# Patient Record
Sex: FEMALE | Race: BLACK OR AFRICAN AMERICAN | Employment: OTHER | URBAN - METROPOLITAN AREA
[De-identification: names, ages, dates, MRNs, and addresses within clinical notes are randomized per-mention and may not be internally consistent; named-entity substitution may affect disease eponyms.]

---

## 2017-04-05 ENCOUNTER — HOSPITAL ENCOUNTER (OUTPATIENT)
Dept: OTHER | Age: 52
Discharge: HOME | End: 2017-04-05
Attending: CLINIC/CENTER | Admitting: CLINIC/CENTER

## 2017-04-05 LAB
BACTERIA: NEGATIVE /HPF
BILIRUBIN URINE: NEGATIVE MG/DL
BLOOD, URINE: ABNORMAL
CLARITY: ABNORMAL
COLOR: YELLOW
COMMENT UA: ABNORMAL
GLUCOSE, URINE: >500 MG/DL
KETONES, URINE: NEGATIVE MG/DL
LEUKOCYTE ESTERASE, URINE: ABNORMAL
MUCUS: ABNORMAL HPF
NITRITE URINE, QUANTITATIVE: NEGATIVE
PH, URINE: 7 (ref 5–8)
PROTEIN UA: NEGATIVE MG/DL
RBC URINE: 105 /HPF (ref 0–6)
SPECIFIC GRAVITY UA: 1 (ref 1–1.03)
SQUAMOUS EPITHELIAL: 7 /HPF
UROBILINOGEN, URINE: NORMAL EU/DL (ref 0.2–1)
WBC UA: 211 /HPF (ref 0–5)

## 2017-04-07 LAB
CHLAMYDIA TRACHOMATIS AMPLIFIED DET: NEGATIVE
CULTURE: NORMAL
N GONORRHOEAE AMPLIFIED DET: NEGATIVE
REPORT STATUS: NORMAL
REQUEST PROBLEM: NORMAL
SPECIMEN: NORMAL
TOTAL COLONY COUNT: NORMAL

## 2019-08-06 ENCOUNTER — HOSPITAL ENCOUNTER (OUTPATIENT)
Age: 54
Discharge: HOME | End: 2019-08-06
Payer: MEDICAID

## 2019-08-06 LAB
ALBUMIN SERPL-MCNC: 3.9 GM/DL (ref 3.4–5)
ALP BLD-CCNC: 143 IU/L (ref 40–128)
ALT SERPL-CCNC: 51 U/L (ref 10–40)
AMPHETAMINES: NEGATIVE
ANION GAP SERPL CALCULATED.3IONS-SCNC: 6 MMOL/L (ref 4–16)
AST SERPL-CCNC: 51 IU/L (ref 15–37)
BARBITURATE SCREEN URINE: NEGATIVE
BASOPHILS ABSOLUTE: 0 K/CU MM
BASOPHILS RELATIVE PERCENT: 0.4 % (ref 0–1)
BENZODIAZEPINE SCREEN, URINE: NEGATIVE
BILIRUB SERPL-MCNC: 0.7 MG/DL (ref 0–1)
BUN BLDV-MCNC: 7 MG/DL (ref 6–23)
CALCIUM SERPL-MCNC: 9.8 MG/DL (ref 8.3–10.6)
CANNABINOID SCREEN URINE: ABNORMAL
CHLORIDE BLD-SCNC: 108 MMOL/L (ref 99–110)
CHOLESTEROL: 141 MG/DL
CO2: 20 MMOL/L (ref 21–32)
COCAINE METABOLITE: NEGATIVE
CREAT SERPL-MCNC: 1 MG/DL (ref 0.6–1.1)
DIFFERENTIAL TYPE: ABNORMAL
EOSINOPHILS ABSOLUTE: 0.1 K/CU MM
EOSINOPHILS RELATIVE PERCENT: 1.5 % (ref 0–3)
ESTIMATED AVERAGE GLUCOSE: 126 MG/DL
GFR AFRICAN AMERICAN: >60 ML/MIN/1.73M2
GFR NON-AFRICAN AMERICAN: 58 ML/MIN/1.73M2
GLUCOSE BLD-MCNC: 153 MG/DL (ref 70–99)
HBA1C MFR BLD: 6 % (ref 4.2–6.3)
HCT VFR BLD CALC: 40.4 % (ref 37–47)
HDLC SERPL-MCNC: 77 MG/DL
HEMOGLOBIN: 13.2 GM/DL (ref 12.5–16)
IMMATURE NEUTROPHIL %: 1.2 % (ref 0–0.43)
INTERPRETATION: NORMAL
LDL CHOLESTEROL DIRECT: 54 MG/DL
LITHIUM LEVEL: 1.3 MMOL/L (ref 0.6–1.2)
LYMPHOCYTES ABSOLUTE: 2.3 K/CU MM
LYMPHOCYTES RELATIVE PERCENT: 24.4 % (ref 24–44)
MCH RBC QN AUTO: 32.5 PG (ref 27–31)
MCHC RBC AUTO-ENTMCNC: 32.7 % (ref 32–36)
MCV RBC AUTO: 99.5 FL (ref 78–100)
MONOCYTES ABSOLUTE: 0.8 K/CU MM
MONOCYTES RELATIVE PERCENT: 8.3 % (ref 0–4)
NUCLEATED RBC %: 0 %
OPIATES, URINE: NEGATIVE
OXYCODONE: ABNORMAL
PDW BLD-RTO: 14.8 % (ref 11.7–14.9)
PHENCYCLIDINE, URINE: NEGATIVE
PLATELET # BLD: 171 K/CU MM (ref 140–440)
PMV BLD AUTO: 11.3 FL (ref 7.5–11.1)
POTASSIUM SERPL-SCNC: 4.2 MMOL/L (ref 3.5–5.1)
PREGNANCY, URINE: NEGATIVE
RBC # BLD: 4.06 M/CU MM (ref 4.2–5.4)
SEGMENTED NEUTROPHILS ABSOLUTE COUNT: 6 K/CU MM
SEGMENTED NEUTROPHILS RELATIVE PERCENT: 64.2 % (ref 36–66)
SODIUM BLD-SCNC: 134 MMOL/L (ref 135–145)
SPECIFIC GRAVITY, URINE: 1.01 (ref 1–1.03)
T4 FREE: 1.19 NG/DL (ref 0.9–1.8)
TOTAL IMMATURE NEUTOROPHIL: 0.11 K/CU MM
TOTAL NUCLEATED RBC: 0 K/CU MM
TOTAL PROTEIN: 8.1 GM/DL (ref 6.4–8.2)
TRIGL SERPL-MCNC: 92 MG/DL
TSH HIGH SENSITIVITY: 2.89 UIU/ML (ref 0.27–4.2)
WBC # BLD: 9.3 K/CU MM (ref 4–10.5)

## 2019-08-06 PROCEDURE — 36415 COLL VENOUS BLD VENIPUNCTURE: CPT

## 2019-08-06 PROCEDURE — 81025 URINE PREGNANCY TEST: CPT

## 2019-08-06 PROCEDURE — 84443 ASSAY THYROID STIM HORMONE: CPT

## 2019-08-06 PROCEDURE — 80178 ASSAY OF LITHIUM: CPT

## 2019-08-06 PROCEDURE — 80307 DRUG TEST PRSMV CHEM ANLYZR: CPT

## 2019-08-06 PROCEDURE — 80053 COMPREHEN METABOLIC PANEL: CPT

## 2019-08-06 PROCEDURE — 85025 COMPLETE CBC W/AUTO DIFF WBC: CPT

## 2019-08-06 PROCEDURE — 84439 ASSAY OF FREE THYROXINE: CPT

## 2019-08-06 PROCEDURE — 83036 HEMOGLOBIN GLYCOSYLATED A1C: CPT

## 2019-08-06 PROCEDURE — 83721 ASSAY OF BLOOD LIPOPROTEIN: CPT

## 2019-08-06 PROCEDURE — 80061 LIPID PANEL: CPT

## 2020-05-26 ENCOUNTER — HOSPITAL ENCOUNTER (INPATIENT)
Age: 55
LOS: 2 days | Discharge: HOME | DRG: 312 | End: 2020-05-28
Attending: EMERGENCY MEDICINE | Admitting: INTERNAL MEDICINE
Payer: COMMERCIAL

## 2020-05-26 ENCOUNTER — APPOINTMENT (OUTPATIENT)
Dept: CT IMAGING | Age: 55
DRG: 312 | End: 2020-05-26
Payer: COMMERCIAL

## 2020-05-26 ENCOUNTER — APPOINTMENT (OUTPATIENT)
Dept: MRI IMAGING | Age: 55
DRG: 312 | End: 2020-05-26
Payer: COMMERCIAL

## 2020-05-26 ENCOUNTER — APPOINTMENT (OUTPATIENT)
Dept: GENERAL RADIOLOGY | Age: 55
DRG: 312 | End: 2020-05-26
Payer: COMMERCIAL

## 2020-05-26 PROBLEM — R73.9 HYPERGLYCEMIA: Status: ACTIVE | Noted: 2020-05-26

## 2020-05-26 LAB
ALBUMIN SERPL-MCNC: 4.1 GM/DL (ref 3.4–5)
ALP BLD-CCNC: 182 IU/L (ref 40–129)
ALT SERPL-CCNC: 15 U/L (ref 10–40)
AMPHETAMINES: NEGATIVE
ANION GAP SERPL CALCULATED.3IONS-SCNC: 16 MMOL/L (ref 4–16)
AST SERPL-CCNC: 18 IU/L (ref 15–37)
BACTERIA: NEGATIVE /HPF
BARBITURATE SCREEN URINE: NEGATIVE
BASE EXCESS: 1 (ref 0–2.4)
BASE EXCESS: 3 (ref 0–2.4)
BASOPHILS ABSOLUTE: 0 K/CU MM
BASOPHILS RELATIVE PERCENT: 0.6 % (ref 0–1)
BENZODIAZEPINE SCREEN, URINE: NEGATIVE
BETA-HYDROXYBUTYRATE: 5.4 MG/DL (ref 0–3)
BILIRUB SERPL-MCNC: 0.6 MG/DL (ref 0–1)
BILIRUBIN URINE: NEGATIVE MG/DL
BLOOD, URINE: NEGATIVE
BUN BLDV-MCNC: 3 MG/DL (ref 6–23)
CALCIUM SERPL-MCNC: 9.5 MG/DL (ref 8.3–10.6)
CANNABINOID SCREEN URINE: NEGATIVE
CHLORIDE BLD-SCNC: 86 MMOL/L (ref 99–110)
CHP ED QC CHECK: NORMAL
CLARITY: CLEAR
CO2: 24 MMOL/L (ref 21–32)
COCAINE METABOLITE: ABNORMAL
COLOR: ABNORMAL
COMMENT: ABNORMAL
COMMENT: ABNORMAL
CREAT SERPL-MCNC: 0.6 MG/DL (ref 0.6–1.1)
DIFFERENTIAL TYPE: ABNORMAL
EOSINOPHILS ABSOLUTE: 0.1 K/CU MM
EOSINOPHILS RELATIVE PERCENT: 1.2 % (ref 0–3)
ESTIMATED AVERAGE GLUCOSE: 387 MG/DL
GFR AFRICAN AMERICAN: >60 ML/MIN/1.73M2
GFR NON-AFRICAN AMERICAN: >60 ML/MIN/1.73M2
GLUCOSE BLD-MCNC: 300 MG/DL (ref 70–99)
GLUCOSE BLD-MCNC: 335 MG/DL (ref 70–99)
GLUCOSE BLD-MCNC: 345 MG/DL (ref 70–99)
GLUCOSE BLD-MCNC: 388 MG/DL (ref 70–99)
GLUCOSE BLD-MCNC: 450 MG/DL (ref 70–99)
GLUCOSE BLD-MCNC: 833 MG/DL (ref 70–99)
GLUCOSE BLD-MCNC: >550 MG/DL (ref 70–99)
GLUCOSE BLD-MCNC: >550 MG/DL (ref 70–99)
GLUCOSE BLD-MCNC: NORMAL MG/DL
GLUCOSE, URINE: >500 MG/DL
HBA1C MFR BLD: 15.1 % (ref 4.2–6.3)
HCO3 VENOUS: 23.2 MMOL/L (ref 19–25)
HCO3 VENOUS: 26.3 MMOL/L (ref 19–25)
HCT VFR BLD CALC: 45.4 % (ref 37–47)
HEMOGLOBIN: 15.5 GM/DL (ref 12.5–16)
IMMATURE NEUTROPHIL %: 0.4 % (ref 0–0.43)
KETONES, URINE: NEGATIVE MG/DL
LEUKOCYTE ESTERASE, URINE: NEGATIVE
LYMPHOCYTES ABSOLUTE: 1.5 K/CU MM
LYMPHOCYTES RELATIVE PERCENT: 29.4 % (ref 24–44)
MCH RBC QN AUTO: 32.6 PG (ref 27–31)
MCHC RBC AUTO-ENTMCNC: 34.1 % (ref 32–36)
MCV RBC AUTO: 95.6 FL (ref 78–100)
MONOCYTES ABSOLUTE: 0.6 K/CU MM
MONOCYTES RELATIVE PERCENT: 10.8 % (ref 0–4)
MUCUS: ABNORMAL HPF
NITRITE URINE, QUANTITATIVE: NEGATIVE
NUCLEATED RBC %: 0 %
O2 SAT, VEN: 82.3 % (ref 50–70)
O2 SAT, VEN: 86.5 % (ref 50–70)
OPIATES, URINE: NEGATIVE
OXYCODONE: NEGATIVE
PCO2, VEN: 43 MMHG (ref 38–52)
PCO2, VEN: 51 MMHG (ref 38–52)
PDW BLD-RTO: 12.8 % (ref 11.7–14.9)
PH VENOUS: 7.32 (ref 7.32–7.42)
PH VENOUS: 7.34 (ref 7.32–7.42)
PH, URINE: 6 (ref 5–8)
PHENCYCLIDINE, URINE: NEGATIVE
PLATELET # BLD: 179 K/CU MM (ref 140–440)
PMV BLD AUTO: 11.8 FL (ref 7.5–11.1)
PO2, VEN: 250 MMHG (ref 28–48)
PO2, VEN: 58 MMHG (ref 28–48)
POTASSIUM SERPL-SCNC: 4.5 MMOL/L (ref 3.5–5.1)
PROTEIN UA: NEGATIVE MG/DL
RBC # BLD: 4.75 M/CU MM (ref 4.2–5.4)
RBC URINE: ABNORMAL /HPF (ref 0–6)
SEGMENTED NEUTROPHILS ABSOLUTE COUNT: 3 K/CU MM
SEGMENTED NEUTROPHILS RELATIVE PERCENT: 57.6 % (ref 36–66)
SODIUM BLD-SCNC: 126 MMOL/L (ref 135–145)
SPECIFIC GRAVITY UA: 1.02 (ref 1–1.03)
TOTAL IMMATURE NEUTOROPHIL: 0.02 K/CU MM
TOTAL NUCLEATED RBC: 0 K/CU MM
TOTAL PROTEIN: 8.5 GM/DL (ref 6.4–8.2)
TRICHOMONAS: ABNORMAL /HPF
TROPONIN T: <0.01 NG/ML
TROPONIN T: <0.01 NG/ML
UROBILINOGEN, URINE: NORMAL MG/DL (ref 0.2–1)
WBC # BLD: 5.1 K/CU MM (ref 4–10.5)
WBC UA: ABNORMAL /HPF (ref 0–5)

## 2020-05-26 PROCEDURE — 2700000000 HC OXYGEN THERAPY PER DAY

## 2020-05-26 PROCEDURE — 81001 URINALYSIS AUTO W/SCOPE: CPT

## 2020-05-26 PROCEDURE — 93005 ELECTROCARDIOGRAM TRACING: CPT | Performed by: EMERGENCY MEDICINE

## 2020-05-26 PROCEDURE — 6370000000 HC RX 637 (ALT 250 FOR IP): Performed by: PHYSICIAN ASSISTANT

## 2020-05-26 PROCEDURE — 2580000003 HC RX 258: Performed by: EMERGENCY MEDICINE

## 2020-05-26 PROCEDURE — 82805 BLOOD GASES W/O2 SATURATION: CPT

## 2020-05-26 PROCEDURE — 96374 THER/PROPH/DIAG INJ IV PUSH: CPT

## 2020-05-26 PROCEDURE — 94761 N-INVAS EAR/PLS OXIMETRY MLT: CPT

## 2020-05-26 PROCEDURE — 6370000000 HC RX 637 (ALT 250 FOR IP): Performed by: EMERGENCY MEDICINE

## 2020-05-26 PROCEDURE — 6360000002 HC RX W HCPCS: Performed by: EMERGENCY MEDICINE

## 2020-05-26 PROCEDURE — G0378 HOSPITAL OBSERVATION PER HR: HCPCS

## 2020-05-26 PROCEDURE — 71045 X-RAY EXAM CHEST 1 VIEW: CPT

## 2020-05-26 PROCEDURE — 96366 THER/PROPH/DIAG IV INF ADDON: CPT

## 2020-05-26 PROCEDURE — 83036 HEMOGLOBIN GLYCOSYLATED A1C: CPT

## 2020-05-26 PROCEDURE — 2580000003 HC RX 258: Performed by: INTERNAL MEDICINE

## 2020-05-26 PROCEDURE — 2000000000 HC ICU R&B

## 2020-05-26 PROCEDURE — 80053 COMPREHEN METABOLIC PANEL: CPT

## 2020-05-26 PROCEDURE — 70450 CT HEAD/BRAIN W/O DYE: CPT

## 2020-05-26 PROCEDURE — 96365 THER/PROPH/DIAG IV INF INIT: CPT

## 2020-05-26 PROCEDURE — 99291 CRITICAL CARE FIRST HOUR: CPT

## 2020-05-26 PROCEDURE — 84484 ASSAY OF TROPONIN QUANT: CPT

## 2020-05-26 PROCEDURE — 70551 MRI BRAIN STEM W/O DYE: CPT

## 2020-05-26 PROCEDURE — 80307 DRUG TEST PRSMV CHEM ANLYZR: CPT

## 2020-05-26 PROCEDURE — 85025 COMPLETE CBC W/AUTO DIFF WBC: CPT

## 2020-05-26 PROCEDURE — 96375 TX/PRO/DX INJ NEW DRUG ADDON: CPT

## 2020-05-26 PROCEDURE — 82962 GLUCOSE BLOOD TEST: CPT

## 2020-05-26 PROCEDURE — 6370000000 HC RX 637 (ALT 250 FOR IP): Performed by: INTERNAL MEDICINE

## 2020-05-26 PROCEDURE — 82010 KETONE BODYS QUAN: CPT

## 2020-05-26 RX ORDER — ASPIRIN 81 MG/1
81 TABLET, CHEWABLE ORAL DAILY
Status: DISCONTINUED | OUTPATIENT
Start: 2020-05-26 | End: 2020-05-28 | Stop reason: HOSPADM

## 2020-05-26 RX ORDER — POLYETHYLENE GLYCOL 3350 17 G/17G
17 POWDER, FOR SOLUTION ORAL DAILY PRN
Status: DISCONTINUED | OUTPATIENT
Start: 2020-05-26 | End: 2020-05-28 | Stop reason: HOSPADM

## 2020-05-26 RX ORDER — QUETIAPINE FUMARATE 200 MG/1
400 TABLET, FILM COATED ORAL NIGHTLY
Status: DISCONTINUED | OUTPATIENT
Start: 2020-05-26 | End: 2020-05-28 | Stop reason: HOSPADM

## 2020-05-26 RX ORDER — SODIUM CHLORIDE 0.9 % (FLUSH) 0.9 %
10 SYRINGE (ML) INJECTION EVERY 12 HOURS SCHEDULED
Status: DISCONTINUED | OUTPATIENT
Start: 2020-05-26 | End: 2020-05-28 | Stop reason: HOSPADM

## 2020-05-26 RX ORDER — DEXTROSE MONOHYDRATE 50 MG/ML
100 INJECTION, SOLUTION INTRAVENOUS PRN
Status: DISCONTINUED | OUTPATIENT
Start: 2020-05-26 | End: 2020-05-28 | Stop reason: HOSPADM

## 2020-05-26 RX ORDER — ACETAMINOPHEN 325 MG/1
650 TABLET ORAL EVERY 6 HOURS PRN
Status: DISCONTINUED | OUTPATIENT
Start: 2020-05-26 | End: 2020-05-28 | Stop reason: HOSPADM

## 2020-05-26 RX ORDER — 0.9 % SODIUM CHLORIDE 0.9 %
1000 INTRAVENOUS SOLUTION INTRAVENOUS ONCE
Status: COMPLETED | OUTPATIENT
Start: 2020-05-26 | End: 2020-05-26

## 2020-05-26 RX ORDER — ONDANSETRON 2 MG/ML
4 INJECTION INTRAMUSCULAR; INTRAVENOUS EVERY 30 MIN PRN
Status: DISCONTINUED | OUTPATIENT
Start: 2020-05-26 | End: 2020-05-26

## 2020-05-26 RX ORDER — SODIUM CHLORIDE 0.9 % (FLUSH) 0.9 %
10 SYRINGE (ML) INJECTION PRN
Status: DISCONTINUED | OUTPATIENT
Start: 2020-05-26 | End: 2020-05-28 | Stop reason: HOSPADM

## 2020-05-26 RX ORDER — ONDANSETRON 2 MG/ML
4 INJECTION INTRAMUSCULAR; INTRAVENOUS EVERY 6 HOURS PRN
Status: DISCONTINUED | OUTPATIENT
Start: 2020-05-26 | End: 2020-05-28 | Stop reason: HOSPADM

## 2020-05-26 RX ORDER — PROMETHAZINE HYDROCHLORIDE 25 MG/1
12.5 TABLET ORAL EVERY 6 HOURS PRN
Status: DISCONTINUED | OUTPATIENT
Start: 2020-05-26 | End: 2020-05-28 | Stop reason: HOSPADM

## 2020-05-26 RX ORDER — NICOTINE POLACRILEX 4 MG
15 LOZENGE BUCCAL PRN
Status: DISCONTINUED | OUTPATIENT
Start: 2020-05-26 | End: 2020-05-28 | Stop reason: HOSPADM

## 2020-05-26 RX ORDER — SODIUM CHLORIDE 9 MG/ML
INJECTION, SOLUTION INTRAVENOUS CONTINUOUS
Status: DISCONTINUED | OUTPATIENT
Start: 2020-05-26 | End: 2020-05-27

## 2020-05-26 RX ORDER — ACETAMINOPHEN 650 MG/1
650 SUPPOSITORY RECTAL EVERY 6 HOURS PRN
Status: DISCONTINUED | OUTPATIENT
Start: 2020-05-26 | End: 2020-05-28 | Stop reason: HOSPADM

## 2020-05-26 RX ORDER — DEXTROSE MONOHYDRATE 25 G/50ML
12.5 INJECTION, SOLUTION INTRAVENOUS PRN
Status: DISCONTINUED | OUTPATIENT
Start: 2020-05-26 | End: 2020-05-28 | Stop reason: HOSPADM

## 2020-05-26 RX ADMIN — ASPIRIN 81 MG 81 MG: 81 TABLET ORAL at 19:59

## 2020-05-26 RX ADMIN — SODIUM CHLORIDE, PRESERVATIVE FREE 10 ML: 5 INJECTION INTRAVENOUS at 23:02

## 2020-05-26 RX ADMIN — SODIUM CHLORIDE 1000 ML: 9 INJECTION, SOLUTION INTRAVENOUS at 19:40

## 2020-05-26 RX ADMIN — ONDANSETRON 4 MG: 2 INJECTION INTRAMUSCULAR; INTRAVENOUS at 15:25

## 2020-05-26 RX ADMIN — SODIUM CHLORIDE 1000 ML: 9 INJECTION, SOLUTION INTRAVENOUS at 15:24

## 2020-05-26 RX ADMIN — SODIUM CHLORIDE: 9 INJECTION, SOLUTION INTRAVENOUS at 23:01

## 2020-05-26 RX ADMIN — QUETIAPINE FUMARATE 400 MG: 200 TABLET ORAL at 23:01

## 2020-05-26 RX ADMIN — SODIUM CHLORIDE 1 UNITS/HR: 9 INJECTION, SOLUTION INTRAVENOUS at 18:14

## 2020-05-26 ASSESSMENT — PAIN DESCRIPTION - PAIN TYPE: TYPE: ACUTE PAIN

## 2020-05-26 ASSESSMENT — PAIN SCALES - GENERAL: PAINLEVEL_OUTOF10: 8

## 2020-05-26 NOTE — ED TRIAGE NOTES
Pt mother Sergio Hilliard [phone]  Pt sister Komal Mayfield [phone]  Pt ok for pt mother and sister to have information about her.

## 2020-05-26 NOTE — ED PROVIDER NOTES
History    Marital status: Legally      Spouse name: Not on file    Number of children: Not on file    Years of education: Not on file    Highest education level: Not on file   Occupational History    Not on file   Social Needs    Financial resource strain: Not on file    Food insecurity     Worry: Not on file     Inability: Not on file    Transportation needs     Medical: Not on file     Non-medical: Not on file   Tobacco Use    Smoking status: Current Every Day Smoker     Packs/day: 1.00     Types: Cigarettes    Smokeless tobacco: Never Used   Substance and Sexual Activity    Alcohol use: Yes     Comment: occassionally    Drug use: Yes     Types: Marijuana    Sexual activity: Not on file   Lifestyle    Physical activity     Days per week: Not on file     Minutes per session: Not on file    Stress: Not on file   Relationships    Social connections     Talks on phone: Not on file     Gets together: Not on file     Attends Gnosticist service: Not on file     Active member of club or organization: Not on file     Attends meetings of clubs or organizations: Not on file     Relationship status: Not on file    Intimate partner violence     Fear of current or ex partner: Not on file     Emotionally abused: Not on file     Physically abused: Not on file     Forced sexual activity: Not on file   Other Topics Concern    Not on file   Social History Narrative    Not on file     Current Facility-Administered Medications   Medication Dose Route Frequency Provider Last Rate Last Dose    glucose (GLUTOSE) 40 % oral gel 15 g  15 g Oral PRN Marcos Morales MD        dextrose 50 % IV solution  12.5 g Intravenous PRN Marcos Morales MD        glucagon (rDNA) injection 1 mg  1 mg Intramuscular PRN Marcos Morales MD        dextrose 5 % solution  100 mL/hr Intravenous PRN Marcos Morales MD        insulin regular (HUMULIN R;NOVOLIN R) 100 Units in sodium chloride 0.9 % 100 mL infusion  1 Units/hr Intravenous Continuous Alessio Noonan MD 1 mL/hr at 05/26/20 2110 1 Units/hr at 05/26/20 2110    0.9 % sodium chloride infusion   Intravenous Continuous Yung Dillard MD        acetaminophen (TYLENOL) tablet 650 mg  650 mg Oral Q6H PRN Yung Dillard MD        Or   Anna Haro acetaminophen (TYLENOL) suppository 650 mg  650 mg Rectal Q6H PRN Yung Dillard MD        promethazine (PHENERGAN) tablet 12.5 mg  12.5 mg Oral Q6H PRN Yung Dillard MD        Or    ondansetron (ZOFRAN) injection 4 mg  4 mg Intravenous Q6H PRN Yung Dillard MD        aspirin chewable tablet 81 mg  81 mg Oral Daily Yung Dillard MD   81 mg at 05/26/20 1959     No current outpatient medications on file. Allergies   Allergen Reactions    Haldol [Haloperidol Lactate] Other (See Comments)     Unknown, severe reaction per mother    Penicillins        Nursing Notes Reviewed    Physical Exam:  Triage VS:    ED Triage Vitals [05/26/20 1345]   Enc Vitals Group      /87      Pulse 111      Resp 18      Temp 98.3 °F (36.8 °C)      Temp Source Oral      SpO2 95 %      Weight 162 lb (73.5 kg)      Height 5' 2\" (1.575 m)      Head Circumference       Peak Flow       Pain Score       Pain Loc       Pain Edu? Excl. in [address]? My pulse ox interpretation is - normal    General appearance:  No acute distress. Skin:  Warm. Dry. No vaginal/labial rash, no other rashes noted. Eye:  Extraocular movements intact. Ears, nose, mouth and throat:  Oral mucosa moist   Neck:  Trachea midline. Extremity:  No swelling. Normal ROM    Heart:  Tachycardic with regular rhythm, normal S1 & S2, no extra heart sounds. Perfusion:  intact  Respiratory:  Lungs clear to auscultation bilaterally. Respirations nonlabored. Abdominal:  Normal bowel sounds. Soft. Nontender. Non distended.   Neurological:  Alert and oriented, follows commands with all limbs, strength 5 out of 5 bilateral upper and lower extremities, sensation intact to light touch throughout bilateral upper and lower extremities. Finger-nose testing with no ataxia bilaterally.   Gait deferred          Psychiatric:  Appropriate    I have reviewed and interpreted all of the currently available lab results from this visit (if applicable):  Results for orders placed or performed during the hospital encounter of 05/26/20   Blood Gas, Venous   Result Value Ref Range    pH, Balbir 7.32 7.32 - 7.42    pCO2, Balbir 51 38 - 52 mmHG    pO2, Balbir 58 (H) 28 - 48 mmHG    Base Excess 1 0 - 2.4    HCO3, Venous 26.3 (H) 19 - 25 MMOL/L    O2 Sat, Balbir 82.3 (H) 50 - 70 %    Comment VBG    CBC Auto Differential   Result Value Ref Range    WBC 5.1 4.0 - 10.5 K/CU MM    RBC 4.75 4.2 - 5.4 M/CU MM    Hemoglobin 15.5 12.5 - 16.0 GM/DL    Hematocrit 45.4 37 - 47 %    MCV 95.6 78 - 100 FL    MCH 32.6 (H) 27 - 31 PG    MCHC 34.1 32.0 - 36.0 %    RDW 12.8 11.7 - 14.9 %    Platelets 028 530 - 156 K/CU MM    MPV 11.8 (H) 7.5 - 11.1 FL    Differential Type AUTOMATED DIFFERENTIAL     Segs Relative 57.6 36 - 66 %    Lymphocytes % 29.4 24 - 44 %    Monocytes % 10.8 (H) 0 - 4 %    Eosinophils % 1.2 0 - 3 %    Basophils % 0.6 0 - 1 %    Segs Absolute 3.0 K/CU MM    Lymphocytes Absolute 1.5 K/CU MM    Monocytes Absolute 0.6 K/CU MM    Eosinophils Absolute 0.1 K/CU MM    Basophils Absolute 0.0 K/CU MM    Nucleated RBC % 0.0 %    Total Nucleated RBC 0.0 K/CU MM    Total Immature Neutrophil 0.02 K/CU MM    Immature Neutrophil % 0.4 0 - 0.43 %   CMP   Result Value Ref Range    Sodium 126 (L) 135 - 145 MMOL/L    Potassium 4.5 3.5 - 5.1 MMOL/L    Chloride 86 (L) 99 - 110 mMol/L    CO2 24 21 - 32 MMOL/L    BUN 3 (L) 6 - 23 MG/DL    CREATININE 0.6 0.6 - 1.1 MG/DL    Glucose 833 (HH) 70 - 99 MG/DL    Calcium 9.5 8.3 - 10.6 MG/DL    Alb 4.1 3.4 - 5.0 GM/DL    Total Protein 8.5 (H) 6.4 - 8.2 GM/DL    Total Bilirubin 0.6 0.0 - 1.0 MG/DL    ALT 15 10 - 40 U/L    AST 18 15 - 37 IU/L    Alkaline Phosphatase 182 (H) 40 - 129 IU/L    GFR

## 2020-05-26 NOTE — ED NOTES
The pt's glucose is \"Out of Reportable Range\" and was taken at 1719 by this tech.      Ray Virk  05/26/20 1725

## 2020-05-26 NOTE — H&P
History and Physical  Internal Medicine Hospitalist      Name:  Jorge L Parrish /Age/Sex: 1965  (53 y.o. female)   MRN & CSN:  [id number] & [id number] Admission Date/Time: 2020  2:39 PM   Location:  [facility] PCP: Aroldo Singleton MD       Hospital Day: 1          Chief Complaint: Chest Pain (squeezing a month, general unwell); Other (groin has sores like when pt had acidosis); and Loss of Consciousness (x 3 per mom)     Assessment and Plan:     --- Recurrent syncope/pre-syncope  - unclear etiology. CT head unremarkable. --- Atypical chest pain -initial troponin normal.  --- Hyperglycemia (). Not in DKA. --- Pseudohyponatremia due to hyperglycemia. --- Poorly controlled T2DM. --- Paranoid schizophrenia - no longer on treatment. --- Elevated BP. No hx of HTN. --- Cigarette smoker - 1 PPD    Plan:  Continue insulin drip. Will switch to SQ insulin when BG < 250 mg/dL. Maintain n.p.o. until ready to switch to SQ insulin. Check hemoglobin A1c  Endocrinology consultation  Continue normal saline  Check orthostatic vital signs  MRI brain without contrast and carotid ultrasound  ECHO  ECG  Cardiology consult  Smoking cessation counseling offered. She declined nicotine patch. Current diagnosis and plan of management discussed with the patient  at the time of admission in lay language who agree to the above plan and disposition of admission for further care. All concerns and questions addressed. Diet NPO   DVT Prophylaxis [x] Lovenox, []  Heparin, [] SCDs, [] Ambulation  [] Long term AC   GI Prophylaxis [] PPI,  [] H2 Blocker,  [] Carafate,  [] Diet,  [x] No GI prophylaxis, N/A: patient is not under significant medical stress, non-ICU or is receiving a diet/tube feeds   Code Status FULL   Disposition  home when ready for discharge.    MDM [] Low, [x] Moderate,[]  High     History of Present Illness:     Principal Problem: Recurrent syncope and intermittent chest pain for 3 quan Balderrama is a 53 y.o. female who presents to the ED with above complaints. Patient has PMH of diabetes mellitus, schizophrenia. She reports history of 3 \" heart attacks\" when she was living at [place] but denies any stent placement or open heart surgeries. She presented to the ED today because of recurrent syncopal episodes. She reports 3 episodes. She says episodes started as dizziness and then she sees gray and then passes out. She reports usually passing out about 5 to 10 minutes and when she comes around she is back to her normal self. She denies any history of seizures. No one has ever witnessed this episode. She reports issues with hypo-and hyperglycemia in the past but she has not checked her blood sugar after these episodes so she cannot if she was hypoglycemic.  she denies extremity weakness. No headache or blurry vision. She also endorses intermittent chest pain. Describes episodes as starting out as a burning sensation then progresses to a tightness. Episodes would last about 10 seconds. Last episode was few hours prior to today's presentation to the ED. She denies exertional chest pain. She denies any relationship with meals. She cannot identify any trigger. In the ED she was found to have blood glucose of 833 and sodium of 126. She was not in DKA. She was started on insulin drip in the ED and admitted for further evaluation. She endorses polyuria and polydipsia. ED Course: Discussed case with ED physician prior to admission. ROS: As per HPI, otherwise 10-systems reviewed negative, unless as noted above.     Objective:   No intake or output data in the 24 hours ending 05/26/20 1831     Vitals:   Vitals:    05/26/20 1600 05/26/20 1651 05/26/20 1751 05/26/20 1805   BP: (!) 143/90 (!) 128/91 (!) 136/93 (!) 164/103   Pulse:  81 90 97   Resp: 18 20     Temp:       TempSrc:       SpO2: 98% 100% 100% 100%   Weight:       Height:         Physical Exam: 05/26/20    GEN  --American woman, sitting upright in bed. EYES -Pupils are equally round. No vision changes. No scleral erythema, discharge, or conjunctivitis. HENT -MM are moist. Oral pharynx without exudates, no evidence of thrush. NECK -Supple, no apparent thyromegaly or masses. RESP -LS CTA equal bilat, no wheezes, rales or rhonchi. Symmetric chest movement. No respiratory distress noted. C/V  -S1/S2 auscultated. RRR without appreciable M/R/G. No peripheral edema. No reproducible chest wall tenderness. GI  -Abdomen is soft non-distended, no significant tenderness. No masses or guarding.   -Laurent catheter is not present. MS  -B/L extremities strong muscles strength. Full movements. No gross joint deformities. No swelling, intact sensation symmetrical.   SKIN  -Normal coloration, warm, dry. No open wounds or ulcers. NEURO  - normal speech, no lateralizing weakness. PSYC  -Awake, alert, oriented x 3. Appropriate affect. Past Medical History:      Past Medical History:   Diagnosis Date    CAD (coronary artery disease)     Diabetes mellitus ([facility] [address]     Hepatitis C      Past Surgery History:  Patient  has a past surgical history that includes Hysterectomy and Cholecystectomy. Social History:    FAM HX: Assessed: Noncontributory.   Soc HX:   Social History     Socioeconomic History    Marital status: Legally      Spouse name: None    Number of children: None    Years of education: None    Highest education level: None   Occupational History    None   Social Needs    Financial resource strain: None    Food insecurity     Worry: None     Inability: None    Transportation needs     Medical: None     Non-medical: None   Tobacco Use    Smoking status: Current Every Day Smoker     Packs/day: 1.00     Types: Cigarettes    Smokeless tobacco: Never Used   Substance and Sexual Activity    Alcohol use: Yes     Comment: occassionally    Drug use: Yes     Types: Marijuana    Sexual MD on 5/26/2020 at 6:31 PM

## 2020-05-27 ENCOUNTER — APPOINTMENT (OUTPATIENT)
Dept: ULTRASOUND IMAGING | Age: 55
DRG: 312 | End: 2020-05-27
Payer: COMMERCIAL

## 2020-05-27 ENCOUNTER — APPOINTMENT (OUTPATIENT)
Dept: GENERAL RADIOLOGY | Age: 55
DRG: 312 | End: 2020-05-27
Payer: COMMERCIAL

## 2020-05-27 PROBLEM — E11.9 TYPE 2 DIABETES MELLITUS WITHOUT COMPLICATION, WITH LONG-TERM CURRENT USE OF INSULIN (HCC): Status: ACTIVE | Noted: 2020-05-27

## 2020-05-27 PROBLEM — R07.2 PRECORDIAL PAIN: Status: ACTIVE | Noted: 2020-05-27

## 2020-05-27 PROBLEM — Z79.4 TYPE 2 DIABETES MELLITUS WITHOUT COMPLICATION, WITH LONG-TERM CURRENT USE OF INSULIN (HCC): Status: ACTIVE | Noted: 2020-05-27

## 2020-05-27 LAB
ANION GAP SERPL CALCULATED.3IONS-SCNC: 10 MMOL/L (ref 4–16)
BACTERIA: NEGATIVE /HPF
BILIRUBIN URINE: NEGATIVE MG/DL
BLOOD, URINE: NEGATIVE
BUN BLDV-MCNC: 2 MG/DL (ref 6–23)
CALCIUM SERPL-MCNC: 9 MG/DL (ref 8.3–10.6)
CHLORIDE BLD-SCNC: 107 MMOL/L (ref 99–110)
CLARITY: ABNORMAL
CO2: 23 MMOL/L (ref 21–32)
COLOR: ABNORMAL
CREAT SERPL-MCNC: 0.5 MG/DL (ref 0.6–1.1)
EKG ATRIAL RATE: 109 BPM
EKG DIAGNOSIS: NORMAL
EKG P-R INTERVAL: 96 MS
EKG Q-T INTERVAL: 492 MS
EKG QRS DURATION: 78 MS
EKG QTC CALCULATION (BAZETT): 662 MS
EKG R AXIS: -32 DEGREES
EKG T AXIS: 53 DEGREES
EKG VENTRICULAR RATE: 109 BPM
GFR AFRICAN AMERICAN: >60 ML/MIN/1.73M2
GFR NON-AFRICAN AMERICAN: >60 ML/MIN/1.73M2
GLUCOSE BLD-MCNC: 166 MG/DL (ref 70–99)
GLUCOSE BLD-MCNC: 190 MG/DL (ref 70–99)
GLUCOSE BLD-MCNC: 243 MG/DL (ref 70–99)
GLUCOSE BLD-MCNC: 252 MG/DL (ref 70–99)
GLUCOSE BLD-MCNC: 264 MG/DL (ref 70–99)
GLUCOSE BLD-MCNC: 272 MG/DL (ref 70–99)
GLUCOSE BLD-MCNC: 274 MG/DL (ref 70–99)
GLUCOSE BLD-MCNC: 279 MG/DL (ref 70–99)
GLUCOSE BLD-MCNC: 279 MG/DL (ref 70–99)
GLUCOSE BLD-MCNC: 290 MG/DL (ref 70–99)
GLUCOSE BLD-MCNC: 347 MG/DL (ref 70–99)
GLUCOSE BLD-MCNC: 412 MG/DL (ref 70–99)
GLUCOSE, URINE: >500 MG/DL
HCT VFR BLD CALC: 38.7 % (ref 37–47)
HEMOGLOBIN: 13.1 GM/DL (ref 12.5–16)
KETONES, URINE: NEGATIVE MG/DL
LEUKOCYTE ESTERASE, URINE: ABNORMAL
LV EF: 53 %
LVEF MODALITY: NORMAL
MCH RBC QN AUTO: 32.2 PG (ref 27–31)
MCHC RBC AUTO-ENTMCNC: 33.9 % (ref 32–36)
MCV RBC AUTO: 95.1 FL (ref 78–100)
NITRITE URINE, QUANTITATIVE: NEGATIVE
PDW BLD-RTO: 12.5 % (ref 11.7–14.9)
PH, URINE: 8 (ref 5–8)
PLATELET # BLD: 145 K/CU MM (ref 140–440)
PMV BLD AUTO: 11.3 FL (ref 7.5–11.1)
POTASSIUM SERPL-SCNC: 3.7 MMOL/L (ref 3.5–5.1)
PROTEIN UA: NEGATIVE MG/DL
RBC # BLD: 4.07 M/CU MM (ref 4.2–5.4)
RBC URINE: 3 /HPF (ref 0–6)
SODIUM BLD-SCNC: 140 MMOL/L (ref 135–145)
SPECIFIC GRAVITY UA: 1.01 (ref 1–1.03)
SQUAMOUS EPITHELIAL: 4 /HPF
TRICHOMONAS: ABNORMAL /HPF
TROPONIN T: <0.01 NG/ML
TSH HIGH SENSITIVITY: 0.94 UIU/ML (ref 0.27–4.2)
UROBILINOGEN, URINE: NORMAL MG/DL (ref 0.2–1)
WBC # BLD: 8.4 K/CU MM (ref 4–10.5)
WBC UA: 4 /HPF (ref 0–5)

## 2020-05-27 PROCEDURE — 93306 TTE W/DOPPLER COMPLETE: CPT

## 2020-05-27 PROCEDURE — 80053 COMPREHEN METABOLIC PANEL: CPT

## 2020-05-27 PROCEDURE — 84484 ASSAY OF TROPONIN QUANT: CPT

## 2020-05-27 PROCEDURE — 2060000000 HC ICU INTERMEDIATE R&B

## 2020-05-27 PROCEDURE — 6360000002 HC RX W HCPCS: Performed by: INTERNAL MEDICINE

## 2020-05-27 PROCEDURE — 84443 ASSAY THYROID STIM HORMONE: CPT

## 2020-05-27 PROCEDURE — G0378 HOSPITAL OBSERVATION PER HR: HCPCS

## 2020-05-27 PROCEDURE — 85027 COMPLETE CBC AUTOMATED: CPT

## 2020-05-27 PROCEDURE — 96366 THER/PROPH/DIAG IV INF ADDON: CPT

## 2020-05-27 PROCEDURE — 81001 URINALYSIS AUTO W/SCOPE: CPT

## 2020-05-27 PROCEDURE — 80048 BASIC METABOLIC PNL TOTAL CA: CPT

## 2020-05-27 PROCEDURE — 93010 ELECTROCARDIOGRAM REPORT: CPT | Performed by: INTERNAL MEDICINE

## 2020-05-27 PROCEDURE — 6370000000 HC RX 637 (ALT 250 FOR IP): Performed by: INTERNAL MEDICINE

## 2020-05-27 PROCEDURE — 99223 1ST HOSP IP/OBS HIGH 75: CPT | Performed by: INTERNAL MEDICINE

## 2020-05-27 PROCEDURE — 94761 N-INVAS EAR/PLS OXIMETRY MLT: CPT

## 2020-05-27 PROCEDURE — 96375 TX/PRO/DX INJ NEW DRUG ADDON: CPT

## 2020-05-27 PROCEDURE — 2580000003 HC RX 258: Performed by: INTERNAL MEDICINE

## 2020-05-27 PROCEDURE — 96372 THER/PROPH/DIAG INJ SC/IM: CPT

## 2020-05-27 PROCEDURE — 71045 X-RAY EXAM CHEST 1 VIEW: CPT

## 2020-05-27 PROCEDURE — 96368 THER/DIAG CONCURRENT INF: CPT

## 2020-05-27 PROCEDURE — 93880 EXTRACRANIAL BILAT STUDY: CPT

## 2020-05-27 PROCEDURE — 6370000000 HC RX 637 (ALT 250 FOR IP): Performed by: PHYSICIAN ASSISTANT

## 2020-05-27 RX ORDER — HYDRALAZINE HYDROCHLORIDE 20 MG/ML
10 INJECTION INTRAMUSCULAR; INTRAVENOUS EVERY 6 HOURS PRN
Status: DISCONTINUED | OUTPATIENT
Start: 2020-05-27 | End: 2020-05-28 | Stop reason: HOSPADM

## 2020-05-27 RX ORDER — INSULIN GLARGINE 100 [IU]/ML
40 INJECTION, SOLUTION SUBCUTANEOUS NIGHTLY
Status: DISCONTINUED | OUTPATIENT
Start: 2020-05-27 | End: 2020-05-28

## 2020-05-27 RX ORDER — ASPIRIN 81 MG/1
81 TABLET, CHEWABLE ORAL DAILY
COMMUNITY

## 2020-05-27 RX ORDER — SODIUM CHLORIDE 9 MG/ML
INJECTION, SOLUTION INTRAVENOUS CONTINUOUS
Status: DISCONTINUED | OUTPATIENT
Start: 2020-05-27 | End: 2020-05-28 | Stop reason: HOSPADM

## 2020-05-27 RX ORDER — QUETIAPINE FUMARATE 300 MG/1
400 TABLET, FILM COATED ORAL NIGHTLY
Status: ON HOLD | COMMUNITY
End: 2022-03-22 | Stop reason: SDUPTHER

## 2020-05-27 RX ORDER — DEXTROSE AND SODIUM CHLORIDE 5; .45 G/100ML; G/100ML
INJECTION, SOLUTION INTRAVENOUS CONTINUOUS
Status: DISCONTINUED | OUTPATIENT
Start: 2020-05-27 | End: 2020-05-28

## 2020-05-27 RX ORDER — LISINOPRIL 10 MG/1
10 TABLET ORAL DAILY
Status: DISCONTINUED | OUTPATIENT
Start: 2020-05-27 | End: 2020-05-28 | Stop reason: HOSPADM

## 2020-05-27 RX ORDER — LISINOPRIL 10 MG/1
10 TABLET ORAL DAILY
Status: ON HOLD | COMMUNITY
End: 2020-05-28 | Stop reason: SDUPTHER

## 2020-05-27 RX ADMIN — DEXTROSE AND SODIUM CHLORIDE: 5; 450 INJECTION, SOLUTION INTRAVENOUS at 09:54

## 2020-05-27 RX ADMIN — ASPIRIN 81 MG 81 MG: 81 TABLET ORAL at 09:54

## 2020-05-27 RX ADMIN — SODIUM CHLORIDE: 9 INJECTION, SOLUTION INTRAVENOUS at 14:41

## 2020-05-27 RX ADMIN — ENOXAPARIN SODIUM 40 MG: 40 INJECTION SUBCUTANEOUS at 09:54

## 2020-05-27 RX ADMIN — SODIUM CHLORIDE, PRESERVATIVE FREE 10 ML: 5 INJECTION INTRAVENOUS at 09:54

## 2020-05-27 RX ADMIN — HYDRALAZINE HYDROCHLORIDE 10 MG: 20 INJECTION INTRAMUSCULAR; INTRAVENOUS at 13:02

## 2020-05-27 RX ADMIN — LISINOPRIL 10 MG: 10 TABLET ORAL at 14:41

## 2020-05-27 RX ADMIN — INSULIN GLARGINE 40 UNITS: 100 INJECTION, SOLUTION SUBCUTANEOUS at 20:56

## 2020-05-27 RX ADMIN — DEXTROSE AND SODIUM CHLORIDE: 5; 450 INJECTION, SOLUTION INTRAVENOUS at 02:24

## 2020-05-27 RX ADMIN — QUETIAPINE FUMARATE 400 MG: 200 TABLET ORAL at 20:56

## 2020-05-27 RX ADMIN — ACETAMINOPHEN 650 MG: 325 TABLET ORAL at 12:36

## 2020-05-27 ASSESSMENT — PAIN DESCRIPTION - DESCRIPTORS
DESCRIPTORS: HEADACHE
DESCRIPTORS: HEADACHE

## 2020-05-27 ASSESSMENT — PAIN DESCRIPTION - LOCATION: LOCATION: HEAD

## 2020-05-27 ASSESSMENT — PAIN SCALES - GENERAL
PAINLEVEL_OUTOF10: 10
PAINLEVEL_OUTOF10: 0
PAINLEVEL_OUTOF10: 6

## 2020-05-27 ASSESSMENT — PAIN DESCRIPTION - PAIN TYPE: TYPE: ACUTE PAIN

## 2020-05-27 NOTE — PROGRESS NOTES
05/27/20 1002   BP: (!) 158/106   Pulse: 89   Resp: 25   Temp:    SpO2:      Physical Exam:   GEN Awake.  Alert , not in respiratory distress, not in pain  HEENT: PEERLA, , supple neck,   Chest: air entry equal bilaterally, no wheezing or crepitation  Heart: S1 and S2 heard, no murmur, no gallop or rub, regular rate  Abdomen: soft, ND , Nt, +BS  Extremities: no cyanosis, tenderness or erythema, peripheral pulses audible  Neurology: alert, oriented x3, able to move 4 limbs    Medications:   Medications:    insulin lispro  15 Units Subcutaneous TID WC    insulin glargine  40 Units Subcutaneous Nightly    insulin lispro  0-12 Units Subcutaneous TID WC    insulin lispro  0-6 Units Subcutaneous 2 times per day    sodium chloride flush  10 mL Intravenous 2 times per day    enoxaparin  40 mg Subcutaneous Daily    aspirin  81 mg Oral Daily    QUEtiapine  400 mg Oral Nightly      Infusions:    dextrose 5 % and 0.45 % NaCl 150 mL/hr at 05/27/20 0954    dextrose      insulin 1 Units/hr (05/26/20 2110)     PRN Meds: glucose, 15 g, PRN  dextrose, 12.5 g, PRN  glucagon (rDNA), 1 mg, PRN  dextrose, 100 mL/hr, PRN  sodium chloride flush, 10 mL, PRN  acetaminophen, 650 mg, Q6H PRN    Or  acetaminophen, 650 mg, Q6H PRN  polyethylene glycol, 17 g, Daily PRN  promethazine, 12.5 mg, Q6H PRN    Or  ondansetron, 4 mg, Q6H PRN          Electronically signed by Jill Amin MD on 5/27/2020 at 10:55 AM

## 2020-05-27 NOTE — CONSULTS
CARDIOLOGY CONSULT NOTE   Reason for consultation:  Chest pain/ syncope    Referring physician:  Yung Dillard MD     Primary care physician: Brie Lynn MD      Dear  Dr. Yung Dillard MD   Thanks for the consult. Chief Complaints :  Chief Complaint   Patient presents with    Chest Pain     squeezing a month, general unwell    Other     groin has sores like when pt had acidosis    Loss of Consciousness     x 3 per mom        History of present illness:Deb is a 53 y. o.year old who has history of uncontrolled diabetes presents with complaint of intermittent dizziness lightheadedness reports episodes of passing out. He says he gets dizzy when she stands up she also reports chest tightness in the middle of the chest without any radiation. She has history of schizophrenia reports that she has had heart attacks before when she was out of the [place] although cannot confirm where. She has uncontrolled diabetes not taking any medication supposed to be on insulin. In the emergency department she was noted to have possible ketoacidosis that admitted in the ICU for IV fluids and insulin. Cardiology was consulted due to intermittent complaints of chest tightness. EKG shows sinus tachycardia    Past medical history:    has a past medical history of CAD (coronary artery disease), Diabetes mellitus ([address] and Hepatitis C. Past surgical history:   has a past surgical history that includes Hysterectomy and Cholecystectomy. Social History:   reports that she has been smoking cigarettes. She has been smoking about 1.00 pack per day. She has never used smokeless tobacco. She reports current alcohol use. She reports current drug use. Drug: Marijuana.   Family history:   no family history of CAD, STROKE of DM at early age    Allergies   Allergen Reactions    Haldol [Haloperidol Lactate] Other (See Comments)     Unknown, severe reaction per mother    Penicillins        dextrose 5 % and 0.45 % sodium MD Maxx        dextrose 50 % IV solution  12.5 g Intravenous PRN Patricia Kitchen MD        glucagon (rDNA) injection 1 mg  1 mg Intramuscular PRN Patricia Kitchen MD        dextrose 5 % solution  100 mL/hr Intravenous PRN Patricia Kitchen MD        insulin regular (HUMULIN R;NOVOLIN R) 100 Units in sodium chloride 0.9 % 100 mL infusion  1 Units/hr Intravenous Continuous Patricia Kitchen MD 1 mL/hr at 05/26/20 2110 1 Units/hr at 05/26/20 2110    sodium chloride flush 0.9 % injection 10 mL  10 mL Intravenous 2 times per day Patricia Kitchen MD   10 mL at 05/27/20 0954    sodium chloride flush 0.9 % injection 10 mL  10 mL Intravenous PRN Patricia Kitchen MD        acetaminophen (TYLENOL) tablet 650 mg  650 mg Oral Q6H PRN Patricia Kitchen MD   650 mg at 05/27/20 1236    Or    acetaminophen (TYLENOL) suppository 650 mg  650 mg Rectal Q6H PRN Patricia Kitchen MD        polyethylene glycol (GLYCOLAX) packet 17 g  17 g Oral Daily PRN Patricia Kitchen MD        promethazine (PHENERGAN) tablet 12.5 mg  12.5 mg Oral Q6H PRN Patricia Kitchen MD        Or    ondansetron (ZOFRAN) injection 4 mg  4 mg Intravenous Q6H PRN Patricia Kitchen MD        enoxaparin (LOVENOX) injection 40 mg  40 mg Subcutaneous Daily Patricia Kitchen MD   40 mg at 05/27/20 9828    aspirin chewable tablet 81 mg  81 mg Oral Daily Patricia Kitchen MD   81 mg at 05/27/20 3078    QUEtiapine (SEROQUEL) tablet 400 mg  400 mg Oral Nightly Moe Montero PA-C   400 mg at 05/26/20 2301     Review of Systems:   · Constitutional: No Fever or Weight Loss   · Eyes: No Decreased Vision  · ENT: No Headaches, Hearing Loss or Vertigo  · Cardiovascular: As per HPI  · Respiratory: As per HPI  · Gastrointestinal: No abdominal pain, appetite loss, blood in stools, constipation, diarrhea or heartburn  · Genitourinary: No dysuria, trouble voiding, or hematuria  · Musculoskeletal:  No gait disturbance, weakness or joint complaints  · Integumentary: No rash or pruritis  · Neurological: No TIA or stroke symptoms  · Psychiatric: History of schizophrenia currently not getting treated  · Endocrine: No malaise, fatigue or temperature intolerance  · Hematologic/Lymphatic: No bleeding problems, blood clots or swollen lymph nodes  · Allergic/Immunologic: No nasal congestion or hives  All systems negative except as marked. Physical Examination:    Vitals:    05/27/20 0808 05/27/20 0900 05/27/20 1002 05/27/20 1100   BP:  (!) 157/104 (!) 158/106 (!) 152/109   Pulse: 90 91 89 94   Resp:  21 25 23   Temp:       TempSrc:       SpO2:       Weight:       Height:           General Appearance:  No distress, conversant    Constitutional:  Well developed, Well nourished, No acute distress, Non-toxic appearance. HENT:  Normocephalic, Atraumatic, Bilateral external ears normal, Oropharynx moist, No oral exudates, Nose normal. Neck- Normal range of motion, No tenderness, Supple, No stridor,no apical-carotid delay  Lymphatics : no palpable lymph nodes  Eyes:  PERRL, EOMI, Conjunctiva normal, No discharge. Respiratory:  Normal breath sounds, No respiratory distress, No wheezing, No chest tenderness. ,no use of accessory muscles, crackles Absent   Cardiovascular: (PMI) apex non displaced,no lifts no thrills, ankle swelling Absent  , 1+, s1 and s2 audible,Murmur. Absent , JVD not noted    Abdomen /GI:  Bowel sounds normal, Soft, No tenderness, No masses, No gross visceromegaly   :  No costovertebral angle tenderness   Musculoskeletal:  No edema, no tenderness, no deformities.  Back- no tenderness  Integument:  Well hydrated, no rash   Lymphatic:  No lymphadenopathy noted   Neurologic:  Alert & oriented x 3, CN 2-12 normal, normal motor function, normal sensory function, no focal deficits noted           Medical decision making and Data review:    Lab Review   Recent Labs     05/27/20  0330   WBC 8.4   HGB 13.1   HCT 38.7         Recent Labs     05/27/20  0330    exam:->chest pain Reason for Exam: chest pain FINDINGS: The lungs are without acute focal process. There is no effusion or pneumothorax. The cardiomediastinal silhouette is without acute process. The osseous structures are without acute process. No acute process. Vl Dup Carotid Bilateral    Result Date: 5/27/2020  EXAMINATION: ULTRASOUND EVALUATION OF THE CAROTID ARTERIES 5/27/2020 COMPARISON: None. HISTORY: ORDERING SYSTEM PROVIDED HISTORY: Recurrent dizziness TECHNOLOGIST PROVIDED HISTORY: Reason for exam:->Recurrent dizziness Reason for Exam: dizziness FINDINGS: RIGHT: The right common carotid artery demonstrates peak systolic velocities of 61 and 52 cm/sec in the proximal and distal segments respectively. The right internal carotid artery demonstrates the systolic velocities of 38, 37, and 44 cm/sec in the proximal, mid and distal segments respectively. The external carotid artery is patent. The vertebral artery demonstrates normal antegrade flow. Mild atherosclerotic plaque at the carotid bulb. Significant vessel movement with respiration. ICA/CCA ratio of 0.7. LEFT: The left common carotid artery demonstrates peak systolic velocities of 67 and 44 cm/sec in the proximal and distal segments respectively. The left internal carotid artery demonstrates the systolic velocities of 36, 33, and 38 cm/sec in the proximal, mid and distal segments respectively. The external carotid artery is patent. The vertebral artery demonstrates normal antegrade flow. Mild atherosclerotic plaque at the carotid bulb. Significant vessel movement with respiration. ICA/CCA ratio of 0.6. 1. The right internal carotid artery demonstrates 0-50% stenosis . 2. The left internal carotid artery demonstrates 0-50% stenosis . 3. Bilateral vertebral arteries are patent with flow in the normal direction.      Mri Brain Wo Contrast    Result Date: 5/26/2020  EXAMINATION: MRI OF THE BRAIN WITHOUT CONTRAST  5/26/2020 8:27 pm TECHNIQUE: diabetes dehydration electrolyte abnormality as per primary team  DVT prophylaxis if no contraindication  History of schizophrenia as per primary team  6. Dyslipidemia: Start statins           Thank you  much for consult and giving us the opportunity in contributing in the care of this patient. Please feel free to call me for any questions.        Roxanne Baird MD, 5/27/2020 12:54 PM

## 2020-05-28 ENCOUNTER — APPOINTMENT (OUTPATIENT)
Dept: NUCLEAR MEDICINE | Age: 55
DRG: 312 | End: 2020-05-28
Payer: COMMERCIAL

## 2020-05-28 VITALS
DIASTOLIC BLOOD PRESSURE: 98 MMHG | SYSTOLIC BLOOD PRESSURE: 151 MMHG | RESPIRATION RATE: 29 BRPM | TEMPERATURE: 98.3 F | OXYGEN SATURATION: 98 % | HEIGHT: 62 IN | WEIGHT: 142.2 LBS | HEART RATE: 91 BPM | BODY MASS INDEX: 26.17 KG/M2

## 2020-05-28 LAB
ALBUMIN SERPL-MCNC: 2.9 GM/DL (ref 3.4–5)
ALP BLD-CCNC: 114 IU/L (ref 40–128)
ALT SERPL-CCNC: 11 U/L (ref 10–40)
ANION GAP SERPL CALCULATED.3IONS-SCNC: 8 MMOL/L (ref 4–16)
AST SERPL-CCNC: 17 IU/L (ref 15–37)
BILIRUB SERPL-MCNC: 0.4 MG/DL (ref 0–1)
BUN BLDV-MCNC: 3 MG/DL (ref 6–23)
CALCIUM SERPL-MCNC: 9 MG/DL (ref 8.3–10.6)
CHLORIDE BLD-SCNC: 107 MMOL/L (ref 99–110)
CO2: 25 MMOL/L (ref 21–32)
CREAT SERPL-MCNC: 0.6 MG/DL (ref 0.6–1.1)
GFR AFRICAN AMERICAN: >60 ML/MIN/1.73M2
GFR NON-AFRICAN AMERICAN: >60 ML/MIN/1.73M2
GLUCOSE BLD-MCNC: 108 MG/DL (ref 70–99)
GLUCOSE BLD-MCNC: 148 MG/DL (ref 70–99)
GLUCOSE BLD-MCNC: 184 MG/DL (ref 70–99)
GLUCOSE BLD-MCNC: 206 MG/DL (ref 70–99)
GLUCOSE BLD-MCNC: 98 MG/DL (ref 70–99)
HCT VFR BLD CALC: 39.4 % (ref 37–47)
HEMOGLOBIN: 13.3 GM/DL (ref 12.5–16)
LV EF: 73 %
LVEF MODALITY: NORMAL
MAGNESIUM: 1.6 MG/DL (ref 1.8–2.4)
MCH RBC QN AUTO: 32.3 PG (ref 27–31)
MCHC RBC AUTO-ENTMCNC: 33.8 % (ref 32–36)
MCV RBC AUTO: 95.6 FL (ref 78–100)
PDW BLD-RTO: 12.8 % (ref 11.7–14.9)
PHOSPHORUS: 2.4 MG/DL (ref 2.5–4.9)
PLATELET # BLD: 143 K/CU MM (ref 140–440)
PMV BLD AUTO: 11.9 FL (ref 7.5–11.1)
POTASSIUM SERPL-SCNC: 3.9 MMOL/L (ref 3.5–5.1)
RBC # BLD: 4.12 M/CU MM (ref 4.2–5.4)
SODIUM BLD-SCNC: 140 MMOL/L (ref 135–145)
TOTAL PROTEIN: 6.3 GM/DL (ref 6.4–8.2)
WBC # BLD: 5.3 K/CU MM (ref 4–10.5)

## 2020-05-28 PROCEDURE — 2580000003 HC RX 258: Performed by: INTERNAL MEDICINE

## 2020-05-28 PROCEDURE — 6360000002 HC RX W HCPCS: Performed by: HOSPITALIST

## 2020-05-28 PROCEDURE — 96372 THER/PROPH/DIAG INJ SC/IM: CPT

## 2020-05-28 PROCEDURE — 6360000002 HC RX W HCPCS: Performed by: INTERNAL MEDICINE

## 2020-05-28 PROCEDURE — 2580000003 HC RX 258: Performed by: HOSPITALIST

## 2020-05-28 PROCEDURE — 99233 SBSQ HOSP IP/OBS HIGH 50: CPT | Performed by: INTERNAL MEDICINE

## 2020-05-28 PROCEDURE — 6370000000 HC RX 637 (ALT 250 FOR IP): Performed by: INTERNAL MEDICINE

## 2020-05-28 PROCEDURE — 80053 COMPREHEN METABOLIC PANEL: CPT

## 2020-05-28 PROCEDURE — 36415 COLL VENOUS BLD VENIPUNCTURE: CPT

## 2020-05-28 PROCEDURE — 84100 ASSAY OF PHOSPHORUS: CPT

## 2020-05-28 PROCEDURE — 96367 TX/PROPH/DG ADDL SEQ IV INF: CPT

## 2020-05-28 PROCEDURE — 85027 COMPLETE CBC AUTOMATED: CPT

## 2020-05-28 PROCEDURE — 82962 GLUCOSE BLOOD TEST: CPT

## 2020-05-28 PROCEDURE — 96368 THER/DIAG CONCURRENT INF: CPT

## 2020-05-28 PROCEDURE — 94761 N-INVAS EAR/PLS OXIMETRY MLT: CPT

## 2020-05-28 PROCEDURE — 96366 THER/PROPH/DIAG IV INF ADDON: CPT

## 2020-05-28 PROCEDURE — 83735 ASSAY OF MAGNESIUM: CPT

## 2020-05-28 PROCEDURE — G0378 HOSPITAL OBSERVATION PER HR: HCPCS

## 2020-05-28 PROCEDURE — 78452 HT MUSCLE IMAGE SPECT MULT: CPT

## 2020-05-28 PROCEDURE — 3430000000 HC RX DIAGNOSTIC RADIOPHARMACEUTICAL: Performed by: INTERNAL MEDICINE

## 2020-05-28 PROCEDURE — 93017 CV STRESS TEST TRACING ONLY: CPT

## 2020-05-28 PROCEDURE — A9500 TC99M SESTAMIBI: HCPCS | Performed by: INTERNAL MEDICINE

## 2020-05-28 PROCEDURE — 2500000003 HC RX 250 WO HCPCS: Performed by: HOSPITALIST

## 2020-05-28 RX ORDER — INSULIN GLARGINE 100 [IU]/ML
30 INJECTION, SOLUTION SUBCUTANEOUS NIGHTLY
Status: DISCONTINUED | OUTPATIENT
Start: 2020-05-28 | End: 2020-05-28 | Stop reason: HOSPADM

## 2020-05-28 RX ORDER — INSULIN GLARGINE 100 [IU]/ML
30 INJECTION, SOLUTION SUBCUTANEOUS NIGHTLY
Qty: 1 VIAL | Refills: 0 | Status: ON HOLD | OUTPATIENT
Start: 2020-05-28 | End: 2020-08-05 | Stop reason: SDUPTHER

## 2020-05-28 RX ORDER — LISINOPRIL 10 MG/1
20 TABLET ORAL DAILY
Qty: 30 TABLET | Refills: 0 | Status: ON HOLD
Start: 2020-05-28 | End: 2020-08-05 | Stop reason: HOSPADM

## 2020-05-28 RX ORDER — MAGNESIUM SULFATE IN WATER 40 MG/ML
2 INJECTION, SOLUTION INTRAVENOUS ONCE
Status: COMPLETED | OUTPATIENT
Start: 2020-05-28 | End: 2020-05-28

## 2020-05-28 RX ORDER — PEN NEEDLE, DIABETIC 31 GX5/16"
NEEDLE, DISPOSABLE MISCELLANEOUS
Qty: 60 EACH | Refills: 0 | Status: SHIPPED | OUTPATIENT
Start: 2020-05-28 | End: 2021-04-12

## 2020-05-28 RX ORDER — ATORVASTATIN CALCIUM 40 MG/1
40 TABLET, FILM COATED ORAL DAILY
Qty: 15 TABLET | Refills: 0 | Status: SHIPPED | OUTPATIENT
Start: 2020-05-28 | End: 2021-04-12

## 2020-05-28 RX ADMIN — Medication 30 MILLICURIE: at 09:30

## 2020-05-28 RX ADMIN — SODIUM CHLORIDE: 9 INJECTION, SOLUTION INTRAVENOUS at 11:17

## 2020-05-28 RX ADMIN — POTASSIUM PHOSPHATE, MONOBASIC AND POTASSIUM PHOSPHATE, DIBASIC 10 MMOL: 224; 236 INJECTION, SOLUTION INTRAVENOUS at 11:17

## 2020-05-28 RX ADMIN — SODIUM CHLORIDE: 9 INJECTION, SOLUTION INTRAVENOUS at 00:19

## 2020-05-28 RX ADMIN — MAGNESIUM SULFATE 2 G: 2 INJECTION INTRAVENOUS at 11:17

## 2020-05-28 RX ADMIN — ASPIRIN 81 MG 81 MG: 81 TABLET ORAL at 08:11

## 2020-05-28 RX ADMIN — ACETAMINOPHEN 650 MG: 325 TABLET ORAL at 03:13

## 2020-05-28 RX ADMIN — REGADENOSON 0.4 MG: 0.08 INJECTION, SOLUTION INTRAVENOUS at 09:29

## 2020-05-28 RX ADMIN — Medication 10 MILLICURIE: at 08:25

## 2020-05-28 RX ADMIN — LISINOPRIL 10 MG: 10 TABLET ORAL at 08:11

## 2020-05-28 RX ADMIN — INSULIN LISPRO 15 UNITS: 100 INJECTION, SOLUTION INTRAVENOUS; SUBCUTANEOUS at 10:51

## 2020-05-28 RX ADMIN — SODIUM CHLORIDE, PRESERVATIVE FREE 10 ML: 5 INJECTION INTRAVENOUS at 10:57

## 2020-05-28 RX ADMIN — INSULIN LISPRO 15 UNITS: 100 INJECTION, SOLUTION INTRAVENOUS; SUBCUTANEOUS at 13:22

## 2020-05-28 RX ADMIN — ENOXAPARIN SODIUM 40 MG: 40 INJECTION SUBCUTANEOUS at 08:11

## 2020-05-28 ASSESSMENT — PAIN DESCRIPTION - PAIN TYPE: TYPE: ACUTE PAIN

## 2020-05-28 ASSESSMENT — PAIN SCALES - GENERAL
PAINLEVEL_OUTOF10: 1
PAINLEVEL_OUTOF10: 3

## 2020-05-28 ASSESSMENT — PAIN DESCRIPTION - LOCATION: LOCATION: HEAD

## 2020-05-28 NOTE — PROGRESS NOTES
Cardiology Progress Note     Admit Date:  5/26/2020    Consult reason/ Seen today for :   Chest pain   Uncontrolled HTN     Subjective and  Overnight Events :  No chest pain ,       Chief complain on admission : 53 y. o.year old who is admitted for  Chief Complaint   Patient presents with    Chest Pain     squeezing a month, general unwell    Other     groin has sores like when pt had acidosis    Loss of Consciousness     x 3 per mom      Assessment / Plan:  Stress test is normal , follow up in office , EF is karlos on echo , no valve disease  Uncontrolled HTN : increase lisinopril to 20   Needs to be on statin   Dizziness in setting fo dehydration , ketoacidosis  DVT Prophylaxis if no contraindication  jannet sign off call with questions, follow up in office    Past medical history:    has a past medical history of CAD (coronary artery disease), Diabetes mellitus ([address] and Hepatitis C. Past surgical history:   has a past surgical history that includes Hysterectomy and Cholecystectomy. Social History:   reports that she has been smoking cigarettes. She has been smoking about 1.00 pack per day. She has never used smokeless tobacco. She reports current alcohol use. She reports current drug use. Drug: Marijuana. Family history:  family history is not on file.     Allergies   Allergen Reactions    Haldol [Haloperidol Lactate] Other (See Comments)     Unknown, severe reaction per mother    Penicillins        Review of Systems:    All 14 systems were reviewed and are negative  Except for the positive findings  which as documented     BP (!) 151/98   Pulse 91   Temp 98.3 °F (36.8 °C) (Oral)   Resp 29   Ht 5' 2\" (1.575 m)   Wt 142 lb 3.2 oz (64.5 kg)   SpO2 98%   BMI 26.01 kg/m²       Intake/Output Summary (Last 24 hours) at 5/28/2020 1322  Last data filed at 5/28/2020 0732  Gross per 24 hour   Intake 3628 ml   Output --   Net 3628 ml

## 2020-05-28 NOTE — PROGRESS NOTES
called? ->No Is the patient pregnant?->No Reason for Exam: falls, syncope Acuity: Acute Type of Exam: Initial Mechanism of Injury: fall Relevant Medical/Surgical History: poor historian FINDINGS: BRAIN/VENTRICLES: There is no acute intracranial hemorrhage, mass effect or midline shift. No abnormal extra-axial fluid collection. The gray-white differentiation is maintained without evidence of an acute infarct. There is no evidence of hydrocephalus. ORBITS: The visualized portion of the orbits demonstrate no acute abnormality. SINUSES: The visualized paranasal sinuses and mastoid air cells demonstrate no acute abnormality. SOFT TISSUES/SKULL:  No acute abnormality of the visualized skull or soft tissues. No acute intracranial abnormality. Xr Chest Portable    Result Date: 5/26/2020  EXAMINATION: ONE XRAY VIEW OF THE CHEST 5/26/2020 2:16 pm COMPARISON: None. HISTORY: ORDERING SYSTEM PROVIDED HISTORY: chest pain TECHNOLOGIST PROVIDED HISTORY: Reason for exam:->chest pain Reason for Exam: chest pain FINDINGS: The lungs are without acute focal process. There is no effusion or pneumothorax. The cardiomediastinal silhouette is without acute process. The osseous structures are without acute process. No acute process. Xr Chest 1 Vw    Result Date: 5/27/2020  EXAMINATION: ONE XRAY VIEW OF THE CHEST 5/27/2020 11:44 am COMPARISON: 05/26/2020 HISTORY: ORDERING SYSTEM PROVIDED HISTORY: cough TECHNOLOGIST PROVIDED HISTORY: Reason for exam:->cough Reason for Exam: cough FINDINGS: The heart size is normal.  There is no pneumothorax, edema or focal consolidation. The bony thorax is grossly intact. No evidence of acute cardiopulmonary disease. Vl Dup Carotid Bilateral    Result Date: 5/27/2020  EXAMINATION: ULTRASOUND EVALUATION OF THE CAROTID ARTERIES 5/27/2020 COMPARISON: None.  HISTORY: ORDERING SYSTEM PROVIDED HISTORY: Recurrent dizziness TECHNOLOGIST PROVIDED HISTORY: Reason for exam:->Recurrent dizziness configuration. The sellar/suprasellar regions appear unremarkable. The normal signal voids within the major intracranial vessels appear maintained. There is a chronic lacunar infarct within the left cerebellar hemisphere, inferiorly. ORBITS: The visualized portion of the orbits demonstrate no acute abnormality. SINUSES: The visualized paranasal sinuses and mastoid air cells are well aerated. BONES/SOFT TISSUES: The bone marrow signal intensity appears normal. The soft tissues demonstrate no acute abnormality. No acute intracranial abnormality visualized. Chronic lacunar infarct within the cerebellum. Scheduled Medicines   Medications:    insulin glargine  30 Units Subcutaneous Nightly    insulin lispro  15 Units Subcutaneous TID WC    insulin lispro  0-12 Units Subcutaneous TID WC    insulin lispro  0-6 Units Subcutaneous 2 times per day    lisinopril  10 mg Oral Daily    sodium chloride flush  10 mL Intravenous 2 times per day    enoxaparin  40 mg Subcutaneous Daily    aspirin  81 mg Oral Daily    QUEtiapine  400 mg Oral Nightly      Infusions:    sodium chloride 100 mL/hr at 05/28/20 0019    dextrose           Objective:   Vitals: /86   Pulse 85   Temp 98.3 °F (36.8 °C) (Oral)   Resp 21   Ht 5' 2\" (1.575 m)   Wt 142 lb 3.2 oz (64.5 kg)   SpO2 98%   BMI 26.01 kg/m²   General appearance: alert and cooperative with exam  Neck: no JVD or bruit  Thyroid : Normal lobes   Lungs: Has Vesicular Breath sounds   Heart:  regular rate and rhythm  Abdomen: soft, non-tender; bowel sounds normal; no masses,  no organomegaly  Musculoskeletal: Normal  Extremities: extremities normal, , no edema  Neurologic:  Awake, alert, oriented to name, place and time. Cranial nerves II-XII are grossly intact. Motor is  intact. Sensory is intact. ,  and gait is normal.    Assessment:     Patient Active Problem List:     Hyperglycemia     Precordial pain     Type 2 diabetes mellitus without complication, with long-term current use of insulin ([facility] [address]      Plan:     1. Reviewed POC blood glucose . Labs and X ray results   2. Reviewed Current Medicines   3. On meal/ Correction bolus Humalog/ Basal Lantus Insulin regime / and Oral Hypoglycemic drugs   4. Monitor Blood glucose frequently   5. Modified  the dose of Insulin/ other medicines as needed   6. Will follow     .      Demi Woodruff MD

## 2020-05-28 NOTE — CONSULTS
140   K 4.5 3.7   CL 86* 107   CO2 24 23   BUN 3* 2*   CREATININE 0.6 0.5*   CALCIUM 9.5 9.0   PROT 8.5*  --    LABALBU 4.1  --    BILITOT 0.6  --    ALKPHOS 182*  --    AST 18  --    ALT 15  --      Lipids:   Lab Results   Component Value Date    CHOL 141 08/06/2019    HDL 77 08/06/2019    TRIG 92 08/06/2019     Glucose:   Recent Labs     05/27/20  1448 05/27/20  1717 05/27/20  2055   POCGLU 347* 166* 190*     Hemoglobin A1C:   Lab Results   Component Value Date    LABA1C 15.1 05/26/2020     Free T4:   Lab Results   Component Value Date    T4FREE 1.19 08/06/2019     Free T3: No results found for: FT3  TSH High Sensitivity:   Lab Results   Component Value Date    TSHHS 0.936 05/27/2020       Ct Head Wo Contrast    Result Date: 5/26/2020  EXAMINATION: CT OF THE HEAD WITHOUT CONTRAST  5/26/2020 5:24 pm TECHNIQUE: CT of the head was performed without the administration of intravenous contrast. Dose modulation, iterative reconstruction, and/or weight based adjustment of the mA/kV was utilized to reduce the radiation dose to as low as reasonably achievable. COMPARISON: None. HISTORY: ORDERING SYSTEM PROVIDED HISTORY: falls, syncope TECHNOLOGIST PROVIDED HISTORY: Reason for exam:->falls, syncope Has a \"code stroke\" or \"stroke alert\" been called? ->No Is the patient pregnant?->No Reason for Exam: falls, syncope Acuity: Acute Type of Exam: Initial Mechanism of Injury: fall Relevant Medical/Surgical History: poor historian FINDINGS: BRAIN/VENTRICLES: There is no acute intracranial hemorrhage, mass effect or midline shift. No abnormal extra-axial fluid collection. The gray-white differentiation is maintained without evidence of an acute infarct. There is no evidence of hydrocephalus. ORBITS: The visualized portion of the orbits demonstrate no acute abnormality. SINUSES: The visualized paranasal sinuses and mastoid air cells demonstrate no acute abnormality.  SOFT TISSUES/SKULL:  No acute abnormality of the visualized skull or soft tissues. No acute intracranial abnormality. Xr Chest Portable    Result Date: 5/26/2020  EXAMINATION: ONE XRAY VIEW OF THE CHEST 5/26/2020 2:16 pm COMPARISON: None. HISTORY: ORDERING SYSTEM PROVIDED HISTORY: chest pain TECHNOLOGIST PROVIDED HISTORY: Reason for exam:->chest pain Reason for Exam: chest pain FINDINGS: The lungs are without acute focal process. There is no effusion or pneumothorax. The cardiomediastinal silhouette is without acute process. The osseous structures are without acute process. No acute process. Xr Chest 1 Vw    Result Date: 5/27/2020  EXAMINATION: ONE XRAY VIEW OF THE CHEST 5/27/2020 11:44 am COMPARISON: 05/26/2020 HISTORY: ORDERING SYSTEM PROVIDED HISTORY: cough TECHNOLOGIST PROVIDED HISTORY: Reason for exam:->cough Reason for Exam: cough FINDINGS: The heart size is normal.  There is no pneumothorax, edema or focal consolidation. The bony thorax is grossly intact. No evidence of acute cardiopulmonary disease. Vl Dup Carotid Bilateral    Result Date: 5/27/2020  EXAMINATION: ULTRASOUND EVALUATION OF THE CAROTID ARTERIES 5/27/2020 COMPARISON: None. HISTORY: ORDERING SYSTEM PROVIDED HISTORY: Recurrent dizziness TECHNOLOGIST PROVIDED HISTORY: Reason for exam:->Recurrent dizziness Reason for Exam: dizziness FINDINGS: RIGHT: The right common carotid artery demonstrates peak systolic velocities of 61 and 52 cm/sec in the proximal and distal segments respectively. The right internal carotid artery demonstrates the systolic velocities of 38, 37, and 44 cm/sec in the proximal, mid and distal segments respectively. The external carotid artery is patent. The vertebral artery demonstrates normal antegrade flow. Mild atherosclerotic plaque at the carotid bulb. Significant vessel movement with respiration. ICA/CCA ratio of 0.7. LEFT: The left common carotid artery demonstrates peak systolic velocities of 67 and 44 cm/sec in the proximal and distal segments respectively. The left internal carotid artery demonstrates the systolic velocities of 36, 33, and 38 cm/sec in the proximal, mid and distal segments respectively. The external carotid artery is patent. The vertebral artery demonstrates normal antegrade flow. Mild atherosclerotic plaque at the carotid bulb. Significant vessel movement with respiration. ICA/CCA ratio of 0.6. 1. The right internal carotid artery demonstrates 0-50% stenosis . 2. The left internal carotid artery demonstrates 0-50% stenosis . 3. Bilateral vertebral arteries are patent with flow in the normal direction. Mri Brain Wo Contrast    Result Date: 5/26/2020  EXAMINATION: MRI OF THE BRAIN WITHOUT CONTRAST  5/26/2020 8:27 pm TECHNIQUE: Multiplanar multisequence MRI of the brain was performed without the administration of intravenous contrast. COMPARISON: None. HISTORY: ORDERING SYSTEM PROVIDED HISTORY: Recurrent dizziness TECHNOLOGIST PROVIDED HISTORY: Reason for exam:->Recurrent dizziness Is the patient pregnant?->No Reason for Exam: NKI, dizziness, No Surg FINDINGS: INTRACRANIAL STRUCTURES/VENTRICLES: There is no acute infarct. No mass effect or midline shift. No evidence of an acute intracranial hemorrhage. The ventricles and sulci are normal in size and configuration. The sellar/suprasellar regions appear unremarkable. The normal signal voids within the major intracranial vessels appear maintained. There is a chronic lacunar infarct within the left cerebellar hemisphere, inferiorly. ORBITS: The visualized portion of the orbits demonstrate no acute abnormality. SINUSES: The visualized paranasal sinuses and mastoid air cells are well aerated. BONES/SOFT TISSUES: The bone marrow signal intensity appears normal. The soft tissues demonstrate no acute abnormality. No acute intracranial abnormality visualized. Chronic lacunar infarct within the cerebellum.        Scheduled Medicines   Medications:    insulin lispro  15 Units Subcutaneous TID WC

## 2020-05-28 NOTE — PROGRESS NOTES
[facility] NOTE: MEDS TO [address] Select Patient?: No  Total # of Prescriptions Filled: 3   The following medications were delivered to the patient:  Atorvastatin 40mg  Basaglar (substituted for lantus due to INS)  Novolog (substituted for humalog due to INS  Total # of Interventions Completed: 1  Time Spent (min): 15    Additional Documentation:    Also added pen needles

## 2020-05-28 NOTE — DISCHARGE SUMMARY
agreement with the discharge recommendations, medications, and plan. Consults this admission:  IP CONSULT TO HOSPITALIST  IP CONSULT TO ENDOCRINOLOGY  IP CONSULT TO CARDIOLOGY    Discharge Instruction:   Follow up appointments: follow up with endocrinologist 2 weeks  Primary care physician:  within 1 weeks    Diet:  diabetic diet   Activity: activity as tolerated  Disposition: Discharged to:   [x]Home, [][facility], []SNF, []Acute Rehab, []Hospice   Condition on discharge: Stable    Discharge Medications:      Nasir Self   Home Medication Instructions [id number]    Printed on:05/28/20 1111   Medication Information                      aspirin 81 MG chewable tablet  Take 81 mg by mouth daily             insulin lispro (HUMALOG) 100 UNIT/ML injection vial  Inject into the skin 3 times daily (before meals)             lisinopril (PRINIVIL;ZESTRIL) 10 MG tablet  Take 10 mg by mouth daily             QUEtiapine (SEROQUEL) 300 MG tablet  Take 400 mg by mouth nightly                 Objective Findings at Discharge:   BP (!) 139/94   Pulse 90   Temp 98.3 °F (36.8 °C) (Oral)   Resp 24   Ht 5' 2\" (1.575 m)   Wt 142 lb 3.2 oz (64.5 kg)   SpO2 98%   BMI 26.01 kg/m²            PHYSICAL EXAM     GEN    Awake.  Alert , not in respiratory distress, not in pain  HEENT: PEERLA, , supple neck,   Chest: air entry equal bilaterally, no wheezing or crepitation  Heart: S1 and S2 heard, no murmur, no gallop or rub, regular rate  Abdomen: soft, ND , Nt, +BS  Extremities: no cyanosis, tenderness or erythema, peripheral pulses audible  Neurology: alert, oriented x3, able to move 4 limbs  BMP/CBC  Recent Labs     05/26/20  1439 05/27/20  0330 05/28/20  0316   * 140 140   K 4.5 3.7 3.9   CL 86* 107 107   CO2 24 23 25   BUN 3* 2* 3*   CREATININE 0.6 0.5* 0.6   WBC 5.1 8.4 5.3   HCT 45.4 38.7 39.4    145 143       IMAGING:      Discharge Time of 35  minutes    Electronically signed by Vidal Victoria MD on

## 2020-08-01 ENCOUNTER — APPOINTMENT (OUTPATIENT)
Dept: GENERAL RADIOLOGY | Age: 55
DRG: 871 | End: 2020-08-01
Payer: COMMERCIAL

## 2020-08-01 ENCOUNTER — HOSPITAL ENCOUNTER (INPATIENT)
Age: 55
LOS: 4 days | Discharge: HOME HEALTH | DRG: 871 | End: 2020-08-05
Attending: EMERGENCY MEDICINE | Admitting: INTERNAL MEDICINE
Payer: COMMERCIAL

## 2020-08-01 PROBLEM — E11.65 HYPERGLYCEMIC CRISIS IN DIABETES MELLITUS (HCC): Status: ACTIVE | Noted: 2020-08-01

## 2020-08-01 PROBLEM — I95.9 HYPOTENSIVE EPISODE: Status: ACTIVE | Noted: 2020-08-01

## 2020-08-01 PROBLEM — E87.1 HYPEROSMOLAR HYPONATREMIA: Status: ACTIVE | Noted: 2020-08-01

## 2020-08-01 PROBLEM — E87.6 HYPOKALEMIA: Status: ACTIVE | Noted: 2020-08-01

## 2020-08-01 PROBLEM — E87.0 HYPEROSMOLAR HYPONATREMIA: Status: ACTIVE | Noted: 2020-08-01

## 2020-08-01 LAB
ALBUMIN SERPL-MCNC: 2.8 GM/DL (ref 3.4–5)
ALP BLD-CCNC: 121 IU/L (ref 40–128)
ALT SERPL-CCNC: 6 U/L (ref 10–40)
AMPHETAMINES: NEGATIVE
ANION GAP SERPL CALCULATED.3IONS-SCNC: 11 MMOL/L (ref 4–16)
ANION GAP SERPL CALCULATED.3IONS-SCNC: 18 MMOL/L (ref 4–16)
ANION GAP SERPL CALCULATED.3IONS-SCNC: 20 MMOL/L (ref 4–16)
AST SERPL-CCNC: 7 IU/L (ref 15–37)
BACTERIA: ABNORMAL /HPF
BARBITURATE SCREEN URINE: NEGATIVE
BASE EXCESS: 4 (ref 0–2.4)
BASOPHILS ABSOLUTE: 0 K/CU MM
BASOPHILS RELATIVE PERCENT: 0.1 % (ref 0–1)
BENZODIAZEPINE SCREEN, URINE: NEGATIVE
BETA-HYDROXYBUTYRATE: 10.1 MG/DL (ref 0–3)
BILIRUB SERPL-MCNC: 1 MG/DL (ref 0–1)
BILIRUBIN URINE: NEGATIVE MG/DL
BLOOD, URINE: ABNORMAL
BUN BLDV-MCNC: 12 MG/DL (ref 6–23)
BUN BLDV-MCNC: 14 MG/DL (ref 6–23)
BUN BLDV-MCNC: 15 MG/DL (ref 6–23)
CALCIUM IONIZED: 4.36 MG/DL (ref 4.48–5.28)
CALCIUM IONIZED: 4.72 MG/DL (ref 4.48–5.28)
CALCIUM SERPL-MCNC: 6.4 MG/DL (ref 8.3–10.6)
CALCIUM SERPL-MCNC: 8.9 MG/DL (ref 8.3–10.6)
CALCIUM SERPL-MCNC: 9.3 MG/DL (ref 8.3–10.6)
CANNABINOID SCREEN URINE: NEGATIVE
CHLORIDE BLD-SCNC: 72 MMOL/L (ref 99–110)
CHLORIDE BLD-SCNC: 91 MMOL/L (ref 99–110)
CHLORIDE BLD-SCNC: 97 MMOL/L (ref 99–110)
CHP ED QC CHECK: NORMAL
CLARITY: ABNORMAL
CO2: 17 MMOL/L (ref 21–32)
CO2: 20 MMOL/L (ref 21–32)
CO2: 26 MMOL/L (ref 21–32)
COCAINE METABOLITE: NEGATIVE
COLOR: YELLOW
COMMENT: ABNORMAL
CREAT SERPL-MCNC: 1 MG/DL (ref 0.6–1.1)
DIFFERENTIAL TYPE: ABNORMAL
EOSINOPHILS ABSOLUTE: 0 K/CU MM
EOSINOPHILS RELATIVE PERCENT: 0.1 % (ref 0–3)
GFR AFRICAN AMERICAN: >60 ML/MIN/1.73M2
GFR NON-AFRICAN AMERICAN: 58 ML/MIN/1.73M2
GLUCOSE BLD-MCNC: 1336 MG/DL (ref 70–99)
GLUCOSE BLD-MCNC: 1336 MG/DL (ref 70–99)
GLUCOSE BLD-MCNC: 912 MG/DL (ref 70–99)
GLUCOSE BLD-MCNC: >1500 MG/DL (ref 70–99)
GLUCOSE BLD-MCNC: >550 MG/DL (ref 70–99)
GLUCOSE, URINE: >500 MG/DL
HCO3 VENOUS: 19.2 MMOL/L (ref 19–25)
HCT VFR BLD CALC: 32.2 % (ref 37–47)
HEMOGLOBIN: 8.3 GM/DL (ref 12.5–16)
IMMATURE NEUTROPHIL %: 0.4 % (ref 0–0.43)
IONIZED CA: 1.09 MMOL/L (ref 1.12–1.32)
IONIZED CA: 1.18 MMOL/L (ref 1.12–1.32)
IRON: 89 UG/DL (ref 37–145)
KETONES, URINE: NEGATIVE MG/DL
LACTATE: 2.7 MMOL/L (ref 0.4–2)
LACTATE: 5.3 MMOL/L (ref 0.4–2)
LEUKOCYTE ESTERASE, URINE: ABNORMAL
LIPASE: 32 IU/L (ref 13–60)
LYMPHOCYTES ABSOLUTE: 0.6 K/CU MM
LYMPHOCYTES RELATIVE PERCENT: 8.8 % (ref 24–44)
MAGNESIUM: 1.9 MG/DL (ref 1.8–2.4)
MAGNESIUM: 2.1 MG/DL (ref 1.8–2.4)
MAGNESIUM: 2.1 MG/DL (ref 1.8–2.4)
MCH RBC QN AUTO: 30.9 PG (ref 27–31)
MCHC RBC AUTO-ENTMCNC: 25.8 % (ref 32–36)
MCV RBC AUTO: 119.7 FL (ref 78–100)
MONOCYTES ABSOLUTE: 0.7 K/CU MM
MONOCYTES RELATIVE PERCENT: 10 % (ref 0–4)
MUCUS: ABNORMAL HPF
NITRITE URINE, QUANTITATIVE: NEGATIVE
NUCLEATED RBC %: 0 %
O2 SAT, VEN: 86.4 % (ref 50–70)
OPIATES, URINE: NEGATIVE
OXYCODONE: NEGATIVE
PCO2, VEN: 29 MMHG (ref 38–52)
PCT TRANSFERRIN: 39 % (ref 10–44)
PDW BLD-RTO: 17 % (ref 11.7–14.9)
PH VENOUS: 7.43 (ref 7.32–7.42)
PH, URINE: 5 (ref 5–8)
PHENCYCLIDINE, URINE: NEGATIVE
PHOSPHORUS: 0.7 MG/DL (ref 2.5–4.9)
PHOSPHORUS: 1.2 MG/DL (ref 2.5–4.9)
PLATELET # BLD: 114 K/CU MM (ref 140–440)
PMV BLD AUTO: 12.2 FL (ref 7.5–11.1)
PO2, VEN: 159 MMHG (ref 28–48)
POTASSIUM SERPL-SCNC: 2.7 MMOL/L (ref 3.5–5.1)
POTASSIUM SERPL-SCNC: 2.7 MMOL/L (ref 3.5–5.1)
POTASSIUM SERPL-SCNC: 3 MMOL/L (ref 3.5–5.1)
PROCALCITONIN: 2.39
PROTEIN UA: NEGATIVE MG/DL
RBC # BLD: 2.69 M/CU MM (ref 4.2–5.4)
RBC URINE: 1 /HPF (ref 0–6)
REASON FOR REJECTION: NORMAL
REJECTED TEST: NORMAL
SEGMENTED NEUTROPHILS ABSOLUTE COUNT: 5.4 K/CU MM
SEGMENTED NEUTROPHILS RELATIVE PERCENT: 80.6 % (ref 36–66)
SODIUM BLD-SCNC: 109 MMOL/L (ref 135–145)
SODIUM BLD-SCNC: 129 MMOL/L (ref 135–145)
SODIUM BLD-SCNC: 134 MMOL/L (ref 135–145)
SPECIFIC GRAVITY UA: 1.02 (ref 1–1.03)
SQUAMOUS EPITHELIAL: <1 /HPF
TOTAL IMMATURE NEUTOROPHIL: 0.03 K/CU MM
TOTAL IRON BINDING CAPACITY: 230 UG/DL (ref 250–450)
TOTAL NUCLEATED RBC: 0 K/CU MM
TOTAL PROTEIN: 8.5 GM/DL (ref 6.4–8.2)
TRICHOMONAS: ABNORMAL /HPF
UNSATURATED IRON BINDING CAPACITY: 141 UG/DL (ref 110–370)
UROBILINOGEN, URINE: NORMAL MG/DL (ref 0.2–1)
WBC # BLD: 6.7 K/CU MM (ref 4–10.5)
WBC UA: 34 /HPF (ref 0–5)

## 2020-08-01 PROCEDURE — 87086 URINE CULTURE/COLONY COUNT: CPT

## 2020-08-01 PROCEDURE — 96372 THER/PROPH/DIAG INJ SC/IM: CPT

## 2020-08-01 PROCEDURE — 83550 IRON BINDING TEST: CPT

## 2020-08-01 PROCEDURE — 83605 ASSAY OF LACTIC ACID: CPT

## 2020-08-01 PROCEDURE — 87077 CULTURE AEROBIC IDENTIFY: CPT

## 2020-08-01 PROCEDURE — 36415 COLL VENOUS BLD VENIPUNCTURE: CPT

## 2020-08-01 PROCEDURE — 82805 BLOOD GASES W/O2 SATURATION: CPT

## 2020-08-01 PROCEDURE — 94761 N-INVAS EAR/PLS OXIMETRY MLT: CPT

## 2020-08-01 PROCEDURE — 2000000000 HC ICU R&B

## 2020-08-01 PROCEDURE — 96375 TX/PRO/DX INJ NEW DRUG ADDON: CPT

## 2020-08-01 PROCEDURE — 83540 ASSAY OF IRON: CPT

## 2020-08-01 PROCEDURE — 99285 EMERGENCY DEPT VISIT HI MDM: CPT

## 2020-08-01 PROCEDURE — 96365 THER/PROPH/DIAG IV INF INIT: CPT

## 2020-08-01 PROCEDURE — 83735 ASSAY OF MAGNESIUM: CPT

## 2020-08-01 PROCEDURE — 6360000002 HC RX W HCPCS: Performed by: NURSE PRACTITIONER

## 2020-08-01 PROCEDURE — 87186 SC STD MICRODIL/AGAR DIL: CPT

## 2020-08-01 PROCEDURE — 80307 DRUG TEST PRSMV CHEM ANLYZR: CPT

## 2020-08-01 PROCEDURE — 6360000002 HC RX W HCPCS: Performed by: EMERGENCY MEDICINE

## 2020-08-01 PROCEDURE — 93005 ELECTROCARDIOGRAM TRACING: CPT | Performed by: EMERGENCY MEDICINE

## 2020-08-01 PROCEDURE — 80053 COMPREHEN METABOLIC PANEL: CPT

## 2020-08-01 PROCEDURE — 83690 ASSAY OF LIPASE: CPT

## 2020-08-01 PROCEDURE — 84145 PROCALCITONIN (PCT): CPT

## 2020-08-01 PROCEDURE — 2580000003 HC RX 258: Performed by: EMERGENCY MEDICINE

## 2020-08-01 PROCEDURE — 81001 URINALYSIS AUTO W/SCOPE: CPT

## 2020-08-01 PROCEDURE — 87150 DNA/RNA AMPLIFIED PROBE: CPT

## 2020-08-01 PROCEDURE — 85025 COMPLETE CBC W/AUTO DIFF WBC: CPT

## 2020-08-01 PROCEDURE — C9113 INJ PANTOPRAZOLE SODIUM, VIA: HCPCS | Performed by: EMERGENCY MEDICINE

## 2020-08-01 PROCEDURE — 6360000002 HC RX W HCPCS: Performed by: INTERNAL MEDICINE

## 2020-08-01 PROCEDURE — 82010 KETONE BODYS QUAN: CPT

## 2020-08-01 PROCEDURE — 84100 ASSAY OF PHOSPHORUS: CPT

## 2020-08-01 PROCEDURE — 6370000000 HC RX 637 (ALT 250 FOR IP): Performed by: EMERGENCY MEDICINE

## 2020-08-01 PROCEDURE — 87040 BLOOD CULTURE FOR BACTERIA: CPT

## 2020-08-01 PROCEDURE — 71045 X-RAY EXAM CHEST 1 VIEW: CPT

## 2020-08-01 PROCEDURE — 82330 ASSAY OF CALCIUM: CPT

## 2020-08-01 PROCEDURE — 83036 HEMOGLOBIN GLYCOSYLATED A1C: CPT

## 2020-08-01 PROCEDURE — 80048 BASIC METABOLIC PNL TOTAL CA: CPT

## 2020-08-01 RX ORDER — ONDANSETRON 2 MG/ML
4 INJECTION INTRAMUSCULAR; INTRAVENOUS ONCE
Status: COMPLETED | OUTPATIENT
Start: 2020-08-01 | End: 2020-08-02

## 2020-08-01 RX ORDER — NICOTINE POLACRILEX 4 MG
15 LOZENGE BUCCAL PRN
Status: DISCONTINUED | OUTPATIENT
Start: 2020-08-01 | End: 2020-08-02 | Stop reason: SDUPTHER

## 2020-08-01 RX ORDER — POTASSIUM CHLORIDE 7.45 MG/ML
10 INJECTION INTRAVENOUS
Status: COMPLETED | OUTPATIENT
Start: 2020-08-01 | End: 2020-08-01

## 2020-08-01 RX ORDER — SODIUM CHLORIDE 9 MG/ML
INJECTION, SOLUTION INTRAVENOUS CONTINUOUS
Status: CANCELLED | OUTPATIENT
Start: 2020-08-01

## 2020-08-01 RX ORDER — DEXTROSE MONOHYDRATE 25 G/50ML
12.5 INJECTION, SOLUTION INTRAVENOUS PRN
Status: DISCONTINUED | OUTPATIENT
Start: 2020-08-01 | End: 2020-08-02 | Stop reason: SDUPTHER

## 2020-08-01 RX ORDER — PANTOPRAZOLE SODIUM 40 MG/10ML
40 INJECTION, POWDER, LYOPHILIZED, FOR SOLUTION INTRAVENOUS ONCE
Status: COMPLETED | OUTPATIENT
Start: 2020-08-01 | End: 2020-08-01

## 2020-08-01 RX ORDER — SODIUM CHLORIDE AND POTASSIUM CHLORIDE .9; .15 G/100ML; G/100ML
SOLUTION INTRAVENOUS CONTINUOUS
Status: DISCONTINUED | OUTPATIENT
Start: 2020-08-02 | End: 2020-08-02

## 2020-08-01 RX ORDER — POTASSIUM CHLORIDE 7.45 MG/ML
10 INJECTION INTRAVENOUS PRN
Status: DISCONTINUED | OUTPATIENT
Start: 2020-08-01 | End: 2020-08-02

## 2020-08-01 RX ORDER — ATORVASTATIN CALCIUM 40 MG/1
40 TABLET, FILM COATED ORAL DAILY
Status: DISCONTINUED | OUTPATIENT
Start: 2020-08-02 | End: 2020-08-05 | Stop reason: HOSPADM

## 2020-08-01 RX ORDER — DEXTROSE, SODIUM CHLORIDE, AND POTASSIUM CHLORIDE 5; .45; .15 G/100ML; G/100ML; G/100ML
INJECTION INTRAVENOUS CONTINUOUS PRN
Status: DISCONTINUED | OUTPATIENT
Start: 2020-08-01 | End: 2020-08-03

## 2020-08-01 RX ORDER — MAGNESIUM SULFATE 1 G/100ML
1 INJECTION INTRAVENOUS PRN
Status: DISCONTINUED | OUTPATIENT
Start: 2020-08-01 | End: 2020-08-05 | Stop reason: HOSPADM

## 2020-08-01 RX ORDER — DEXTROSE MONOHYDRATE 50 MG/ML
100 INJECTION, SOLUTION INTRAVENOUS PRN
Status: DISCONTINUED | OUTPATIENT
Start: 2020-08-01 | End: 2020-08-05 | Stop reason: HOSPADM

## 2020-08-01 RX ORDER — QUETIAPINE FUMARATE 200 MG/1
400 TABLET, FILM COATED ORAL NIGHTLY
Status: DISCONTINUED | OUTPATIENT
Start: 2020-08-01 | End: 2020-08-05 | Stop reason: HOSPADM

## 2020-08-01 RX ORDER — SODIUM CHLORIDE 0.9 % (FLUSH) 0.9 %
10 SYRINGE (ML) INJECTION PRN
Status: DISCONTINUED | OUTPATIENT
Start: 2020-08-01 | End: 2020-08-05 | Stop reason: HOSPADM

## 2020-08-01 RX ORDER — LISINOPRIL 10 MG/1
20 TABLET ORAL DAILY
Status: CANCELLED | OUTPATIENT
Start: 2020-08-01

## 2020-08-01 RX ORDER — CALCIUM GLUCONATE 94 MG/ML
1 INJECTION, SOLUTION INTRAVENOUS ONCE
Status: COMPLETED | OUTPATIENT
Start: 2020-08-01 | End: 2020-08-01

## 2020-08-01 RX ORDER — LIDOCAINE HYDROCHLORIDE 10 MG/ML
5 INJECTION, SOLUTION EPIDURAL; INFILTRATION; INTRACAUDAL; PERINEURAL ONCE
Status: DISCONTINUED | OUTPATIENT
Start: 2020-08-01 | End: 2020-08-05 | Stop reason: HOSPADM

## 2020-08-01 RX ORDER — SODIUM CHLORIDE 0.9 % (FLUSH) 0.9 %
10 SYRINGE (ML) INJECTION EVERY 12 HOURS SCHEDULED
Status: DISCONTINUED | OUTPATIENT
Start: 2020-08-01 | End: 2020-08-02 | Stop reason: SDUPTHER

## 2020-08-01 RX ORDER — PROMETHAZINE HYDROCHLORIDE 25 MG/ML
12.5 INJECTION, SOLUTION INTRAMUSCULAR; INTRAVENOUS EVERY 6 HOURS PRN
Status: DISCONTINUED | OUTPATIENT
Start: 2020-08-01 | End: 2020-08-05 | Stop reason: HOSPADM

## 2020-08-01 RX ORDER — ASPIRIN 81 MG/1
81 TABLET, CHEWABLE ORAL DAILY
Status: DISCONTINUED | OUTPATIENT
Start: 2020-08-02 | End: 2020-08-05 | Stop reason: HOSPADM

## 2020-08-01 RX ORDER — ONDANSETRON 2 MG/ML
4 INJECTION INTRAMUSCULAR; INTRAVENOUS EVERY 30 MIN PRN
Status: DISCONTINUED | OUTPATIENT
Start: 2020-08-01 | End: 2020-08-01

## 2020-08-01 RX ORDER — NICOTINE 21 MG/24HR
1 PATCH, TRANSDERMAL 24 HOURS TRANSDERMAL DAILY
Status: DISCONTINUED | OUTPATIENT
Start: 2020-08-02 | End: 2020-08-05 | Stop reason: HOSPADM

## 2020-08-01 RX ORDER — POTASSIUM CHLORIDE 7.45 MG/ML
20 INJECTION INTRAVENOUS ONCE
Status: DISCONTINUED | OUTPATIENT
Start: 2020-08-01 | End: 2020-08-01 | Stop reason: ALTCHOICE

## 2020-08-01 RX ORDER — SODIUM CHLORIDE AND POTASSIUM CHLORIDE .9; .15 G/100ML; G/100ML
SOLUTION INTRAVENOUS CONTINUOUS
Status: DISCONTINUED | OUTPATIENT
Start: 2020-08-01 | End: 2020-08-01

## 2020-08-01 RX ORDER — NICOTINE 21 MG/24HR
1 PATCH, TRANSDERMAL 24 HOURS TRANSDERMAL ONCE
Status: DISCONTINUED | OUTPATIENT
Start: 2020-08-01 | End: 2020-08-05 | Stop reason: HOSPADM

## 2020-08-01 RX ORDER — DEXTROSE MONOHYDRATE 25 G/50ML
12.5 INJECTION, SOLUTION INTRAVENOUS PRN
Status: DISCONTINUED | OUTPATIENT
Start: 2020-08-01 | End: 2020-08-01

## 2020-08-01 RX ORDER — PROMETHAZINE HYDROCHLORIDE 25 MG/ML
12.5 INJECTION, SOLUTION INTRAMUSCULAR; INTRAVENOUS EVERY 6 HOURS PRN
Status: DISCONTINUED | OUTPATIENT
Start: 2020-08-01 | End: 2020-08-01

## 2020-08-01 RX ORDER — PANTOPRAZOLE SODIUM 40 MG/10ML
40 INJECTION, POWDER, LYOPHILIZED, FOR SOLUTION INTRAVENOUS DAILY
Status: DISCONTINUED | OUTPATIENT
Start: 2020-08-01 | End: 2020-08-05 | Stop reason: HOSPADM

## 2020-08-01 RX ORDER — PROMETHAZINE HYDROCHLORIDE 25 MG/ML
25 INJECTION, SOLUTION INTRAMUSCULAR; INTRAVENOUS ONCE
Status: COMPLETED | OUTPATIENT
Start: 2020-08-01 | End: 2020-08-01

## 2020-08-01 RX ORDER — 0.9 % SODIUM CHLORIDE 0.9 %
15 INTRAVENOUS SOLUTION INTRAVENOUS ONCE
Status: CANCELLED | OUTPATIENT
Start: 2020-08-01 | End: 2020-08-01

## 2020-08-01 RX ORDER — 0.9 % SODIUM CHLORIDE 0.9 %
1000 INTRAVENOUS SOLUTION INTRAVENOUS ONCE
Status: COMPLETED | OUTPATIENT
Start: 2020-08-01 | End: 2020-08-01

## 2020-08-01 RX ADMIN — SODIUM CHLORIDE 0.1 UNITS/KG/HR: 9 INJECTION, SOLUTION INTRAVENOUS at 17:39

## 2020-08-01 RX ADMIN — POTASSIUM CHLORIDE 10 MEQ: 7.46 INJECTION, SOLUTION INTRAVENOUS at 19:09

## 2020-08-01 RX ADMIN — CALCIUM GLUCONATE 1 G: 98 INJECTION, SOLUTION INTRAVENOUS at 17:40

## 2020-08-01 RX ADMIN — POTASSIUM CHLORIDE 10 MEQ: 7.46 INJECTION, SOLUTION INTRAVENOUS at 17:38

## 2020-08-01 RX ADMIN — SODIUM CHLORIDE 1000 ML: 9 INJECTION, SOLUTION INTRAVENOUS at 15:44

## 2020-08-01 RX ADMIN — ONDANSETRON 4 MG: 2 INJECTION INTRAMUSCULAR; INTRAVENOUS at 15:44

## 2020-08-01 RX ADMIN — POTASSIUM CHLORIDE AND SODIUM CHLORIDE: 900; 150 INJECTION, SOLUTION INTRAVENOUS at 22:57

## 2020-08-01 RX ADMIN — CEFEPIME HYDROCHLORIDE 2 G: 2 INJECTION, POWDER, FOR SOLUTION INTRAVENOUS at 17:37

## 2020-08-01 RX ADMIN — PANTOPRAZOLE SODIUM 40 MG: 40 INJECTION, POWDER, FOR SOLUTION INTRAVENOUS at 15:48

## 2020-08-01 RX ADMIN — PROMETHAZINE HYDROCHLORIDE 25 MG: 25 INJECTION INTRAMUSCULAR; INTRAVENOUS at 17:38

## 2020-08-01 RX ADMIN — PROMETHAZINE HYDROCHLORIDE 12.5 MG: 25 INJECTION INTRAMUSCULAR; INTRAVENOUS at 21:10

## 2020-08-01 ASSESSMENT — PAIN DESCRIPTION - PAIN TYPE
TYPE: ACUTE PAIN

## 2020-08-01 ASSESSMENT — PAIN SCALES - GENERAL
PAINLEVEL_OUTOF10: 10

## 2020-08-01 ASSESSMENT — PAIN DESCRIPTION - LOCATION
LOCATION: MOUTH
LOCATION: GENERALIZED
LOCATION: MOUTH

## 2020-08-01 NOTE — ED PROVIDER NOTES
Triage Chief Complaint:   Hyperglycemia (BG HIGH); Fatigue; and Emesis      [place]:  Nir Monae is a 53 y.o. female that presents to the emergency department with nausea, vomiting, hyperglycemia. Patient does have a history of hep C, diabetes and CAD. She was admitted at the end of May with syncope, hyperglycemia. Patient did have an elevated glucose in the 800s at that time. She did have an MRI of the brain that was nonacute. She did have a negative cardiac stress test.  She was started on Lantus 30 units at night and a sliding scale of Humalog 12 units before meals which patient states she has been taking. States her last dose of 12 units was at 2 PM.  States vomiting has started today. No diarrhea. No chest pain or pressure. No abdominal pain. No fevers or chills. No sick contacts. Denies urinary symptoms. Past Medical History:   Diagnosis Date    CAD (coronary artery disease)     Diabetes mellitus ([facility] [address]     Hepatitis C      Past Surgical History:   Procedure Laterality Date    CHOLECYSTECTOMY      HYSTERECTOMY       History reviewed. No pertinent family history.   Social History     Socioeconomic History    Marital status: Legally      Spouse name: Not on file    Number of children: Not on file    Years of education: Not on file    Highest education level: Not on file   Occupational History    Not on file   Social Needs    Financial resource strain: Not on file    Food insecurity     Worry: Not on file     Inability: Not on file    Transportation needs     Medical: Not on file     Non-medical: Not on file   Tobacco Use    Smoking status: Current Every Day Smoker     Packs/day: 0.50     Types: Cigarettes    Smokeless tobacco: Never Used   Substance and Sexual Activity    Alcohol use: Yes     Comment: occassionally    Drug use: Yes     Types: Marijuana    Sexual activity: Not on file   Lifestyle    Physical activity     Days per week: Not on file     Minutes per session: Not on file    Stress: Not on file   Relationships    Social connections     Talks on phone: Not on file     Gets together: Not on file     Attends [employer] service: Not on file     Active member of club or organization: Not on file     Attends meetings of clubs or organizations: Not on file     Relationship status: Not on file    Intimate partner violence     Fear of current or ex partner: Not on file     Emotionally abused: Not on file     Physically abused: Not on file     Forced sexual activity: Not on file   Other Topics Concern    Not on file   Social History Narrative    Not on file     Current Facility-Administered Medications   Medication Dose Route Frequency Provider Last Rate Last Dose    ondansetron (ZOFRAN) injection 4 mg  4 mg Intravenous Q30 Min PRN Ginger Thacker MD   4 mg at 08/01/20 1544    cefepime (MAXIPIME) 2 g IVPB minibag  2 g Intravenous Once Ginger hTacker MD        glucose (GLUTOSE) 40 % oral gel 15 g  15 g Oral PRN Ginger Thacker MD        dextrose 50 % IV solution  12.5 g Intravenous PRN Ginger Thacker MD        glucagon (rDNA) injection 1 mg  1 mg Intramuscular PRN Ginger Thacker MD        dextrose 5 % solution  100 mL/hr Intravenous PRN Ginger Thacker MD        insulin regular (HUMULIN R;NOVOLIN R) 100 Units in sodium chloride 0.9 % 100 mL infusion  0.1 Units/kg/hr Intravenous Continuous Ginger Thacker MD        potassium chloride 10 mEq/100 mL IVPB (Peripheral Line)  10 mEq Intravenous Q1H Ginger Thacker MD        calcium gluconate 10 % injection 1 g  1 g Intravenous Once Ginger Thacker MD         Current Outpatient Medications   Medication Sig Dispense Refill    insulin glargine (LANTUS) 100 UNIT/ML injection vial Inject 30 Units into the skin nightly 1 vial 0    insulin lispro (HUMALOG) 100 UNIT/ML injection vial Inject 12 Units into the skin 3 times daily (with meals) 1 vial 0    lisinopril (PRINIVIL;ZESTRIL) 10 MG tablet Take 2 tablets by mouth daily 30 tablet 0    atorvastatin (LIPITOR) 40 MG tablet Take 1 tablet by mouth daily for 15 days 15 tablet 0    Insulin Pen Needle (MEIJER PEN NEEDLES) 31G X 8 MM MISC 1 each by Does not apply route 2 times daily AND 1 each 2 times daily. 60 each 0    aspirin 81 MG chewable tablet Take 81 mg by mouth daily      QUEtiapine (SEROQUEL) 300 MG tablet Take 400 mg by mouth nightly       Allergies   Allergen Reactions    Haldol [Haloperidol Lactate] Other (See Comments)     Unknown, severe reaction per mother    Penicillins      Nursing Notes Reviewed    ROS:  At least 10 systems reviewed and otherwise negative except as in the [address] Physical Exam:  ED Triage Vitals   Enc Vitals Group      BP 08/01/20 1536 89/68      Pulse 08/01/20 1536 88      Resp 08/01/20 1536 18      Temp 08/01/20 1536 97.8 °F (36.6 °C)      Temp Source 08/01/20 1536 Oral      SpO2 08/01/20 1536 100 %      Weight 08/01/20 1533 142 lb (64.4 kg)      Height 08/01/20 1533 5' 2\" (1.575 m)      Head Circumference --       Peak Flow --       Pain Score --       Pain Loc --       Pain Edu? --       Excl. in [address]? --      My pulse oximetry interpretation is which is within the normal range    GENERAL APPEARANCE: Awake and alert. Cooperative. Ill-appearing. Dry heaving. HEAD: Normocephalic. Atraumatic. EYES: EOM's grossly intact. Sclera anicteric. ENT: Mucous membranes are tacky. Tolerates saliva. No trismus. NECK: Supple. No meningismus. Trachea midline. HEART: RRR. Radial pulses 2+. LUNGS: Respirations unlabored. CTAB  ABDOMEN: Soft. Non-tender. No guarding or rebound. Normal bowel sounds. EXTREMITIES: No acute deformities. No edema  SKIN: Warm and dry. NEUROLOGICAL: No gross facial drooping. Moves all 4 extremities spontaneously. PSYCHIATRIC: Normal mood.     I have reviewed and interpreted all of the currently available lab results from this visit (if applicable):  Results for orders placed or performed during the hospital exam:->fatigue   Reason for Exam: fatigue   Acuity: Unknown   Type of Exam: Initial   Additional signs and symptoms: none   Relevant Medical/Surgical History: CAD     FINDINGS:   Monitor wires overlie the chest.     The trachea is midline.  The cardiac silhouette is unremarkable. The lungs   are clear without evidence of infiltrate, mass, or pneumothorax. Loyd Levans is no   pleural fluid.                        [] Discussed with Radiologist:     [] The following radiograph was interpreted by myself in the absence of a radiologist:     EKG: (All EKG's are interpreted by myself in the absence of a cardiologist)  The Ekg interpreted by me shows  normal sinus rhythm with a rate of 93  Axis is   Normal  QTc is  normal  Intervals and Durations are unremarkable. ST Segments: T wave inversion in V3  No significant change from prior EKG dated 5-      MDM:  Patient is afebrile. Hypotensive at 89/68. Heart rate is in the 80s. Sats are 100%. Patient has a liter of fluid going via EMS. Did add a second liter of fluid as well as Zofran and Protonix IV. EKG does not show acute findings. . Accuchek \"hi\" . CBC shows a normal white count of 6.7. Hemoglobin of 8.3. Does have a left shift. CMP shows a glucose of greater than 1500. She does have a pseudohyponatremia of 109. She does have a low calcium of 6.4 that was replaced IV with calcium gluconate. Patient is not acidotic with a normal pH and a normal CO2. Anion gap is mildly elevated at 20 and she is dehydrated with a beta hydroxybutyrate of 10. 1. Bula Dayhoff Venous pH is 7.43. magnesium normal. LIpase normal.  Hydroxybutyrate is 7. 1. Urinalysis shows negative nitrites, trace leuks, 34 WBCs with rare bacteria. Less than 1 squames. Urine culture pending. Started on cefepime IV as her lactic acid came back elevated at 5.3 patient started on a insulin drip secondary to very high glucose. CXR shows no acute findings.   Patient will need to be admitted to the ICU for further evaluation and treatment. CRITICAL CARE NOTE:  There was a high probability of clinically significant life-threatening deterioration of the patient's condition requiring my urgent intervention due to electrolyte abnormalities including low potassium, low calcium, very high glucose. IV insulin, IV potassium, IV calcium, IV fluids chart review, re-eval, admit was performed to address this. Total critical care time is at least  45 minutes. This includes vital sign monitoring, pulse oximetry monitoring, telemetry monitoring, clinical response to the IV medications, reviewing the nursing notes, consultation time, dictation/documentation time, and interpretation of the lab work. This time excludes time spent performing procedures and separately billable procedures and family discussion time. Clinical Impression:  1. Nausea and vomiting, intractability of vomiting not specified, unspecified vomiting type    2. Hyperglycemia    3. Acute cystitis without hematuria    4. Elevated lactic acid level    5. Hypocalcemia    6. Hypokalemia    7. Anemia, unspecified type        Disposition Vitals:  [unfilled], [unfilled], [unfilled], [unfilled]    Disposition referral (if applicable):  No follow-up provider specified.     Disposition medications (if applicable):  New Prescriptions    No medications on file         (Please note that portions of this note may have been completed with a voice recognition program. Efforts were made to edit the dictations but occasionally words are mis-transcribed.)    MD Bertha Emanuel MD  08/01/20 2901 Delmi Arguelles MD  08/01/20 2901 Delmi Arguelles MD  08/01/20 2801

## 2020-08-01 NOTE — ED NOTES
Kiran Sextonbrandon (sister) [phone]  Please keep updated on patient, patient verbalized understanding.        Juliocesar Munoz RN  08/01/20 6092

## 2020-08-01 NOTE — ED NOTES
Bed: ED-25  Expected date:   Expected time:   Means of arrival:   Comments:  ems     Bhanu Joyce RN  08/01/20 2826

## 2020-08-01 NOTE — H&P
History and Physical  YNES Awad-BC   Internal Medicine Hospitalist      Name:  Nemo Infante /Age/Sex: 1965  (53 y.o. female)   MRN & CSN:  [id number] & [id number] Admission Date/Time: 2020  3:32 PM   Location:  ED25/ED-25 PCP: Aileen Dunbar MD       Hospital Day: 1      Supervising Physician: Dr. Oliva Faith     Chief Complaint: Hyperglycemia (BG HIGH); Fatigue; and Emesis     Assessment and Plan:   Nemo Infante is a 53 y.o. female who presents with Hyperglycemic crisis in diabetes mellitus ([facility] [address]     Hyperglycemic, hyperosmolar crisis  in DM type 2 -- likely noncompliance to treatment, serum glucose >1500, A/G 20.   Hyperosmolar hyponatremia (109), 2/2 hyperglycemia - corrected with fluids and Insulin for hyperglycemia.  Hypokalemia (3.0) , hypocalcemia (corrected 7.4)   Elevated lactate (2.7), likely d/t dehydration.       - was admitted last 2020 for similar problem       - admit inpatient to ICU, telemetry monitoring         - insulin bolus then insulin gtt - started in ED       - aggressive IVF NS with KCl        - series Lactate       - serial BMP            - NPO until gap closed       - hold Lantus for now       - calcium gluconate 1 g given in ED       - Endo consulted for med mx       - pending iron panel, A1C, UDS, ionized Ca     Hypotensive episode - asymptomatic, initial BP in ED 89/68 with MAP 75.       - hold Lisinopril for now       - cont IVF       - closely monitor BP trends and MAP     Suspected, UTI - asymptomatic, UA with trace esterase, WBC normal, no fever, but procalcitonin 2.39       - cont Cefepime - started in ED       - cont monitor signs and symptoms of sepsis       - pending cultures           Tobacco abuse - on Nicotine patch, tobacco cessation education.  Chronic Illnesses: will continue current home medications unless contraindicated by above plan and assessment.         - CAD - on ASA, Lipitor       - Hep C       - thyroid disease       - paranoid schizophrenia/bipolar affective       - enlarged liver     Current diagnosis and plan of management discussed with the patient and sister at the time of admission in lay language who agree to the above plan and disposition of admission for further care. All concerns and questions addressed. Patient assessment and plan in conjunction with supervising physician - Dr. Ryanne Knowles NPO effective now    DVT Prophylaxis [x] Lovenox, []  Heparin, [] SCDs, [] Ambulation  [] Long term AC   GI Prophylaxis [x] PPI,  [] H2 Blocker,  [] Carafate,  [] Diet,  [] No GI prophylaxis, N/A: patient is not under significant medical stress, non-ICU or is receiving a diet/tube feeds   Code Status  Full CODE   Disposition Patient requires continued admission due to Hyperglycemic crisis in diabetes mellitus ([facility] [address] Discharge Plan: Patient plans to return home upon discharge. MDM [] Low, [] Moderate,[x]  High  Patient's risk as above due to:      [x] One or more chronic illnesses with mild exacerbation progression      [x] Two or more stable chronic illnesses      [] Undiagnosed new problem with uncertain prognosis      [] Elective major surgery      []Prescription drug management     History of Present Illness:     Principal Problem: Hyperglycemic crisis in diabetes mellitus ([facility] [address]  Kacey Arenas is a 53 y.o. female with past medical history of uncontrolled DM type 2, CAD, Hep C, thyroid disease, paranoid schizophrenia, bipolar affective, enlarged liver, and tobacco abuse presented to the ED with sister for complaints of extremely high blood sugar, nausea, vomiting, and fatigue. Per chart review, patient was admitted here last 5/2020 for similar problem with blood glucose of 833 and syncope. Patient reports that she was doing fine for 2 weeks after discharged last 5/28/2020, then she stopped eating, lost her appetite but admits drinking pop every day.  Patient stated that she was taking her insulin every day, and last dose of 12 units of insulin was taking at 2 PM day. Patient had a MRI test last admission which was non-acute. Patient denies chest pain, palpitation, fever, chills or diaphoresis, cough, and SOB or difficulty breathing. Denies any other symptoms including headache, paresthesias, abdominal pain, changes in bowels, dysuria, hematuria, frequency or urgency, and B/L weakness. Upon interview, the patient provided the history as above. ED Course: Discussed case with ED physician prior to admission. ROS: Ten point ROS reviewed and negative, unless as noted above per HPI. Objective:   No intake or output data in the 24 hours ending 08/01/20 1812     Vitals:   Vitals:    08/01/20 1533 08/01/20 1536   BP:  89/68   Pulse:  88   Resp:  18   Temp:  97.8 °F (36.6 °C)   TempSrc:  Oral   SpO2:  100%   Weight: 142 lb (64.4 kg)    Height: 5' 2\" (1.575 m)      Physical Exam: 08/01/20    GEN  -Awake, alert, appearing female, sitting upright in bed, cooperative, able to give adequate history. Appears given age. EYES -Pupils are equally round. No vision changes. No scleral erythema, discharge, or conjunctivitis. HENT -MM are moist. Oral pharynx without exudates, no evidence of thrush. NECK -Supple, no apparent thyromegaly or masses. RESP -LS CTA equal bilat, no wheezes, rales or rhonchi. Symmetric chest movement. No respiratory distress noted. C/V  -S1/S2 auscultated. RRR without appreciable M/R/G. No JVD or carotid bruits. Peripheral pulses equal bilaterally and palpable. Peripheral pulses equal bilaterally and palpable. No peripheral edema. GI  -Abdomen is soft, round, non-distended, no significant tenderness. No masses or guarding. + BS in all quadrants. Rectal exam deferred.   -Laurent catheter is not present. LYMPH  -No palpable cervical lymphadenopathy and no hepatosplenomegaly. No petechiae or ecchymoses. MS  -B/L extremities strong muscles strength. Full movements.  No gross joint deformities. No swelling, intact sensation symmetrical.   SKIN  -Normal coloration, warm, dry. No open wounds or ulcers. NEURO  -CN 2-12 appear grossly intact, normal speech, no lateralizing weakness. PSYC  -Awake, alert, oriented x 4. Appropriate affect. Past Medical History:      Past Medical History:   Diagnosis Date    CAD (coronary artery disease)     Diabetes mellitus ([facility] [address]     Hepatitis C      Past Surgery History:  Patient  has a past surgical history that includes Hysterectomy and Cholecystectomy. Social History:    FAM HX: Assessed: family history includes Cancer in her father; No Known Problems in her mother.   Soc HX:   Social History     Socioeconomic History    Marital status: Legally      Spouse name: None    Number of children: None    Years of education: None    Highest education level: None   Occupational History    None   Social Needs    Financial resource strain: None    Food insecurity     Worry: None     Inability: None    Transportation needs     Medical: None     Non-medical: None   Tobacco Use    Smoking status: Current Every Day Smoker     Packs/day: 0.50     Types: Cigarettes    Smokeless tobacco: Never Used   Substance and Sexual Activity    Alcohol use: Yes     Comment: occassionally    Drug use: Yes     Types: Marijuana    Sexual activity: None   Lifestyle    Physical activity     Days per week: None     Minutes per session: None    Stress: None   Relationships    Social connections     Talks on phone: None     Gets together: None     Attends Latter-day service: None     Active member of club or organization: None     Attends meetings of clubs or organizations: None     Relationship status: None    Intimate partner violence     Fear of current or ex partner: None     Emotionally abused: None     Physically abused: None     Forced sexual activity: None   Other Topics Concern    None   Social History Narrative    None     TOBACCO:   reports that for input(s): INR in the last 72 hours. No results for input(s): CKTOTAL, CKMB, CKMBINDEX in the last 72 hours. Invalid input(s): Elmyra Kid input(s): PRO-BNP    Radiology this visit:  Reviewed. Xr Chest Portable    Result Date: 8/1/2020  EXAMINATION: ONE XRAY VIEW OF THE CHEST 8/1/2020 4:04 pm COMPARISON: 05/27/2020 HISTORY: ORDERING SYSTEM PROVIDED HISTORY: fatigue TECHNOLOGIST PROVIDED HISTORY: Reason for exam:->fatigue Reason for Exam: fatigue Acuity: Unknown Type of Exam: Initial Additional signs and symptoms: none Relevant Medical/Surgical History: CAD FINDINGS: Monitor wires overlie the chest. The trachea is midline. The cardiac silhouette is unremarkable. The lungs are clear without evidence of infiltrate, mass, or pneumothorax. There is no pleural fluid. No acute cardiopulmonary process.      EKG this visit:   EKG: Normal sinus rhythm, rate 93   Nonspecific T wave abnormality   Abnormal ECG   When compared with ECG of 26-MAY-2020 13:48,   NH interval has increased   Questionable change in QRS duration   Minimal criteria for Anterior infarct are no longer present     Current Treatment Team:  Treatment Team: Attending Provider: Bertha Griffin MD; Registered Nurse: Artur Shipley RN; Consulting Physician: MD Paulette Gayle APRN-BC [facility]  8/1/2020 6:12 PM      Electronically signed by YNES Roy CNP on 8/1/2020 at 6:12 PM

## 2020-08-01 NOTE — ED TRIAGE NOTES
Pt brought to the ED by squad from home for Hyperglycemia, Generalized Fatigue, and N/V. Pt reports symptoms for a week. Pt was found to hypertensive en route by EMS and a 1Liter bolus NS initiated.

## 2020-08-02 ENCOUNTER — APPOINTMENT (OUTPATIENT)
Dept: CT IMAGING | Age: 55
DRG: 871 | End: 2020-08-02
Payer: COMMERCIAL

## 2020-08-02 LAB
ANION GAP SERPL CALCULATED.3IONS-SCNC: 10 MMOL/L (ref 4–16)
ANION GAP SERPL CALCULATED.3IONS-SCNC: 12 MMOL/L (ref 4–16)
ANION GAP SERPL CALCULATED.3IONS-SCNC: 9 MMOL/L (ref 4–16)
BASE EXCESS MIXED: 5.7 (ref 0–2.3)
BASE EXCESS: ABNORMAL (ref 0–2.4)
BASOPHILS ABSOLUTE: 0 K/CU MM
BASOPHILS RELATIVE PERCENT: 0.2 % (ref 0–1)
BUN BLDV-MCNC: 11 MG/DL (ref 6–23)
BUN BLDV-MCNC: 6 MG/DL (ref 6–23)
BUN BLDV-MCNC: 9 MG/DL (ref 6–23)
CALCIUM SERPL-MCNC: 8.2 MG/DL (ref 8.3–10.6)
CALCIUM SERPL-MCNC: 9.3 MG/DL (ref 8.3–10.6)
CALCIUM SERPL-MCNC: 9.5 MG/DL (ref 8.3–10.6)
CHLORIDE BLD-SCNC: 101 MMOL/L (ref 99–110)
CHLORIDE BLD-SCNC: 106 MMOL/L (ref 99–110)
CHLORIDE BLD-SCNC: 111 MMOL/L (ref 99–110)
CO2: 22 MMOL/L (ref 21–32)
CO2: 28 MMOL/L (ref 21–32)
CO2: 28 MMOL/L (ref 21–32)
CO2: 33 MMOL/L (ref 21–32)
CREAT SERPL-MCNC: 0.9 MG/DL (ref 0.6–1.1)
CREAT SERPL-MCNC: 1 MG/DL (ref 0.6–1.1)
CREAT SERPL-MCNC: 1 MG/DL (ref 0.6–1.1)
DIFFERENTIAL TYPE: ABNORMAL
EOSINOPHILS ABSOLUTE: 0.1 K/CU MM
EOSINOPHILS RELATIVE PERCENT: 0.5 % (ref 0–3)
ESTIMATED AVERAGE GLUCOSE: 344 MG/DL
GFR AFRICAN AMERICAN: >60 ML/MIN/1.73M2
GFR NON-AFRICAN AMERICAN: 58 ML/MIN/1.73M2
GFR NON-AFRICAN AMERICAN: 58 ML/MIN/1.73M2
GFR NON-AFRICAN AMERICAN: >60 ML/MIN/1.73M2
GLUCOSE BLD-MCNC: 156 MG/DL (ref 70–99)
GLUCOSE BLD-MCNC: 162 MG/DL (ref 70–99)
GLUCOSE BLD-MCNC: 178 MG/DL (ref 70–99)
GLUCOSE BLD-MCNC: 180 MG/DL (ref 70–99)
GLUCOSE BLD-MCNC: 214 MG/DL (ref 70–99)
GLUCOSE BLD-MCNC: 242 MG/DL (ref 70–99)
GLUCOSE BLD-MCNC: 248 MG/DL (ref 70–99)
GLUCOSE BLD-MCNC: 259 MG/DL (ref 70–99)
GLUCOSE BLD-MCNC: 265 MG/DL (ref 70–99)
GLUCOSE BLD-MCNC: 287 MG/DL (ref 70–99)
GLUCOSE BLD-MCNC: 317 MG/DL (ref 70–99)
GLUCOSE BLD-MCNC: 381 MG/DL (ref 70–99)
GLUCOSE BLD-MCNC: 403 MG/DL (ref 70–99)
GLUCOSE BLD-MCNC: 447 MG/DL (ref 70–99)
GLUCOSE BLD-MCNC: 563 MG/DL (ref 70–99)
HBA1C MFR BLD: 13.6 % (ref 4.2–6.3)
HCO3 ARTERIAL: 31 MMOL/L (ref 18–23)
HCT VFR BLD CALC: 21.2 % (ref 37–47)
HCT VFR BLD CALC: 24 % (ref 37–47)
HEMOGLOBIN: 7.6 GM/DL (ref 12.5–16)
HEMOGLOBIN: 8.3 GM/DL (ref 12.5–16)
HEPATITIS C ANTIBODY: ABNORMAL
IMMATURE NEUTROPHIL %: 1 % (ref 0–0.43)
LACTIC ACID, SEPSIS: 2 MMOL/L (ref 0.5–1.9)
LYMPHOCYTES ABSOLUTE: 1.7 K/CU MM
LYMPHOCYTES RELATIVE PERCENT: 9.6 % (ref 24–44)
MAGNESIUM: 1.5 MG/DL (ref 1.8–2.4)
MAGNESIUM: 1.7 MG/DL (ref 1.8–2.4)
MAGNESIUM: 2.1 MG/DL (ref 1.8–2.4)
MAGNESIUM: 2.1 MG/DL (ref 1.8–2.4)
MCH RBC QN AUTO: 30.6 PG (ref 27–31)
MCHC RBC AUTO-ENTMCNC: 35.8 % (ref 32–36)
MCV RBC AUTO: 85.5 FL (ref 78–100)
MONOCYTES ABSOLUTE: 1 K/CU MM
MONOCYTES RELATIVE PERCENT: 6 % (ref 0–4)
NUCLEATED RBC %: 0 %
O2 SATURATION: 54.1 % (ref 96–97)
PCO2 ARTERIAL: 48.5 MMHG (ref 32–45)
PDW BLD-RTO: 14.7 % (ref 11.7–14.9)
PH BLOOD: 7.41 (ref 7.34–7.45)
PHOSPHORUS: 0.7 MG/DL (ref 2.5–4.9)
PHOSPHORUS: 1.7 MG/DL (ref 2.5–4.9)
PHOSPHORUS: 1.8 MG/DL (ref 2.5–4.9)
PHOSPHORUS: 2.1 MG/DL (ref 2.5–4.9)
PLATELET # BLD: 348 K/CU MM (ref 140–440)
PMV BLD AUTO: 10.1 FL (ref 7.5–11.1)
PO2 ARTERIAL: 28.7 MMHG (ref 75–100)
POC CALCIUM: 1.38 MMOL/L (ref 1.12–1.32)
POC CHLORIDE: 113 MMOL/L (ref 98–109)
POC CREATININE: 0.9 MG/DL (ref 0.6–1.1)
POTASSIUM SERPL-SCNC: 2.7 MMOL/L (ref 3.5–5.1)
POTASSIUM SERPL-SCNC: 2.9 MMOL/L (ref 3.5–4.5)
POTASSIUM SERPL-SCNC: 3.1 MMOL/L (ref 3.5–5.1)
POTASSIUM SERPL-SCNC: 3.4 MMOL/L (ref 3.5–5.1)
RBC # BLD: 2.48 M/CU MM (ref 4.2–5.4)
REASON FOR REJECTION: NORMAL
REJECTED TEST: NORMAL
SEGMENTED NEUTROPHILS ABSOLUTE COUNT: 14.3 K/CU MM
SEGMENTED NEUTROPHILS RELATIVE PERCENT: 82.7 % (ref 36–66)
SODIUM BLD-SCNC: 135 MMOL/L (ref 135–145)
SODIUM BLD-SCNC: 144 MMOL/L (ref 135–145)
SODIUM BLD-SCNC: 148 MMOL/L (ref 135–145)
SODIUM BLD-SCNC: 153 MMOL/L (ref 138–146)
SOURCE, BLOOD GAS: ABNORMAL
TOTAL IMMATURE NEUTOROPHIL: 0.17 K/CU MM
TOTAL NUCLEATED RBC: 0 K/CU MM
WBC # BLD: 17.2 K/CU MM (ref 4–10.5)

## 2020-08-02 PROCEDURE — 87040 BLOOD CULTURE FOR BACTERIA: CPT

## 2020-08-02 PROCEDURE — 82962 GLUCOSE BLOOD TEST: CPT

## 2020-08-02 PROCEDURE — 83605 ASSAY OF LACTIC ACID: CPT

## 2020-08-02 PROCEDURE — 84100 ASSAY OF PHOSPHORUS: CPT

## 2020-08-02 PROCEDURE — 02HV33Z INSERTION OF INFUSION DEVICE INTO SUPERIOR VENA CAVA, PERCUTANEOUS APPROACH: ICD-10-PCS | Performed by: FAMILY MEDICINE

## 2020-08-02 PROCEDURE — 36556 INSERT NON-TUNNEL CV CATH: CPT

## 2020-08-02 PROCEDURE — 94761 N-INVAS EAR/PLS OXIMETRY MLT: CPT

## 2020-08-02 PROCEDURE — 74177 CT ABD & PELVIS W/CONTRAST: CPT

## 2020-08-02 PROCEDURE — C9113 INJ PANTOPRAZOLE SODIUM, VIA: HCPCS | Performed by: NURSE PRACTITIONER

## 2020-08-02 PROCEDURE — 2500000003 HC RX 250 WO HCPCS: Performed by: INTERNAL MEDICINE

## 2020-08-02 PROCEDURE — 2500000003 HC RX 250 WO HCPCS: Performed by: NURSE PRACTITIONER

## 2020-08-02 PROCEDURE — 2000000000 HC ICU R&B

## 2020-08-02 PROCEDURE — 86803 HEPATITIS C AB TEST: CPT

## 2020-08-02 PROCEDURE — 84132 ASSAY OF SERUM POTASSIUM: CPT

## 2020-08-02 PROCEDURE — 83735 ASSAY OF MAGNESIUM: CPT

## 2020-08-02 PROCEDURE — 6360000002 HC RX W HCPCS: Performed by: NURSE PRACTITIONER

## 2020-08-02 PROCEDURE — 6360000002 HC RX W HCPCS: Performed by: INTERNAL MEDICINE

## 2020-08-02 PROCEDURE — 6360000004 HC RX CONTRAST MEDICATION: Performed by: INTERNAL MEDICINE

## 2020-08-02 PROCEDURE — 6370000000 HC RX 637 (ALT 250 FOR IP): Performed by: NURSE PRACTITIONER

## 2020-08-02 PROCEDURE — 6370000000 HC RX 637 (ALT 250 FOR IP): Performed by: INTERNAL MEDICINE

## 2020-08-02 PROCEDURE — 2580000003 HC RX 258: Performed by: NURSE PRACTITIONER

## 2020-08-02 PROCEDURE — 83036 HEMOGLOBIN GLYCOSYLATED A1C: CPT

## 2020-08-02 PROCEDURE — 80048 BASIC METABOLIC PNL TOTAL CA: CPT

## 2020-08-02 PROCEDURE — 93010 ELECTROCARDIOGRAM REPORT: CPT | Performed by: INTERNAL MEDICINE

## 2020-08-02 PROCEDURE — 85025 COMPLETE CBC W/AUTO DIFF WBC: CPT

## 2020-08-02 PROCEDURE — 2580000003 HC RX 258: Performed by: HOSPITALIST

## 2020-08-02 PROCEDURE — 2580000003 HC RX 258: Performed by: INTERNAL MEDICINE

## 2020-08-02 PROCEDURE — 36592 COLLECT BLOOD FROM PICC: CPT

## 2020-08-02 RX ORDER — INSULIN GLARGINE 100 [IU]/ML
0.25 INJECTION, SOLUTION SUBCUTANEOUS NIGHTLY
Status: DISCONTINUED | OUTPATIENT
Start: 2020-08-02 | End: 2020-08-02

## 2020-08-02 RX ORDER — POTASSIUM CHLORIDE 29.8 MG/ML
20 INJECTION INTRAVENOUS PRN
Status: DISCONTINUED | OUTPATIENT
Start: 2020-08-02 | End: 2020-08-05 | Stop reason: HOSPADM

## 2020-08-02 RX ORDER — SODIUM CHLORIDE 450 MG/100ML
INJECTION, SOLUTION INTRAVENOUS CONTINUOUS
Status: DISCONTINUED | OUTPATIENT
Start: 2020-08-02 | End: 2020-08-03

## 2020-08-02 RX ORDER — INSULIN GLARGINE 100 [IU]/ML
40 INJECTION, SOLUTION SUBCUTANEOUS NIGHTLY
Status: DISCONTINUED | OUTPATIENT
Start: 2020-08-02 | End: 2020-08-05 | Stop reason: HOSPADM

## 2020-08-02 RX ORDER — DEXTROSE MONOHYDRATE 25 G/50ML
12.5 INJECTION, SOLUTION INTRAVENOUS PRN
Status: DISCONTINUED | OUTPATIENT
Start: 2020-08-02 | End: 2020-08-05 | Stop reason: HOSPADM

## 2020-08-02 RX ORDER — NICOTINE POLACRILEX 4 MG
15 LOZENGE BUCCAL PRN
Status: DISCONTINUED | OUTPATIENT
Start: 2020-08-02 | End: 2020-08-05 | Stop reason: HOSPADM

## 2020-08-02 RX ORDER — DEXTROSE MONOHYDRATE 50 MG/ML
100 INJECTION, SOLUTION INTRAVENOUS PRN
Status: DISCONTINUED | OUTPATIENT
Start: 2020-08-02 | End: 2020-08-02

## 2020-08-02 RX ORDER — LIDOCAINE HYDROCHLORIDE 20 MG/ML
5 SOLUTION OROPHARYNGEAL
Status: DISCONTINUED | OUTPATIENT
Start: 2020-08-02 | End: 2020-08-05 | Stop reason: HOSPADM

## 2020-08-02 RX ADMIN — POTASSIUM CHLORIDE 20 MEQ: 400 INJECTION, SOLUTION INTRAVENOUS at 03:21

## 2020-08-02 RX ADMIN — CEFEPIME 2 G: 2 INJECTION, POWDER, FOR SOLUTION INTRAVENOUS at 18:46

## 2020-08-02 RX ADMIN — SODIUM PHOSPHATE, MONOBASIC, MONOHYDRATE 20 MMOL: 276; 142 INJECTION, SOLUTION INTRAVENOUS at 02:33

## 2020-08-02 RX ADMIN — ASPIRIN 81 MG: 81 TABLET, CHEWABLE ORAL at 08:24

## 2020-08-02 RX ADMIN — POTASSIUM CHLORIDE 20 MEQ: 400 INJECTION, SOLUTION INTRAVENOUS at 18:08

## 2020-08-02 RX ADMIN — PANTOPRAZOLE SODIUM 40 MG: 40 INJECTION, POWDER, FOR SOLUTION INTRAVENOUS at 00:46

## 2020-08-02 RX ADMIN — SODIUM CHLORIDE: 4.5 INJECTION, SOLUTION INTRAVENOUS at 13:22

## 2020-08-02 RX ADMIN — POTASSIUM CHLORIDE 20 MEQ: 400 INJECTION, SOLUTION INTRAVENOUS at 02:00

## 2020-08-02 RX ADMIN — ONDANSETRON 4 MG: 2 INJECTION INTRAMUSCULAR; INTRAVENOUS at 00:46

## 2020-08-02 RX ADMIN — MAGNESIUM SULFATE HEPTAHYDRATE 1 G: 1 INJECTION, SOLUTION INTRAVENOUS at 19:02

## 2020-08-02 RX ADMIN — SODIUM CHLORIDE: 4.5 INJECTION, SOLUTION INTRAVENOUS at 23:51

## 2020-08-02 RX ADMIN — CEFEPIME 2 G: 2 INJECTION, POWDER, FOR SOLUTION INTRAVENOUS at 06:33

## 2020-08-02 RX ADMIN — ENOXAPARIN SODIUM 40 MG: 40 INJECTION SUBCUTANEOUS at 21:54

## 2020-08-02 RX ADMIN — INSULIN GLARGINE 40 UNITS: 100 INJECTION, SOLUTION SUBCUTANEOUS at 21:57

## 2020-08-02 RX ADMIN — POTASSIUM CHLORIDE, DEXTROSE MONOHYDRATE AND SODIUM CHLORIDE: 150; 5; 450 INJECTION, SOLUTION INTRAVENOUS at 07:08

## 2020-08-02 RX ADMIN — POTASSIUM CHLORIDE 20 MEQ: 400 INJECTION, SOLUTION INTRAVENOUS at 06:52

## 2020-08-02 RX ADMIN — POTASSIUM CHLORIDE: 149 INJECTION, SOLUTION, CONCENTRATE INTRAVENOUS at 00:19

## 2020-08-02 RX ADMIN — PANTOPRAZOLE SODIUM 40 MG: 40 INJECTION, POWDER, FOR SOLUTION INTRAVENOUS at 08:25

## 2020-08-02 RX ADMIN — POTASSIUM CHLORIDE 20 MEQ: 400 INJECTION, SOLUTION INTRAVENOUS at 00:46

## 2020-08-02 RX ADMIN — POTASSIUM CHLORIDE 20 MEQ: 400 INJECTION, SOLUTION INTRAVENOUS at 18:57

## 2020-08-02 RX ADMIN — POTASSIUM CHLORIDE 20 MEQ: 400 INJECTION, SOLUTION INTRAVENOUS at 08:24

## 2020-08-02 RX ADMIN — QUETIAPINE FUMARATE 400 MG: 200 TABLET ORAL at 21:54

## 2020-08-02 RX ADMIN — IOPAMIDOL 75 ML: 755 INJECTION, SOLUTION INTRAVENOUS at 15:33

## 2020-08-02 RX ADMIN — SODIUM PHOSPHATE, MONOBASIC, MONOHYDRATE 15 MMOL: 276; 142 INJECTION, SOLUTION INTRAVENOUS at 18:45

## 2020-08-02 RX ADMIN — SODIUM CHLORIDE: 234 INJECTION INTRAMUSCULAR; INTRAVENOUS; SUBCUTANEOUS at 00:46

## 2020-08-02 RX ADMIN — ATORVASTATIN CALCIUM 40 MG: 40 TABLET, FILM COATED ORAL at 08:24

## 2020-08-02 RX ADMIN — MAGNESIUM SULFATE HEPTAHYDRATE 1 G: 1 INJECTION, SOLUTION INTRAVENOUS at 17:57

## 2020-08-02 ASSESSMENT — PAIN SCALES - GENERAL
PAINLEVEL_OUTOF10: 0
PAINLEVEL_OUTOF10: 2
PAINLEVEL_OUTOF10: 2
PAINLEVEL_OUTOF10: 0
PAINLEVEL_OUTOF10: 5
PAINLEVEL_OUTOF10: 0

## 2020-08-02 ASSESSMENT — PAIN DESCRIPTION - LOCATION
LOCATION: MOUTH

## 2020-08-02 ASSESSMENT — PAIN DESCRIPTION - PAIN TYPE
TYPE: ACUTE PAIN

## 2020-08-02 NOTE — PROGRESS NOTES
Davina Case is a 53 y.o. female patient stating has abd pain.  No chest pain or shortness of breath    Current Facility-Administered Medications   Medication Dose Route Frequency Provider Last Rate Last Dose    potassium chloride 20 mEq/50 mL IVPB (Central Line)  20 mEq Intravenous PRN Kyra Uriostegui MD 50 mL/hr at 08/02/20 0652 20 mEq at 08/02/20 0017    sodium chloride 0.225 % 1,000 mL infusion   Intravenous Continuous Mono Anders Olivas  mL/hr at 08/02/20 0046      glucose (GLUTOSE) 40 % oral gel 15 g  15 g Oral PRN Bob Truong MD        glucagon (rDNA) injection 1 mg  1 mg Intramuscular PRN Bob Truong MD        dextrose 5 % solution  100 mL/hr Intravenous PRN Bob Truong MD        insulin regular (HUMULIN R;NOVOLIN R) 100 Units in sodium chloride 0.9 % 100 mL infusion  0.1 Units/kg/hr Intravenous Continuous Bob Truong MD 3.6 mL/hr at 08/02/20 0725 3.6 Units/hr at 08/02/20 0725    enoxaparin (LOVENOX) injection 40 mg  40 mg Subcutaneous Daily Olive Aranda, APRN - CNP   Stopped at 08/02/20 0030    dextrose 50 % IV solution  12.5 g Intravenous PRN Olive Aranda, APRN - CNP        magnesium sulfate 1 g in dextrose 5% 100 mL IVPB  1 g Intravenous PRN Olive Aranda, APRN - CNP        sodium phosphate 10 mmol in dextrose 5 % 250 mL IVPB  10 mmol Intravenous PRN Olive Aranda, APRN - CNP        Or    sodium phosphate 15 mmol in dextrose 5 % 250 mL IVPB  15 mmol Intravenous PRN Olive Aranda, APRN - CNP        Or    sodium phosphate 20 mmol in dextrose 5 % 500 mL IVPB  20 mmol Intravenous PRN Olive Aranda, APRN - CNP 83.3 mL/hr at 08/02/20 0233 20 mmol at 08/02/20 0233    dextrose 5 % and 0.45 % NaCl with KCl 20 mEq infusion   Intravenous Continuous PRN Olive Aranda, APRN -  mL/hr at 08/02/20 0708      aspirin chewable tablet 81 mg  81 mg Oral Daily Yadira Cee APRN - CNP        atorvastatin (LIPITOR) tablet 40 mg  40 mg Oral Daily [place] Aranda, APRN - CNP        QUEtiapine (SEROQUEL) tablet 400 mg  400 mg Oral Nightly Yadira Cee, APRN - CNP        nicotine (NICODERM CQ) 21 MG/24HR 1 patch  1 patch Transdermal Daily Yadira Cee, APRN - CNP        pantoprazole (PROTONIX) injection 40 mg  40 mg Intravenous Daily Yadira Cee, APRN - CNP   40 mg at 08/02/20 0046    cefepime (MAXIPIME) 2 g IVPB minibag  2 g Intravenous Q12H YNES Gutiérrez - CNP   Stopped at 08/02/20 0703    lidocaine PF 1 % injection 5 mL  5 mL Intradermal Once Orquidea Schreiber APRN - CNP        sodium chloride flush 0.9 % injection 10 mL  10 mL Intravenous PRN Orquidea Schreiber, APRN - CNP        nicotine (NICODERM CQ) 21 MG/24HR 1 patch  1 patch Transdermal Once Kennedy Suryar, APRN - ALLYSSA        promethazine (PHENERGAN) injection 12.5 mg  12.5 mg Intravenous Q6H PRN Orquidea Schreiber, APRN - CNP   12.5 mg at 08/01/20 2110     Allergies   Allergen Reactions    Haldol [Haloperidol Lactate] Other (See Comments)     Unknown, severe reaction per mother    Penicillins      Principal Problem:    Hyperglycemic crisis in diabetes mellitus ([facility] [address]  Active Problems:    Hyperosmolar hyponatremia    Hypokalemia    Hypotensive episode  Resolved Problems:    * No resolved hospital problems. *    Blood pressure 119/83, pulse 106, temperature 98.5 °F (36.9 °C), temperature source Oral, resp. rate (!) 47, height 5' 2\" (1.575 m), weight 124 lb 3.2 oz (56.3 kg), SpO2 100 %. Subjective:  Diet:  Poor intake. Pain:  She complains of pain that is mild. Objective:  General Appearance:  Uncomfortable. Vital signs: (most recent): Blood pressure 125/84, pulse 107, temperature 98.5 °F (36.9 °C), temperature source Oral, resp. rate 28, height 5' 2\" (1.575 m), weight 124 lb 3.2 oz (56.3 kg), SpO2 97 %. Vital signs are normal.    HEENT: Normal HEENT exam.    Lungs:  Normal effort. There are decreased breath sounds. Heart: Normal rate. Abdomen: Abdomen is soft.   There is no abdominal tenderness. Extremities: Decreased range of motion. Neurological: Patient is alert.       Assessment & Plan   Sepsis with klebseilla bacteremia and suspected from UTI  -repeat bld cx  -urine cx pending  -Cefepime and check CT abd  Hyperosmolar hyperglycemic crisis  -IVF and IV insulin  hyperosmoloar hyponatremia and now hypernatremia  -IVF  Hypophos,Hypokalemia  -replacement protocol  Hypotension  -hold ACE, IVF   Bipolar  Thormbocytopenia  -mild due to sepsis  DVT prophylaxis  -lovenox  Rachel Andino MD  8/2/2020

## 2020-08-02 NOTE — CONSULTS
150.  In any event,  she has various treatment initially isotonic followed by hypotonic as I  was called by Dr. Monico Nageotte and without knowing much data we decided to put  some hypotonic solution and she is currently on 0.5 normal saline too. She is subsequently admitted to ICU. In addition of that of course, she has had acute kidney injury with  creatinine about 1.1 to 1.0 which does not surprise me at all. She is a  tiny lady and does not have much muscle mass. Her baseline creatinine  about 0.5. Sugars getting under control. She was on insulin drip of  course. In addition to that, she was also found to have Klebsiella  pneumoniae sepsis by PCR and some pyuria in the urine. Taken together,  she probably had some urinary tract infection and sepsis. It also looks like that she was here in 05/2020 with apparently syncopal  episode. She has several diagnostic tests, which include cardiac and  neurological.  None of them revealed any clear-cut etiology. Her sugar  obviously was 802. At that time, she also had hyperosmolar nonketotic  state like this time and was sent home with insulin therapy. The patient is not 100% oriented, but she told me that she does not have  any primary care that might be the biggest problem why she keep coming  back, but we probably need more collateral history to see how we are  going to manage her as an outpatient. PAST MEDICAL HISTORY:  1. Of course diabetes mellitus. She is insulin dependent. She could  not tell me how long she has it obviously out of control, not controlled  at all one can see it. 2.  It is listed that she had hepatitis C, but I could not see any  serology or probably going to worth checking next time. 3.  Hypertension. 4.  She is also listed as coronary artery disease, but I do not see any  documentation. The noninvasive workup was okay, so those all need  verification. SOCIAL HISTORY:  The patient is not . Apparently has one son. atraumatic. EYES:  At least 1+ conjunctival pallor. EARS, MOUTH, AND THROAT:  Poor oral hygiene. CARDIOVASCULAR:  Tachycardia. RESPIRATORY:  Clear to auscultation. ABDOMEN:  Soft. There are some well-healed scars from previous surgery  perhaps her hysterectomy and cholecystectomy as mentioned in her  history. EXTREMITIES:  No edema. :  She does have a Laurent catheter with some concentrated urine. LABORATORY VALUES AND ANCILLARY SERVICES:  Her most recent creatinine is  0.9. Potassium is 3.4, sodium is 148, and phosphorous is 1.8. We need  to replete all these things. IMPRESSION:  A 48-year-old female with dysnatremia and acute kidney  injury, and sepsis. 1.  Dysnatremia. I suspect the first ER lab was an error. As her  calcium, sodium, albumin, bicarb, all was really low and it  significantly improved within four hours. Regardless what kind of  intervention was done it will be extremely unlikely that can explain  physiologically. So if I disregard the first lab, the second lab sodium  was 129, but adjusted for a blood sugar will be almost 150s, so I think  she was hypernatremic to begin with and it makes more sense to me given  her very high sugar perhaps polyuria and more water loss and the salt. Her sodium still about 150'ara. She is on hypotonic solution. Blood  sugar getting under control. So I think it will be all acceptable to  me. 2.  Acute kidney injury likely from sepsis and high hyperglycemia  induced more water loss than the salt. 3.  Sepsis with Klebsiella. She does have some pyuria, so urinary tract  infection would make some sense that might have precipitated the  hyperosmolar nonketotic state too. I do not see overtly any other  source at this point, but we will keep looking. 4.  Other electrolyte imbalance again all from hyperglycemia. 5.  Hyperosmolar nonketotic state, recurrent event, might have been this  time precipitated by infection. Her pH is more than 7. Initial  bicarbonate could have been lab error too. PLAN:  So I would go ahead and treat the gram-negative ravi and look for  any other potential source, slow blood sugar control. I think it is  quite reasonable to keep her on hypotonic solution. She has high risk  for all electrolyte imbalance including potassium, magnesium,  phosphorus, etc.  So keep an eye and monitor and replete accordingly. Above all we probably need a good plan for her outpatient care and to  make sure we can treat her as an outpatient and not getting  decompensating and coming back to the hospital.  I will probably add an  hepatitis C antibody _____ test just to see whether she does have  hepatitis C or not, if so she might be able to get some treatment. Further plan will be dictated by her clinical course.         Darling Presley MD    D: 08/02/2020 8:15:22       T: 08/02/2020 10:02:58     [email or web address]  Job#: [id number]     Doc#: [id number]    CC:

## 2020-08-02 NOTE — CONSULTS
Pt seen ,examined,interviewed and chart reviewed. Please see the dictated consult for details     Imp :   1. Dysnatremia- I suspect the first ER lab was an error- as  her ca, Na. Alb  was very low and reppeat lab within 4 h - Na 129, ca 8.9 - no reapet alb yet - I suspect with  Her  very high BS ( lab cpuld not event detec it - as it was > 1500 mg/dl- so if we disregard the initial na - rest of the na adjuted for BS was > 140- her second Na likely close to 150 - which makes sense  with severe polyuria and diuresis likely  from very high bs - na still little up clsoe to 150 but she is on hypotionic sol - so acceptable  2. MELANY- likely froms sepsis and high bs induced  water/ and some na loss   3. Sepsis with kelb - likely UT origin but will look for any other potential source - her CXR seems to be ok and no pulm sx   4. Hyperosmolar non ketotic state- recurrent event  And  may have been precipiatated by sepsis - her PH was > 7.4     Plan:  1. So mainly tx for GNR  2. Look for any other source  3. Slow BS control  4. Also ok with hypotopnic sol  5. She has risk ro other electrolytes imblalanc  So  keep monitoring  6. Above all - we need a good plan for her out pt care - so she does not  keep getting sick and  needing her to come to the [facility]   7.  Add hep c ab       Thanks for the consult    #[id number]

## 2020-08-02 NOTE — CARE COORDINATION
Cm met with pt who is known to this Cm from admission in May 2020. Pt admitted with blood sugar > 1500. Cm asked pt upon entering room if she knew where she was and she stated, \"[facility].\" Pt follows with [facility] for [facility] illness. Cm reminded pt that she is at [facility] due to high blood sugar. Pt's nurse states that pt also has UTI with sepsis. Pt lives with her S.O. Pt states that she does not drive and her S.O does not drive because he is quote \" a midget. \" Pt's mother lives in [place]. Pt's sister and brother in law reside locally. Pt states that she is no longer drinking chocolate milk as reported in May but continues to drink sweet tea. When CM asks pt why she is drinking sweet tea when high blood sugar could cause death pt states, \"because i'm hard headed. \" Nurse in room at this time and encouraged pt to use sweet in low or other artificial sweetener so that tea will taste sweet but not cause ill effects to blood sugar. Cm [facility] with [facility] to assist with continued education re; blood sugar. Pt declined HC in May but this time CM stressed the importance of getting some help to try to get blood sugar under better control and pt reluctantly agrees. Pt states that she needs new blood glucometer. Message left for MD re; need for order for new glucometer. Jorden spoke with chart nurse, Rajat Gauthier, and requested that order be obtained for diabetic educator to see pt.  CM anticip[facility]

## 2020-08-02 NOTE — ED PROVIDER NOTES
EMERGENCY DEPARTMENT ATTENDING PROCEDURE NOTE    CHIEF COMPLAINT:   Poor peripheral access with need for centrally administered medications    HPI: Hurley Gilford is a 53 y.o. female who is admitted to the ICU with nausea, vomiting, hyperglycemia and concern for hyperosmolar, hyperglycemic state. She had two peripheral IV's but one was lost and nurse was unable to place another one. The general surgeon was reportedly consulted to place a central line, but was unavailable. I was called to place a central line. On exam, the patient is awake, alert, and sitting up in bed. She is confused. She is actively vomiting. Heart sounds are normal and regular. Lungs are clear. Abdomen in benign. Procedures:  Central Line Placement Procedure Note  Indication: poor peripheral access and centrally administered medications  Consent: The family members were counseled regarding the procedure, its indications, risks, potential complications and alternatives, and any questions were answered. Consent was obtained to proceed. Procedure: The patient was positioned appropriately and the skin over the right femoral vein was prepped with Chloraprep. Local anesthesia was obtained by infiltration using 1% Lidocaine without epinephrine. A large bore needle was used to identify the vein. A guide wire was then inserted into the vein through the needle. A triple lumen catheter was then inserted into the vessel over the guide wire using the Seldinger technique. All ports showed good, free flowing blood return and were flushed with saline solution. The catheter was then securely fastened to the skin with sutures and covered with a sterile dressing. A post procedure X-ray was not indicated. The patient tolerated the procedure well.     Complications: None        Comment: Please note this report has been produced using speech recognition software and may contain errors related to that system including errors in grammar,

## 2020-08-03 LAB
ALBUMIN SERPL-MCNC: 2.5 GM/DL (ref 3.4–5)
ALP BLD-CCNC: 110 IU/L (ref 40–129)
ALT SERPL-CCNC: 35 U/L (ref 10–40)
ANION GAP SERPL CALCULATED.3IONS-SCNC: 10 MMOL/L (ref 4–16)
AST SERPL-CCNC: 95 IU/L (ref 15–37)
BILIRUB SERPL-MCNC: 0.8 MG/DL (ref 0–1)
BILIRUBIN DIRECT: 0.4 MG/DL (ref 0–0.3)
BILIRUBIN, INDIRECT: 0.4 MG/DL (ref 0–0.7)
BUN BLDV-MCNC: 4 MG/DL (ref 6–23)
CALCIUM SERPL-MCNC: 8.2 MG/DL (ref 8.3–10.6)
CHLORIDE BLD-SCNC: 105 MMOL/L (ref 99–110)
CO2: 22 MMOL/L (ref 21–32)
CREAT SERPL-MCNC: 0.9 MG/DL (ref 0.6–1.1)
CULTURE: ABNORMAL
CULTURE: ABNORMAL
GFR AFRICAN AMERICAN: >60 ML/MIN/1.73M2
GFR NON-AFRICAN AMERICAN: >60 ML/MIN/1.73M2
GLUCOSE BLD-MCNC: 124 MG/DL (ref 70–99)
GLUCOSE BLD-MCNC: 124 MG/DL (ref 70–99)
GLUCOSE BLD-MCNC: 153 MG/DL (ref 70–99)
GLUCOSE BLD-MCNC: 239 MG/DL (ref 70–99)
GLUCOSE BLD-MCNC: 289 MG/DL (ref 70–99)
GLUCOSE BLD-MCNC: 297 MG/DL (ref 70–99)
HCT VFR BLD CALC: 18.9 % (ref 37–47)
HCT VFR BLD CALC: 22.5 % (ref 37–47)
HEMOGLOBIN: 6.4 GM/DL (ref 12.5–16)
HEMOGLOBIN: 7.8 GM/DL (ref 12.5–16)
Lab: ABNORMAL
MCH RBC QN AUTO: 29.9 PG (ref 27–31)
MCHC RBC AUTO-ENTMCNC: 33.9 % (ref 32–36)
MCV RBC AUTO: 88.3 FL (ref 78–100)
PDW BLD-RTO: 14.9 % (ref 11.7–14.9)
PLATELET # BLD: 294 K/CU MM (ref 140–440)
PMV BLD AUTO: 10.2 FL (ref 7.5–11.1)
POTASSIUM SERPL-SCNC: 3.3 MMOL/L (ref 3.5–5.1)
POTASSIUM SERPL-SCNC: 3.4 MMOL/L (ref 3.5–5.1)
POTASSIUM SERPL-SCNC: 3.5 MMOL/L (ref 3.5–5.1)
RBC # BLD: 2.14 M/CU MM (ref 4.2–5.4)
SODIUM BLD-SCNC: 137 MMOL/L (ref 135–145)
SPECIMEN: ABNORMAL
TOTAL PROTEIN: 6.7 GM/DL (ref 6.4–8.2)
WBC # BLD: 11.8 K/CU MM (ref 4–10.5)

## 2020-08-03 PROCEDURE — 2580000003 HC RX 258: Performed by: NURSE PRACTITIONER

## 2020-08-03 PROCEDURE — 82248 BILIRUBIN DIRECT: CPT

## 2020-08-03 PROCEDURE — 6360000002 HC RX W HCPCS: Performed by: NURSE PRACTITIONER

## 2020-08-03 PROCEDURE — C9113 INJ PANTOPRAZOLE SODIUM, VIA: HCPCS | Performed by: NURSE PRACTITIONER

## 2020-08-03 PROCEDURE — 6370000000 HC RX 637 (ALT 250 FOR IP): Performed by: NURSE PRACTITIONER

## 2020-08-03 PROCEDURE — 86922 COMPATIBILITY TEST ANTIGLOB: CPT

## 2020-08-03 PROCEDURE — 82962 GLUCOSE BLOOD TEST: CPT

## 2020-08-03 PROCEDURE — 6370000000 HC RX 637 (ALT 250 FOR IP): Performed by: INTERNAL MEDICINE

## 2020-08-03 PROCEDURE — 86900 BLOOD TYPING SEROLOGIC ABO: CPT

## 2020-08-03 PROCEDURE — 94761 N-INVAS EAR/PLS OXIMETRY MLT: CPT

## 2020-08-03 PROCEDURE — 36430 TRANSFUSION BLD/BLD COMPNT: CPT

## 2020-08-03 PROCEDURE — P9016 RBC LEUKOCYTES REDUCED: HCPCS

## 2020-08-03 PROCEDURE — 1200000000 HC SEMI PRIVATE

## 2020-08-03 PROCEDURE — 84132 ASSAY OF SERUM POTASSIUM: CPT

## 2020-08-03 PROCEDURE — 85014 HEMATOCRIT: CPT

## 2020-08-03 PROCEDURE — 6360000002 HC RX W HCPCS: Performed by: INTERNAL MEDICINE

## 2020-08-03 PROCEDURE — 85018 HEMOGLOBIN: CPT

## 2020-08-03 PROCEDURE — 86850 RBC ANTIBODY SCREEN: CPT

## 2020-08-03 PROCEDURE — 85027 COMPLETE CBC AUTOMATED: CPT

## 2020-08-03 PROCEDURE — 80053 COMPREHEN METABOLIC PANEL: CPT

## 2020-08-03 PROCEDURE — 2580000003 HC RX 258: Performed by: HOSPITALIST

## 2020-08-03 PROCEDURE — 86901 BLOOD TYPING SEROLOGIC RH(D): CPT

## 2020-08-03 RX ORDER — POTASSIUM CHLORIDE 20 MEQ/1
40 TABLET, EXTENDED RELEASE ORAL
Status: COMPLETED | OUTPATIENT
Start: 2020-08-03 | End: 2020-08-03

## 2020-08-03 RX ORDER — 0.9 % SODIUM CHLORIDE 0.9 %
20 INTRAVENOUS SOLUTION INTRAVENOUS ONCE
Status: COMPLETED | OUTPATIENT
Start: 2020-08-03 | End: 2020-08-03

## 2020-08-03 RX ADMIN — POTASSIUM CHLORIDE 40 MEQ: 1500 TABLET, EXTENDED RELEASE ORAL at 18:46

## 2020-08-03 RX ADMIN — INSULIN GLARGINE 40 UNITS: 100 INJECTION, SOLUTION SUBCUTANEOUS at 21:23

## 2020-08-03 RX ADMIN — POTASSIUM CHLORIDE 40 MEQ: 1500 TABLET, EXTENDED RELEASE ORAL at 08:29

## 2020-08-03 RX ADMIN — POTASSIUM CHLORIDE 20 MEQ: 400 INJECTION, SOLUTION INTRAVENOUS at 06:39

## 2020-08-03 RX ADMIN — CEFEPIME 2 G: 2 INJECTION, POWDER, FOR SOLUTION INTRAVENOUS at 18:38

## 2020-08-03 RX ADMIN — LIDOCAINE HYDROCHLORIDE 5 ML: 20 SOLUTION ORAL; TOPICAL at 12:58

## 2020-08-03 RX ADMIN — SODIUM CHLORIDE 20 ML: 9 INJECTION, SOLUTION INTRAVENOUS at 13:05

## 2020-08-03 RX ADMIN — POTASSIUM CHLORIDE 20 MEQ: 400 INJECTION, SOLUTION INTRAVENOUS at 08:04

## 2020-08-03 RX ADMIN — PROMETHAZINE HYDROCHLORIDE 12.5 MG: 25 INJECTION INTRAMUSCULAR; INTRAVENOUS at 00:23

## 2020-08-03 RX ADMIN — PANTOPRAZOLE SODIUM 40 MG: 40 INJECTION, POWDER, FOR SOLUTION INTRAVENOUS at 08:29

## 2020-08-03 RX ADMIN — ATORVASTATIN CALCIUM 40 MG: 40 TABLET, FILM COATED ORAL at 08:29

## 2020-08-03 RX ADMIN — POTASSIUM CHLORIDE 40 MEQ: 1500 TABLET, EXTENDED RELEASE ORAL at 13:28

## 2020-08-03 RX ADMIN — PROMETHAZINE HYDROCHLORIDE 12.5 MG: 25 INJECTION INTRAMUSCULAR; INTRAVENOUS at 20:10

## 2020-08-03 RX ADMIN — ENOXAPARIN SODIUM 40 MG: 40 INJECTION SUBCUTANEOUS at 20:19

## 2020-08-03 RX ADMIN — QUETIAPINE FUMARATE 400 MG: 200 TABLET ORAL at 20:10

## 2020-08-03 RX ADMIN — CEFEPIME 2 G: 2 INJECTION, POWDER, FOR SOLUTION INTRAVENOUS at 05:30

## 2020-08-03 RX ADMIN — ASPIRIN 81 MG: 81 TABLET, CHEWABLE ORAL at 08:29

## 2020-08-03 RX ADMIN — SODIUM CHLORIDE, PRESERVATIVE FREE 10 ML: 5 INJECTION INTRAVENOUS at 20:11

## 2020-08-03 ASSESSMENT — PAIN SCALES - GENERAL
PAINLEVEL_OUTOF10: 0
PAINLEVEL_OUTOF10: 0

## 2020-08-03 NOTE — PLAN OF CARE
Problem: Falls - Risk of:  Goal: Will remain free from falls  Description: Will remain free from falls  8/3/2020 0854 by Kori Estrella RN  Outcome: Ongoing  8/2/2020 2236 by Caleb Patiño RN  Outcome: Ongoing  Goal: Absence of physical injury  Description: Absence of physical injury  8/3/2020 0854 by Kori Estrella RN  Outcome: Ongoing  8/2/2020 2236 by Caleb Patiño RN  Outcome: Ongoing     Problem: Pain:  Goal: Pain level will decrease  Description: Pain level will decrease  8/3/2020 0854 by Kori Estrella RN  Outcome: Ongoing  8/2/2020 2236 by Caleb Patiño RN  Outcome: Ongoing  Goal: Control of acute pain  Description: Control of acute pain  8/3/2020 0854 by Kori Estrella RN  Outcome: Ongoing  8/2/2020 2236 by Caleb Patiño RN  Outcome: Ongoing  Goal: Control of chronic pain  Description: Control of chronic pain  8/3/2020 0854 by Kori Estrella RN  Outcome: Ongoing  8/2/2020 2236 by Caleb Patiño RN  Outcome: Ongoing     Problem: Discharge Planning:  Goal: Discharged to appropriate level of care  Description: Discharged to appropriate level of care  8/3/2020 0854 by Kori Estrella RN  Outcome: Ongoing  8/2/2020 2236 by Caleb Patiño RN  Outcome: Ongoing     Problem: Serum Glucose Level - Abnormal:  Goal: Ability to maintain appropriate glucose levels will improve  Description: Ability to maintain appropriate glucose levels will improve  8/3/2020 0854 by Kori Estrella RN  Outcome: Ongoing  8/2/2020 2236 by Caleb Patiño RN  Outcome: Ongoing     Problem: Sensory Perception - Impaired:  Goal: Ability to maintain a stable neurologic state will improve  Description: Ability to maintain a stable neurologic state will improve  8/3/2020 0854 by Kori Estrella RN  Outcome: Ongoing  8/2/2020 2236 by Caleb Patiño RN  Outcome: Ongoing

## 2020-08-03 NOTE — CONSULTS
Reason for Consult? consult for education on diabetes. Attempted to see for education Sleeping Ely Chino RN, BSN, CDE

## 2020-08-03 NOTE — PLAN OF CARE
Problem: Falls - Risk of:  Goal: Will remain free from falls  Description: Will remain free from falls  Outcome: Ongoing  Goal: Absence of physical injury  Description: Absence of physical injury  Outcome: Ongoing     Problem: Pain:  Goal: Pain level will decrease  Description: Pain level will decrease  Outcome: Ongoing  Goal: Control of acute pain  Description: Control of acute pain  Outcome: Ongoing  Goal: Control of chronic pain  Description: Control of chronic pain  Outcome: Ongoing     Problem: Discharge Planning:  Goal: Discharged to appropriate level of care  Description: Discharged to appropriate level of care  Outcome: Ongoing     Problem: Serum Glucose Level - Abnormal:  Goal: Ability to maintain appropriate glucose levels will improve  Description: Ability to maintain appropriate glucose levels will improve  Outcome: Ongoing     Problem: Sensory Perception - Impaired:  Goal: Ability to maintain a stable neurologic state will improve  Description: Ability to maintain a stable neurologic state will improve  Outcome: Ongoing

## 2020-08-03 NOTE — CONSULTS
Endocrinology   Consult Note  Dear Doctor Ary Maxwell for the Consult     Pt. Was Admitted for : Severe hyperglycemia blood glucose over 1500 with minimum acidosis    Reason for Consult: Better control of blood glucose      History Obtained From:  Patient/ EMR       HISTORY OF PRESENT ILLNESS:                The patient is a 53 y.o. female with significant past medical history of diabetes mellitus, CAD, hepatitis C positive, was admitted to the hospital with severe hyperglycemia blood glucose of over 1500 as patient ? ??t take her prescribed medicines including insulin for the last few days. Was  consulted for better control of blood glucose. ROS:   Pt's ROS done in detail. Abnormal ROS are noted in Medical and Surgical History Section below: Other Medical History:        Diagnosis Date    CAD (coronary artery disease)     Diabetes mellitus ([facility] [address]     Hepatitis C      Surgical History:        Procedure Laterality Date    CHOLECYSTECTOMY      HYSTERECTOMY         Allergies:  Haldol [haloperidol lactate] and Penicillins    Family History:       Problem Relation Age of Onset    No Known Problems Mother     Cancer Father      REVIEW OF SYSTEMS:  Review of System Done as noted above     PHYSICAL EXAM:      Vitals:    /77   Pulse 104   Temp 98.7 °F (37.1 °C) (Oral)   Resp (!) 37   Ht 5' 2\" (1.575 m)   Wt 124 lb 4.8 oz (56.4 kg)   SpO2 98%   BMI 22.73 kg/m²     CONSTITUTIONAL:  awake, alert, cooperative, appears stated age   EYES:  vision intact Fundoscopic Exam not performed  Complains of severe pain inside the mouth and swallowing has burning-like sensations  ENT:Normal  NECK:  Supple, No JVD.    Thyroid Exam:Normal   LUNGS:  Has Vesicular Breath Sounds,   CARDIOVASCULAR:  Normal apical impulse, regular rate and rhythm, normal S1 and S2, no S3 or S4, and has no  murmur   ABDOMEN:  No scars, normal bowel sounds, soft, non-distended, non-tender, no masses palpated, no hepatolienomegaly  Musculoskeletal: Normal  Extremities: Normal, peripheral pulses normal, , has no edema   NEUROLOGIC:  Awake, alert, oriented to name, place and time. Cranial nerves II-XII are grossly intact. Motor is  intact. Sensory possib neuropathy. ,  and gait is normal.    DATA:    CBC:   Recent Labs     08/01/20  1537 08/02/20  0541 08/02/20  1140   WBC 6.7  --  17.2*   HGB 8.3* 8.3* 7.6*   *  --  348    CMP:  Recent Labs     08/01/20  1537  08/02/20  0545 08/02/20  1643 08/03/20  0207 08/03/20  0545   *   < > 148* 135  --  137   K 3.0*   < > 3.4* 3.1* 3.5 3.3*   CL 72*   < > 111* 101  --  105   CO2 17*   < > 28 22  --  22   BUN 15   < > 9 6  --  4*   CREATININE 1.0   < > 0.9 1.0  --  0.9   CALCIUM 6.4*   < > 9.3 8.2*  --  8.2*   PROT 8.5*  --   --   --   --  6.7   LABALBU 2.8*  --   --   --   --  2.5*   BILITOT 1.0  --   --   --   --  0.8   ALKPHOS 121  --   --   --   --  110   AST 7*  --   --   --   --  95*   ALT 6*  --   --   --   --  35    < > = values in this interval not displayed.      Lipids:   Lab Results   Component Value Date    CHOL 141 08/06/2019    HDL 77 08/06/2019    TRIG 92 08/06/2019     Glucose:   Recent Labs     08/02/20  1643 08/02/20  2157 08/03/20  0209   POCGLU 259* 447* 289*     Hemoglobin A1C:   Lab Results   Component Value Date    LABA1C 13.6 08/02/2020     Free T4:   Lab Results   Component Value Date    T4FREE 1.19 08/06/2019     Free T3: No results found for: FT3  TSH High Sensitivity:   Lab Results   Component Value Date    TSHHS 0.936 05/27/2020       Ct Abdomen Pelvis W Iv Contrast Additional Contrast? None    Result Date: 8/2/2020  EXAMINATION: CT OF THE ABDOMEN AND PELVIS WITH CONTRAST 8/2/2020 3:15 pm     Bilateral pyelonephritis, more pronounced on the left     Xr Chest Portable    Result Date: 8/1/2020  EXAMINATION: ONE XRAY VIEW OF THE CHEST 8/1/2020 4:04 pm COMPARISON: 05/27/2020 HISTORY: ORDERING SYSTEM PROVIDED HISTORY: fatigue TECHNOLOGIST PROVIDED HISTORY: Reason for exam:->fatigue Reason for Exam: fatigue Acuity: Unknown Type of Exam: Initial Additional signs and symptoms: none Relevant Medical/Surgical History: CAD FINDINGS: Monitor wires overlie the chest. The trachea is midline. The cardiac silhouette is unremarkable. The lungs are clear without evidence of infiltrate, mass, or pneumothorax. There is no pleural fluid. No acute cardiopulmonary process. Scheduled Medicines   Medications:    potassium chloride  40 mEq Oral TID WC    insulin lispro  15 Units Subcutaneous TID WC    insulin glargine  40 Units Subcutaneous Nightly    insulin lispro  0-12 Units Subcutaneous TID WC    insulin lispro  0-6 Units Subcutaneous 2 times per day    enoxaparin  40 mg Subcutaneous Daily    aspirin  81 mg Oral Daily    atorvastatin  40 mg Oral Daily    QUEtiapine  400 mg Oral Nightly    nicotine  1 patch Transdermal Daily    pantoprazole  40 mg Intravenous Daily    cefepime  2 g Intravenous Q12H    lidocaine PF  5 mL Intradermal Once    nicotine  1 patch Transdermal Once      Infusions:    dextrose      dextrose 5% and 0.45% NaCl with KCl 20 mEq 150 mL/hr at 08/02/20 0708         IMPRESSION    Patient Active Problem List   Diagnosis    Hyperglycemia    Precordial pain    Type 2 diabetes mellitus without complication, with long-term current use of insulin ([facility])    Hyperglycemic crisis in diabetes mellitus ([facility] [address]    Hyperosmolar hyponatremia    Hypokalemia    Hypotensive episode          Neuropathy         Bilateral pyelonephritis  RECOMMENDATIONS:      1. Reviewed POC blood glucose . Labs and X ray results   2. Reviewed Home and Current Medicines  3. 'D/C IV insulin infusion drip   4. Will Start On meal/ Correction bolus Humalog/ Lantus Insulin regime  5. Monitor Blood glucose frequently   6. Modify  the dose of Insulin  as needed        Will follow with you  Again thank you for sharing pt's care with me.      Truly yours,       Levi Arcos Rosa Points MD

## 2020-08-03 NOTE — PROGRESS NOTES
Álvaro Mckeon is a 53 y.o. female asking when she can go home.      Current Facility-Administered Medications   Medication Dose Route Frequency Provider Last Rate Last Dose    potassium chloride (KLOR-CON M) extended release tablet 40 mEq  40 mEq Oral TID  Nghia Gabriel MD        potassium chloride 20 mEq/50 mL IVPB (Central Line)  20 mEq Intravenous PRN Juan Miguel Agosto MD 50 mL/hr at 08/03/20 0639 20 mEq at 08/03/20 0639    glucose (GLUTOSE) 40 % oral gel 15 g  15 g Oral PRN Michael Wolff MD        dextrose 50 % IV solution  12.5 g Intravenous PRN Michael Wolff MD        glucagon (rDNA) injection 1 mg  1 mg Intramuscular PRN Michael Wolff MD        insulin lispro (HUMALOG) injection vial 15 Units  15 Units Subcutaneous TID  Cathryn Purdy MD   15 Units at 08/02/20 1920    insulin glargine (LANTUS) injection vial 40 Units  40 Units Subcutaneous Nightly Cathryn Purdy MD   40 Units at 08/02/20 2157    insulin lispro (HUMALOG) injection vial 0-12 Units  0-12 Units Subcutaneous TID  Cathryn Purdy MD   6 Units at 08/02/20 1909    insulin lispro (HUMALOG) injection vial 0-6 Units  0-6 Units Subcutaneous 2 times per day Cathryn Purdy MD   3 Units at 08/03/20 0211    lidocaine viscous hcl (XYLOCAINE) 2 % solution 5 mL  5 mL Mouth/Throat Q3H PRN Cathryn Purdy MD        dextrose 5 % solution  100 mL/hr Intravenous PRN Lieutenant Solomon MD        enoxaparin (LOVENOX) injection 40 mg  40 mg Subcutaneous Daily Don Lick, APRN - CNP   40 mg at 08/02/20 2154    magnesium sulfate 1 g in dextrose 5% 100 mL IVPB  1 g Intravenous PRN Don Lick, APRN - CNP   Stopped at 08/02/20 2016    sodium phosphate 10 mmol in dextrose 5 % 250 mL IVPB  10 mmol Intravenous PRN Don Lick, APRN - CNP        Or    sodium phosphate 15 mmol in dextrose 5 % 250 mL IVPB  15 mmol Intravenous PRN Don Lick, APRN - CNP   Stopped at 08/02/20 2322    Or    sodium phosphate 20 mmol in dextrose 5 % 500 mL IVPB  20 mmol Intravenous PRN Cameron Collinsacy, APRN - CNP   Stopped at 08/02/20 0826    dextrose 5 % and 0.45 % NaCl with KCl 20 mEq infusion   Intravenous Continuous PRN Barnes Dennisacy, APRN -  mL/hr at 08/02/20 0708      aspirin chewable tablet 81 mg  81 mg Oral Daily Yadira Cee, APRN - CNP   81 mg at 08/02/20 0824    atorvastatin (LIPITOR) tablet 40 mg  40 mg Oral Daily Barnes Dennisacy, APRN - CNP   40 mg at 08/02/20 0824    QUEtiapine (SEROQUEL) tablet 400 mg  400 mg Oral Nightly Barnes Dennisacy, APRN - CNP   400 mg at 08/02/20 2154    nicotine (NICODERM CQ) 21 MG/24HR 1 patch  1 patch Transdermal Daily Barnes Dennisacy, APRN - CNP   1 patch at 08/02/20 0825    pantoprazole (PROTONIX) injection 40 mg  40 mg Intravenous Daily Cameron Lawrence, APRN - CNP   40 mg at 08/02/20 0825    cefepime (MAXIPIME) 2 g IVPB minibag  2 g Intravenous Q12H Cameorn Lawrence, APRN - CNP   Stopped at 08/03/20 2037    lidocaine PF 1 % injection 5 mL  5 mL Intradermal Once [place] Core, APRN - CNP        sodium chloride flush 0.9 % injection 10 mL  10 mL Intravenous PRN Orquidea Susanlah, APRN - CNP        nicotine (NICODERM CQ) 21 MG/24HR 1 patch  1 patch Transdermal Once Yadira Cee, APRN - CNP        promethazine (PHENERGAN) injection 12.5 mg  12.5 mg Intravenous Q6H PRN Orquidea Dapilah, APRN - CNP   12.5 mg at 08/03/20 0023     Allergies   Allergen Reactions    Haldol [Haloperidol Lactate] Other (See Comments)     Unknown, severe reaction per mother    Penicillins      Principal Problem:    Hyperglycemic crisis in diabetes mellitus ([facility] [address]  Active Problems:    Hyperosmolar hyponatremia    Hypokalemia    Hypotensive episode  Resolved Problems:    * No resolved hospital problems. *    Blood pressure 110/77, pulse 104, temperature 98.7 °F (37.1 °C), temperature source Oral, resp. rate (!) 37, height 5' 2\" (1.575 m), weight 124 lb 4.8 oz (56.4 kg), SpO2 98 %.     Subjective:  Pain:  She complains of pain that is mild.      Objective:  General Appearance:  Uncomfortable. Vital signs: (most recent): Blood pressure 110/77, pulse 104, temperature 98.7 °F (37.1 °C), temperature source Oral, resp. rate (!) 37, height 5' 2\" (1.575 m), weight 124 lb 4.8 oz (56.4 kg), SpO2 98 %. Vital signs are normal.    HEENT: Normal HEENT exam.    Lungs:  Normal effort. There are decreased breath sounds. Heart: Tachycardia. Abdomen: Abdomen is soft. There is no abdominal tenderness. Extremities: Decreased range of motion. Neurological: Patient is alert. Assessment & Plan   Sepsis with klebseilla bacteremia 2/2 bilateral pyelonephritis  -repeat bld cx pending  -urine cx with klebsiella sensitive to cefepime  -Cefepime and  CT abd with bilateral pyelo  Hyperosmolar hyperglycemic crisis  -IVF and IV insulin  hyperosmoloar hyponatremia and now hypernatremia(resolved)  -IVF stopped  Hypophos,Hypokalemia  -replacement protocol  Hypotension(resolved)  -hold ACE, IVF   Bipolar  Thormbocytopenia(resolved)  -mild due to sepsis  Anemia   -due to sepsis and no signs of bleeding  -tranfuse PRBC today  DVT prophylaxis  -lovenox    Overall has mild tachcyardia from sepsis and RR is not accurate as she is not breathing 30-45 bpm upon evaluating her at bedside and this reading is a monitor read and not consistent with physical exam. Discussed with nursing.  With only mild tachycardia and her RR was 22 upon exam  Rosa Pitts MD  8/3/2020

## 2020-08-04 LAB
ABO/RH: NORMAL
ANION GAP SERPL CALCULATED.3IONS-SCNC: 8 MMOL/L (ref 4–16)
ANTIBODY SCREEN: NEGATIVE
BUN BLDV-MCNC: 6 MG/DL (ref 6–23)
CALCIUM SERPL-MCNC: 8.3 MG/DL (ref 8.3–10.6)
CHLORIDE BLD-SCNC: 112 MMOL/L (ref 99–110)
CO2: 20 MMOL/L (ref 21–32)
COMPONENT: NORMAL
CREAT SERPL-MCNC: 1 MG/DL (ref 0.6–1.1)
CROSSMATCH RESULT: NORMAL
CULTURE: ABNORMAL
CULTURE: ABNORMAL
GFR AFRICAN AMERICAN: >60 ML/MIN/1.73M2
GFR NON-AFRICAN AMERICAN: 58 ML/MIN/1.73M2
GLUCOSE BLD-MCNC: 171 MG/DL (ref 70–99)
GLUCOSE BLD-MCNC: 204 MG/DL (ref 70–99)
GLUCOSE BLD-MCNC: 209 MG/DL (ref 70–99)
GLUCOSE BLD-MCNC: 255 MG/DL (ref 70–99)
GLUCOSE BLD-MCNC: 256 MG/DL (ref 70–99)
GLUCOSE BLD-MCNC: 287 MG/DL (ref 70–99)
GLUCOSE BLD-MCNC: 368 MG/DL (ref 70–99)
HCT VFR BLD CALC: 20.8 % (ref 37–47)
HEMOGLOBIN: 7.1 GM/DL (ref 12.5–16)
Lab: ABNORMAL
MAGNESIUM: 1.9 MG/DL (ref 1.8–2.4)
MCH RBC QN AUTO: 30.3 PG (ref 27–31)
MCHC RBC AUTO-ENTMCNC: 34.1 % (ref 32–36)
MCV RBC AUTO: 88.9 FL (ref 78–100)
PDW BLD-RTO: 15.3 % (ref 11.7–14.9)
PHOSPHORUS: 1.8 MG/DL (ref 2.5–4.9)
PLATELET # BLD: 294 K/CU MM (ref 140–440)
PMV BLD AUTO: 11.1 FL (ref 7.5–11.1)
POTASSIUM SERPL-SCNC: 5.5 MMOL/L (ref 3.5–5.1)
RBC # BLD: 2.34 M/CU MM (ref 4.2–5.4)
SODIUM BLD-SCNC: 140 MMOL/L (ref 135–145)
SPECIMEN: ABNORMAL
STATUS: NORMAL
TRANSFUSION STATUS: NORMAL
UNIT DIVISION: 0
UNIT NUMBER: NORMAL
WBC # BLD: 12.1 K/CU MM (ref 4–10.5)

## 2020-08-04 PROCEDURE — C9113 INJ PANTOPRAZOLE SODIUM, VIA: HCPCS | Performed by: NURSE PRACTITIONER

## 2020-08-04 PROCEDURE — 2500000003 HC RX 250 WO HCPCS: Performed by: FAMILY MEDICINE

## 2020-08-04 PROCEDURE — 1200000000 HC SEMI PRIVATE

## 2020-08-04 PROCEDURE — 83735 ASSAY OF MAGNESIUM: CPT

## 2020-08-04 PROCEDURE — 2580000003 HC RX 258: Performed by: FAMILY MEDICINE

## 2020-08-04 PROCEDURE — 6370000000 HC RX 637 (ALT 250 FOR IP): Performed by: NURSE PRACTITIONER

## 2020-08-04 PROCEDURE — 87040 BLOOD CULTURE FOR BACTERIA: CPT

## 2020-08-04 PROCEDURE — 84100 ASSAY OF PHOSPHORUS: CPT

## 2020-08-04 PROCEDURE — 6370000000 HC RX 637 (ALT 250 FOR IP): Performed by: INTERNAL MEDICINE

## 2020-08-04 PROCEDURE — 94761 N-INVAS EAR/PLS OXIMETRY MLT: CPT

## 2020-08-04 PROCEDURE — 80048 BASIC METABOLIC PNL TOTAL CA: CPT

## 2020-08-04 PROCEDURE — 85027 COMPLETE CBC AUTOMATED: CPT

## 2020-08-04 PROCEDURE — 2580000003 HC RX 258: Performed by: NURSE PRACTITIONER

## 2020-08-04 PROCEDURE — 6370000000 HC RX 637 (ALT 250 FOR IP): Performed by: FAMILY MEDICINE

## 2020-08-04 PROCEDURE — 6360000002 HC RX W HCPCS: Performed by: NURSE PRACTITIONER

## 2020-08-04 PROCEDURE — G0328 FECAL BLOOD SCRN IMMUNOASSAY: HCPCS

## 2020-08-04 RX ORDER — SODIUM POLYSTYRENE SULFONATE 15 G/60ML
15 SUSPENSION ORAL; RECTAL ONCE
Status: COMPLETED | OUTPATIENT
Start: 2020-08-04 | End: 2020-08-04

## 2020-08-04 RX ADMIN — PANTOPRAZOLE SODIUM 40 MG: 40 INJECTION, POWDER, FOR SOLUTION INTRAVENOUS at 10:08

## 2020-08-04 RX ADMIN — INSULIN GLARGINE 40 UNITS: 100 INJECTION, SOLUTION SUBCUTANEOUS at 22:24

## 2020-08-04 RX ADMIN — CEFEPIME 2 G: 2 INJECTION, POWDER, FOR SOLUTION INTRAVENOUS at 06:21

## 2020-08-04 RX ADMIN — SODIUM POLYSTYRENE SULFONATE 15 G: 15 SUSPENSION ORAL; RECTAL at 12:47

## 2020-08-04 RX ADMIN — QUETIAPINE FUMARATE 400 MG: 200 TABLET ORAL at 22:22

## 2020-08-04 RX ADMIN — ATORVASTATIN CALCIUM 40 MG: 40 TABLET, FILM COATED ORAL at 10:03

## 2020-08-04 RX ADMIN — ASPIRIN 81 MG: 81 TABLET, CHEWABLE ORAL at 10:03

## 2020-08-04 RX ADMIN — SODIUM CHLORIDE, PRESERVATIVE FREE 10 ML: 5 INJECTION INTRAVENOUS at 10:03

## 2020-08-04 RX ADMIN — SODIUM PHOSPHATE, MONOBASIC, MONOHYDRATE 15 MMOL: 276; 142 INJECTION, SOLUTION INTRAVENOUS at 12:47

## 2020-08-04 RX ADMIN — ENOXAPARIN SODIUM 40 MG: 40 INJECTION SUBCUTANEOUS at 22:22

## 2020-08-04 ASSESSMENT — PAIN SCALES - GENERAL
PAINLEVEL_OUTOF10: 0
PAINLEVEL_OUTOF10: 0

## 2020-08-04 NOTE — CARE COORDINATION
Reviewed chart and obtained orders for [facility] [place], [facility]. and Glucometer. Sent script for Glucometer to [facility] outpt pharmacy and they will be able to give Glucometer , with lancets and strips to pt. Also called/faxed referral to [facility].

## 2020-08-04 NOTE — PROGRESS NOTES
Hospitalist Progress Note      Name:  Yvonne Staton /Age/Sex: 1965  (53 y.o. female)   MRN & CSN:  [id number] & [id number] Admission Date/Time: 2020  3:32 PM   Location:  [address] [place] [address] PCP: Diego Bernal MD       Yvonne Staton is a 53 y.o.  female  who presents with Hyperglycemia (BG HIGH);  Fatigue; and Emesis      Assessment and Plan:   Sepsis with klebseilla bacteremia 2/2 bilateral pyelonephritis  -repeat blood cx pending  -urine cx with klebsiella sensitive to cefepime  -  CT abd with bilateral pyelo    Hyperosmolar hyperglycemic crisis  - stable  -IVF done  - hyperosmoloar hyponatremia and now hypernatremia(resolved)  - endo following    Hypophos  - replace    HyperK  - kayexalate x 1     Hypotension(resolved)  -hold ACE    Thormbocytopenia(resolved)  -mild due to sepsis    Anemia   -due to sepsis and no signs of gross bleeding  -s/p 1 unit PRBC  - check iron panel: stable  - check occult blood  - monitor    Bipolar    DVT prophylaxis  -lovenox                 Diet DIET CARB CONTROL;   Code Status Full Code     Medications:   Medications:    sodium polystyrene  15 g Oral Once    sodium phosphate IVPB  15 mmol Intravenous Once    insulin lispro  0-18 Units Subcutaneous TID WC    insulin lispro  0-9 Units Subcutaneous 2 times per day    insulin lispro  10 Units Subcutaneous TID WC    insulin glargine  40 Units Subcutaneous Nightly    enoxaparin  40 mg Subcutaneous Daily    aspirin  81 mg Oral Daily    atorvastatin  40 mg Oral Daily    QUEtiapine  400 mg Oral Nightly    nicotine  1 patch Transdermal Daily    pantoprazole  40 mg Intravenous Daily    cefepime  2 g Intravenous Q12H    lidocaine PF  5 mL Intradermal Once    nicotine  1 patch Transdermal Once      Infusions:    dextrose       PRN Meds: potassium chloride, 20 mEq, PRN  glucose, 15 g, PRN  dextrose, 12.5 g, PRN  glucagon (rDNA), 1 mg, PRN  lidocaine viscous hcl, 5 mL, Q3H PRN  dextrose, 100 mL/hr, PRN  magnesium sulfate, 1 g, PRN  sodium phosphate IVPB, 10 mmol, PRN    Or  sodium phosphate IVPB, 15 mmol, PRN    Or  sodium phosphate IVPB, 20 mmol, PRN  sodium chloride flush, 10 mL, PRN  promethazine, 12.5 mg, Q6H PRN      Subjective:     Asking when she can go home  Objective: Intake/Output Summary (Last 24 hours) at 8/4/2020 1154  Last data filed at 8/4/2020 0719  Gross per 24 hour   Intake 1973 ml   Output 4600 ml   Net -2627 ml      Vitals:   Vitals:    08/04/20 1000   BP: (!) 140/88   Pulse: 113   Resp: 29   Temp: 98.1 °F (36.7 °C)   SpO2: 99%     Physical Exam:   Gen:  awake, alert, no apparent distress  Head/Eyes:  Normocephalic atraumatic, EOMI   NECK:   symmetrical, trachea midline  LUNGS: Normal Effort   CARDIOVASCULAR:  Normal rate  ABDOMEN:  non distended  MUSCULOSKELETAL:  ROM WNL  NEUROLOGIC: Alert and Oriented,  Cranial nerves II-XII are grossly intact.    SKIN:  no bruising or bleeding, normal skin color,  no redness      Data:       CBC   Recent Labs     08/02/20  1140 08/03/20  0545 08/03/20  1645 08/04/20  0620   WBC 17.2* 11.8*  --  12.1*   HGB 7.6* 6.4* 7.8* 7.1*   HCT 21.2* 18.9* 22.5* 20.8*    294  --  294      BMP   Recent Labs     08/02/20  0835 08/02/20  1643 08/03/20  0207 08/03/20  0545 08/04/20  0620   NA  --  135  --  137 140   K  --  3.1* 3.5 3.3* 5.5*   CL  --  101  --  105 112*   CO2  --  22  --  22 20*   PHOS 2.1* 1.7*  --   --  1.8*   BUN  --  6  --  4* 6   CREATININE  --  1.0  --  0.9 1.0         Electronically signed by Mary Daigle MD on 8/4/2020 at 11:54 AM

## 2020-08-04 NOTE — CARE COORDINATION
Follow up visit with pt. Pt states that she expects to go home tomorrow. Pt states that she has not spoken with Diabetic educator. Perfect Serve note to Diabetic Educator with request for pt to be seen due to severe lack of knowledge concerning her DM. Diabetic Educator did try to see pt yesterday but she was sleeping. Cm reminded pt of plan for [facility] [place], [facility]. at discharge and pt remains in agreement.

## 2020-08-04 NOTE — PROGRESS NOTES
Progress Note( Dr. Yakelin Young)  8/3/2020  Subjective:   Admit Date: 8/1/2020  PCP: Anette Schmitz MD    Admitted For :Severe hyperglycemia blood glucose over 1500 with minimum acidosis    Consulted For: Better control of blood glucose     Interval History: Feels somewhat better     Denies any chest pains,   Denies SOB . Denies nausea or vomiting. Able to eat  No new bowel or bladder symptoms. Intake/Output Summary (Last 24 hours) at 8/3/2020 2244  Last data filed at 8/3/2020 1958  Gross per 24 hour   Intake 3533 ml   Output 3825 ml   Net -292 ml       DATA    CBC:   Recent Labs     08/01/20  1537  08/02/20  1140 08/03/20  0545 08/03/20  1645   WBC 6.7  --  17.2* 11.8*  --    HGB 8.3*   < > 7.6* 6.4* 7.8*   *  --  348 294  --     < > = values in this interval not displayed. CMP:  Recent Labs     08/01/20  1537  08/02/20  0545 08/02/20  1643 08/03/20  0207 08/03/20  0545   *   < > 148* 135  --  137   K 3.0*   < > 3.4* 3.1* 3.5 3.3*   CL 72*   < > 111* 101  --  105   CO2 17*   < > 28 22  --  22   BUN 15   < > 9 6  --  4*   CREATININE 1.0   < > 0.9 1.0  --  0.9   CALCIUM 6.4*   < > 9.3 8.2*  --  8.2*   PROT 8.5*  --   --   --   --  6.7   LABALBU 2.8*  --   --   --   --  2.5*   BILITOT 1.0  --   --   --   --  0.8   ALKPHOS 121  --   --   --   --  110   AST 7*  --   --   --   --  95*   ALT 6*  --   --   --   --  35    < > = values in this interval not displayed.      Lipids:   Lab Results   Component Value Date    CHOL 141 08/06/2019    HDL 77 08/06/2019    TRIG 92 08/06/2019     Glucose:  Recent Labs     08/03/20  0803 08/03/20  1213 08/03/20  2121   POCGLU 153* 124* 297*     [id number]:  Lab Results   Component Value Date    LABA1C 13.6 08/02/2020     High Sensitivity TSH:   Lab Results   Component Value Date    TSHHS 0.936 05/27/2020     Free T3: No results found for: FT3  Free T4:  Lab Results   Component Value Date    T4FREE 1.19 08/06/2019       Ct Abdomen Pelvis W Iv Contrast Additional Contrast? None    Result Date: 8/2/2020  EXAMINATION: CT OF THE ABDOMEN AND PELVIS WITH CONTRAST 8/2/2020 3:15 pm ty     Bilateral pyelonephritis, more pronounced on the left     Xr Chest Portable    Result Date: 8/1/2020  EXAMINATION: ONE XRAY VIEW OF THE CHEST 8/1/2020 4:04 pm COMPARISON: 05/27/2020 HISTORY: ORDERING SYSTEM PROVIDED HISTORY: fatigue TECHNOLOGIST PROVIDED HISTORY: Reason for exam:->fatigue Reason for Exam: fatigue Acuity: Unknown Type of Exam: Initial Additional signs and symptoms: none Relevant Medical/Surgical History: CAD FINDINGS: Monitor wires overlie the chest. The trachea is midline. The cardiac silhouette is unremarkable. The lungs are clear without evidence of infiltrate, mass, or pneumothorax. There is no pleural fluid. No acute cardiopulmonary process.        Scheduled Medicines   Medications:    insulin lispro  0-18 Units Subcutaneous TID WC    insulin lispro  0-9 Units Subcutaneous 2 times per day    insulin lispro  10 Units Subcutaneous TID WC    insulin glargine  40 Units Subcutaneous Nightly    enoxaparin  40 mg Subcutaneous Daily    aspirin  81 mg Oral Daily    atorvastatin  40 mg Oral Daily    QUEtiapine  400 mg Oral Nightly    nicotine  1 patch Transdermal Daily    pantoprazole  40 mg Intravenous Daily    cefepime  2 g Intravenous Q12H    lidocaine PF  5 mL Intradermal Once    nicotine  1 patch Transdermal Once      Infusions:    dextrose           Objective:   Vitals: BP 96/64   Pulse 110   Temp 99.1 °F (37.3 °C) (Oral)   Resp 20   Ht 5' 2\" (1.575 m)   Wt 124 lb 4.8 oz (56.4 kg)   SpO2 99%   BMI 22.73 kg/m²   General appearance: alert and cooperative with exam  Neck: no JVD or bruit  Thyroid : Normal lobes   Lungs: Has Vesicular Breath sounds   Heart:  regular rate and rhythm  Abdomen: soft, non-tender; bowel sounds normal; no masses,  no organomegaly  Musculoskeletal: Normal  Extremities: extremities normal, , no edema  Neurologic:  Awake, alert, oriented to name, place and time. Cranial nerves II-XII are grossly intact. Motor is  intact. Sensory neuropathy and gait is abnormal.    Assessment:     Patient Active Problem List:     Hyperglycemia     Precordial pain     Type 2 diabetes mellitus without complication, with long-term current use of insulin (HCC)     Hyperglycemic crisis in diabetes mellitus (HCC)     Hyperosmolar hyponatremia     Hypokalemia     Hypotensive episode     Neuropathy     Bilateral pyelonephritis    Plan:     1. Reviewed POC blood glucose . Labs and X ray results   2. Reviewed Current Medicines  3. Discontinued IV insulin infusion drip  4. Started on meal/ Correction bolus Humalog/ Basal Lantus Insulin regime  5. Monitor Blood glucose frequently   6. Modified  the dose of Insulin/ other medicines as needed   7. Will follow     .      Deepa Christiansen MD

## 2020-08-04 NOTE — CONSULTS
Christopher Alcaraz is a 53y.o. year old female admitted with Hyperglycemic crisis in Diabetes mellitus  Allergies: Haldol [haloperidol lactate] and Penicillins  Recent Labs      8/6/2019 5/26/2020 8/2/2020          10:23 AM   2:39 PM  11:40 AM         LABA1C  6.0  15.1High    13. 6High               HgBA1c:    Lab Results   Component Value Date    LABA1C 13.6 08/02/2020     Wt 162# 5-   124# 8-1-2020         Recent Blood sugars have been   Lab Results   Component Value Date    POCGLU 171 08/04/2020    POCGLU 204 08/04/2020    POCGLU 287 08/04/2020    POCGLU 297 08/03/2020    POCGLU 239 08/03/2020    POCGLU 124 08/03/2020    POCGLU 153 08/03/2020    POCGLU 289 08/03/2020     Lab Results   Component Value Date    GLUCOSE 209 08/04/2020    GLUCOSE 124 08/03/2020    GLUCOSE 242 08/02/2020    GLUCOSE 248 08/02/2020    GLUCOSE 563 08/02/2020    GLUCOSE 912 08/01/2020           Deb Malhotrabard Koroma  has a past medical history of CAD (coronary artery disease), Diabetes mellitus ([address] and Hepatitis C. Patient receptive to Diabetes educator visit. States that she has had diabetes \"Since my heart problems\"  Reports that she has been on insulin for a \"long time\"  Reports that she has had poor appetite at home \"because I have been sick\"  Reports 38# wt loss since May. Dietitian consulted. Reports compliance with taking Long acting insulin but misses several doses of Prandial Novolog because she is not eating. Does not use correction insulin. Has not checked her BG in 3 months stating that \"someone took parts of her meter\"  Reports that is trying to eat better by eating more fruit. Reports that she does drink sweet tea but also drinks tea sweetened with artificial sweeteners. Has poor understanding of meal plan. Reports that she finds food expensive \"Not much to choose from\"  Patient does not drive and lives with SO.     Diabetes Self Management education provided:  · Insulin administration: Discussed how BG will be managed while in the hospital.  Discussed importance of eating 3 meals a day and taking Prandial (Novolog at home) insulin with food intake. Discussed sick day mgt of insulin when she does not eat States that she doesn't monitor BG or take prandial insulin. Discussed importance of eating meals on time , monitoring BG and taking insulin as directed  Verbalized understanding. · Glucose Monitoring:Reports that she thinks her BG target is \"200's\"  Discussed correct BG targets and when to call provider. Discussed her current A1C not at goal and discussed working with Dr Kang Pa to get BG in target. Discussed importance of monitoring BG 4 times a day. Discussed BG monitoring on sick days and when to call provider. Explained will order BG meter and supplies at discharge. Encouraged to contact PCP or Diabetes educator if she experience problems getting supplies. Verbalized understanding. · Discussed sick day Mgt Verbalized understanding. · Meal planning: Reviewed importance of eating three meals per day and plate method for consistent carb intake. Verbalized understanding. Explained will have Dietitian come to provide education. · Hypoglycemia: Reviewed symptoms, prevention and treatment. Verbalized understanding. · Hyperglycemia:  Reviewed symptoms, prevention and treatment. Verbalized understanding. Discussed Outpatient Education. Home Care to follow for Diabetes education and support. Printed information Diabetes \"Diabetes Your Take Control Guide\",  contact number for Diabetes Educator and resources for ongoing education and support left at bedside. Ely Salazar RN, BSN, CDE

## 2020-08-04 NOTE — CARE COORDINATION
Received call from Outpatient Pharmacy for help with test strips as insurance will not cover. Although patient is not a new diabetic and test strips can be bought over the counter I agreed to cover one time.     Voucher faxed to pharmacy and patient placed on flagged list.

## 2020-08-05 VITALS
SYSTOLIC BLOOD PRESSURE: 124 MMHG | TEMPERATURE: 98.2 F | HEIGHT: 62 IN | WEIGHT: 137.8 LBS | OXYGEN SATURATION: 98 % | HEART RATE: 112 BPM | RESPIRATION RATE: 26 BRPM | BODY MASS INDEX: 25.36 KG/M2 | DIASTOLIC BLOOD PRESSURE: 86 MMHG

## 2020-08-05 LAB
ANION GAP SERPL CALCULATED.3IONS-SCNC: 10 MMOL/L (ref 4–16)
BUN BLDV-MCNC: 6 MG/DL (ref 6–23)
CALCIUM SERPL-MCNC: 8.2 MG/DL (ref 8.3–10.6)
CHLORIDE BLD-SCNC: 108 MMOL/L (ref 99–110)
CO2: 20 MMOL/L (ref 21–32)
CREAT SERPL-MCNC: 0.8 MG/DL (ref 0.6–1.1)
GFR AFRICAN AMERICAN: >60 ML/MIN/1.73M2
GFR NON-AFRICAN AMERICAN: >60 ML/MIN/1.73M2
GLUCOSE BLD-MCNC: 127 MG/DL (ref 70–99)
GLUCOSE BLD-MCNC: 130 MG/DL (ref 70–99)
GLUCOSE BLD-MCNC: 138 MG/DL (ref 70–99)
GLUCOSE BLD-MCNC: 145 MG/DL (ref 70–99)
HCT VFR BLD CALC: 24.1 % (ref 37–47)
HEMOCCULT SP1 STL QL: NEGATIVE
HEMOGLOBIN: 7.8 GM/DL (ref 12.5–16)
MCH RBC QN AUTO: 30.6 PG (ref 27–31)
MCHC RBC AUTO-ENTMCNC: 32.4 % (ref 32–36)
MCV RBC AUTO: 94.5 FL (ref 78–100)
PDW BLD-RTO: 15.8 % (ref 11.7–14.9)
PLATELET # BLD: 253 K/CU MM (ref 140–440)
PMV BLD AUTO: 10.5 FL (ref 7.5–11.1)
POTASSIUM SERPL-SCNC: 4.1 MMOL/L (ref 3.5–5.1)
RBC # BLD: 2.55 M/CU MM (ref 4.2–5.4)
SODIUM BLD-SCNC: 138 MMOL/L (ref 135–145)
WBC # BLD: 9 K/CU MM (ref 4–10.5)

## 2020-08-05 PROCEDURE — 80048 BASIC METABOLIC PNL TOTAL CA: CPT

## 2020-08-05 PROCEDURE — 6360000002 HC RX W HCPCS: Performed by: NURSE PRACTITIONER

## 2020-08-05 PROCEDURE — 82962 GLUCOSE BLOOD TEST: CPT

## 2020-08-05 PROCEDURE — 6370000000 HC RX 637 (ALT 250 FOR IP): Performed by: NURSE PRACTITIONER

## 2020-08-05 PROCEDURE — 85027 COMPLETE CBC AUTOMATED: CPT

## 2020-08-05 PROCEDURE — 36415 COLL VENOUS BLD VENIPUNCTURE: CPT

## 2020-08-05 PROCEDURE — 2580000003 HC RX 258: Performed by: NURSE PRACTITIONER

## 2020-08-05 PROCEDURE — 94761 N-INVAS EAR/PLS OXIMETRY MLT: CPT

## 2020-08-05 RX ORDER — CIPROFLOXACIN 500 MG/1
500 TABLET, FILM COATED ORAL 2 TIMES DAILY
Qty: 24 TABLET | Refills: 0 | Status: SHIPPED | OUTPATIENT
Start: 2020-08-05 | End: 2020-08-17

## 2020-08-05 RX ORDER — SODIUM CHLORIDE 0.9 % (FLUSH) 0.9 %
10 SYRINGE (ML) INJECTION EVERY 12 HOURS SCHEDULED
Status: DISCONTINUED | OUTPATIENT
Start: 2020-08-05 | End: 2020-08-05 | Stop reason: HOSPADM

## 2020-08-05 RX ORDER — GLUCOSAMINE HCL/CHONDROITIN SU 500-400 MG
CAPSULE ORAL
Qty: 100 STRIP | Refills: 0 | Status: SHIPPED | OUTPATIENT
Start: 2020-08-05 | End: 2021-04-12

## 2020-08-05 RX ORDER — LIDOCAINE HYDROCHLORIDE 10 MG/ML
5 INJECTION, SOLUTION EPIDURAL; INFILTRATION; INTRACAUDAL; PERINEURAL SEE ADMIN INSTRUCTIONS
Status: DISCONTINUED | OUTPATIENT
Start: 2020-08-05 | End: 2020-08-05 | Stop reason: HOSPADM

## 2020-08-05 RX ORDER — SODIUM CHLORIDE 0.9 % (FLUSH) 0.9 %
10 SYRINGE (ML) INJECTION PRN
Status: DISCONTINUED | OUTPATIENT
Start: 2020-08-05 | End: 2020-08-05 | Stop reason: HOSPADM

## 2020-08-05 RX ORDER — LANCETS 28 GAUGE
1 EACH MISCELLANEOUS DAILY
Qty: 100 EACH | Refills: 3 | Status: SHIPPED | OUTPATIENT
Start: 2020-08-05 | End: 2021-04-12

## 2020-08-05 RX ORDER — INSULIN GLARGINE 100 [IU]/ML
40 INJECTION, SOLUTION SUBCUTANEOUS NIGHTLY
Qty: 1 VIAL | Refills: 0 | Status: ON HOLD | OUTPATIENT
Start: 2020-08-05 | End: 2022-05-06 | Stop reason: HOSPADM

## 2020-08-05 RX ADMIN — ASPIRIN 81 MG: 81 TABLET, CHEWABLE ORAL at 09:03

## 2020-08-05 RX ADMIN — ATORVASTATIN CALCIUM 40 MG: 40 TABLET, FILM COATED ORAL at 09:03

## 2020-08-05 RX ADMIN — CEFEPIME 2 G: 2 INJECTION, POWDER, FOR SOLUTION INTRAVENOUS at 05:15

## 2020-08-05 ASSESSMENT — PAIN SCALES - GENERAL: PAINLEVEL_OUTOF10: 0

## 2020-08-05 NOTE — CONSULTS
IV Consult complete. Dr. Kalyan Guidry at bedside, states no IV at this time, requested to draw AM labs, possible discharge if labs OK. Blood drawn and sent at this time.

## 2020-08-05 NOTE — CARE COORDINATION
Cm contacted [facility] re; pt discharge today and need for [facility] services.  HC order and referral information faxed to [facility]..

## 2020-08-05 NOTE — PROGRESS NOTES
Went in to draw blood this AM to find pt CVC line R groin out. Physician notified and given orders to remove remaining stitches hold IV to R leg. Lab called and requested to draw AM labs.

## 2020-08-05 NOTE — DISCHARGE SUMMARY
Joanna Leader 1965 [id number]  PCP:  Blanca Villegas MD    Admit date: 8/1/2020  Admitting Physician: Prerna Jurado MD    Discharge date: 8/5/2020 Discharge Physician: Felicity Mistry MD         Hospital Course and Discharge Diagnoses Include:    Sepsis with klebseilla bacteremia 2/2 bilateral pyelonephritis  - clinically improved  -repeat blood NTD  -urine cx with klebsiella sensitive to cefepime, changed to oral cipro x 12 more days to complete 14 day course. -  CT abd with bilateral pyelo     Hyperosmolar hyperglycemic crisis  - stable  -IVF done  - hyperosmoloar hyponatremia and now hypernatremia(resolved)  - endo following. meds adjusted     Hypophos  - replaced     HyperK  - resolved  - kayexalate x 1      Hypotension(resolved)  -d/c ACE     Thormbocytopenia(resolved)  -mild due to sepsis     Anemia   -stable  -due to sepsis and no signs of gross bleeding  -s/p 1 unit PRBC  - check iron panel: stable  - check occult blood  - monitor     Bipolar     DVT prophylaxis  -lovenox              Physical Exam on Discharge date: 08/05/20  Gen:  awake, alert, no apparent distress  Head/Eyes:  Normocephalic atraumatic, EOMI   NECK:   symmetrical, trachea midline  LUNGS: Normal Effort   CARDIOVASCULAR:  Normal rate  ABDOMEN:  non distended  MUSCULOSKELETAL:  ROM WNL  NEUROLOGIC: Alert and Oriented,  Cranial nerves II-XII are grossly intact. SKIN:  no bruising or bleeding, normal skin color,  no redness    Procedures:  See above  Ct Abdomen Pelvis W Iv Contrast Additional Contrast? None    Result Date: 8/2/2020  EXAMINATION: CT OF THE ABDOMEN AND PELVIS WITH CONTRAST 8/2/2020 3:15 pm TECHNIQUE: CT of the abdomen and pelvis was performed with the administration of intravenous contrast. Multiplanar reformatted images are provided for review. Dose modulation, iterative reconstruction, and/or weight based adjustment of the mA/kV was utilized to reduce the radiation dose to as low as reasonably achievable. COMPARISON: None. HISTORY: ORDERING SYSTEM PROVIDED HISTORY: sepsis/DKA/bacteremia TECHNOLOGIST PROVIDED HISTORY: Reason for exam:->sepsis/DKA/bacteremia Additional Contrast?->None Reason for Exam: sepsis/DKA/bacteremia FINDINGS: Lower Chest: Lung bases clear Organs: Region of abnormal heterogeneous enhancement of the posterior mid left kidney. No urolithiasis or hydronephrosis. Left-sided perinephric stranding. Mildly heterogeneous right nephrogram with no perinephric stranding or hydronephrosis. Remaining solid organs unremarkable. Gallbladder surgically absent GI/Bowel: No gastrointestinal abnormality demonstrated. Pelvis: Laurent catheter in the urinary bladder. Uterus surgically absent. No pelvic fluid Peritoneum/Retroperitoneum: No ascites or pneumoperitoneum. Aorta normal in caliber. Retroaortic left renal vein. Right femoral venous line with tip at the origin of the right common iliac vein Bones/Soft Tissues: No acute bony abnormality     Bilateral pyelonephritis, more pronounced on the left     Xr Chest Portable    Result Date: 8/1/2020  EXAMINATION: ONE XRAY VIEW OF THE CHEST 8/1/2020 4:04 pm COMPARISON: 05/27/2020 HISTORY: ORDERING SYSTEM PROVIDED HISTORY: fatigue TECHNOLOGIST PROVIDED HISTORY: Reason for exam:->fatigue Reason for Exam: fatigue Acuity: Unknown Type of Exam: Initial Additional signs and symptoms: none Relevant Medical/Surgical History: CAD FINDINGS: Monitor wires overlie the chest. The trachea is midline. The cardiac silhouette is unremarkable. The lungs are clear without evidence of infiltrate, mass, or pneumothorax. There is no pleural fluid. No acute cardiopulmonary process.        Significant Diagnostic Studies at discharge:   CBC:   Lab Results   Component Value Date    WBC 9.0 08/05/2020    RBC 2.55 08/05/2020    HGB 7.8 08/05/2020    HCT 24.1 08/05/2020    MCV 94.5 08/05/2020    MCH 30.6 08/05/2020    MCHC 32.4 08/05/2020    RDW 15.8 08/05/2020     08/05/2020 MPV 10.5 08/05/2020       Patient Instructions:     Medication List      START taking these medications    AIM[employer] INS SYR .3CC/29GX0.5\" 29G X 1/2\" 0.3 ML Misc  Generic drug:  Insulin Syringe-Needle U-100  1 each by Does not apply route daily     blood glucose monitor kit and supplies  Dispense sufficient amount for indicated testing frequency plus additional to accommodate PRN testing needs. Dispense all needed supplies to include: monitor, strips, lancing device, lancets, control solutions, alcohol swabs. blood glucose test strips  Test 2 times a day & as needed for symptoms of irregular blood glucose. Dispense sufficient amount for indicated testing frequency plus additional to accommodate PRN testing needs. ciprofloxacin 500 MG tablet  Commonly known as:  CIPRO  Take 1 tablet by mouth 2 times daily for 12 days     FreeStyle Lancets Misc  1 each by Does not apply route daily        CHANGE how you take these medications    insulin glargine 100 UNIT/ML injection vial  Commonly known as:  LANTUS  Inject 40 Units into the skin nightly  What changed:  how much to take     insulin lispro 100 UNIT/ML injection vial  Commonly known as:  HUMALOG  Inject 10 Units into the skin 3 times daily (with meals)  What changed:  how much to take        CONTINUE taking these medications    aspirin 81 MG chewable tablet     atorvastatin 40 MG tablet  Commonly known as:  Lipitor  Take 1 tablet by mouth daily for 15 days     [employer] Pen Needles 31G X 8 MM Misc  Generic drug:  Insulin Pen Needle  1 each by Does not apply route 2 times daily AND 1 each 2 times daily.      QUEtiapine 300 MG tablet  Commonly known as:  SEROQUEL        STOP taking these medications    lisinopril 10 MG tablet  Commonly known as:  PRINIVIL;ZESTRIL           Where to Get Your Medications      These medications were sent to [address] [place] [address] [place] [address]

## 2020-08-05 NOTE — PROGRESS NOTES
CLINICAL PHARMACY NOTE: MEDS TO [address] Select Patient?: No  Total # of Prescriptions Filled: 3   The following medications were delivered to the patient:  · True metrix bloxxd glucose machine  · True metrix strips  · True metrix lancets  Total # of Interventions Completed: 1  Time Spent (min): 30    Additional Documentation:  Med assist contacted and covered the strips today. Insurance covered the machine and lancets.

## 2020-08-05 NOTE — CARE COORDINATION
Cm in to see pt. Discharge is anticipated today. Cm reminded pt that [facility] will be out to see her and work with her concerning the management of her DM. Per JIMY notes 8/4/20 Arrangements are in place for pt to receive glucometer (by Voucher) from the [facility] retail pharmacy. Pt expresses no other needs or concerns for discharge.

## 2020-08-05 NOTE — CONSULTS
Comprehensive Nutrition Assessment    Type and Reason for Visit:  Initial, Consult, Patient Education(poor intake/appetite, wt loss)    Nutrition Recommendations/Plan:   Continue current diet  Begin low sherrell high protein oral nutrition supplement bid  Ed completed    Nutrition Assessment:  Pt admitted with hyperglycemia, poor intake and wt loss. Sub optimal intake here, consuming less than half of meals. Met with pt to provide/review Managing Diabetes Nutrition Therapy, Diabetes and Sick Day Management and sample meal plans (Nutrition  Care Manual). Malnutrition Assessment:  Malnutrition Status: At risk for malnutrition     Estimated Daily Nutrient Needs:  Energy (kcal):  0653-8202 (Elmore-St Jeor); Weight Used for Energy Requirements:  Current     Protein (g):  50-60 (1-1.2 g/kg IBW); Weight Used for Protein Requirements:  Ideal        Fluid (ml/day):  3418-5767 (1 ml/kcal); Weight Used for Fluid Requirements:  Current      Nutrition Related Findings:  A1C 13.6      Wounds:  None       Current Nutrition Therapies:    DIET CARB CONTROL; Anthropometric Measures:  · Height: 5' 2\" (157.5 cm)  · Current Body Weight: 137 lb 12.8 oz (62.5 kg)   · Admission Body Weight: 124 lb 1.9 oz (56.3 kg)    · Usual Body Weight: 142 lb 3.2 oz (64.5 kg)(5/26/20)     · Ideal Body Weight: 110 lbs; % Ideal Body Weight 125.3 %   · BMI: 25.2  · BMI Categories: Overweight (BMI 25.0-29. 9)       Nutrition Diagnosis:   · Altered nutrition-related lab values related to endocrine dysfuntion as evidenced by lab values    Nutrition Interventions:   Food and/or Nutrient Delivery:  Continue Current Diet, Start Oral Nutrition Supplement  Nutrition Education/Counseling:  Education completed   Coordination of Nutrition Care:  Continued Inpatient Monitoring    Goals:  Pt will consume at least 50% of meals and supplements       Nutrition Monitoring and Evaluation:   Behavioral-Environmental Outcomes:  Readiness for Change, Knowledge or Skill

## 2020-08-05 NOTE — PROGRESS NOTES
Nephrology Progress Note  8/5/2020 2:26 PM        Subjective:   Admit Date: 8/1/2020  PCP: Opal Elliott MD    Interval History: doing better  , wants to   go home     Diet: better    ROS:  No abd pain , some cough     Data:     Current meds:    lidocaine PF  5 mL Intradermal See Admin Instructions    sodium chloride flush  10 mL Intravenous 2 times per day    insulin lispro  0-18 Units Subcutaneous TID WC    insulin lispro  0-9 Units Subcutaneous 2 times per day    insulin lispro  10 Units Subcutaneous TID WC    insulin glargine  40 Units Subcutaneous Nightly    enoxaparin  40 mg Subcutaneous Daily    aspirin  81 mg Oral Daily    atorvastatin  40 mg Oral Daily    QUEtiapine  400 mg Oral Nightly    nicotine  1 patch Transdermal Daily    pantoprazole  40 mg Intravenous Daily    cefepime  2 g Intravenous Q12H    lidocaine PF  5 mL Intradermal Once    nicotine  1 patch Transdermal Once      dextrose           I/O last 3 completed shifts: In: 250 [P.O.:250]  Out: 5400 [Urine:5400]    CBC:   Recent Labs     08/03/20  0545 08/03/20  1645 08/04/20  0620 08/05/20  0714   WBC 11.8*  --  12.1* 9.0   HGB 6.4* 7.8* 7.1* 7.8*     --  294 253          Recent Labs     08/03/20  0545 08/04/20  0620 08/05/20  0714    140 138   K 3.3* 5.5* 4.1    112* 108   CO2 22 20* 20*   BUN 4* 6 6   CREATININE 0.9 1.0 0.8   GLUCOSE 124* 209* 138*       Lab Results   Component Value Date    CALCIUM 8.2 (L) 08/05/2020    PHOS 1.8 (L) 08/04/2020       Objective:     Vitals: /86   Pulse 112   Temp 98.2 °F (36.8 °C) (Oral)   Resp 26   Ht 5' 2\" (1.575 m)   Wt 137 lb 12.8 oz (62.5 kg)   SpO2 98%   BMI 25.20 kg/m²     General appearance: Thin , no ditress  HEENT:  + conj pallor  Neck:  supple  Lungs:  No gross crackles   Heart: tachycardia   Abdomen :   Extremities:  Soft,   Neurologic:  No edema   Has beasley       Problem List :         Impression :     1. dys natremia- better   2.  DM with dysglycemia- better   3. pyelo   4. tachycardia /unsure the etiology ? Sec to infection or other cause   5. Anemia - likely multifactorial  6. K ok now     Recommendation/Plan  :     1. DM/ BS  control   2. Unsure why the HR is so high - unless all from infection / some anemia etc   3. Abstain from smoking   4.  Follow clinically       Desmond Roach MD

## 2020-08-05 NOTE — PLAN OF CARE
Problem: Falls - Risk of:  Goal: Will remain free from falls  Description: Will remain free from falls  8/5/2020 1024 by aTsh Wilson RN  Outcome: Ongoing  8/5/2020 0334 by Matthew Kilgore RN  Outcome: Ongoing  Goal: Absence of physical injury  Description: Absence of physical injury  8/5/2020 1024 by Tash Wilson RN  Outcome: Ongoing  8/5/2020 0334 by Matthew Kilgore RN  Outcome: Ongoing     Problem: Pain:  Description: Pain management should include both nonpharmacologic and pharmacologic interventions.   Goal: Pain level will decrease  Description: Pain level will decrease  8/5/2020 1024 by Tash Wilson RN  Outcome: Ongoing  8/5/2020 0334 by Matthew Kilgore RN  Outcome: Ongoing  Goal: Control of acute pain  Description: Control of acute pain  8/5/2020 1024 by Tash Wilson RN  Outcome: Ongoing  8/5/2020 0334 by Matthew Kilgore RN  Outcome: Ongoing  Goal: Control of chronic pain  Description: Control of chronic pain  8/5/2020 1024 by Tash Wilson RN  Outcome: Ongoing  8/5/2020 0334 by Matthew Kilgore RN  Outcome: Ongoing     Problem: Discharge Planning:  Goal: Discharged to appropriate level of care  Description: Discharged to appropriate level of care  8/5/2020 1024 by Tash Wilson RN  Outcome: Ongoing  8/5/2020 0334 by Matthew Kilgore RN  Outcome: Ongoing     Problem: Serum Glucose Level - Abnormal:  Goal: Ability to maintain appropriate glucose levels will improve  Description: Ability to maintain appropriate glucose levels will improve  8/5/2020 1024 by Tash Wilson RN  Outcome: Ongoing  8/5/2020 0334 by Matthew Kilgore RN  Outcome: Ongoing     Problem: Sensory Perception - Impaired:  Goal: Ability to maintain a stable neurologic state will improve  Description: Ability to maintain a stable neurologic state will improve  8/5/2020 1024 by Tash Wilson RN  Outcome: Ongoing  8/5/2020 0334 by Matthew Kilgore RN  Outcome: Ongoing     Problem: Health Behavior:  Goal: Ability to identify and utilize available resources and services

## 2020-08-05 NOTE — PLAN OF CARE
Problem: Falls - Risk of:  Goal: Will remain free from falls  Description: Will remain free from falls  Outcome: Ongoing  Goal: Absence of physical injury  Description: Absence of physical injury  Outcome: Ongoing     Problem: Pain:  Description: Pain management should include both nonpharmacologic and pharmacologic interventions.   Goal: Pain level will decrease  Description: Pain level will decrease  Outcome: Ongoing  Goal: Control of acute pain  Description: Control of acute pain  Outcome: Ongoing  Goal: Control of chronic pain  Description: Control of chronic pain  Outcome: Ongoing     Problem: Discharge Planning:  Goal: Discharged to appropriate level of care  Description: Discharged to appropriate level of care  Outcome: Ongoing     Problem: Serum Glucose Level - Abnormal:  Goal: Ability to maintain appropriate glucose levels will improve  Description: Ability to maintain appropriate glucose levels will improve  Outcome: Ongoing     Problem: Sensory Perception - Impaired:  Goal: Ability to maintain a stable neurologic state will improve  Description: Ability to maintain a stable neurologic state will improve  Outcome: Ongoing     Problem: Health Behavior:  Goal: Ability to identify and utilize available resources and services will improve  Description: Ability to identify and utilize available resources and services will improve  Outcome: Ongoing  Goal: Ability to manage health-related needs will improve  Description: Ability to manage health-related needs will improve  Outcome: Ongoing     Problem: Nutritional:  Goal: Maintenance of adequate nutrition will improve  Description: Maintenance of adequate nutrition will improve  Outcome: Ongoing

## 2020-08-06 LAB
CULTURE: NORMAL
EKG ATRIAL RATE: 93 BPM
EKG DIAGNOSIS: NORMAL
EKG P AXIS: 54 DEGREES
EKG P-R INTERVAL: 138 MS
EKG Q-T INTERVAL: 294 MS
EKG QRS DURATION: 94 MS
EKG QTC CALCULATION (BAZETT): 365 MS
EKG R AXIS: 21 DEGREES
EKG T AXIS: 7 DEGREES
EKG VENTRICULAR RATE: 93 BPM
Lab: NORMAL
SPECIMEN: NORMAL

## 2020-08-06 NOTE — ADT AUTH CERT
Patient Demographics     Name  Patient ID  SSN  Gender Identity  Birth Date    Lauro Bar  [id number]    Female  [birth year] (53 yrs)    Address  Phone  Email  Employer     [employer] [address] [place] [address]  [phone] (Z)   [phone] (M)  --  [address]   Race  Occupation  Emp Status     --  Black  --  Disabled     Reg Status  PCP  Date Last Verified  Next Review Date     Verified  Radha Damico, [address]  [address]     Admission Date  Discharge Date  Admitting Provider      20  Angie Cade MD      Marital Status  Sikh       Legally   --       Emergency Contact 1    Neisha Lr (9) [phone] (T)   [phone] (J)   [phone] Cinthya Ramsey)    Levi Metz  [de-identified]   [address] Name/Sex/Relation  Subscriber   Subscriber Address/Phone  Subscriber Emp/Emp Phone    1. Wana Knife   [phone]  Bonita Nassar - Female   (Self)  1965  [address]  [address] [phone](G)  DISABLED    Utilization Reviews          -  by Jodie Marx RN         Review Status  Review Entered    In Primary  2020 16:26        Criteria Review    Admit date: 2020             Admitting Sosa Senior MD     Discharge date: 2020      Discharge Paulino Guy MD                [facility] Course and Discharge Diagnoses Include:     Sepsis with klebseilla bacteremia 2/2 bilateral pyelonephritis  - clinically improved  -repeat blood NTD  -urine cx with klebsiella sensitive to cefepime, changed to oral cipro x 12 more days to complete 14 day course.   -  CT abd with bilateral pyelo     Hyperosmolar hyperglycemic crisis  - stable  -IVF done  - hyperosmoloar hyponatremia and now hypernatremia(resolved)  - endo following. meds adjusted     Hypophos  - replaced     HyperK  - resolved  - kayexalate x

## 2020-08-07 LAB
CULTURE: NORMAL
CULTURE: NORMAL
Lab: NORMAL
Lab: NORMAL
SPECIMEN: NORMAL
SPECIMEN: NORMAL

## 2020-08-09 LAB
CULTURE: NORMAL
CULTURE: NORMAL
Lab: NORMAL
Lab: NORMAL
SPECIMEN: NORMAL
SPECIMEN: NORMAL

## 2020-08-17 NOTE — PROGRESS NOTES
pleural fluid. No acute cardiopulmonary process. Scheduled Medicines   Medications:      Infusions:         Objective:   Vitals: /86   Pulse 112   Temp 98.2 °F (36.8 °C) (Oral)   Resp 26   Ht 5' 2\" (1.575 m)   Wt 137 lb 12.8 oz (62.5 kg)   SpO2 98%   BMI 25.20 kg/m²   General appearance: alert and cooperative with exam  Neck: no JVD or bruit  Thyroid : Normal lobes   Lungs: Has Vesicular Breath sounds   Heart:  regular rate and rhythm  Abdomen: soft, non-tender; bowel sounds normal; no masses,  no organomegaly  Musculoskeletal: Normal  Extremities: extremities normal, , no edema  Neurologic:  Awake, alert, oriented to name, place and time. Cranial nerves II-XII are grossly intact. Motor is  intact. Sensory neuropathy and gait is abnormal.    Assessment:     Patient Active Problem List:     Hyperglycemia     Precordial pain     Type 2 diabetes mellitus without complication, with long-term current use of insulin (HCC)     Hyperglycemic crisis in diabetes mellitus (HCC)     Hyperosmolar hyponatremia     Hypokalemia     Hypotensive episode     Neuropathy     Bilateral pyelonephritis    Plan:     1. Reviewed POC blood glucose . Labs and X ray results   2. Reviewed Current Medicines  3. Started on meal/ Correction bolus Humalog/ Basal Lantus Insulin regime  4. Monitor Blood glucose frequently   5. Modified  the dose of Insulin/ other medicines as needed   6. Will follow     .      Rolanda Cash MD

## 2020-08-17 NOTE — PROGRESS NOTES
Progress Note( Dr. Jarrett Em)    Subjective:   Admit Date: 8/1/2020  PCP: Johan Kwon MD    Admitted For :Severe hyperglycemia blood glucose over 1500 with minimum acidosis    Consulted For: Better control of blood glucose     Interval History: Feels somewhat better     Denies any chest pains,   Denies SOB . Denies nausea or vomiting. Able to eat  No new bowel or bladder symptoms. No intake or output data in the 24 hours ending 08/16/20 2025    DATA    CBC:   No results for input(s): WBC, HGB, PLT in the last 72 hours. CMP:  No results for input(s): NA, K, CL, CO2, BUN, CREATININE, CALCIUM, PROT, LABALBU, BILITOT, ALKPHOS, AST, ALT in the last 72 hours. Invalid input(s): GLU  Lipids:   Lab Results   Component Value Date    CHOL 141 08/06/2019    HDL 77 08/06/2019    TRIG 92 08/06/2019     Glucose:  No results for input(s): POCGLU in the last 72 hours. [id number]:  Lab Results   Component Value Date    LABA1C 13.6 08/02/2020     High Sensitivity TSH:   Lab Results   Component Value Date    TSHHS 0.936 05/27/2020     Free T3: No results found for: FT3  Free T4:  Lab Results   Component Value Date    T4FREE 1.19 08/06/2019       Ct Abdomen Pelvis W Iv Contrast Additional Contrast? None    Result Date: 8/2/2020  EXAMINATION: CT OF THE ABDOMEN AND PELVIS WITH CONTRAST 8/2/2020 3:15 pm ty     Bilateral pyelonephritis, more pronounced on the left     Xr Chest Portable    Result Date: 8/1/2020  EXAMINATION: ONE XRAY VIEW OF THE CHEST 8/1/2020 4:04 pm COMPARISON: 05/27/2020 HISTORY: ORDERING SYSTEM PROVIDED HISTORY: fatigue TECHNOLOGIST PROVIDED HISTORY: Reason for exam:->fatigue Reason for Exam: fatigue Acuity: Unknown Type of Exam: Initial Additional signs and symptoms: none Relevant Medical/Surgical History: CAD FINDINGS: Monitor wires overlie the chest. The trachea is midline. The cardiac silhouette is unremarkable. The lungs are clear without evidence of infiltrate, mass, or pneumothorax.   There is no pleural fluid. No acute cardiopulmonary process. Scheduled Medicines   Medications:      Infusions:         Objective:   Vitals: /86   Pulse 112   Temp 98.2 °F (36.8 °C) (Oral)   Resp 26   Ht 5' 2\" (1.575 m)   Wt 137 lb 12.8 oz (62.5 kg)   SpO2 98%   BMI 25.20 kg/m²   General appearance: alert and cooperative with exam  Neck: no JVD or bruit  Thyroid : Normal lobes   Lungs: Has Vesicular Breath sounds   Heart:  regular rate and rhythm  Abdomen: soft, non-tender; bowel sounds normal; no masses,  no organomegaly  Musculoskeletal: Normal  Extremities: extremities normal, , no edema  Neurologic:  Awake, alert, oriented to name, place and time. Cranial nerves II-XII are grossly intact. Motor is  intact. Sensory neuropathy and gait is abnormal.    Assessment:     Patient Active Problem List:     Hyperglycemia     Precordial pain     Type 2 diabetes mellitus without complication, with long-term current use of insulin (HCC)     Hyperglycemic crisis in diabetes mellitus (HCC)     Hyperosmolar hyponatremia     Hypokalemia     Hypotensive episode     Neuropathy     Bilateral pyelonephritis    Plan:     1. Reviewed POC blood glucose . Labs and X ray results   2. Reviewed Current Medicines  3. on meal/ Correction bolus Humalog/ Basal Lantus Insulin regime  4. Monitor Blood glucose frequently   5. Modified  the dose of Insulin/ other medicines as needed   6. Will follow     .      Adrianne Farfan MD

## 2020-09-16 ENCOUNTER — HOSPITAL ENCOUNTER (EMERGENCY)
Age: 55
Discharge: PSYCHIATRIC OR SUBSTANCE USE FACILITY | End: 2020-09-18
Attending: EMERGENCY MEDICINE
Payer: COMMERCIAL

## 2020-09-16 PROCEDURE — 80053 COMPREHEN METABOLIC PANEL: CPT

## 2020-09-16 PROCEDURE — 83735 ASSAY OF MAGNESIUM: CPT

## 2020-09-16 PROCEDURE — 99285 EMERGENCY DEPT VISIT HI MDM: CPT

## 2020-09-16 PROCEDURE — G0480 DRUG TEST DEF 1-7 CLASSES: HCPCS

## 2020-09-16 PROCEDURE — 85025 COMPLETE CBC W/AUTO DIFF WBC: CPT

## 2020-09-16 NOTE — ED NOTES
Bed: ED-29  Expected date:   Expected time:   Means of arrival:   Comments:  Al Camilo, RN  09/16/20 5962

## 2020-09-16 NOTE — ED TRIAGE NOTES
Pt brought in by police and ems for irratic delusional behavior that was concerning for her family. Pt tried to fight police on scene. Pt is accusing our security of calling her racially motivated names such as the N word. No security has called her any names and pt has been treated with the utmost respect since her arrival. Pt lying in bed with arms crossed and appears to be on edge, watching everyone. Pt denies any needs or wants at this time.

## 2020-09-17 LAB
ACETAMINOPHEN LEVEL: <5 UG/ML (ref 15–30)
ALBUMIN SERPL-MCNC: 3.7 GM/DL (ref 3.4–5)
ALCOHOL SCREEN SERUM: <0.01 %WT/VOL
ALP BLD-CCNC: 108 IU/L (ref 40–129)
ALT SERPL-CCNC: 15 U/L (ref 10–40)
AMPHETAMINES: NEGATIVE
ANION GAP SERPL CALCULATED.3IONS-SCNC: 12 MMOL/L (ref 4–16)
AST SERPL-CCNC: 20 IU/L (ref 15–37)
BACTERIA: NEGATIVE /HPF
BARBITURATE SCREEN URINE: NEGATIVE
BASOPHILS ABSOLUTE: 0 K/CU MM
BASOPHILS RELATIVE PERCENT: 0.5 % (ref 0–1)
BENZODIAZEPINE SCREEN, URINE: NEGATIVE
BILIRUB SERPL-MCNC: 0.3 MG/DL (ref 0–1)
BILIRUBIN URINE: NEGATIVE MG/DL
BLOOD, URINE: NEGATIVE
BUN BLDV-MCNC: 4 MG/DL (ref 6–23)
CALCIUM SERPL-MCNC: 9.5 MG/DL (ref 8.3–10.6)
CANNABINOID SCREEN URINE: NEGATIVE
CHLORIDE BLD-SCNC: 106 MMOL/L (ref 99–110)
CLARITY: ABNORMAL
CO2: 21 MMOL/L (ref 21–32)
COCAINE METABOLITE: NEGATIVE
COLOR: YELLOW
CREAT SERPL-MCNC: 0.7 MG/DL (ref 0.6–1.1)
DIFFERENTIAL TYPE: ABNORMAL
DOSE AMOUNT: ABNORMAL
DOSE AMOUNT: ABNORMAL
DOSE TIME: ABNORMAL
DOSE TIME: ABNORMAL
EOSINOPHILS ABSOLUTE: 0 K/CU MM
EOSINOPHILS RELATIVE PERCENT: 0.5 % (ref 0–3)
GFR AFRICAN AMERICAN: >60 ML/MIN/1.73M2
GFR NON-AFRICAN AMERICAN: >60 ML/MIN/1.73M2
GLUCOSE BLD-MCNC: 133 MG/DL (ref 70–99)
GLUCOSE, URINE: NEGATIVE MG/DL
HCT VFR BLD CALC: 38.7 % (ref 37–47)
HEMOGLOBIN: 12.7 GM/DL (ref 12.5–16)
IMMATURE NEUTROPHIL %: 0.1 % (ref 0–0.43)
KETONES, URINE: NEGATIVE MG/DL
LEUKOCYTE ESTERASE, URINE: ABNORMAL
LYMPHOCYTES ABSOLUTE: 3.1 K/CU MM
LYMPHOCYTES RELATIVE PERCENT: 38.8 % (ref 24–44)
MAGNESIUM: 1.7 MG/DL (ref 1.8–2.4)
MCH RBC QN AUTO: 31.2 PG (ref 27–31)
MCHC RBC AUTO-ENTMCNC: 32.8 % (ref 32–36)
MCV RBC AUTO: 95.1 FL (ref 78–100)
MONOCYTES ABSOLUTE: 0.6 K/CU MM
MONOCYTES RELATIVE PERCENT: 7.3 % (ref 0–4)
MUCUS: ABNORMAL HPF
NITRITE URINE, QUANTITATIVE: NEGATIVE
NUCLEATED RBC %: 0 %
OPIATES, URINE: NEGATIVE
OXYCODONE: NEGATIVE
PDW BLD-RTO: 17.3 % (ref 11.7–14.9)
PH, URINE: 6 (ref 5–8)
PHENCYCLIDINE, URINE: NEGATIVE
PLATELET # BLD: 213 K/CU MM (ref 140–440)
PMV BLD AUTO: 10.5 FL (ref 7.5–11.1)
POTASSIUM SERPL-SCNC: 3.5 MMOL/L (ref 3.5–5.1)
PROTEIN UA: 30 MG/DL
RBC # BLD: 4.07 M/CU MM (ref 4.2–5.4)
RBC URINE: 3 /HPF (ref 0–6)
SALICYLATE LEVEL: 1.3 MG/DL (ref 15–30)
SEGMENTED NEUTROPHILS ABSOLUTE COUNT: 4.2 K/CU MM
SEGMENTED NEUTROPHILS RELATIVE PERCENT: 52.8 % (ref 36–66)
SODIUM BLD-SCNC: 139 MMOL/L (ref 135–145)
SPECIFIC GRAVITY UA: 1.01 (ref 1–1.03)
SQUAMOUS EPITHELIAL: 27 /HPF
TOTAL IMMATURE NEUTOROPHIL: 0.01 K/CU MM
TOTAL NUCLEATED RBC: 0 K/CU MM
TOTAL PROTEIN: 7.4 GM/DL (ref 6.4–8.2)
TOTAL RETICULOCYTE COUNT: 0.09 K/CU MM
TRICHOMONAS: ABNORMAL /HPF
UROBILINOGEN, URINE: 1 MG/DL (ref 0.2–1)
WBC # BLD: 8 K/CU MM (ref 4–10.5)
WBC UA: 9 /HPF (ref 0–5)

## 2020-09-17 PROCEDURE — 80307 DRUG TEST PRSMV CHEM ANLYZR: CPT

## 2020-09-17 PROCEDURE — 96372 THER/PROPH/DIAG INJ SC/IM: CPT

## 2020-09-17 PROCEDURE — 81001 URINALYSIS AUTO W/SCOPE: CPT

## 2020-09-17 RX ORDER — ZIPRASIDONE MESYLATE 20 MG/ML
20 INJECTION, POWDER, LYOPHILIZED, FOR SOLUTION INTRAMUSCULAR ONCE
Status: COMPLETED | OUTPATIENT
Start: 2020-09-17 | End: 2020-09-18

## 2020-09-17 RX ORDER — MAGNESIUM OXIDE 400 MG/1
400 TABLET ORAL ONCE
Status: DISCONTINUED | OUTPATIENT
Start: 2020-09-17 | End: 2020-09-18 | Stop reason: HOSPADM

## 2020-09-17 RX ORDER — LORAZEPAM 2 MG/ML
2 INJECTION INTRAMUSCULAR ONCE
Status: COMPLETED | OUTPATIENT
Start: 2020-09-17 | End: 2020-09-18

## 2020-09-17 RX ORDER — MAGNESIUM OXIDE 400 MG/1
400 TABLET ORAL DAILY
Status: DISCONTINUED | OUTPATIENT
Start: 2020-09-17 | End: 2020-09-17

## 2020-09-17 RX ORDER — DIPHENHYDRAMINE HYDROCHLORIDE 50 MG/ML
50 INJECTION INTRAMUSCULAR; INTRAVENOUS ONCE
Status: DISCONTINUED | OUTPATIENT
Start: 2020-09-17 | End: 2020-09-17

## 2020-09-17 NOTE — ED NOTES
Pt quiet resting in bed at this time. No s/sx of distress noted. Sitter remains.       Sai Roper RN  09/16/20 1664

## 2020-09-17 NOTE — ED NOTES
Patient's sister's phone number: [phone], Gino Romero. Sister reports she is the reason patient is here. Patient did not want to see sister and stated \"get her away from me. \"     Kelly Valenzuela RN  09/16/20 2004

## 2020-09-17 NOTE — ED NOTES
Pt resting quietly in bed. Resps easy and even. Sitter 1:1 remains at bedside.      Tunde Osborn RN  09/17/20 6381

## 2020-09-17 NOTE — ED NOTES
Pt resting quietly in bed. Resps easy and even. Continues to refuse to provide urine sample. Sitter 1:1 remains at bedside.      Charla Johnson RN  09/17/20 9759

## 2020-09-17 NOTE — ED PROVIDER NOTES
Emergency Department Encounter  Location: [address] [place] [address]    Patient: Serena Warren  MRN: [id number]  : 1965  Date of evaluation: 2020  ED Provider: JAYY Rivera    0600  Serena Warren was checked out to me by Rodrigo Lira PA-C. Please see his/her initial documentation for details of the patient's initial ED presentation, physical exam and completed studies. In brief, Serena Warren is a 53 y.o. female that presented to the emergency department in custody of [facility] with pink slip secondary to family concern for erratic, delusional behavior. Patient has history of noncompliance schizophrenia. Patient is accusatory towards sister for being here because she accused her of \"raping 1000s of people in [place]\". Patient difficult with staff but otherwise compliant, she really does not know why she is here. At the time of signout, awaiting disposition/likely placement . Still need urinalysis/urine drug screen, she will be orally supplemented for her slightly low magnesium. I have reviewed and interpreted all of the currently available lab results and diagnostics from this visit:  Results for orders placed or performed during the hospital encounter of 20   Ethanol   Result Value Ref Range    Alcohol Scrn <0.01 <8.73 %WT/VOL   Salicylate   Result Value Ref Range    Salicylate Lvl 1.3 (L) 15 - 30 MG/DL    DOSE AMOUNT DOSE AMT. GIVEN - Unknown     DOSE TIME DOSE TIME GIVEN - Unknown    Acetaminophen Level   Result Value Ref Range    Acetaminophen Level <5.0 (L) 15 - 30 ug/ml    DOSE AMOUNT DOSE AMT.  GIVEN - UNKNOWN     DOSE TIME DOSE TIME GIVEN - UNKNOWN    CBC Auto Differential   Result Value Ref Range    WBC 8.0 4.0 - 10.5 K/CU MM    RBC 4.07 (L) 4.2 - 5.4 M/CU MM    Hemoglobin 12.7 12.5 - 16.0 GM/DL    Hematocrit 38.7 37 - 47 %    MCV 95.1 78 - 100 FL    MCH 31.2 (H) 27 - 31 PG    MCHC 32.8 32.0 - 36.0 %    RDW 17.3 (H) 11.7 - 14.9 %    Platelets 065 965 - 055 K/CU MM    MPV 10.5 7.5 - 11.1 FL    Differential Type AUTOMATED DIFFERENTIAL     Segs Relative 52.8 36 - 66 %    Lymphocytes % 38.8 24 - 44 %    Monocytes % 7.3 (H) 0 - 4 %    Eosinophils % 0.5 0 - 3 %    Basophils % 0.5 0 - 1 %    Segs Absolute 4.2 K/CU MM    Lymphocytes Absolute 3.1 K/CU MM    Monocytes Absolute 0.6 K/CU MM    Eosinophils Absolute 0.0 K/CU MM    Basophils Absolute 0.0 K/CU MM    Nucleated RBC % 0.0 %    Total Nucleated RBC 0.0 K/CU MM    TRC 0.0948 K/CU MM    Total Immature Neutrophil 0.01 K/CU MM    Immature Neutrophil % 0.1 0 - 0.43 %   Comprehensive Metabolic Panel w/ Reflex to MG   Result Value Ref Range    Sodium 139 135 - 145 MMOL/L    Potassium 3.5 3.5 - 5.1 MMOL/L    Chloride 106 99 - 110 mMol/L    CO2 21 21 - 32 MMOL/L    BUN 4 (L) 6 - 23 MG/DL    CREATININE 0.7 0.6 - 1.1 MG/DL    Glucose 133 (H) 70 - 99 MG/DL    Calcium 9.5 8.3 - 10.6 MG/DL    Alb 3.7 3.4 - 5.0 GM/DL    Total Protein 7.4 6.4 - 8.2 GM/DL    Total Bilirubin 0.3 0.0 - 1.0 MG/DL    ALT 15 10 - 40 U/L    AST 20 15 - 37 IU/L    Alkaline Phosphatase 108 40 - 129 IU/L    GFR Non-African American >60 >60 mL/min/1.73m2    GFR African American >60 >60 mL/min/1.73m2    Anion Gap 12 4 - 16   Magnesium   Result Value Ref Range    Magnesium 1.7 (L) 1.8 - 2.4 mg/dl     No results found. Final ED Course and MDM: In brief, Sri Wesley is a 53 y.o. female whose care was signed out to me by the outgoing provider. In brief, patient is presenting with noncompliant schizophrenia with erratic, delusional behavior. Was pink slipped by [facility] for this erratic behavior. Patients family is who notified police, she was brought in to have her mental health evaluation. She demonstrates delusions, she has erratic behavior. Has been noncompliant with her psychiatric regimen. Patient is redirectable and compliant but does demonstrate delusions, paranoia.   Her medical work-up did

## 2020-09-17 NOTE — ED NOTES
Pt not willing to remove bra. [facility] reports this being okay.        Henrik Arellano RN  09/16/20 4882

## 2020-09-17 NOTE — ED NOTES
Pt refusing vitals for nurse. Allowing sitter to obtain vitals. Pt states unable to urinate at this time because we have not given her anything to drink. Water was offered prior to this with her refusing and states we spit in it. Requesting diet pop. Made aware of needing urine to medically clear her so mental health can assess her. States Garjazzmine Gilmar you all fake doctors and nurses on this floor? \" Pop provided and pt swinging it around and wanting a different kind. Made aware of it being the only diet pop we have in the ER and pop taken from pt d/t her swinging it around refusing to drink it.       Kristina Garnica, KELLY  09/17/20 0700

## 2020-09-17 NOTE — ED NOTES
Pt appears to occasionally looking out of door mumbling. Once heard pt state \"Oh I know what they are doing. \" Appears to be paranoid.       Sai Gotti RN  09/17/20 1551 oral

## 2020-09-17 NOTE — ED NOTES
Pt resting quietly in bed, resps easy and even. Offered to order meal tray for pt. Declined, states \"no, I don't want anything, they'll spit in it. \"  Sitter 1:1 remains at bedside.      Cali Saucedo RN  09/17/20 4053

## 2020-09-17 NOTE — ED NOTES
PT given a cola from [employer] took one look at it and asked why we spit in it.  When told it was not spit in I was told we were all liars [facility]  09/17/20 0659

## 2020-09-17 NOTE — ED NOTES
This nurse in to draw blood. Pt questioning \"Do you even know what you're doing? \" Pt ensured this is done daily. Cooperative with blood draw.       Nini Carranza, RN  09/17/20 4656

## 2020-09-17 NOTE — ED NOTES
Pt resting quietly in bed. Resps easy and even. Sitter 1:1 remains at bedside.      Lety Wheeler RN  09/17/20 7192

## 2020-09-17 NOTE — ED NOTES
Attempt to see if pt can provide urine sample, states \"No, you havent given me an IV or anything yet, this isn't a real hospital.\"  Sitter 1:1 remains at bedside.      Marci Peralta RN  09/17/20 1386

## 2020-09-17 NOTE — ED NOTES
Pt resting with eyes closed. Sitter remains at bedside for pt safety.       America Lantigua RN  09/17/20 9920

## 2020-09-17 NOTE — ED NOTES
Pt resting quietly in bed, resps easy and even. Sitter 1:1 remains at bedside.      Young Lucas RN  09/17/20 6052

## 2020-09-17 NOTE — ED NOTES
Continuing to await pt to use bathroom again for urine sample.       Josesito Fernandez RN  09/17/20 4304

## 2020-09-17 NOTE — ED NOTES
Pt resting quietly in bed with eyes closed. Resps easy and even. Continues to refuse providing urine sample. Sitter 1:1 remains at bedside.      Adolph Medico, RN  09/17/20 0502

## 2020-09-17 NOTE — ED PROVIDER NOTES
I independently examined and evaluated Nohemi Mitchell. In brief their history revealed presented with [address] [employer] due to erratic behavior. Has history of schizophrenia, has been noncompliant with medications. Reported that she was delusional, paranoid by patients sister who called. Their focused exam revealed awake, alert, well-hydrated, well-nourished, and in no acute distress. Mucous membranes are moist.  Breathing is unlabored. Skin is dry. Mental status is normal. The patient moves all extremities, and is without facial droop. ED course: 79-year-old female with history of paranoid schizophrenia who presented with paranoid delusions. Was signed out to me pending urine drug screen and mental health crisis evaluation. She was evaluated by mental health crisis provider who deemed her to meet criteria for inpatient admission. She was accepted to [facility] mental health by Doris Mckeon the nurse practitioner. She is currently stable for transfer. All diagnostic, treatment, and disposition decisions were made by myself in conjunction with the Advanced Practice Provider. For all further details of the patient's emergency department visit, please see the Advanced Practice Provider's documentation.       Justine Vasques DO  09/17/20 1926

## 2020-09-17 NOTE — ED NOTES
Pt resting quietly in bed. Resps easy and even. Sitter 1:1 remains at bedside.      Young Lucas RN  09/17/20 0500

## 2020-09-17 NOTE — ED NOTES
Room broke down for psych precautions. Security at doorway. Pt yelling and reports to security that he does not work here and works for the police. States not wanting him in her room.       Chasidy Méndez RN  09/16/20 5129

## 2020-09-17 NOTE — ED NOTES
Pt resting quietly in bed. Resps easy and even. Sitter 1:1 remains at bedside.      Bessie Russ RN  09/17/20 6090

## 2020-09-17 NOTE — ED NOTES
Sitter 1:1 lunch relief . Patient laying in bed calm, turned to the right side with blankets over face. Rikki Guan.  Jacque  09/17/20 4247

## 2020-09-17 NOTE — ED NOTES
Pt continues to sleep at this time. No s/sx of distress sitter remains.       Nini Carranza, RN  09/17/20 5349

## 2020-09-17 NOTE — ED NOTES
Pt resting quietly in bed. Resps easy and even. Sitter 1:1 remains at bedside.      Dinah Schultz RN  09/17/20 5972

## 2020-09-17 NOTE — ED NOTES
Pt resting quietly in bed with eyes closed. Resps easy and even. Sitter 1:1 remains at bedside.      Clau Lawrence, RN  09/17/20 1016

## 2020-09-17 NOTE — ED PROVIDER NOTES
eMERGENCY dEPARTMENT eNCOUnter        [address] [facility]    Chief Complaint   Patient presents with    Mental Health Problem     schizophrenia, delusional       HPI    Sapphire Ramos is a 53 y.o. female who presents for a mental health evaluation. Patient is pink slipped by [facility]. They were apparently called to patient's residence by family because they were concerned that patient has not been compliant with her medications. She reportedly has a history of schizophrenia. Patient is agitated, tells me that Sera Manuel\" (who is patient's sister) is the reason she is here because she accused her of raping 1s of people in [place]. Patient states that the people that brought her here are from [place] and also reports her name as being something other than what it really is. She states her father is coming here to get her but she won't tell me where he is coming from because she doesn't trust anyone (father is reportedly ). REVIEW OF SYSTEMS    See HPI for further details. Review of systems otherwise negative. Constitutional:  Denies fever, chills. HENT:  Denies headache, dizziness, lightheadedness. Respiratory:  Denies any shortness of breath. Cardiovascular:  Denies chest pain, palpitations. GI:  Denies abdominal pain, nausea, vomiting, diarrhea. :  Denies dysuria. Musculoskeletal:  Denies edema, tenderness. Integument:  Denies rashes, lesions. Neurologic:  + altered mental status. Denies any numbness or tingling. Psychiatric:  + schizophrenia, agitation.     PAST MEDICAL HISTORY    Past Medical History:   Diagnosis Date    CAD (coronary artery disease)     [facility]     Hepatitis C     Schizophrenia ([facility]        SURGICAL HISTORY    Past Surgical History:   Procedure Laterality Date    CHOLECYSTECTOMY      HYSTERECTOMY         CURRENT MEDICATIONS    Current Outpatient Rx   Medication Sig Dispense Refill    insulin glargine (LANTUS) 100 UNIT/ML injection vial Inject 40 Units into the skin nightly 1 vial 0    insulin lispro (HUMALOG) 100 UNIT/ML injection vial Inject 10 Units into the skin 3 times daily (with meals) 1 vial 0    blood glucose monitor kit and supplies Dispense sufficient amount for indicated testing frequency plus additional to accommodate PRN testing needs. Dispense all needed supplies to include: monitor, strips, lancing device, lancets, control solutions, alcohol swabs. 1 kit 0    Insulin Syringe-Needle U-100 ([employer] INS SYR .3CC/29GX0.5\") 29G X 1/2\" 0.3 ML MISC 1 each by Does not apply route daily 100 each 3    FreeStyle Lancets MISC 1 each by Does not apply route daily 100 each 3    blood glucose monitor strips Test 2 times a day & as needed for symptoms of irregular blood glucose. Dispense sufficient amount for indicated testing frequency plus additional to accommodate PRN testing needs. 100 strip 0    atorvastatin (LIPITOR) 40 MG tablet Take 1 tablet by mouth daily for 15 days 15 tablet 0    Insulin Pen Needle (MEIJER PEN NEEDLES) 31G X 8 MM MISC 1 each by Does not apply route 2 times daily AND 1 each 2 times daily.  60 each 0    aspirin 81 MG chewable tablet Take 81 mg by mouth daily      QUEtiapine (SEROQUEL) 300 MG tablet Take 400 mg by mouth nightly         ALLERGIES    Allergies   Allergen Reactions    Haldol [Haloperidol Lactate] Other (See Comments)     Unknown, severe reaction per mother    Penicillins        FAMILY HISTORY    Family History   Problem Relation Age of Onset    No Known Problems Mother     Cancer Father        SOCIAL HISTORY    Social History     Socioeconomic History    Marital status: Legally      Spouse name: None    Number of children: None    Years of education: None    Highest education level: None   Occupational History    None   Social Needs    Financial resource strain: None    Food insecurity     Worry: None     Inability: None    Transportation needs     Medical: None Non-medical: None   Tobacco Use    Smoking status: Current Every Day Smoker     Packs/day: 0.50     Types: Cigarettes    Smokeless tobacco: Never Used   Substance and Sexual Activity    Alcohol use: Yes     Comment: occassionally    Drug use: Yes     Types: Marijuana    Sexual activity: None   Lifestyle    Physical activity     Days per week: None     Minutes per session: None    Stress: None   Relationships    Social connections     Talks on phone: None     Gets together: None     Attends Baptism service: None     Active member of club or organization: None     Attends meetings of clubs or organizations: None     Relationship status: None    Intimate partner violence     Fear of current or ex partner: None     Emotionally abused: None     Physically abused: None     Forced sexual activity: None   Other Topics Concern    None   Social History Narrative    None       PHYSICAL EXAM    VITAL SIGNS: BP (!) 152/90   Pulse 124   Temp 97.9 °F (36.6 °C) (Oral)   Resp 17   Ht 5' 2\" (1.575 m)   Wt 130 lb (59 kg)   SpO2 97%   BMI 23.78 kg/m²   Constitutional:  Well developed, well nourished, no acute distress, non-toxic appearance   Eyes:  PERRL, EOMI. Conjunctiva normal.  HENT:  Atraumatic, external ears normal, nose normal, oropharynx moist. Neck- supple   Respiratory:  Respirations unlabored. Musculoskeletal:  No edema, no tenderness, no  deformities. Integument:  Well hydrated. Neurologic:  Alert and oriented. Psychiatric:  Agitated.      RADIOLOGY/PROCEDURES    Labs Reviewed   SALICYLATE LEVEL - Abnormal; Notable for the following components:       Result Value    Salicylate Lvl 1.3 (*)     All other components within normal limits   ACETAMINOPHEN LEVEL - Abnormal; Notable for the following components:    Acetaminophen Level <5.0 (*)     All other components within normal limits   CBC WITH AUTO DIFFERENTIAL - Abnormal; Notable for the following components:    RBC 4.07 (*)     MCH 31.2 (*)     RDW 17.3 (*)     Monocytes % 7.3 (*)     All other components within normal limits   COMPREHENSIVE METABOLIC PANEL W/ REFLEX TO MG FOR LOW K - Abnormal; Notable for the following components:    BUN 4 (*)     Glucose 133 (*)     All other components within normal limits   MAGNESIUM - Abnormal; Notable for the following components:    Magnesium 1.7 (*)     All other components within normal limits   ETHANOL   URINE DRUG SCREEN       ED COURSE & MEDICAL DECISION MAKING    Pertinent Labs & Imaging studies reviewed. (See chart for details)  -  Patient seen and evaluated in the emergency department. -  Triage and nursing notes reviewed and incorporated. -  Old chart records reviewed and incorporated. -  Supervising physician was Dr. Layne Jiménez. Patient was seen independently. -  Differential diagnosis includes: depression, suicidal, behavior disorder, schizophrenia, schizoaffective disorder, manic, dementia, delirium, alcohol intoxication, drug toxicity, and others  -  Work-up included:  See above  -  Patient awaiting UDS and MH evaluation at the end of my shift. Signed out to Eladia Hernandez PA-C. Please see his note for further details, including final disposition. In light of current events, I did utilize appropriate PPE (including surgical face mask, safety glasses, and gloves, as recommended by the health facility/national standard best practice, during my bedside interactions with the patient. FINAL IMPRESSION    1.  Paranoid type delusional disorder [facility])                Yovana Dewitt PA-C  09/21/20 4680

## 2020-09-17 NOTE — ED NOTES
Patients seem to be having hallucinations, swinging her left arm in air. Blaire Cortez.  [facility]  09/17/20 6096

## 2020-09-17 NOTE — ED NOTES
Pt resting quietly in bed with blankets over head. Resps easy and even. Sitter 1:1 at bedside.      Ronald Carolina RN  09/17/20 1000 [facility], RN  09/17/20 5135

## 2020-09-17 NOTE — ED NOTES
Pt currently in room with sitter at bedside for safety precautions.       Josesito Fernandez RN  09/16/20 8921

## 2020-09-17 NOTE — ED NOTES
Pt resting quietly in bed. Resps easy and even. Sitter 1:1 remains at bedside.      Deandre Peres RN  09/17/20 8817

## 2020-09-18 ENCOUNTER — HOSPITAL ENCOUNTER (OUTPATIENT)
Age: 55
Setting detail: SPECIMEN
Discharge: HOME | End: 2020-09-18
Payer: COMMERCIAL

## 2020-09-18 VITALS
RESPIRATION RATE: 15 BRPM | HEIGHT: 62 IN | OXYGEN SATURATION: 100 % | BODY MASS INDEX: 23.92 KG/M2 | WEIGHT: 130 LBS | HEART RATE: 93 BPM | TEMPERATURE: 97.9 F | SYSTOLIC BLOOD PRESSURE: 194 MMHG | DIASTOLIC BLOOD PRESSURE: 86 MMHG

## 2020-09-18 PROCEDURE — 80061 LIPID PANEL: CPT

## 2020-09-18 PROCEDURE — U0002 COVID-19 LAB TEST NON-CDC: HCPCS

## 2020-09-18 PROCEDURE — 83721 ASSAY OF BLOOD LIPOPROTEIN: CPT

## 2020-09-18 PROCEDURE — 6360000002 HC RX W HCPCS: Performed by: PHYSICIAN ASSISTANT

## 2020-09-18 RX ADMIN — LORAZEPAM 2 MG: 2 INJECTION INTRAMUSCULAR; INTRAVENOUS at 00:41

## 2020-09-18 RX ADMIN — ZIPRASIDONE MESYLATE 20 MG: 20 INJECTION, POWDER, LYOPHILIZED, FOR SOLUTION INTRAMUSCULAR at 00:41

## 2020-09-18 NOTE — ED NOTES
Pt agitated with being transferred. Demanding her clothes. Explained process of transfer and updated pt on location and time in which she will be leaving. Pt medicated as per [facility] for transfer agitation.       Joselito Ornelas RN  09/18/20 1922

## 2020-09-18 NOTE — ED NOTES
Lunch coverage for primary RN.  Sitter 1:1     [facility]  09/18/20 1834

## 2020-09-19 LAB
CHOLESTEROL: 160 MG/DL
HDLC SERPL-MCNC: 60 MG/DL
LDL CHOLESTEROL DIRECT: 78 MG/DL
SARS-COV-2: NOT DETECTED
SOURCE: NORMAL
TRIGL SERPL-MCNC: 82 MG/DL

## 2021-03-12 ENCOUNTER — HOSPITAL ENCOUNTER (EMERGENCY)
Age: 56
Discharge: HOME | End: 2021-03-12
Attending: EMERGENCY MEDICINE
Payer: COMMERCIAL

## 2021-03-12 VITALS
SYSTOLIC BLOOD PRESSURE: 148 MMHG | HEART RATE: 89 BPM | TEMPERATURE: 98.6 F | RESPIRATION RATE: 18 BRPM | DIASTOLIC BLOOD PRESSURE: 72 MMHG | OXYGEN SATURATION: 100 %

## 2021-03-12 DIAGNOSIS — F20.9 SCHIZOPHRENIA, UNSPECIFIED TYPE (HCC): Primary | ICD-10-CM

## 2021-03-12 LAB
ACETAMINOPHEN LEVEL: <5 UG/ML (ref 15–30)
ALBUMIN SERPL-MCNC: 3.6 GM/DL (ref 3.4–5)
ALCOHOL SCREEN SERUM: <0.01 %WT/VOL
ALP BLD-CCNC: 145 IU/L (ref 40–128)
ALT SERPL-CCNC: 20 U/L (ref 10–40)
AMPHETAMINES: NEGATIVE
ANION GAP SERPL CALCULATED.3IONS-SCNC: 11 MMOL/L (ref 4–16)
AST SERPL-CCNC: 27 IU/L (ref 15–37)
BACTERIA: ABNORMAL /HPF
BARBITURATE SCREEN URINE: NEGATIVE
BASOPHILS ABSOLUTE: 0.1 K/CU MM
BASOPHILS RELATIVE PERCENT: 0.6 % (ref 0–1)
BENZODIAZEPINE SCREEN, URINE: NEGATIVE
BILIRUB SERPL-MCNC: 0.4 MG/DL (ref 0–1)
BILIRUBIN URINE: NEGATIVE MG/DL
BLOOD, URINE: NEGATIVE
BUN BLDV-MCNC: 9 MG/DL (ref 6–23)
CALCIUM SERPL-MCNC: 9.3 MG/DL (ref 8.3–10.6)
CANNABINOID SCREEN URINE: NEGATIVE
CHLORIDE BLD-SCNC: 103 MMOL/L (ref 99–110)
CLARITY: CLEAR
CO2: 17 MMOL/L (ref 21–32)
COCAINE METABOLITE: NEGATIVE
COLOR: YELLOW
CREAT SERPL-MCNC: 0.7 MG/DL (ref 0.6–1.1)
DIFFERENTIAL TYPE: ABNORMAL
DOSE AMOUNT: ABNORMAL
DOSE AMOUNT: ABNORMAL
DOSE TIME: ABNORMAL
DOSE TIME: ABNORMAL
EOSINOPHILS ABSOLUTE: 0 K/CU MM
EOSINOPHILS RELATIVE PERCENT: 0.5 % (ref 0–3)
GFR AFRICAN AMERICAN: >60 ML/MIN/1.73M2
GFR NON-AFRICAN AMERICAN: >60 ML/MIN/1.73M2
GLUCOSE BLD-MCNC: 132 MG/DL (ref 70–99)
GLUCOSE, URINE: NEGATIVE MG/DL
HCT VFR BLD CALC: 48.3 % (ref 37–47)
HEMOGLOBIN: 16.7 GM/DL (ref 12.5–16)
IMMATURE NEUTROPHIL %: 0.4 % (ref 0–0.43)
KETONES, URINE: NEGATIVE MG/DL
LEUKOCYTE ESTERASE, URINE: NEGATIVE
LYMPHOCYTES ABSOLUTE: 2.2 K/CU MM
LYMPHOCYTES RELATIVE PERCENT: 27.4 % (ref 24–44)
MCH RBC QN AUTO: 35.3 PG (ref 27–31)
MCHC RBC AUTO-ENTMCNC: 34.6 % (ref 32–36)
MCV RBC AUTO: 102.1 FL (ref 78–100)
MONOCYTES ABSOLUTE: 0.7 K/CU MM
MONOCYTES RELATIVE PERCENT: 8.2 % (ref 0–4)
NITRITE URINE, QUANTITATIVE: NEGATIVE
NUCLEATED RBC %: 0 %
OPIATES, URINE: NEGATIVE
OXYCODONE: NEGATIVE
PDW BLD-RTO: 14.5 % (ref 11.7–14.9)
PH, URINE: 6 (ref 5–8)
PHENCYCLIDINE, URINE: NEGATIVE
PLATELET # BLD: 143 K/CU MM (ref 140–440)
PMV BLD AUTO: 11 FL (ref 7.5–11.1)
POTASSIUM SERPL-SCNC: 4.5 MMOL/L (ref 3.5–5.1)
PROTEIN UA: 100 MG/DL
RBC # BLD: 4.73 M/CU MM (ref 4.2–5.4)
RBC URINE: ABNORMAL /HPF (ref 0–6)
REASON FOR REJECTION: NORMAL
REJECTED TEST: NORMAL
SALICYLATE LEVEL: <0.3 MG/DL (ref 15–30)
SARS-COV-2, NAAT: NOT DETECTED
SEGMENTED NEUTROPHILS ABSOLUTE COUNT: 5.1 K/CU MM
SEGMENTED NEUTROPHILS RELATIVE PERCENT: 62.9 % (ref 36–66)
SODIUM BLD-SCNC: 131 MMOL/L (ref 135–145)
SOURCE: NORMAL
SPECIFIC GRAVITY UA: 1 (ref 1–1.03)
SQUAMOUS EPITHELIAL: 2 /HPF
TOTAL IMMATURE NEUTOROPHIL: 0.03 K/CU MM
TOTAL NUCLEATED RBC: 0 K/CU MM
TOTAL PROTEIN: 8 GM/DL (ref 6.4–8.2)
TRICHOMONAS: ABNORMAL /HPF
UROBILINOGEN, URINE: NEGATIVE MG/DL (ref 0.2–1)
WBC # BLD: 8.1 K/CU MM (ref 4–10.5)
WBC UA: ABNORMAL /HPF (ref 0–5)

## 2021-03-12 PROCEDURE — 80053 COMPREHEN METABOLIC PANEL: CPT

## 2021-03-12 PROCEDURE — 80307 DRUG TEST PRSMV CHEM ANLYZR: CPT

## 2021-03-12 PROCEDURE — 83036 HEMOGLOBIN GLYCOSYLATED A1C: CPT

## 2021-03-12 PROCEDURE — 99285 EMERGENCY DEPT VISIT HI MDM: CPT

## 2021-03-12 PROCEDURE — 81001 URINALYSIS AUTO W/SCOPE: CPT

## 2021-03-12 PROCEDURE — G0480 DRUG TEST DEF 1-7 CLASSES: HCPCS

## 2021-03-12 PROCEDURE — 36415 COLL VENOUS BLD VENIPUNCTURE: CPT

## 2021-03-12 PROCEDURE — 85025 COMPLETE CBC W/AUTO DIFF WBC: CPT

## 2021-03-12 PROCEDURE — 83721 ASSAY OF BLOOD LIPOPROTEIN: CPT

## 2021-03-12 PROCEDURE — 87635 SARS-COV-2 COVID-19 AMP PRB: CPT

## 2021-03-12 PROCEDURE — 80061 LIPID PANEL: CPT

## 2021-03-12 RX ORDER — ZIPRASIDONE MESYLATE 20 MG/ML
20 INJECTION, POWDER, LYOPHILIZED, FOR SOLUTION INTRAMUSCULAR ONCE
Status: DISCONTINUED | OUTPATIENT
Start: 2021-03-12 | End: 2021-03-12 | Stop reason: HOSPADM

## 2021-03-12 NOTE — ED TRIAGE NOTES
Pt presents to ED escorted by police for a mental  Health probate court order stating that the patient is to be admitted to [facility].  Pt is at ER to receive medical clearance

## 2021-03-12 NOTE — ED NOTES
Pt is being cooperative with staff at this time.   and beatrice at 1:1      Aaron Ferrara RN  03/12/21 8393

## 2021-03-12 NOTE — ED PROVIDER NOTES
Socioeconomic History    Marital status: Legally      Spouse name: None    Number of children: None    Years of education: None    Highest education level: None   Occupational History    None   Social Needs    Financial resource strain: None    Food insecurity     Worry: None     Inability: None    Transportation needs     Medical: None     Non-medical: None   Tobacco Use    Smoking status: Current Every Day Smoker     Packs/day: 0.50     Types: Cigarettes    Smokeless tobacco: Never Used   Substance and Sexual Activity    Alcohol use: Yes     Comment: occassionally    Drug use: Yes     Types: Marijuana    Sexual activity: None   Lifestyle    Physical activity     Days per week: None     Minutes per session: None    Stress: None   Relationships    Social connections     Talks on phone: None     Gets together: None     Attends Voodoo service: None     Active member of club or organization: None     Attends meetings of clubs or organizations: None     Relationship status: None    Intimate partner violence     Fear of current or ex partner: None     Emotionally abused: None     Physically abused: None     Forced sexual activity: None   Other Topics Concern    None   Social History Narrative    None         **Past medical, family and social histories, and nursing notes reviewed and verified by me**      PHYSICAL EXAM  VITAL SIGNS:   ED Triage Vitals   Enc Vitals Group      BP       Pulse       Resp       Temp       Temp src       SpO2       Weight       Height       Head Circumference       Peak Flow       Pain Score       Pain Loc       Pain Edu? Excl. in [address]? Vitals during ED course were reviewed and are as charted.     Constitutional: Minimal distress, Non-toxic appearance  Eyes: Conjunctiva normal, No discharge, PERRL  HENT: Normocephalic, Atraumatic,  oropharynx moist  Neck: Supple, no stridor, no grossly visible or palpable masses  Cardiovascular: Regular rate and rhythm, No murmurs, No rubs, No gallops  Pulmonary/Chest: Normal breath sounds, No respiratory distress or accessory muscle use, No wheezing, crackles or rhonchi.   Abdomen: Soft, nondistended and nonrigid, No tenderness or peritoneal signs, No masses  Extremities: No gross deformities, no edema, no tenderness  Neurologic: Normal motor function, Normal sensory function, No focal deficits  Skin: Warm, Dry, No erythema, No rash, No cyanosis, No mottling  Psychiatric: Alert and oriented x3, Affect normal              RADIOLOGY/PROCEDURES/LABS/MEDICATIONS ADMINISTERED:    I have reviewed and interpreted all of the currently available lab results from this visit (if applicable):  Results for orders placed or performed during the hospital encounter of 03/12/21   COVID-19, Rapid    Specimen: Nasopharyngeal   Result Value Ref Range    Source THROAT     SARS-CoV-2, NAAT NOT DETECTED    Comprehensive Metabolic Panel w/ Reflex to MG   Result Value Ref Range    Sodium 131 (L) 135 - 145 MMOL/L    Potassium 4.5 3.5 - 5.1 MMOL/L    Chloride 103 99 - 110 mMol/L    CO2 17 (L) 21 - 32 MMOL/L    BUN 9 6 - 23 MG/DL    CREATININE 0.7 0.6 - 1.1 MG/DL    Glucose 132 (H) 70 - 99 MG/DL    Calcium 9.3 8.3 - 10.6 MG/DL    Albumin 3.6 3.4 - 5.0 GM/DL    Total Protein 8.0 6.4 - 8.2 GM/DL    Total Bilirubin 0.4 0.0 - 1.0 MG/DL    ALT 20 10 - 40 U/L    AST 27 15 - 37 IU/L    Alkaline Phosphatase 145 (H) 40 - 128 IU/L    GFR Non-African American >60 >60 mL/min/1.73m2    GFR African American >60 >60 mL/min/1.73m2    Anion Gap 11 4 - 16   Urinalysis, reflex to microscopic   Result Value Ref Range    Color, UA YELLOW YELLOW    Clarity, UA CLEAR CLEAR    Glucose, Urine NEGATIVE NEGATIVE MG/DL    Bilirubin Urine NEGATIVE NEGATIVE MG/DL    Ketones, Urine NEGATIVE NEGATIVE MG/DL    Specific Gravity, UA 1.004 1.001 - 1.035    Blood, Urine NEGATIVE NEGATIVE    pH, Urine 6.0 5.0 - 8.0    Protein,  (A) NEGATIVE MG/DL    Urobilinogen, Urine NEGATIVE 0.2

## 2021-03-13 LAB
CHOLESTEROL: 161 MG/DL
ESTIMATED AVERAGE GLUCOSE: 97 MG/DL
HBA1C MFR BLD: 5 % (ref 4.2–6.3)
HDLC SERPL-MCNC: 72 MG/DL
LDL CHOLESTEROL DIRECT: 70 MG/DL
TRIGL SERPL-MCNC: 163 MG/DL

## 2021-03-13 NOTE — ED NOTES
re faxed information to [facility] [phone] request from Jay[facility] Lyon stated their fax machine on the unit is not working      [facility]  03/12/21 2713

## 2021-04-12 ENCOUNTER — HOSPITAL ENCOUNTER (EMERGENCY)
Age: 56
Discharge: HOME | End: 2021-04-13
Attending: EMERGENCY MEDICINE
Payer: COMMERCIAL

## 2021-04-12 DIAGNOSIS — F22 DELUSIONS (HCC): Primary | ICD-10-CM

## 2021-04-12 LAB
ACETAMINOPHEN LEVEL: <5 UG/ML (ref 15–30)
ALBUMIN SERPL-MCNC: 3.5 GM/DL (ref 3.4–5)
ALCOHOL SCREEN SERUM: <0.01 %WT/VOL
ALP BLD-CCNC: 158 IU/L (ref 40–129)
ALT SERPL-CCNC: 23 U/L (ref 10–40)
AMPHETAMINES: NEGATIVE
ANION GAP SERPL CALCULATED.3IONS-SCNC: 6 MMOL/L (ref 4–16)
AST SERPL-CCNC: 20 IU/L (ref 15–37)
BACTERIA: ABNORMAL /HPF
BARBITURATE SCREEN URINE: NEGATIVE
BASOPHILS ABSOLUTE: 0 K/CU MM
BASOPHILS RELATIVE PERCENT: 0.1 % (ref 0–1)
BENZODIAZEPINE SCREEN, URINE: NEGATIVE
BILIRUB SERPL-MCNC: 0.4 MG/DL (ref 0–1)
BILIRUBIN URINE: NEGATIVE MG/DL
BLOOD, URINE: NEGATIVE
BUN BLDV-MCNC: 8 MG/DL (ref 6–23)
CALCIUM SERPL-MCNC: 9.2 MG/DL (ref 8.3–10.6)
CANNABINOID SCREEN URINE: NEGATIVE
CHLORIDE BLD-SCNC: 98 MMOL/L (ref 99–110)
CLARITY: ABNORMAL
CO2: 27 MMOL/L (ref 21–32)
COCAINE METABOLITE: NEGATIVE
COLOR: YELLOW
CREAT SERPL-MCNC: 0.8 MG/DL (ref 0.6–1.1)
DIFFERENTIAL TYPE: ABNORMAL
DOSE AMOUNT: ABNORMAL
DOSE AMOUNT: ABNORMAL
DOSE TIME: ABNORMAL
DOSE TIME: ABNORMAL
EOSINOPHILS ABSOLUTE: 0 K/CU MM
EOSINOPHILS RELATIVE PERCENT: 0.3 % (ref 0–3)
GFR AFRICAN AMERICAN: >60 ML/MIN/1.73M2
GFR NON-AFRICAN AMERICAN: >60 ML/MIN/1.73M2
GLUCOSE BLD-MCNC: 172 MG/DL (ref 70–99)
GLUCOSE, URINE: NEGATIVE MG/DL
HCT VFR BLD CALC: 43 % (ref 37–47)
HEMOGLOBIN: 15 GM/DL (ref 12.5–16)
IMMATURE NEUTROPHIL %: 0.3 % (ref 0–0.43)
KETONES, URINE: NEGATIVE MG/DL
LEUKOCYTE ESTERASE, URINE: ABNORMAL
LYMPHOCYTES ABSOLUTE: 1.6 K/CU MM
LYMPHOCYTES RELATIVE PERCENT: 24 % (ref 24–44)
MAGNESIUM: 1.6 MG/DL (ref 1.8–2.4)
MCH RBC QN AUTO: 35.5 PG (ref 27–31)
MCHC RBC AUTO-ENTMCNC: 34.9 % (ref 32–36)
MCV RBC AUTO: 101.7 FL (ref 78–100)
MONOCYTES ABSOLUTE: 0.4 K/CU MM
MONOCYTES RELATIVE PERCENT: 6 % (ref 0–4)
MUCUS: ABNORMAL HPF
NITRITE URINE, QUANTITATIVE: NEGATIVE
NUCLEATED RBC %: 0 %
OPIATES, URINE: NEGATIVE
OXYCODONE: NEGATIVE
PDW BLD-RTO: 13.7 % (ref 11.7–14.9)
PH, URINE: 6 (ref 5–8)
PHENCYCLIDINE, URINE: NEGATIVE
PLATELET # BLD: 155 K/CU MM (ref 140–440)
PMV BLD AUTO: 11 FL (ref 7.5–11.1)
POTASSIUM SERPL-SCNC: 3.5 MMOL/L (ref 3.5–5.1)
PROTEIN UA: 100 MG/DL
RBC # BLD: 4.23 M/CU MM (ref 4.2–5.4)
RBC URINE: ABNORMAL /HPF (ref 0–6)
SALICYLATE LEVEL: <0.3 MG/DL (ref 15–30)
SARS-COV-2, NAAT: NOT DETECTED
SEGMENTED NEUTROPHILS ABSOLUTE COUNT: 4.7 K/CU MM
SEGMENTED NEUTROPHILS RELATIVE PERCENT: 69.3 % (ref 36–66)
SODIUM BLD-SCNC: 131 MMOL/L (ref 135–145)
SOURCE: NORMAL
SPECIFIC GRAVITY UA: 1.01 (ref 1–1.03)
SQUAMOUS EPITHELIAL: 30 /HPF
TOTAL IMMATURE NEUTOROPHIL: 0.02 K/CU MM
TOTAL NUCLEATED RBC: 0 K/CU MM
TOTAL PROTEIN: 7.9 GM/DL (ref 6.4–8.2)
TRICHOMONAS: ABNORMAL /HPF
UROBILINOGEN, URINE: NEGATIVE MG/DL (ref 0.2–1)
WBC # BLD: 6.8 K/CU MM (ref 4–10.5)
WBC UA: 1 /HPF (ref 0–5)

## 2021-04-12 PROCEDURE — G0480 DRUG TEST DEF 1-7 CLASSES: HCPCS

## 2021-04-12 PROCEDURE — 80307 DRUG TEST PRSMV CHEM ANLYZR: CPT

## 2021-04-12 PROCEDURE — 85025 COMPLETE CBC W/AUTO DIFF WBC: CPT

## 2021-04-12 PROCEDURE — 36415 COLL VENOUS BLD VENIPUNCTURE: CPT

## 2021-04-12 PROCEDURE — 83735 ASSAY OF MAGNESIUM: CPT

## 2021-04-12 PROCEDURE — 81001 URINALYSIS AUTO W/SCOPE: CPT

## 2021-04-12 PROCEDURE — 87635 SARS-COV-2 COVID-19 AMP PRB: CPT

## 2021-04-12 PROCEDURE — 99285 EMERGENCY DEPT VISIT HI MDM: CPT

## 2021-04-12 PROCEDURE — 6370000000 HC RX 637 (ALT 250 FOR IP): Performed by: EMERGENCY MEDICINE

## 2021-04-12 PROCEDURE — 80053 COMPREHEN METABOLIC PANEL: CPT

## 2021-04-12 RX ORDER — NITROFURANTOIN 25; 75 MG/1; MG/1
100 CAPSULE ORAL ONCE
Status: DISCONTINUED | OUTPATIENT
Start: 2021-04-12 | End: 2021-04-12

## 2021-04-12 RX ORDER — ZIPRASIDONE MESYLATE 20 MG/ML
20 INJECTION, POWDER, LYOPHILIZED, FOR SOLUTION INTRAMUSCULAR
Status: ACTIVE | OUTPATIENT
Start: 2021-04-12 | End: 2021-04-12

## 2021-04-12 RX ORDER — DIPHENHYDRAMINE HYDROCHLORIDE 50 MG/ML
25 INJECTION INTRAMUSCULAR; INTRAVENOUS
Status: ACTIVE | OUTPATIENT
Start: 2021-04-12 | End: 2021-04-12

## 2021-04-12 RX ORDER — ACETAMINOPHEN 325 MG/1
650 TABLET ORAL ONCE
Status: COMPLETED | OUTPATIENT
Start: 2021-04-12 | End: 2021-04-12

## 2021-04-12 RX ORDER — LORAZEPAM 2 MG/ML
2 INJECTION INTRAMUSCULAR
Status: ACTIVE | OUTPATIENT
Start: 2021-04-12 | End: 2021-04-12

## 2021-04-12 RX ADMIN — ACETAMINOPHEN 650 MG: 325 TABLET ORAL at 20:51

## 2021-04-12 ASSESSMENT — ENCOUNTER SYMPTOMS
COUGH: 0
SORE THROAT: 0
EYE DISCHARGE: 0
ABDOMINAL PAIN: 0
SHORTNESS OF BREATH: 0
VOMITING: 0
BACK PAIN: 0
NAUSEA: 0
EYE PAIN: 0
RHINORRHEA: 0

## 2021-04-12 ASSESSMENT — PAIN SCALES - GENERAL: PAINLEVEL_OUTOF10: 6
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
## 2021-04-12 NOTE — ED NOTES
Bed: H-01  Expected date:   Expected time:   Means of arrival:   Comments:  Ems       [facility]  04/12/21 6022
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
## 2021-04-12 NOTE — ED NOTES
Pt belongings removed and inventoried by security per hospital protocol. Pt placed in green gown. Pt is verbally aggressive with staff and very argumentative, but cooperative at this time.  Sitter at bedside     Charles Whitney RN  04/12/21 [address], RN  04/12/21 8952

## 2021-04-13 VITALS
BODY MASS INDEX: 21.16 KG/M2 | WEIGHT: 115 LBS | SYSTOLIC BLOOD PRESSURE: 145 MMHG | OXYGEN SATURATION: 96 % | HEART RATE: 97 BPM | RESPIRATION RATE: 18 BRPM | DIASTOLIC BLOOD PRESSURE: 85 MMHG | HEIGHT: 62 IN | TEMPERATURE: 98.7 F

## 2021-04-13 LAB — GLUCOSE BLD-MCNC: 164 MG/DL (ref 70–99)

## 2021-04-13 PROCEDURE — 82962 GLUCOSE BLOOD TEST: CPT
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
## 2021-04-13 NOTE — ED NOTES
Received call from [facility], [address] has accepted pt and pt waiting for open bed there, probable to have available bed today     Aaron Gupta RN  04/13/21 0800

## 2021-04-13 NOTE — ED PROVIDER NOTES
Emergency Department Encounter    Patient: Josette Carlson  MRN: [id number]  : 1965  Date of Evaluation: 2021  ED Provider:  Vania Jeans    Briefly, Josette Carlson is a 63 y.o. female presented to the emergency department with disorganized thinking and signs of psychosis. She was medically cleared prior to my assumption of care, she is awaiting psychiatric placement at this time.     I have reviewed and interpreted all of the currently available lab results from this visit (if applicable)  Results for orders placed or performed during the hospital encounter of 21   COVID-19, Rapid    Specimen: Nasopharyngeal   Result Value Ref Range    Source THROAT     SARS-CoV-2, NAAT NOT DETECTED NOT DETECTED   Comprehensive Metabolic Panel w/ Reflex to MG   Result Value Ref Range    Sodium 131 (L) 135 - 145 MMOL/L    Potassium 3.5 3.5 - 5.1 MMOL/L    Chloride 98 (L) 99 - 110 mMol/L    CO2 27 21 - 32 MMOL/L    BUN 8 6 - 23 MG/DL    CREATININE 0.8 0.6 - 1.1 MG/DL    Glucose 172 (H) 70 - 99 MG/DL    Calcium 9.2 8.3 - 10.6 MG/DL    Albumin 3.5 3.4 - 5.0 GM/DL    Total Protein 7.9 6.4 - 8.2 GM/DL    Total Bilirubin 0.4 0.0 - 1.0 MG/DL    ALT 23 10 - 40 U/L    AST 20 15 - 37 IU/L    Alkaline Phosphatase 158 (H) 40 - 129 IU/L    GFR Non-African American >60 >60 mL/min/1.73m2    GFR African American >60 >60 mL/min/1.73m2    Anion Gap 6 4 - 16   CBC Auto Differential   Result Value Ref Range    WBC 6.8 4.0 - 10.5 K/CU MM    RBC 4.23 4.2 - 5.4 M/CU MM    Hemoglobin 15.0 12.5 - 16.0 GM/DL    Hematocrit 43.0 37 - 47 %    .7 (H) 78 - 100 FL    MCH 35.5 (H) 27 - 31 PG    MCHC 34.9 32.0 - 36.0 %    RDW 13.7 11.7 - 14.9 %    Platelets 897 173 - 918 K/CU MM    MPV 11.0 7.5 - 11.1 FL    Differential Type AUTOMATED DIFFERENTIAL     Segs Relative 69.3 (H) 36 - 66 %    Lymphocytes % 24.0 24 - 44 %    Monocytes % 6.0 (H) 0 - 4 %    Eosinophils % 0.3 0 - 3 %    Basophils % 0.1 0 - 1 %    Segs Absolute 4.7 K/CU MM Lymphocytes Absolute 1.6 K/CU MM    Monocytes Absolute 0.4 K/CU MM    Eosinophils Absolute 0.0 K/CU MM    Basophils Absolute 0.0 K/CU MM    Nucleated RBC % 0.0 %    Total Nucleated RBC 0.0 K/CU MM    Total Immature Neutrophil 0.02 K/CU MM    Immature Neutrophil % 0.3 0 - 0.43 %   Urinalysis with microscopic   Result Value Ref Range    Color, UA YELLOW YELLOW    Clarity, UA SLIGHTLY CLOUDY (A) CLEAR    Glucose, Urine NEGATIVE NEGATIVE MG/DL    Bilirubin Urine NEGATIVE NEGATIVE MG/DL    Ketones, Urine NEGATIVE NEGATIVE MG/DL    Specific Gravity, UA 1.008 1.001 - 1.035    Blood, Urine NEGATIVE NEGATIVE    pH, Urine 6.0 5.0 - 8.0    Protein,  (A) NEGATIVE MG/DL    Urobilinogen, Urine NEGATIVE 0.2 - 1.0 MG/DL    Nitrite Urine, Quantitative NEGATIVE NEGATIVE    Leukocyte Esterase, Urine TRACE (A) NEGATIVE    RBC, UA NONE SEEN 0 - 6 /HPF    WBC, UA 1 0 - 5 /HPF    Bacteria, UA MODERATE (A) NEGATIVE /HPF    Squam Epithel, UA 30 /HPF    Mucus, UA RARE (A) NEGATIVE HPF    Trichomonas, UA NONE SEEN NONE SEEN /HPF   Drug screen multi urine   Result Value Ref Range    Cannabinoid Scrn, Ur NEGATIVE NEGATIVE    Amphetamines NEGATIVE NEGATIVE    Cocaine Metabolite NEGATIVE NEGATIVE    Benzodiazepine Screen, Urine NEGATIVE NEGATIVE    Barbiturate Screen, Ur NEGATIVE NEGATIVE    Opiates, Urine NEGATIVE NEGATIVE    Phencyclidine, Urine NEGATIVE NEGATIVE    Oxycodone NEGATIVE NEGATIVE   Salicylate Level   Result Value Ref Range    Salicylate Lvl <9.6 (L) 15 - 30 MG/DL    DOSE AMOUNT DOSE AMT. GIVEN - UNKNOWN     DOSE TIME DOSE TIME GIVEN - UNKNOWN    Acetaminophen Level   Result Value Ref Range    Acetaminophen Level <5.0 (L) 15 - 30 ug/ml    DOSE AMOUNT DOSE AMT.  GIVEN - UNKNOWN     DOSE TIME DOSE TIME GIVEN - UNKNOWN    ETOH Blood   Result Value Ref Range    Alcohol Scrn <0.01 <0.01 %WT/VOL   Magnesium   Result Value Ref Range    Magnesium 1.6 (L) 1.8 - 2.4 mg/dl      Radiographs (if obtained):    [] Radiologist's Report Reviewed:  No orders to display       MDM:  She was medically cleared and pink slipped prior to my assumption of care. She will require psychiatric placement. She was pink slipped prior to my assumption of care. Clinical Impression:  1. Delusions ([facility] [address]      Disposition referral (if applicable):  No follow-up provider specified. Disposition medications (if applicable):  New Prescriptions    No medications on file       Comment: Please note this report has been produced using speech recognition software and may contain errors related to that system including errors in grammar, punctuation, and spelling, as well as words and phrases that may be inappropriate. Efforts were made to edit the dictations.         Janey Gramajo MD  04/13/21 2442

## 2021-04-13 NOTE — ED NOTES
Called [facility] spoke with Eboni Salazar RN, information provided, [facility] will assist with placement      Kisha Agrawal RN  04/12/21 2593

## 2021-04-13 NOTE — ED NOTES
Spoke with Krupa Irby at [place] [address], updated to pt being accepted at [facility]. Krupa Irby is calling [facility] for details and will set up transportation.  Krupa Irby will call back when details available     Riley Bryant RN  04/13/21 1036

## 2021-04-13 NOTE — ED NOTES
Pt resting on cot, sitting up and talking to sitter appropriately and ordering breakfast. Pt is alert and cooperative, in a green gown. Report received from Bartolo campbell RN: Pt is pink slipped and has been assessed by [address] worker. Plan of care is to admit pt to [place] facility, pt has been seen as outpatient and recently discharged from inpatient from [facility] but unable to return there. Pt was diagnosed with uti but refused to take antibiotic. Pt has been paranoid and delusional , did not sleep all night.       Beatrice Barors RN  04/13/21 6000

## 2021-04-13 NOTE — ED NOTES
[facility] here to transport pt to [facility]. All belongings given to EMS crew by security along with all paperwork. Signatures obtained from crew members.      Magalis Lacey RN  04/13/21 4607
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
## 2021-04-13 NOTE — ED NOTES
Spoke with Jose Roberto Jauregui at [facility] updated that pt has been assessed and information faxed to requested places, asked if pt is going to be able to be accepted at their facility or not.  Stated she would call  and call me back    Faxed chart to Kelsey Tyler RN  04/12/21 7647       Montana Bansal RN  04/12/21 5979

## 2021-04-13 NOTE — ED PROVIDER NOTES
CARE RECEIVED FROM:   Dr. Pattie Treviño    I reviewed the key elements of the history, physical exam and initial treatment plan at the bedside. ANCILLARY DATA:  I reviewed the images. Radiologist interpretation:   No orders to display         Labs Reviewed   COMPREHENSIVE METABOLIC PANEL W/ REFLEX TO MG FOR LOW K - Abnormal; Notable for the following components:       Result Value    Sodium 131 (*)     Chloride 98 (*)     Glucose 172 (*)     Alkaline Phosphatase 158 (*)     All other components within normal limits   CBC WITH AUTO DIFFERENTIAL - Abnormal; Notable for the following components:    .7 (*)     MCH 35.5 (*)     Segs Relative 69.3 (*)     Monocytes % 6.0 (*)     All other components within normal limits   URINALYSIS WITH MICROSCOPIC - Abnormal; Notable for the following components:    Clarity, UA SLIGHTLY CLOUDY (*)     Protein,  (*)     Leukocyte Esterase, Urine TRACE (*)     Bacteria, UA MODERATE (*)     Mucus, UA RARE (*)     All other components within normal limits   SALICYLATE LEVEL - Abnormal; Notable for the following components:    Salicylate Lvl <0.6 (*)     All other components within normal limits   ACETAMINOPHEN LEVEL - Abnormal; Notable for the following components:    Acetaminophen Level <5.0 (*)     All other components within normal limits   MAGNESIUM - Abnormal; Notable for the following components:    Magnesium 1.6 (*)     All other components within normal limits   COVID-19, RAPID   URINE DRUG SCREEN   ETHANOL       MEDICAL DECISION MAKING / PLAN:  41-year-old female with paranoid delusions and acute psychosis. No clinical evidence to suggest medical instability. Has been evaluated by mental health and will be admitted for further inpatient psychiatric evaluation and treatment. Placement still pending at the end of my shift and patient signed out to Dr. Lauri Laurent. Clinical Impression:  1.  Delusions ([address]        Disposition referral (if applicable):  No follow-up provider specified. Disposition medications (if applicable):  New Prescriptions    No medications on file       ED Provider Disposition Time  DISPOSITION Ed Observation 04/12/2021 09:07:25 PM          Electronically signed by: Alice Mcdowell M.D., 4/12/2021 11:16 PM      This dictation was created with voice recognition software. While attempts have been made to review the dictation as it is transcribed, on occasion the spoken word can be misinterpreted by the technology leading to omissions or inappropriate words, phrases or sentences.         Yan Remy MD  04/15/21 9534

## 2021-04-13 NOTE — ED NOTES
When speaking with pt, pt becomes more upset. Pt believes she should not be at this hospital and state she is not going to [place] [facility]. Pt states she is verifying what the nurse and dr said, looks down at her lap and states a bunch of words slurred together.  Pt is constantly talking and has not rested since coming to ed     [facility] [employer], RN  04/13/21 4153

## 2021-04-13 NOTE — ED NOTES
Pt requests nurse to call [phone] to update her adoptive sister Rosalio Dodd that pt is here. Called but no answer and unable to leave message.       Claudette Rily, RN  04/13/21 6974

## 2021-04-13 NOTE — ED NOTES
Spoke with Juan Yoder pt sister, [facility] is aware of pt condition and [facility] pt was recently dc from [address] and has not improved.       Virginie Cevallos RN  04/13/21 0318

## 2021-04-13 NOTE — ED NOTES
Spoke to Richard King and Frantz Jarquin at [facility] stated they aren't sure if will have a bed available for pt later, stated to keep looking for placement but that they would contact on call provider and see if they are able to move pts around on units they will call back and update, reported pt has NOT been accepted at this time     Left message on  at [place] [address] requesting call back     Miguelito Good RN  04/13/21 Silas Jordan RN  04/13/21 0807

## 2021-04-13 NOTE — ED NOTES
Per ed charge nurse, pt is accepted at [facility] and call in a few minutes to give report.  Transportation to be set up     [address], RN  04/13/21 5324

## 2021-12-09 NOTE — PROGRESS NOTES
Nephrology Progress Note  8/3/2020 12:38 PM        Subjective:   Admit Date: 8/1/2020  PCP: Eliceo Rodriguez MD    Interval History: better     Diet: some    ROS:  Off insulin drip -  Drinking fluid some po food , c/p some abd pain     Data:     Current meds:    potassium chloride  40 mEq Oral TID WC    insulin lispro  0-18 Units Subcutaneous TID WC    insulin lispro  0-9 Units Subcutaneous 2 times per day    insulin lispro  10 Units Subcutaneous TID WC    sodium chloride  20 mL Intravenous Once    insulin glargine  40 Units Subcutaneous Nightly    enoxaparin  40 mg Subcutaneous Daily    aspirin  81 mg Oral Daily    atorvastatin  40 mg Oral Daily    QUEtiapine  400 mg Oral Nightly    nicotine  1 patch Transdermal Daily    pantoprazole  40 mg Intravenous Daily    cefepime  2 g Intravenous Q12H    lidocaine PF  5 mL Intradermal Once    nicotine  1 patch Transdermal Once      dextrose      dextrose 5% and 0.45% NaCl with KCl 20 mEq 150 mL/hr at 08/02/20 0708         I/O last 3 completed shifts: In: 3258.3 [I.V.:2408.3; IV Piggyback:850]  Out: 0572 [Urine:2375]    CBC:   Recent Labs     08/01/20  1537 08/02/20  0541 08/02/20  1140 08/03/20  0545   WBC 6.7  --  17.2* 11.8*   HGB 8.3* 8.3* 7.6* 6.4*   *  --  348 294          Recent Labs     08/02/20  0545 08/02/20  1643 08/03/20  0207 08/03/20  0545   * 135  --  137   K 3.4* 3.1* 3.5 3.3*   * 101  --  105   CO2 28 22  --  22   BUN 9 6  --  4*   CREATININE 0.9 1.0  --  0.9   GLUCOSE 248* 242*  --  124*       Lab Results   Component Value Date    CALCIUM 8.2 (L) 08/03/2020    PHOS 1.7 (L) 08/02/2020       Objective:     Vitals: /67   Pulse 116   Temp 98.7 °F (37.1 °C) (Oral)   Resp (!) 45   Ht 5' 2\" (1.575 m)   Wt 124 lb 4.8 oz (56.4 kg)   SpO2 99%   BMI 22.73 kg/m²     General appearance:   Thin , no ac distress  HEENT:  ++ conj pallor  Neck:  supple  Lungs:  No crackles  Heart:  Tachycardia   Abdomen: tender on palpation  Extremities:  No overt edema       Problem List :         Impression :     1. dys natremia- nl now   2. DM with sever hyperglycemia- better   3. Anemia with abd  Pain   4. Sepsis with GNR- ? Source ? UT or other     Recommendation/Plan  :     1. Po  Food / fluid  2. Look for any occult blood loss  edi with abd pain/ anemia and low BP   3. Good BS control  4. replete K  5.  Ffollow clinically       Jona Jurado MD Complex Repair And Graft Additional Text (Will Appearing After The Standard Complex Repair Text): The complex repair was not sufficient to completely close the primary defect. The remaining additional defect was repaired with the graft mentioned below.

## 2022-03-03 ENCOUNTER — APPOINTMENT (OUTPATIENT)
Dept: GENERAL RADIOLOGY | Age: 57
End: 2022-03-03
Payer: COMMERCIAL

## 2022-03-03 ENCOUNTER — HOSPITAL ENCOUNTER (EMERGENCY)
Age: 57
Discharge: HOME | End: 2022-03-03
Attending: EMERGENCY MEDICINE
Payer: COMMERCIAL

## 2022-03-03 VITALS
WEIGHT: 125 LBS | TEMPERATURE: 97.8 F | HEIGHT: 62 IN | HEART RATE: 101 BPM | RESPIRATION RATE: 13 BRPM | OXYGEN SATURATION: 100 % | SYSTOLIC BLOOD PRESSURE: 157 MMHG | BODY MASS INDEX: 23 KG/M2 | DIASTOLIC BLOOD PRESSURE: 120 MMHG

## 2022-03-03 DIAGNOSIS — R10.13 ABDOMINAL PAIN, EPIGASTRIC: ICD-10-CM

## 2022-03-03 DIAGNOSIS — I10 ESSENTIAL HYPERTENSION: ICD-10-CM

## 2022-03-03 DIAGNOSIS — N39.0 LOWER URINARY TRACT INFECTION: Primary | ICD-10-CM

## 2022-03-03 DIAGNOSIS — J06.9 UPPER RESPIRATORY TRACT INFECTION, UNSPECIFIED TYPE: ICD-10-CM

## 2022-03-03 LAB
ALBUMIN SERPL-MCNC: 2.3 GM/DL (ref 3.4–5)
ALP BLD-CCNC: 173 IU/L (ref 40–129)
ALT SERPL-CCNC: 17 U/L (ref 10–40)
ANION GAP SERPL CALCULATED.3IONS-SCNC: 11 MMOL/L (ref 4–16)
AST SERPL-CCNC: 23 IU/L (ref 15–37)
BACTERIA: ABNORMAL /HPF
BASOPHILS ABSOLUTE: 0.1 K/CU MM
BASOPHILS RELATIVE PERCENT: 0.7 % (ref 0–1)
BILIRUB SERPL-MCNC: 0.5 MG/DL (ref 0–1)
BILIRUBIN URINE: ABNORMAL MG/DL
BLOOD, URINE: ABNORMAL
BUN BLDV-MCNC: 19 MG/DL (ref 6–23)
CALCIUM SERPL-MCNC: 9.1 MG/DL (ref 8.3–10.6)
CHLORIDE BLD-SCNC: 103 MMOL/L (ref 99–110)
CLARITY: CLEAR
CO2: 20 MMOL/L (ref 21–32)
COLOR: YELLOW
CREAT SERPL-MCNC: 1 MG/DL (ref 0.6–1.1)
DIFFERENTIAL TYPE: ABNORMAL
EOSINOPHILS ABSOLUTE: 0.1 K/CU MM
EOSINOPHILS RELATIVE PERCENT: 0.5 % (ref 0–3)
GFR AFRICAN AMERICAN: >60 ML/MIN/1.73M2
GFR NON-AFRICAN AMERICAN: 57 ML/MIN/1.73M2
GLUCOSE BLD-MCNC: 158 MG/DL (ref 70–99)
GLUCOSE, URINE: NEGATIVE MG/DL
HCT VFR BLD CALC: 48.6 % (ref 37–47)
HEMOGLOBIN: 16.4 GM/DL (ref 12.5–16)
HYALINE CASTS: 0 /LPF
ICTOTEST: NEGATIVE
IMMATURE NEUTROPHIL %: 0.4 % (ref 0–0.43)
KETONES, URINE: NEGATIVE MG/DL
LEUKOCYTE ESTERASE, URINE: ABNORMAL
LIPASE: 18 IU/L (ref 13–60)
LYMPHOCYTES ABSOLUTE: 2.3 K/CU MM
LYMPHOCYTES RELATIVE PERCENT: 23.8 % (ref 24–44)
MCH RBC QN AUTO: 33.8 PG (ref 27–31)
MCHC RBC AUTO-ENTMCNC: 33.7 % (ref 32–36)
MCV RBC AUTO: 100.2 FL (ref 78–100)
MONOCYTES ABSOLUTE: 0.6 K/CU MM
MONOCYTES RELATIVE PERCENT: 6.6 % (ref 0–4)
NITRITE URINE, QUANTITATIVE: NEGATIVE
NUCLEATED RBC %: 0 %
PDW BLD-RTO: 13.7 % (ref 11.7–14.9)
PH, URINE: 6.5 (ref 5–8)
PLATELET # BLD: 175 K/CU MM (ref 140–440)
PMV BLD AUTO: 11.5 FL (ref 7.5–11.1)
POTASSIUM SERPL-SCNC: 4.4 MMOL/L (ref 3.5–5.1)
PROTEIN UA: >300 MG/DL
RBC # BLD: 4.85 M/CU MM (ref 4.2–5.4)
RBC URINE: 8 /HPF (ref 0–6)
SARS-COV-2, NAAT: NOT DETECTED
SEGMENTED NEUTROPHILS ABSOLUTE COUNT: 6.6 K/CU MM
SEGMENTED NEUTROPHILS RELATIVE PERCENT: 68 % (ref 36–66)
SODIUM BLD-SCNC: 134 MMOL/L (ref 135–145)
SOURCE: NORMAL
SPECIFIC GRAVITY UA: 1.01 (ref 1–1.03)
SQUAMOUS EPITHELIAL: 3 /HPF
TOTAL IMMATURE NEUTOROPHIL: 0.04 K/CU MM
TOTAL NUCLEATED RBC: 0 K/CU MM
TOTAL PROTEIN: 6.6 GM/DL (ref 6.4–8.2)
TROPONIN T: <0.01 NG/ML
UROBILINOGEN, URINE: NORMAL MG/DL (ref 0.2–1)
WBC # BLD: 9.8 K/CU MM (ref 4–10.5)
WBC UA: 22 /HPF (ref 0–5)

## 2022-03-03 PROCEDURE — 93005 ELECTROCARDIOGRAM TRACING: CPT | Performed by: PHYSICIAN ASSISTANT

## 2022-03-03 PROCEDURE — 99283 EMERGENCY DEPT VISIT LOW MDM: CPT

## 2022-03-03 PROCEDURE — 83690 ASSAY OF LIPASE: CPT

## 2022-03-03 PROCEDURE — 84484 ASSAY OF TROPONIN QUANT: CPT

## 2022-03-03 PROCEDURE — 6370000000 HC RX 637 (ALT 250 FOR IP): Performed by: EMERGENCY MEDICINE

## 2022-03-03 PROCEDURE — 80053 COMPREHEN METABOLIC PANEL: CPT

## 2022-03-03 PROCEDURE — 85025 COMPLETE CBC W/AUTO DIFF WBC: CPT

## 2022-03-03 PROCEDURE — 87635 SARS-COV-2 COVID-19 AMP PRB: CPT

## 2022-03-03 PROCEDURE — 71045 X-RAY EXAM CHEST 1 VIEW: CPT

## 2022-03-03 PROCEDURE — 81001 URINALYSIS AUTO W/SCOPE: CPT

## 2022-03-03 RX ORDER — DOXYCYCLINE HYCLATE 100 MG
100 TABLET ORAL 2 TIMES DAILY
Qty: 20 TABLET | Refills: 0 | Status: SHIPPED | OUTPATIENT
Start: 2022-03-03 | End: 2022-03-13

## 2022-03-03 RX ORDER — ONDANSETRON 4 MG/1
4 TABLET, ORALLY DISINTEGRATING ORAL 3 TIMES DAILY PRN
Qty: 21 TABLET | Refills: 0 | Status: ON HOLD | OUTPATIENT
Start: 2022-03-03 | End: 2022-05-06 | Stop reason: HOSPADM

## 2022-03-03 RX ORDER — METOPROLOL SUCCINATE 25 MG/1
25 TABLET, EXTENDED RELEASE ORAL DAILY
Qty: 30 TABLET | Refills: 0 | Status: ON HOLD | OUTPATIENT
Start: 2022-03-03 | End: 2022-03-22 | Stop reason: SDUPTHER

## 2022-03-03 RX ORDER — DICYCLOMINE HYDROCHLORIDE 10 MG/1
10 CAPSULE ORAL 4 TIMES DAILY PRN
Qty: 20 CAPSULE | Refills: 1 | Status: SHIPPED | OUTPATIENT
Start: 2022-03-03

## 2022-03-03 RX ORDER — DOXYCYCLINE HYCLATE 100 MG
100 TABLET ORAL ONCE
Status: COMPLETED | OUTPATIENT
Start: 2022-03-03 | End: 2022-03-03

## 2022-03-03 RX ADMIN — DOXYCYCLINE HYCLATE 100 MG: 100 TABLET, COATED ORAL at 17:16

## 2022-03-03 ASSESSMENT — PAIN DESCRIPTION - ORIENTATION: ORIENTATION: LEFT

## 2022-03-03 ASSESSMENT — PAIN DESCRIPTION - FREQUENCY: FREQUENCY: CONTINUOUS

## 2022-03-03 ASSESSMENT — PAIN SCALES - GENERAL: PAINLEVEL_OUTOF10: 9

## 2022-03-03 ASSESSMENT — PAIN DESCRIPTION - DESCRIPTORS: DESCRIPTORS: STABBING

## 2022-03-03 ASSESSMENT — PAIN DESCRIPTION - LOCATION: LOCATION: ABDOMEN

## 2022-03-03 ASSESSMENT — PAIN DESCRIPTION - PAIN TYPE: TYPE: CHRONIC PAIN

## 2022-03-03 NOTE — ED PROVIDER NOTES
12 lead EKG per my interpretation:  Normal Sinus Rhythm 100  Axis is   Normal  QTc is  446  There is no specific T wave changes appreciated. There is no specific ST wave changes appreciated.     Prior EKG to compare with was not available         German Cranker, DO  03/03/22 5754

## 2022-03-03 NOTE — ED PROVIDER NOTES
PROVIDER IN [address] [place] [address]  eMERGENCY dEPARTMENT eNCOUnter      Pt Name: Micky Griggs  MRN: [id number]  Date of evaluation: 3/3/2022      Chief Complaint:    Chief Complaint   Patient presents with    Shortness of Breath       HPI:  This is a  61 y.o. female who presents to the emergency department with a chief complaint of shortness of breath. States been developing over the last week, states that she has had a exposure to COVID-19. She is also concerned that she got history of coronary artery disease. Describes no chest pain palpitations, syncope or near syncope. No leg swelling. Is a smoker, and has had a mild cough. No active fever. Patient also states he is having urinary frequency, concerned about a possible urinary tract infection. Physical Exam: (Pertinent Negatives and Positives)  ED Triage Vitals [03/03/22 1338]   BP Temp Temp Source Pulse Resp SpO2 Height Weight   (!) 162/120 97.8 °F (36.6 °C) Oral 104 18 97 % 5' 2\" (1.575 m) 125 lb (56.7 kg)     GENERAL APPEARANCE: Awake and alert. Cooperative. No acute distress. HEAD: Normocephalic. Atraumatic. EYES: EOM's grossly intact. Sclera anicteric. ENT: Mucous membranes are moist. Tolerates saliva. No trismus. NECK: Supple. No meningismus. Trachea midline. HEART: RRR. Radial pulses 2+. LUNGS: Respirations unlabored. CTAB  ABDOMEN: Soft. Non-tender. No guarding or rebound. EXTREMITIES: No acute deformities. SKIN: Warm and dry. NEUROLOGICAL: No gross facial drooping. Moves all 4 extremities spontaneously. PSYCHIATRIC: Normal mood. PLAN:  LABS:   Labs Reviewed   COVID-19, RAPID   URINALYSIS   CBC WITH AUTO DIFFERENTIAL   COMPREHENSIVE METABOLIC PANEL   TROPONIN   LIPASE     Radiology Studies:   XR CHEST PORTABLE    (Results Pending)       Work-up initiated secondary to patient's medical screening examination. Patient was seen by me in triage.  Please see the full history, physical and final disposition note by the other provider in main emergency department    (Please note sections of this note were completed with a voice recognition program.  Efforts were made to edit the dictations but occasionally words are mis-transcribed.)    Electronically signed, Jayashree Clark [address] [place] [address]  03/03/22 2031

## 2022-03-03 NOTE — ED PROVIDER NOTES
Emergency Department Encounter    Patient: Soledad Henderson  MRN: [id number]  : 1965  Date of Evaluation: 3/4/2022  ED Provider:  Robin Lira MD      Triage Chief Complaint:   Shortness of Breath      [place]:  Soledad Henderson is a 61 y.o. female that presents to the emergency department with shortness of breath and cough. Patient reports that about 1 week ago, she first developed a cough. This is productive of a scant yellow phlegm. She has had some mild shortness of breath with exertion. She denies home oxygen use. She denies any fevers, chills, or sweats. She denies any chest pain or discomfort. She denies any neck pain. She denies known congestive heart failure, but there is a history of coronary artery disease. Patient is also concerned about potential urinary tract infection. She has had significant frequency. She denies any vaginal bleeding or discharge. Patient reports no other particular provocative or alleviating factors. ROS - see HPI, below listed is current ROS at time of my eval:  CONSTITUTIONAL: No fevers, chills, or sweats. EYES: No eye complaints. HENT: No sore throat, runny nose, or earache. No dental pain. No painful swallowing. RESPIRATORY: As above. CARDIOVASCULAR: No anginal-type chest pain, orthopnea, or edema. GASTROINTESTINAL: No nausea, vomiting, or abdominal pain. GENITOURINARY: As above. MUSCULOSKELETAL: No recent injury. No neck, back, or extremity pain. NEUROLOGICAL: No focal weakness, numbness, or tingling. SKIN: No rashes or other lesions reported. No yellowing of the skin.     Medical history:  Past Medical History:   Diagnosis Date    CAD (coronary artery disease)     Diabetes mellitus ([facility]     Hepatitis C     Schizophrenia ([facility]      Past Surgical History:   Procedure Laterality Date    CHOLECYSTECTOMY      HYSTERECTOMY       Family History   Problem Relation Age of Onset    No Known Problems Mother     Cancer Father      Social History     Socioeconomic History    Marital status: Legally      Spouse name: Not on file    Number of children: Not on file    Years of education: Not on file    Highest education level: Not on file   Occupational History    Not on file   Tobacco Use    Smoking status: Current Every Day Smoker     Packs/day: 2.00     Types: Cigars    Smokeless tobacco: Never Used   Vaping Use    Vaping Use: Never used   Substance and Sexual Activity    Alcohol use: Yes     Comment: occassionally    Drug use: Not Currently     Types: Marijuana Scotty Free)    Sexual activity: Not on file   Other Topics Concern    Not on file   Social History Narrative    Not on file     Social Determinants of Health     Financial Resource Strain:     Difficulty of Paying Living Expenses: Not on file   Food Insecurity:     Worried About Running Out of Food in the Last Year: Not on file    Supa of Food in the Last Year: Not on file   Transportation Needs:     Lack of Transportation (Medical): Not on file    Lack of Transportation (Non-Medical):  Not on file   Physical Activity:     Days of Exercise per Week: Not on file    Minutes of Exercise per Session: Not on file   Stress:     Feeling of Stress : Not on file   Social Connections:     Frequency of Communication with Friends and Family: Not on file    Frequency of Social Gatherings with Friends and Family: Not on file    Attends Baptism Services: Not on file    Active Member of [address] [place] [address] or Organizations: Not on file    Attends Club or Organization Meetings: Not on file    Marital Status: Not on file   Intimate Partner Violence:     Fear of Current or Ex-Partner: Not on file    Emotionally Abused: Not on file    Physically Abused: Not on file    Sexually Abused: Not on file   Housing Stability:     Unable to Pay for Housing in the Last Year: Not on file    Number of [place] in the Last Year: Not on file    Unstable Housing in the Last Year: Not on file No current facility-administered medications for this encounter. Current Outpatient Medications   Medication Sig Dispense Refill    doxycycline hyclate (VIBRA-TABS) 100 MG tablet Take 1 tablet by mouth 2 times daily for 10 days 20 tablet 0    metoprolol succinate (TOPROL XL) 25 MG extended release tablet Take 1 tablet by mouth daily 30 tablet 0    ondansetron (ZOFRAN-ODT) 4 MG disintegrating tablet Take 1 tablet by mouth 3 times daily as needed for Nausea or Vomiting 21 tablet 0    dicyclomine (BENTYL) 10 MG capsule Take 1 capsule by mouth 4 times daily as needed (Abdominal cramping, diarrhea) 20 capsule 1    insulin glargine (LANTUS) 100 UNIT/ML injection vial Inject 40 Units into the skin nightly 1 vial 0    insulin lispro (HUMALOG) 100 UNIT/ML injection vial Inject 10 Units into the skin 3 times daily (with meals) 1 vial 0    Insulin Syringe-Needle U-100 (AgilenceCO INS SYR .3CC/29GX0.5\") 29G X 1/2\" 0.3 ML MISC 1 each by Does not apply route daily 100 each 3    aspirin 81 MG chewable tablet Take 81 mg by mouth daily      QUEtiapine (SEROQUEL) 300 MG tablet Take 400 mg by mouth nightly       Allergies   Allergen Reactions    Haldol [Haloperidol Lactate] Other (See Comments)     Unknown, severe reaction per mother    Penicillins        Nursing Notes Reviewed    Physical Exam:  Triage VS:    ED Triage Vitals [03/03/22 1338]   Enc Vitals Group      BP (!) 162/120      Pulse 104      Resp 18      Temp 97.8 °F (36.6 °C)      Temp Source Oral      SpO2 97 %      Weight 125 lb (56.7 kg)      Height 5' 2\" (1.575 m)      Head Circumference       Peak Flow       Pain Score       Pain Loc       Pain Edu? Excl. in [address]? My pulse ox interpretation is -normal on room air    GENERAL: Patient is awake, alert, and oriented appropriately. Patient is resting comfortably in a still position on the exam table. Patient speaking in full and complete sentences. Well-nourished and well-developed.     HEENT: Normocephalic and atraumatic. Pupils equal, round, and reactive to light. No redness or matting. Bilateral external ears are unremarkable. Nasal mucosa is pink without purulence. Oral mucosa is moist and pink. NECK: Supple with normal range of motion. No Kernig's or Brudzinski sign. No JVD. RESPIRATORY: Mildly diminished with symmetric aeration. No respiratory distress. No wheeze, stridor, rales, rhonchi. Chest wall stable and nontender. CARDIOVASCULAR: Regular tachycardia, mild. No murmurs, rubs, or gallops. No central or peripheral cyanosis. GASTROINTESTINAL: Soft, nontender, and nondistended. No McBurney's or Em's point tenderness. No other focal tenderness. No involuntary guarding, rebound, or rigidity. No mass or pulsatile mass. Bowel sounds normal.  No CVA tenderness. NEUROLOGICAL: Awake, alert and oriented x 3. GCS 15. Cranial nerves III through XII are grossly intact as tested without facial droop or dermatomal paresthesias. Of note, forehead wrinkles are symmetric and intact. Conjugate gaze without entrapment. No asymmetry of the corners of the mouth or nasolabial folds. No gross motor or cerebellar deficits. BACK/MUSCULOSKELETAL: No asymmetric edema or calf tenderness. No Homans sign or cords. SKIN: Normal tone for ethnicity. Normal turgor and brisk capillary refill peripherally. PSYCHIATRIC: Normal mood. Normal affect. Emergency department course. Initial medical screening studies were ordered by the provider in triage. Patient is brought to bed 5 and assessed and reassessed by me. Triage EKG shows no criteria ST elevation or reciprocal changes. After initial evaluation, medical screening studies are completed. Chest x-ray shows mild opacities that could represent an atypical infection along with a small pleural effusion. Certainly consider developing pneumonia. Urinalysis is also suggestive of infection.   Single coverage with doxycycline could offer benefit. Patient does not appear septic. There has been no respiratory distress, hypoxia, or cyanosis. She is COVID-19 negative. We have discussed options for disposition, and she appears to be a good candidate for further trial of home management. Patient is agreeable to continuing plan. We have discussed all available results. Patient is satisfied with evaluation and agreeable to recommendations. Patient has had the opportunity to ask questions, and they have been answered to the best of my ability. Instructions are given to follow-up with primary care provider for reevaluation and further testing. Very strict return and follow-up instructions are provided. Patient seen during [facility] [place], I did don appropriate PPE during my encounters with the patient, including n95 (when appropriate) mask and eye protection as appropriate.     I have reviewed and interpreted all of the currently available lab results from this visit (if applicable):  Results for orders placed or performed during the hospital encounter of 03/03/22   COVID-19, Rapid    Specimen: Nasopharyngeal   Result Value Ref Range    Source UNKNOWN     SARS-CoV-2, NAAT NOT DETECTED NOT DETECTED   Urinalysis   Result Value Ref Range    Color, UA YELLOW YELLOW    Clarity, UA CLEAR CLEAR    Glucose, Urine NEGATIVE NEGATIVE MG/DL    Bilirubin Urine SMALL (A) NEGATIVE MG/DL    Ketones, Urine NEGATIVE NEGATIVE MG/DL    Specific Gravity, UA 1.015 1.001 - 1.035    Blood, Urine MODERATE (A) NEGATIVE    pH, Urine 6.5 5.0 - 8.0    Protein, UA >300 (HH) NEGATIVE MG/DL    Urobilinogen, Urine NORMAL 0.2 - 1.0 MG/DL    Nitrite Urine, Quantitative NEGATIVE NEGATIVE    Leukocyte Esterase, Urine SMALL (A) NEGATIVE    RBC, UA 8 (H) 0 - 6 /HPF    WBC, UA 22 (H) 0 - 5 /HPF    Bacteria, UA RARE (A) NEGATIVE /HPF    Squam Epithel, UA 3 /HPF    Hyaline Casts, UA 0 /LPF   CBC with Auto Differential   Result Value Ref Range    WBC 9.8 4.0 - 10.5 K/CU MM    RBC 4. 85 4.2 - 5.4 M/CU MM    Hemoglobin 16.4 (H) 12.5 - 16.0 GM/DL    Hematocrit 48.6 (H) 37 - 47 %    .2 (H) 78 - 100 FL    MCH 33.8 (H) 27 - 31 PG    MCHC 33.7 32.0 - 36.0 %    RDW 13.7 11.7 - 14.9 %    Platelets 985 236 - 476 K/CU MM    MPV 11.5 (H) 7.5 - 11.1 FL    Differential Type AUTOMATED DIFFERENTIAL     Segs Relative 68.0 (H) 36 - 66 %    Lymphocytes % 23.8 (L) 24 - 44 %    Monocytes % 6.6 (H) 0 - 4 %    Eosinophils % 0.5 0 - 3 %    Basophils % 0.7 0 - 1 %    Segs Absolute 6.6 K/CU MM    Lymphocytes Absolute 2.3 K/CU MM    Monocytes Absolute 0.6 K/CU MM    Eosinophils Absolute 0.1 K/CU MM    Basophils Absolute 0.1 K/CU MM    Nucleated RBC % 0.0 %    Total Nucleated RBC 0.0 K/CU MM    Total Immature Neutrophil 0.04 K/CU MM    Immature Neutrophil % 0.4 0 - 0.43 %   Comprehensive Metabolic Panel   Result Value Ref Range    Sodium 134 (L) 135 - 145 MMOL/L    Potassium 4.4 3.5 - 5.1 MMOL/L    Chloride 103 99 - 110 mMol/L    CO2 20 (L) 21 - 32 MMOL/L    BUN 19 6 - 23 MG/DL    CREATININE 1.0 0.6 - 1.1 MG/DL    Glucose 158 (H) 70 - 99 MG/DL    Calcium 9.1 8.3 - 10.6 MG/DL    Albumin 2.3 (L) 3.4 - 5.0 GM/DL    Total Protein 6.6 6.4 - 8.2 GM/DL    Total Bilirubin 0.5 0.0 - 1.0 MG/DL    ALT 17 10 - 40 U/L    AST 23 15 - 37 IU/L    Alkaline Phosphatase 173 (H) 40 - 129 IU/L    GFR Non- 57 (L) >60 mL/min/1.73m2    GFR African American >60 >60 mL/min/1.73m2    Anion Gap 11 4 - 16   Troponin   Result Value Ref Range    Troponin T <0.010 <0.01 NG/ML   Lipase   Result Value Ref Range    Lipase 18 13 - 60 IU/L   ICTOTEST, URINE   Result Value Ref Range    Ictotest NEGATIVE    EKG 12 Lead   Result Value Ref Range    Ventricular Rate 100 BPM    Atrial Rate 100 BPM    P-R Interval 114 ms    QRS Duration 84 ms    Q-T Interval 346 ms    QTc Calculation (Bazett) 446 ms    P Axis 66 degrees    R Axis 0 degrees    T Axis 63 degrees    Diagnosis       Normal sinus rhythm  Biatrial enlargement  Possible Anterior infarct , age undetermined  Abnormal ECG  When compared with ECG of 01-AUG-2020 15:57,  QT has lengthened  Confirmed by LILLIAN Riddle ([id number]) on 3/4/2022 6:12:32 PM          Radiographs (if obtained):  Radiologist's Report Reviewed:  XR CHEST PORTABLE    Result Date: 3/3/2022  EXAMINATION: ONE XRAY VIEW OF THE CHEST 3/3/2022 1:42 pm COMPARISON: 08/01/2020 HISTORY: ORDERING SYSTEM PROVIDED HISTORY: sob TECHNOLOGIST PROVIDED HISTORY: Reason for exam:->sob Reason for Exam: SOB FINDINGS: Small right pleural effusion. Mild bilateral perihilar opacities. No pneumothorax. Heart size is at the upper limits of normal.     Small right pleural effusion. Mild bilateral perihilar opacities, with the differential including pulmonary edema and atypical infection. EKG:  Twelve-lead EKG obtained at 1405 on 3 March 2022 and interpreted by me in the absence of a cardiologist.  There is no criteria ST elevation or reciprocal change. There are no hyperacute T wave changes. There is no sign of acute ischemia or infarction. This tracing shows a normal sinus rhythm. Rate and intervals are 100 beats per minute, RI interval 114 milliseconds, QRS duration 84 milliseconds, QTc interval 446 milliseconds, and R axis normal at 0 degrees. There is no acute change compared with the most recent EKG dated August 1, 2020. Medical decision making:  Patient presents to the emergency department with signs and symptoms consistent with an upper respiratory infection, and she will be treated with a course of doxycycline, which should cover uncomplicated bronchitis, pneumonia, and the concurrent urinary tract infection. .  Medical screening examination does not suggest septic process. I doubt meningitis, encephalitis, cerebritis, or subarachnoid hemorrhage. There have been no meningeal findings. Abdomen has been soft and nontender. There has been no respiratory distress, hypoxia, or cyanosis. There has been no lethargy or irritability. Patient appears generally well hydrated and nontoxic. We have discussed trial of outpatient management including conscientious hydration and careful follow-up with primary care provider. Blood pressure is elevated, but there has been no complaint of headache, chest pain or discomfort, difficulty breathing, or other symptoms to suggest endorgan injury. Recommendations are given to follow-up with primary care provider for long-term monitoring. Procedures: None. Consultations: None. Clinical Impression:  1. Lower urinary tract infection    2. Upper respiratory tract infection, unspecified type    3. Abdominal pain, epigastric    4.  Essential hypertension      Disposition referral (if applicable):  [address], [place]  [address] [address]  [phone]    Schedule an appointment as soon as possible for a visit   For primary care follow-up    Xiomara Riley MD  [address]  [phone]    Schedule an appointment as soon as possible for a visit   For alternative primary follow-up options    [facility] Emergency Department  [place] [id number] [phone]  Go to   As needed, If symptoms worsen    Disposition medications (if applicable):  Discharge Medication List as of 3/3/2022  5:16 PM      START taking these medications    Details   doxycycline hyclate (VIBRA-TABS) 100 MG tablet Take 1 tablet by mouth 2 times daily for 10 days, Disp-20 tablet, R-0Normal      metoprolol succinate (TOPROL XL) 25 MG extended release tablet Take 1 tablet by mouth daily, Disp-30 tablet, R-0Normal      ondansetron (ZOFRAN-ODT) 4 MG disintegrating tablet Take 1 tablet by mouth 3 times daily as needed for Nausea or Vomiting, Disp-21 tablet, R-0Normal      dicyclomine (BENTYL) 10 MG capsule Take 1 capsule by mouth 4 times daily as needed (Abdominal cramping, diarrhea), Disp-20 capsule, R-1Normal ED Provider Disposition Time  DISPOSITION Decision To Discharge 03/03/2022 04:57:54 PM      Comment: Please note this report has been produced using speech recognition software and may contain errors related to that system including errors in grammar, punctuation, and spelling, as well as words and phrases that may be inappropriate. Efforts were made to edit the dictations.         Joselito Preciado MD  03/04/22 7411

## 2022-03-03 NOTE — ED NOTES
Discharge instructions reviewed. Pt verbalized understanding.        Rosmery Mckenzie RN  03/03/22 [id number]

## 2022-03-03 NOTE — ED NOTES
Awaiting arrival of provider for orders. Will continue to monitor.      Doug Matos RN  03/03/22 7120

## 2022-03-04 LAB
EKG ATRIAL RATE: 100 BPM
EKG DIAGNOSIS: NORMAL
EKG P AXIS: 66 DEGREES
EKG P-R INTERVAL: 114 MS
EKG Q-T INTERVAL: 346 MS
EKG QRS DURATION: 84 MS
EKG QTC CALCULATION (BAZETT): 446 MS
EKG R AXIS: 0 DEGREES
EKG T AXIS: 63 DEGREES
EKG VENTRICULAR RATE: 100 BPM

## 2022-03-04 PROCEDURE — 93010 ELECTROCARDIOGRAM REPORT: CPT | Performed by: INTERNAL MEDICINE

## 2022-03-08 ENCOUNTER — NURSE TRIAGE (OUTPATIENT)
Dept: OTHER | Facility: CLINIC | Age: 57
End: 2022-03-08

## 2022-03-08 NOTE — TELEPHONE ENCOUNTER
Received call from [facility]; Patient with Red Flag Complaint requesting to establish care with Allegra Garcia. Subjective: Caller states \"swelling to legs\"     Current Symptoms: Swelling started after new medication given in ER for BP and UTI. Right more than left. Pitting present to right leg, left leg tight no pitting reported. No pain, no redness or weeping. SOB present but improved from ER visit 03-. No chest pain or difficulty walking. Onset: 2 days    Associated Symptoms: reduced activity    Pain Severity: \"burning\"    Temperature: Denies    What has been tried: Nothing, help morning meds    LMP: NA Pregnant: NA    Recommended disposition: See in Office Today ; Pt un-established at this time. Directed patient to [facility] today and seek further care. Call back to set establish appointment after re-evaluation in ER. Care advice provided, patient verbalizes understanding; denies any other questions or concerns; instructed to call back for any new or worsening symptoms. Patient/caller agrees to proceed to [facility] Emergency Department     Attention Provider: Thank you for allowing me to participate in the care of your patient. The patient was connected to triage in response to information provided to the ECC/PSC. Please do not respond through this encounter as the response is not directed to a shared pool.       Reason for Disposition   MODERATE swelling of both ankles (e.g., swelling extends up to the knees) AND new-onset or worsening    Protocols used: LEG SWELLING AND EDEMA-ADULT-OH

## 2022-03-15 ENCOUNTER — APPOINTMENT (OUTPATIENT)
Dept: GENERAL RADIOLOGY | Age: 57
DRG: 286 | End: 2022-03-15
Payer: COMMERCIAL

## 2022-03-15 ENCOUNTER — APPOINTMENT (OUTPATIENT)
Dept: ULTRASOUND IMAGING | Age: 57
DRG: 286 | End: 2022-03-15
Payer: COMMERCIAL

## 2022-03-15 ENCOUNTER — APPOINTMENT (OUTPATIENT)
Dept: CT IMAGING | Age: 57
DRG: 286 | End: 2022-03-15
Payer: COMMERCIAL

## 2022-03-15 ENCOUNTER — HOSPITAL ENCOUNTER (INPATIENT)
Age: 57
LOS: 7 days | Discharge: HOME | DRG: 286 | End: 2022-03-22
Attending: INTERNAL MEDICINE | Admitting: INTERNAL MEDICINE
Payer: COMMERCIAL

## 2022-03-15 DIAGNOSIS — I82.413 ACUTE BILATERAL DEEP VEIN THROMBOSIS (DVT) OF FEMORAL VEINS (HCC): ICD-10-CM

## 2022-03-15 DIAGNOSIS — I82.403 ACUTE DEEP VEIN THROMBOSIS (DVT) OF BOTH LOWER EXTREMITIES, UNSPECIFIED VEIN (HCC): ICD-10-CM

## 2022-03-15 DIAGNOSIS — N39.0 URINARY TRACT INFECTION WITHOUT HEMATURIA, SITE UNSPECIFIED: ICD-10-CM

## 2022-03-15 DIAGNOSIS — I50.9 ACUTE CONGESTIVE HEART FAILURE, UNSPECIFIED HEART FAILURE TYPE (HCC): Primary | ICD-10-CM

## 2022-03-15 LAB
ALBUMIN SERPL-MCNC: 2.6 GM/DL (ref 3.4–5)
ALP BLD-CCNC: 212 IU/L (ref 40–129)
ALT SERPL-CCNC: 37 U/L (ref 10–40)
ANION GAP SERPL CALCULATED.3IONS-SCNC: 13 MMOL/L (ref 4–16)
APTT: 20.3 SECONDS (ref 25.1–37.1)
AST SERPL-CCNC: 36 IU/L (ref 15–37)
BACTERIA: NEGATIVE /HPF
BASOPHILS ABSOLUTE: 0.1 K/CU MM
BASOPHILS RELATIVE PERCENT: 0.6 % (ref 0–1)
BILIRUB SERPL-MCNC: 0.3 MG/DL (ref 0–1)
BILIRUBIN URINE: NEGATIVE MG/DL
BLOOD, URINE: ABNORMAL
BUN BLDV-MCNC: 14 MG/DL (ref 6–23)
CALCIUM OXALATE CRYSTALS: ABNORMAL /HPF
CALCIUM SERPL-MCNC: 9.1 MG/DL (ref 8.3–10.6)
CHLORIDE BLD-SCNC: 103 MMOL/L (ref 99–110)
CLARITY: ABNORMAL
CO2: 19 MMOL/L (ref 21–32)
COLOR: YELLOW
CREAT SERPL-MCNC: 1 MG/DL (ref 0.6–1.1)
DIFFERENTIAL TYPE: ABNORMAL
EKG ATRIAL RATE: 109 BPM
EKG DIAGNOSIS: NORMAL
EKG P AXIS: 70 DEGREES
EKG P-R INTERVAL: 122 MS
EKG Q-T INTERVAL: 312 MS
EKG QRS DURATION: 82 MS
EKG QTC CALCULATION (BAZETT): 420 MS
EKG R AXIS: -5 DEGREES
EKG T AXIS: 52 DEGREES
EKG VENTRICULAR RATE: 109 BPM
EOSINOPHILS ABSOLUTE: 0.1 K/CU MM
EOSINOPHILS RELATIVE PERCENT: 1.2 % (ref 0–3)
GFR AFRICAN AMERICAN: >60 ML/MIN/1.73M2
GFR NON-AFRICAN AMERICAN: 57 ML/MIN/1.73M2
GLUCOSE BLD-MCNC: 130 MG/DL (ref 70–99)
GLUCOSE, URINE: NEGATIVE MG/DL
HCT VFR BLD CALC: 52.4 % (ref 37–47)
HEMOGLOBIN: 17.3 GM/DL (ref 12.5–16)
HYALINE CASTS: 2 /LPF
IMMATURE NEUTROPHIL %: 0.5 % (ref 0–0.43)
KETONES, URINE: NEGATIVE MG/DL
LEUKOCYTE ESTERASE, URINE: ABNORMAL
LYMPHOCYTES ABSOLUTE: 2.9 K/CU MM
LYMPHOCYTES RELATIVE PERCENT: 28.6 % (ref 24–44)
MCH RBC QN AUTO: 32.8 PG (ref 27–31)
MCHC RBC AUTO-ENTMCNC: 33 % (ref 32–36)
MCV RBC AUTO: 99.2 FL (ref 78–100)
MONOCYTES ABSOLUTE: 0.6 K/CU MM
MONOCYTES RELATIVE PERCENT: 5.8 % (ref 0–4)
MUCUS: ABNORMAL HPF
NITRITE URINE, QUANTITATIVE: NEGATIVE
NUCLEATED RBC %: 0 %
PDW BLD-RTO: 14 % (ref 11.7–14.9)
PH, URINE: 7 (ref 5–8)
PLATELET # BLD: 171 K/CU MM (ref 140–440)
PMV BLD AUTO: 11 FL (ref 7.5–11.1)
POTASSIUM SERPL-SCNC: 4.6 MMOL/L (ref 3.5–5.1)
PRO-BNP: ABNORMAL PG/ML
PROTEIN UA: ABNORMAL MG/DL
RBC # BLD: 5.28 M/CU MM (ref 4.2–5.4)
RBC URINE: 9 /HPF (ref 0–6)
SARS-COV-2, NAAT: NOT DETECTED
SEGMENTED NEUTROPHILS ABSOLUTE COUNT: 6.5 K/CU MM
SEGMENTED NEUTROPHILS RELATIVE PERCENT: 63.3 % (ref 36–66)
SODIUM BLD-SCNC: 135 MMOL/L (ref 135–145)
SOURCE: NORMAL
SPECIFIC GRAVITY UA: 1.01 (ref 1–1.03)
SQUAMOUS EPITHELIAL: 5 /HPF
TOTAL IMMATURE NEUTOROPHIL: 0.05 K/CU MM
TOTAL NUCLEATED RBC: 0 K/CU MM
TOTAL PROTEIN: 7.2 GM/DL (ref 6.4–8.2)
TROPONIN T: <0.01 NG/ML
UROBILINOGEN, URINE: 0.2 MG/DL (ref 0.2–1)
WBC # BLD: 10.2 K/CU MM (ref 4–10.5)
WBC CLUMP: ABNORMAL /HPF
WBC UA: 99 /HPF (ref 0–5)
YEAST: ABNORMAL /HPF

## 2022-03-15 PROCEDURE — 6370000000 HC RX 637 (ALT 250 FOR IP): Performed by: PHYSICIAN ASSISTANT

## 2022-03-15 PROCEDURE — 2580000003 HC RX 258: Performed by: NURSE PRACTITIONER

## 2022-03-15 PROCEDURE — 93010 ELECTROCARDIOGRAM REPORT: CPT | Performed by: INTERNAL MEDICINE

## 2022-03-15 PROCEDURE — 2500000003 HC RX 250 WO HCPCS: Performed by: PHYSICIAN ASSISTANT

## 2022-03-15 PROCEDURE — 84484 ASSAY OF TROPONIN QUANT: CPT

## 2022-03-15 PROCEDURE — 87635 SARS-COV-2 COVID-19 AMP PRB: CPT

## 2022-03-15 PROCEDURE — 85730 THROMBOPLASTIN TIME PARTIAL: CPT

## 2022-03-15 PROCEDURE — 93005 ELECTROCARDIOGRAM TRACING: CPT | Performed by: PHYSICIAN ASSISTANT

## 2022-03-15 PROCEDURE — 83880 ASSAY OF NATRIURETIC PEPTIDE: CPT

## 2022-03-15 PROCEDURE — 6360000002 HC RX W HCPCS: Performed by: PHYSICIAN ASSISTANT

## 2022-03-15 PROCEDURE — 96365 THER/PROPH/DIAG IV INF INIT: CPT

## 2022-03-15 PROCEDURE — 2140000000 HC CCU INTERMEDIATE R&B

## 2022-03-15 PROCEDURE — 71045 X-RAY EXAM CHEST 1 VIEW: CPT

## 2022-03-15 PROCEDURE — 71275 CT ANGIOGRAPHY CHEST: CPT

## 2022-03-15 PROCEDURE — 6370000000 HC RX 637 (ALT 250 FOR IP): Performed by: NURSE PRACTITIONER

## 2022-03-15 PROCEDURE — 85027 COMPLETE CBC AUTOMATED: CPT

## 2022-03-15 PROCEDURE — 96375 TX/PRO/DX INJ NEW DRUG ADDON: CPT

## 2022-03-15 PROCEDURE — 93970 EXTREMITY STUDY: CPT

## 2022-03-15 PROCEDURE — 85025 COMPLETE CBC W/AUTO DIFF WBC: CPT

## 2022-03-15 PROCEDURE — 81001 URINALYSIS AUTO W/SCOPE: CPT

## 2022-03-15 PROCEDURE — 99285 EMERGENCY DEPT VISIT HI MDM: CPT

## 2022-03-15 PROCEDURE — 80053 COMPREHEN METABOLIC PANEL: CPT

## 2022-03-15 PROCEDURE — 2580000003 HC RX 258: Performed by: PHYSICIAN ASSISTANT

## 2022-03-15 PROCEDURE — 6360000004 HC RX CONTRAST MEDICATION: Performed by: PHYSICIAN ASSISTANT

## 2022-03-15 RX ORDER — ONDANSETRON 4 MG/1
4 TABLET, ORALLY DISINTEGRATING ORAL EVERY 8 HOURS PRN
Status: DISCONTINUED | OUTPATIENT
Start: 2022-03-15 | End: 2022-03-22 | Stop reason: HOSPADM

## 2022-03-15 RX ORDER — FAMOTIDINE 20 MG/1
20 TABLET, FILM COATED ORAL DAILY
Status: DISCONTINUED | OUTPATIENT
Start: 2022-03-16 | End: 2022-03-22 | Stop reason: HOSPADM

## 2022-03-15 RX ORDER — ACETAMINOPHEN 650 MG/1
650 SUPPOSITORY RECTAL EVERY 6 HOURS PRN
Status: DISCONTINUED | OUTPATIENT
Start: 2022-03-15 | End: 2022-03-16

## 2022-03-15 RX ORDER — NITROFURANTOIN 25; 75 MG/1; MG/1
100 CAPSULE ORAL ONCE
Status: COMPLETED | OUTPATIENT
Start: 2022-03-15 | End: 2022-03-15

## 2022-03-15 RX ORDER — DEXTROSE MONOHYDRATE 25 G/50ML
12.5 INJECTION, SOLUTION INTRAVENOUS PRN
Status: DISCONTINUED | OUTPATIENT
Start: 2022-03-15 | End: 2022-03-15 | Stop reason: RX

## 2022-03-15 RX ORDER — NICOTINE POLACRILEX 4 MG
15 LOZENGE BUCCAL PRN
Status: DISCONTINUED | OUTPATIENT
Start: 2022-03-15 | End: 2022-03-22 | Stop reason: HOSPADM

## 2022-03-15 RX ORDER — SODIUM CHLORIDE 0.9 % (FLUSH) 0.9 %
5-40 SYRINGE (ML) INJECTION EVERY 12 HOURS SCHEDULED
Status: DISCONTINUED | OUTPATIENT
Start: 2022-03-15 | End: 2022-03-22 | Stop reason: HOSPADM

## 2022-03-15 RX ORDER — ASPIRIN 81 MG/1
81 TABLET, CHEWABLE ORAL DAILY
Status: DISCONTINUED | OUTPATIENT
Start: 2022-03-16 | End: 2022-03-22 | Stop reason: HOSPADM

## 2022-03-15 RX ORDER — NITROGLYCERIN 20 MG/100ML
5-200 INJECTION INTRAVENOUS CONTINUOUS
Status: DISCONTINUED | OUTPATIENT
Start: 2022-03-15 | End: 2022-03-17

## 2022-03-15 RX ORDER — HEPARIN SODIUM 1000 [USP'U]/ML
80 INJECTION, SOLUTION INTRAVENOUS; SUBCUTANEOUS PRN
Status: DISCONTINUED | OUTPATIENT
Start: 2022-03-15 | End: 2022-03-16

## 2022-03-15 RX ORDER — QUETIAPINE FUMARATE 100 MG/1
400 TABLET, FILM COATED ORAL NIGHTLY
Status: DISCONTINUED | OUTPATIENT
Start: 2022-03-15 | End: 2022-03-22 | Stop reason: HOSPADM

## 2022-03-15 RX ORDER — NICOTINE 21 MG/24HR
1 PATCH, TRANSDERMAL 24 HOURS TRANSDERMAL DAILY
Status: DISCONTINUED | OUTPATIENT
Start: 2022-03-15 | End: 2022-03-22 | Stop reason: HOSPADM

## 2022-03-15 RX ORDER — POLYETHYLENE GLYCOL 3350 17 G/17G
17 POWDER, FOR SOLUTION ORAL DAILY PRN
Status: DISCONTINUED | OUTPATIENT
Start: 2022-03-15 | End: 2022-03-20

## 2022-03-15 RX ORDER — ACETAMINOPHEN 325 MG/1
650 TABLET ORAL EVERY 6 HOURS PRN
Status: DISCONTINUED | OUTPATIENT
Start: 2022-03-15 | End: 2022-03-16

## 2022-03-15 RX ORDER — HEPARIN SODIUM 1000 [USP'U]/ML
80 INJECTION, SOLUTION INTRAVENOUS; SUBCUTANEOUS ONCE
Status: COMPLETED | OUTPATIENT
Start: 2022-03-15 | End: 2022-03-15

## 2022-03-15 RX ORDER — METOPROLOL SUCCINATE 25 MG/1
25 TABLET, EXTENDED RELEASE ORAL DAILY
Status: DISCONTINUED | OUTPATIENT
Start: 2022-03-16 | End: 2022-03-21

## 2022-03-15 RX ORDER — SODIUM CHLORIDE 0.9 % (FLUSH) 0.9 %
10 SYRINGE (ML) INJECTION
Status: COMPLETED | OUTPATIENT
Start: 2022-03-15 | End: 2022-03-15

## 2022-03-15 RX ORDER — ONDANSETRON 2 MG/ML
4 INJECTION INTRAMUSCULAR; INTRAVENOUS EVERY 6 HOURS PRN
Status: DISCONTINUED | OUTPATIENT
Start: 2022-03-15 | End: 2022-03-22 | Stop reason: HOSPADM

## 2022-03-15 RX ORDER — FUROSEMIDE 10 MG/ML
40 INJECTION INTRAMUSCULAR; INTRAVENOUS ONCE
Status: COMPLETED | OUTPATIENT
Start: 2022-03-15 | End: 2022-03-15

## 2022-03-15 RX ORDER — DEXTROSE MONOHYDRATE 50 MG/ML
100 INJECTION, SOLUTION INTRAVENOUS PRN
Status: DISCONTINUED | OUTPATIENT
Start: 2022-03-15 | End: 2022-03-22 | Stop reason: HOSPADM

## 2022-03-15 RX ORDER — FUROSEMIDE 10 MG/ML
20 INJECTION INTRAMUSCULAR; INTRAVENOUS 2 TIMES DAILY
Status: DISCONTINUED | OUTPATIENT
Start: 2022-03-16 | End: 2022-03-16

## 2022-03-15 RX ORDER — SODIUM CHLORIDE 0.9 % (FLUSH) 0.9 %
5-40 SYRINGE (ML) INJECTION PRN
Status: DISCONTINUED | OUTPATIENT
Start: 2022-03-15 | End: 2022-03-22 | Stop reason: HOSPADM

## 2022-03-15 RX ORDER — HEPARIN SODIUM 1000 [USP'U]/ML
40 INJECTION, SOLUTION INTRAVENOUS; SUBCUTANEOUS PRN
Status: DISCONTINUED | OUTPATIENT
Start: 2022-03-15 | End: 2022-03-21

## 2022-03-15 RX ORDER — SODIUM CHLORIDE 9 MG/ML
25 INJECTION, SOLUTION INTRAVENOUS PRN
Status: DISCONTINUED | OUTPATIENT
Start: 2022-03-15 | End: 2022-03-22 | Stop reason: HOSPADM

## 2022-03-15 RX ORDER — INSULIN GLARGINE 100 [IU]/ML
40 INJECTION, SOLUTION SUBCUTANEOUS NIGHTLY
Status: DISCONTINUED | OUTPATIENT
Start: 2022-03-15 | End: 2022-03-15

## 2022-03-15 RX ORDER — POLYETHYLENE GLYCOL 3350 17 G
2 POWDER IN PACKET (EA) ORAL
Status: DISCONTINUED | OUTPATIENT
Start: 2022-03-15 | End: 2022-03-22 | Stop reason: HOSPADM

## 2022-03-15 RX ORDER — HEPARIN SODIUM 10000 [USP'U]/100ML
5-30 INJECTION, SOLUTION INTRAVENOUS CONTINUOUS
Status: DISCONTINUED | OUTPATIENT
Start: 2022-03-15 | End: 2022-03-16

## 2022-03-15 RX ORDER — ACETAMINOPHEN 500 MG
1000 TABLET ORAL ONCE
Status: COMPLETED | OUTPATIENT
Start: 2022-03-15 | End: 2022-03-15

## 2022-03-15 RX ADMIN — ACETAMINOPHEN 1000 MG: 500 TABLET ORAL at 22:17

## 2022-03-15 RX ADMIN — FUROSEMIDE 40 MG: 10 INJECTION INTRAMUSCULAR; INTRAVENOUS at 16:32

## 2022-03-15 RX ADMIN — HEPARIN SODIUM 4610 UNITS: 1000 INJECTION INTRAVENOUS; SUBCUTANEOUS at 17:22

## 2022-03-15 RX ADMIN — NITROFURANTOIN (MONOHYDRATE/MACROCRYSTALS) 100 MG: 75; 25 CAPSULE ORAL at 16:32

## 2022-03-15 RX ADMIN — HEPARIN SODIUM 18 UNITS/KG/HR: 10000 INJECTION, SOLUTION INTRAVENOUS at 17:26

## 2022-03-15 RX ADMIN — SODIUM CHLORIDE, PRESERVATIVE FREE 10 ML: 5 INJECTION INTRAVENOUS at 23:18

## 2022-03-15 RX ADMIN — NITROGLYCERIN 5 MCG/MIN: 20 INJECTION INTRAVENOUS at 18:40

## 2022-03-15 RX ADMIN — IOPAMIDOL 75 ML: 755 INJECTION, SOLUTION INTRAVENOUS at 17:26

## 2022-03-15 RX ADMIN — QUETIAPINE FUMARATE 400 MG: 100 TABLET ORAL at 23:18

## 2022-03-15 RX ADMIN — SODIUM CHLORIDE, PRESERVATIVE FREE 10 ML: 5 INJECTION INTRAVENOUS at 17:27

## 2022-03-15 ASSESSMENT — PAIN DESCRIPTION - DESCRIPTORS
DESCRIPTORS: ACHING
DESCRIPTORS: ACHING

## 2022-03-15 ASSESSMENT — ENCOUNTER SYMPTOMS
VOMITING: 0
DIARRHEA: 0
COUGH: 0
EYE REDNESS: 0
NAUSEA: 0
SHORTNESS OF BREATH: 1

## 2022-03-15 ASSESSMENT — PAIN DESCRIPTION - FREQUENCY: FREQUENCY: CONTINUOUS

## 2022-03-15 ASSESSMENT — PAIN DESCRIPTION - LOCATION
LOCATION: HEAD
LOCATION: ABDOMEN

## 2022-03-15 ASSESSMENT — PAIN SCALES - GENERAL
PAINLEVEL_OUTOF10: 10
PAINLEVEL_OUTOF10: 3

## 2022-03-15 ASSESSMENT — PAIN DESCRIPTION - PAIN TYPE: TYPE: ACUTE PAIN

## 2022-03-15 NOTE — ED PROVIDER NOTES
EKG sinus tachycardia, left atrial enlargement, rate of 109, AL interval 122, QRS duration 82, QT/QTc 312/420. No ST elevations or depressions noted.       178 Corey Merrill DO  03/15/22 0543

## 2022-03-15 NOTE — ED PROVIDER NOTES
Patient Identification  Huy Todd is a 61 y.o. female    Chief Complaint  Shortness of Breath (short of breath since 03/03/2022, swelling to both legs, still has uti symptoms)      HPI  (History provided by patient)  This is a 61 y.o. female who was brought in by self for chief complaint of shortness of breath, lower extremity swelling. Patient reports feeling short of breath for over 2 weeks. Seen here on March 3 and diagnosed with URI and UTI, started on doxycycline. She reports no improvement of any of her symptoms. She still has shortness of breath and has developed swelling in both of her legs. Shortness of breath is worse when lying down at night and with any exertion. She notes some mild substernal chest discomfort. Also continues to have dysuria and dark foul-smelling urine. REVIEW OF SYSTEMS    Constitutional:  Denies fever, chills  HENT:  Denies sore throat or ear pain   Eyes: Denies vision changes, eye pain  Cardiovascular:  Denies chest pain, syncope  Respiratory:  + shortness of breath, cough   GI:  Denies nausea, vomiting  :  Denies discharge  Musculoskeletal:  Denies back pain, joint pain  Skin:  Denies rash, pruritis  Neurologic:  Denies headache, focal weakness, or sensory changes     See HPI and nursing notes for additional information     I have reviewed the following nursing documentation:  Allergies: Allergies   Allergen Reactions    Haldol [Haloperidol Lactate] Other (See Comments)     Unknown, severe reaction per mother    Penicillins        Past medical history:  has a past medical history of CAD (coronary artery disease), Diabetes mellitus ([facility] [address] Hepatitis C, and Schizophrenia ([facility] [address] Past surgical history:  has a past surgical history that includes Hysterectomy and Cholecystectomy. Home medications:   Prior to Admission medications    Medication Sig Start Date End Date Taking?  Authorizing Provider   metoprolol succinate (TOPROL XL) 25 MG extended release tablet Take 1 tablet by mouth daily 3/3/22 4/2/22  Vallery Essex, MD   ondansetron (ZOFRAN-ODT) 4 MG disintegrating tablet Take 1 tablet by mouth 3 times daily as needed for Nausea or Vomiting 3/3/22   Vallery Essex, MD   dicyclomine (BENTYL) 10 MG capsule Take 1 capsule by mouth 4 times daily as needed (Abdominal cramping, diarrhea) 3/3/22   Vallery Essex, MD   insulin glargine (LANTUS) 100 UNIT/ML injection vial Inject 40 Units into the skin nightly 8/5/20   Kirstin Ortiz MD   insulin lispro (HUMALOG) 100 UNIT/ML injection vial Inject 10 Units into the skin 3 times daily (with meals) 8/5/20   Kirstin Ortiz MD   Insulin Syringe-Needle U-100 (AIMSCO INS SYR .3CC/29GX0.5\") 29G X 1/2\" 0.3 ML MISC 1 each by Does not apply route daily 8/5/20   Kirstin Ortiz MD   aspirin 81 MG chewable tablet Take 81 mg by mouth daily    Historical Provider, MD   QUEtiapine (SEROQUEL) 300 MG tablet Take 400 mg by mouth nightly    Historical Provider, MD       Social history:  reports that she has been smoking cigars. She has been smoking about 3.00 packs per day. She has never used smokeless tobacco. She reports current alcohol use. She reports previous drug use. Drug: Marijuana Mati Don). Family history:    Family History   Problem Relation Age of Onset    No Known Problems Mother     Cancer Father          Exam  BP (!) 186/156   Pulse 114   Temp 97.7 °F (36.5 °C) (Oral)   Resp 20   Ht 5' 2\" (1.575 m)   Wt 127 lb (57.6 kg)   SpO2 99%   BMI 23.23 kg/m²   Nursing note and vitals reviewed. Constitutional: Well developed, well nourished. No acute distress. HENT:      Head: Normocephalic and atraumatic. Ears: External ears normal.      Nose: Nose normal.     Mouth: Membrane mucosa moist and pink. No posterior oropharynx erythema or tonsillar edema  Eyes: Anicteric sclera. No discharge, PERRL  Neck: Supple. Trachea midline.    Cardiovascular: Mildly tachycardic, no murmurs, rubs, or gallops, radial pulses 2+ bilaterally. Pulmonary/Chest: Effort normal. No respiratory distress. Rales noted in left lung base. Otherwise clear. No stridor. No wheezes. No rales. Abdominal: Soft. Nontender to palpation. No distension. No guarding, rebound tenderness, or evidence of ascites. : No CVA tenderness. Musculoskeletal: Moves all extremities. No gross deformity. 2+ pitting edema noted in the bilateral lower extremities up to the knees. Neurological: Alert and oriented to person, place, and time. Normal muscle tone. Skin: Warm and dry. No rash. Psychiatric: Normal mood and affect. Behavior is normal.      EKG   Please see Dr. Hope Cover note for EKG read. Radiographs (if obtained):  [] The following radiograph was interpreted by myself in the absence of a radiologist:   [x] Radiologist's Report Reviewed:  CTA PULMONARY W CONTRAST   Final Result   Limited evaluation of the segmental and subsegmental pulmonary arterial   branches. No central pulmonary embolism is detected. Bilateral pleural effusions, patchy ground-glass opacity, and interlobular   septal thickening, the constellation of which is most compatible with   pulmonary edema. That said, given the focality of the ground-glass infiltrates, infectious or   inflammatory pneumonitis should be considered as well, including viral   infection. VL DUP LOWER EXTREMITY VENOUS BILATERAL   Final Result   1. Extensive deep venous thrombus of the bilateral lower extremities. Critical results were called by Jose Martínez MD to JAYY Rodriguez on   3/15/2022 at 14:54. XR CHEST PORTABLE   Final Result   Interstitial pulmonary edema with small left pleural effusion.                 Labs  Results for orders placed or performed during the hospital encounter of 03/15/22   CBC with Auto Differential   Result Value Ref Range    WBC 10.2 4.0 - 10.5 K/CU MM    RBC 5.28 4.2 - 5.4 M/CU MM    Hemoglobin 17.3 (H) 12.5 - 16.0 GM/DL    Hematocrit 52.4 (H) 37 - 47 %    MCV 99.2 78 - 100 FL    MCH 32.8 (H) 27 - 31 PG    MCHC 33.0 32.0 - 36.0 %    RDW 14.0 11.7 - 14.9 %    Platelets 908 545 - 666 K/CU MM    MPV 11.0 7.5 - 11.1 FL    Differential Type AUTOMATED DIFFERENTIAL     Segs Relative 63.3 36 - 66 %    Lymphocytes % 28.6 24 - 44 %    Monocytes % 5.8 (H) 0 - 4 %    Eosinophils % 1.2 0 - 3 %    Basophils % 0.6 0 - 1 %    Segs Absolute 6.5 K/CU MM    Lymphocytes Absolute 2.9 K/CU MM    Monocytes Absolute 0.6 K/CU MM    Eosinophils Absolute 0.1 K/CU MM    Basophils Absolute 0.1 K/CU MM    Nucleated RBC % 0.0 %    Total Nucleated RBC 0.0 K/CU MM    Total Immature Neutrophil 0.05 K/CU MM    Immature Neutrophil % 0.5 (H) 0 - 0.43 %   Comprehensive Metabolic Panel   Result Value Ref Range    Sodium 135 135 - 145 MMOL/L    Potassium 4.6 3.5 - 5.1 MMOL/L    Chloride 103 99 - 110 mMol/L    CO2 19 (L) 21 - 32 MMOL/L    BUN 14 6 - 23 MG/DL    CREATININE 1.0 0.6 - 1.1 MG/DL    Glucose 130 (H) 70 - 99 MG/DL    Calcium 9.1 8.3 - 10.6 MG/DL    Albumin 2.6 (L) 3.4 - 5.0 GM/DL    Total Protein 7.2 6.4 - 8.2 GM/DL    Total Bilirubin 0.3 0.0 - 1.0 MG/DL    ALT 37 10 - 40 U/L    AST 36 15 - 37 IU/L    Alkaline Phosphatase 212 (H) 40 - 129 IU/L    GFR Non- 57 (L) >60 mL/min/1.73m2    GFR African American >60 >60 mL/min/1.73m2    Anion Gap 13 4 - 16   Troponin   Result Value Ref Range    Troponin T <0.010 <0.01 NG/ML   Brain Natriuretic Peptide   Result Value Ref Range    Pro-BNP 12,109 (H) <300 PG/ML   Urinalysis   Result Value Ref Range    Color, UA YELLOW (A) YELLOW    Clarity, UA SLIGHTLY CLOUDY CLEAR    Glucose, Urine NEGATIVE NEGATIVE MG/DL    Bilirubin Urine NEGATIVE NEGATIVE MG/DL    Ketones, Urine NEGATIVE NEGATIVE MG/DL    Specific Gravity, UA 1.015 1.001 - 1.035    Blood, Urine MODERATE NEGATIVE    pH, Urine 7.0 5.0 - 8.0    Protein, UA >300 MG/DL NEGATIVE MG/DL    Urobilinogen, Urine 0.2 0.2 - 1.0 MG/DL    Nitrite Urine, Quantitative NEGATIVE NEGATIVE Leukocyte Esterase, Urine MODERATE NEGATIVE    RBC, UA 9 (H) 0 - 6 /HPF    WBC, UA 99 (H) 0 - 5 /HPF    Bacteria, UA NEGATIVE NEGATIVE /HPF    WBC Clumps, UA RARE /HPF    Yeast, UA RARE /HPF    Squam Epithel, UA 5 /HPF    Mucus, UA RARE (A) NEGATIVE HPF    Hyaline Casts, UA 2 /LPF    Ca Oxalate Susie, UA OCCASIONAL /HPF   APTT   Result Value Ref Range    aPTT 20.3 (L) 25.1 - 37.1 SECONDS   EKG 12 Lead   Result Value Ref Range    Ventricular Rate 109 BPM    Atrial Rate 109 BPM    P-R Interval 122 ms    QRS Duration 82 ms    Q-T Interval 312 ms    QTc Calculation (Bazett) 420 ms    P Axis 70 degrees    R Axis -5 degrees    T Axis 52 degrees    Diagnosis       Sinus tachycardia  Left atrial enlargement  Anterior infarct (cited on or before 26-MAY-2020)  Abnormal ECG  When compared with ECG of 03-MAR-2022 14:05,  No significant change was found           MDM  Patient presents for shortness of breath, swelling in legs, still having urinary problems. Found to have continued UTI and given Macrobid in ED. Has bilateral DVTs in her lower extremities. CTA pulmonary reveals pulmonary edema and questionable infiltrates that may be viral infection. Rapid Covid sent. Patient started on heparin infusion and lasix. 6:37 PM EDT I have [facility] out [facility]. We discussed the history, physical exam, completed/pending test results (if obtained) and current treatment plan. Please refer to his/her chart for the patients further details, remaining Emergency Department course, final disposition and diagnosis. I have independently evaluated this patient. Final Impression  1. Acute congestive heart failure, unspecified heart failure type ([address]    2. Acute deep vein thrombosis (DVT) of both lower extremities, unspecified vein (HCC)        Blood pressure (!) 186/156, pulse 114, temperature 97.7 °F (36.5 °C), temperature source Oral, resp.  rate 20, height 5' 2\" (1.575 m),

## 2022-03-15 NOTE — ED PROVIDER NOTES
Malik Mann PROVIDER  [facility]  eMERGENCY dEPARTMENT eNCOUnter      Pt Name: Izabel Cates  MRN: [id number]  Date of evaluation: 3/15/2022      Chief Complaint:    Chief Complaint   Patient presents with    Shortness of Breath     short of breath since 03/03/2022, swelling to both legs, still has uti symptoms       HPI:  This is a  61 y.o. female who presents to the emergency department to the emergency department today with a chief complaint of shortness of breath. The patient also had bilateral lower leg swelling. The patient has been seen in the emergency department over the last several weeks, has a history of schizophrenia, history of coronary artery disease and hepatitis. She has noticed more swelling, feeling more short of breath. Denies fevers or chills. Is a current everyday smoker. Requesting medications for her breathing. GENERAL APPEARANCE: Awake and alert. Cooperative. No acute distress. HEAD: Normocephalic. Atraumatic. EYES: EOM's grossly intact. Sclera anicteric. ENT: Mucous membranes are moist. Tolerates saliva. No trismus. NECK: Supple. No meningismus. Trachea midline. HEART: RRR. Radial pulses 2+. LUNGS: Respirations unlabored. CTAB  ABDOMEN: Soft. Non-tender. No guarding or rebound. EXTREMITIES: Bilateral lower leg edema, no erythema, no significant tenderness. SKIN: Warm and dry. NEUROLOGICAL: No gross facial drooping. Moves all 4 extremities spontaneously. PSYCHIATRIC: Normal mood.       Physical Exam: (Pertinent Negatives and Positives)  ED Triage Vitals   BP Temp Temp src Pulse Resp SpO2 Height Weight   03/15/22 1112 03/15/22 1047 -- 03/15/22 1112 03/15/22 1047 03/15/22 1112 03/15/22 1113 03/15/22 1113   (!) 179/134 98.7 °F (37.1 °C)  104 18 95 % 5' 2\" (1.575 m) 127 lb (57.6 kg)       PLAN:  LABS:   Labs Reviewed   CBC WITH AUTO DIFFERENTIAL   COMPREHENSIVE METABOLIC PANEL   TROPONIN   BRAIN NATRIURETIC PEPTIDE     Radiology Studies:   XR CHEST PORTABLE    (Results Pending)   VL DUP LOWER EXTREMITY VENOUS BILATERAL    (Results Pending)       Secondary to her physical exam findings and medical screening examination orders were placed. Will obtain EKG chest x-ray, labs, urinalysis. Patient was seen by me in triage.  Please see the full history, physical and final disposition note by the other provider in main emergency department    (Please note sections of this note were completed with a voice recognition program.  Efforts were made to edit the dictations but occasionally words are mis-transcribed.)    Electronically signed, Jayashree Vanegas [address] [place] [address]  03/15/22 1115

## 2022-03-15 NOTE — ED PROVIDER NOTES
Emergency Department Encounter  Location: [address] [place] [address]    Patient: Cornell Elizondo  MRN: [id number]  : 1965  Date of evaluation: 3/15/2022  ED Provider: Sandra Jeong PA-C    1800 PM  Cornell [facility]izondo [facility]. Please see his/her initial documentation for details of the patient's initial ED presentation, physical exam and completed studies. In brief, Cornell Elizondo is a 61 y.o. female that presented to the emergency department for cough/SOB, leg swelling. Found to have bilateral DVTs, pulmonary edema and possible ground glass infiltrates. Started on heparin gtt. Pending COVID testing at time of sign-out. Also has a UTI--treated with Macrobid.     I have reviewed and interpreted all of the currently available lab results and diagnostics from this visit:  Results for orders placed or performed during the hospital encounter of 03/15/22   COVID-19, Rapid    Specimen: Nasopharyngeal   Result Value Ref Range    Source THROAT     SARS-CoV-2, NAAT NOT DETECTED NOT DETECTED   CBC with Auto Differential   Result Value Ref Range    WBC 10.2 4.0 - 10.5 K/CU MM    RBC 5.28 4.2 - 5.4 M/CU MM    Hemoglobin 17.3 (H) 12.5 - 16.0 GM/DL    Hematocrit 52.4 (H) 37 - 47 %    MCV 99.2 78 - 100 FL    MCH 32.8 (H) 27 - 31 PG    MCHC 33.0 32.0 - 36.0 %    RDW 14.0 11.7 - 14.9 %    Platelets 639 837 - 416 K/CU MM    MPV 11.0 7.5 - 11.1 FL    Differential Type AUTOMATED DIFFERENTIAL     Segs Relative 63.3 36 - 66 %    Lymphocytes % 28.6 24 - 44 %    Monocytes % 5.8 (H) 0 - 4 %    Eosinophils % 1.2 0 - 3 %    Basophils % 0.6 0 - 1 %    Segs Absolute 6.5 K/CU MM    Lymphocytes Absolute 2.9 K/CU MM    Monocytes Absolute 0.6 K/CU MM    Eosinophils Absolute 0.1 K/CU MM    Basophils Absolute 0.1 K/CU MM    Nucleated RBC % 0.0 %    Total Nucleated RBC 0.0 K/CU MM    Total Immature Neutrophil 0.05 K/CU MM    Immature Neutrophil % 0.5 (H) 0 - 0.43 %   Comprehensive Metabolic Panel   Result Value Ref Range    Sodium 135 135 - 145 MMOL/L    Potassium 4.6 3.5 - 5.1 MMOL/L    Chloride 103 99 - 110 mMol/L    CO2 19 (L) 21 - 32 MMOL/L    BUN 14 6 - 23 MG/DL    CREATININE 1.0 0.6 - 1.1 MG/DL    Glucose 130 (H) 70 - 99 MG/DL    Calcium 9.1 8.3 - 10.6 MG/DL    Albumin 2.6 (L) 3.4 - 5.0 GM/DL    Total Protein 7.2 6.4 - 8.2 GM/DL    Total Bilirubin 0.3 0.0 - 1.0 MG/DL    ALT 37 10 - 40 U/L    AST 36 15 - 37 IU/L    Alkaline Phosphatase 212 (H) 40 - 129 IU/L    GFR Non- 57 (L) >60 mL/min/1.73m2    GFR African American >60 >60 mL/min/1.73m2    Anion Gap 13 4 - 16   Troponin   Result Value Ref Range    Troponin T <0.010 <0.01 NG/ML   Brain Natriuretic Peptide   Result Value Ref Range    Pro-BNP 12,109 (H) <300 PG/ML   Urinalysis   Result Value Ref Range    Color, UA YELLOW (A) YELLOW    Clarity, UA SLIGHTLY CLOUDY CLEAR    Glucose, Urine NEGATIVE NEGATIVE MG/DL    Bilirubin Urine NEGATIVE NEGATIVE MG/DL    Ketones, Urine NEGATIVE NEGATIVE MG/DL    Specific Gravity, UA 1.015 1.001 - 1.035    Blood, Urine MODERATE NEGATIVE    pH, Urine 7.0 5.0 - 8.0    Protein, UA >300 MG/DL NEGATIVE MG/DL    Urobilinogen, Urine 0.2 0.2 - 1.0 MG/DL    Nitrite Urine, Quantitative NEGATIVE NEGATIVE    Leukocyte Esterase, Urine MODERATE NEGATIVE    RBC, UA 9 (H) 0 - 6 /HPF    WBC, UA 99 (H) 0 - 5 /HPF    Bacteria, UA NEGATIVE NEGATIVE /HPF    WBC Clumps, UA RARE /HPF    Yeast, UA RARE /HPF    Squam Epithel, UA 5 /HPF    Mucus, UA RARE (A) NEGATIVE HPF    Hyaline Casts, UA 2 /LPF    Ca Oxalate Susie, UA OCCASIONAL /HPF   APTT   Result Value Ref Range    aPTT 20.3 (L) 25.1 - 37.1 SECONDS   Troponin   Result Value Ref Range    Troponin T <0.010 <0.01 NG/ML   CBC   Result Value Ref Range    WBC 7.4 4.0 - 10.5 K/CU MM    RBC 4.18 (L) 4.2 - 5.4 M/CU MM    Hemoglobin 14.0 12.5 - 16.0 GM/DL    Hematocrit 39.6 37 - 47 %    MCV 94.7 78 - 100 FL    MCH 33.5 (H) 27 - 31 PG MCHC 35.4 32.0 - 36.0 %    RDW 13.3 11.7 - 14.9 %    Platelets 569 (L) 875 - 440 K/CU MM    MPV 10.9 7.5 - 11.1 FL   APTT   Result Value Ref Range    aPTT 65.6 (H) 25.1 - 37.1 SECONDS   EKG 12 Lead   Result Value Ref Range    Ventricular Rate 109 BPM    Atrial Rate 109 BPM    P-R Interval 122 ms    QRS Duration 82 ms    Q-T Interval 312 ms    QTc Calculation (Bazett) 420 ms    P Axis 70 degrees    R Axis -5 degrees    T Axis 52 degrees    Diagnosis       Sinus tachycardia  Left atrial enlargement  Anterior infarct (cited on or before 26-MAY-2020)  Abnormal ECG  When compared with ECG of 03-MAR-2022 14:05,  No significant change was found  Confirmed by LILLIAN Mathews ([id number]) on 3/15/2022 8:56:37 PM       XR CHEST PORTABLE    Result Date: 3/15/2022  EXAMINATION: ONE XRAY VIEW OF THE CHEST 3/15/2022 12:35 pm COMPARISON: 03/03/2022 HISTORY: ORDERING SYSTEM PROVIDED HISTORY: Sob TECHNOLOGIST PROVIDED HISTORY: Reason for exam:->Sob Reason for Exam: sob FINDINGS: Interstitial pulmonary edema is present. Small left pleural effusion. Heart size is at the upper limits of normal.  No pneumothorax. Interstitial pulmonary edema with small left pleural effusion. VL DUP LOWER EXTREMITY VENOUS BILATERAL    Result Date: 3/15/2022  EXAMINATION: DUPLEX VENOUS ULTRASOUND OF THE BILATERAL LOWER EXTREMITIES3/15/2022 1:26 pm TECHNIQUE: Duplex ultrasound using B-mode/gray scaled imaging, Doppler spectral analysis and color flow Doppler was obtained of the deep venous structures of the lower bilateral extremities. COMPARISON: None. HISTORY: ORDERING SYSTEM PROVIDED HISTORY: lower extremity swelling, ro dvt TECHNOLOGIST PROVIDED HISTORY: Reason for exam:->lower extremity swelling, ro dvt FINDINGS: Deep venous thrombus present within the right femoral vein which is only partially compressible and demonstrates some vascular flow consistent with nonocclusive thrombus.   The calf veins of the right lower extremity are noncompressible occluding the anterior tibial vein, peroneal vein, and posterior tibial vein consistent with thrombus. The venous thrombus present within the left femoral vein which is noncompressible and demonstrates no significant flow. Flow is identified within the left popliteal vein. The calf veins of the left lower extremity also demonstrate occlusive thrombus. 1. Extensive deep venous thrombus of the bilateral lower extremities. Critical results were called by Asiha Park MD to JAYY Erazo on 3/15/2022 at 14:54. CTA PULMONARY W CONTRAST    Result Date: 3/15/2022  EXAMINATION: CTA OF THE CHEST 3/15/2022 1:59 pm TECHNIQUE: CTA of the chest was performed after the administration of intravenous contrast.  Multiplanar reformatted images are provided for review. MIP images are provided for review. Dose modulation, iterative reconstruction, and/or weight based adjustment of the mA/kV was utilized to reduce the radiation dose to as low as reasonably achievable. COMPARISON: None. HISTORY: ORDERING SYSTEM PROVIDED HISTORY: sob, bilateral dvt, ro pe TECHNOLOGIST PROVIDED HISTORY: Reason for exam:->sob, bilateral dvt, ro pe Decision Support Exception - unselect if not a suspected or confirmed emergency medical condition->Emergency Medical Condition (MA) Reason for Exam: sob, dvt's Additional signs and symptoms: none Relevant Medical/Surgical History: 75ml isovue 370 FINDINGS: Pulmonary Arteries: There is respiratory motion artifact along with streak artifact generated by the patient's right arm is at her side. These combine to limited evaluation of the segmental and subsegmental pulmonary arterial branches. No central pulmonary embolism is detected. Mediastinum: Visualized thyroid unremarkable. Mildly enlarged mediastinal lymph nodes are seen which are likely reactive. Esophagus is unremarkable. Cardiac chambers are unremarkable. No pericardial effusion. Thoracic aorta is normal in caliber. Lungs/pleura: Again, respiratory motion degrades imaging. There is a moderate right and a small moderate left pleural effusion. Adjacent airspace disease is noted. Mild patchy ground-glass infiltrates are detected as well, especially within the superior segment of the left lower lobe and the periphery of the right upper lobe. Interlobular septal thickening is noted. Upper Abdomen: Grossly unremarkable, but not well evaluated secondary to extensive respiratory motion artifact. Soft Tissues/Bones: No acute or suspicious bony abnormalities. Visualized extra thoracic soft tissues unremarkable. Limited evaluation of the segmental and subsegmental pulmonary arterial branches. No central pulmonary embolism is detected. Bilateral pleural effusions, patchy ground-glass opacity, and interlobular septal thickening, the constellation of which is most compatible with pulmonary edema. That said, given the focality of the ground-glass infiltrates, infectious or inflammatory pneumonitis should be considered as well, including viral infection. Final ED Course and MDM:  -  Patient seen and evaluated in the emergency department. -  Triage and nursing notes reviewed and incorporated. -  Old chart records reviewed and incorporated. -  Supervising physician was Dr. Hesham Howard. Patient was seen independently. -  Results of COVID testing discussed with patient--negative. Patient had been started on Nitro gtt for her blood pressure but she informed nurse this was making her worse, so it was stopped--her BP is improved on my examination. I discussed the case with Girish Serrano CNP with the hospitalist group, who will see and admit. In light of current events, I did utilize appropriate PPE (including N95 and surgical face mask, safety glasses, and gloves, as recommended by the health facility/national standard best practice, during my bedside interactions with the patient. Final Impression      1.  Acute congestive heart failure, unspecified heart failure type ([facility] [address]    2. Acute deep vein thrombosis (DVT) of both lower extremities, unspecified vein (HCC)    3.  Urinary tract infection without hematuria, site unspecified        DISPOSITION Admitted 03/15/2022 10:18:07 PM     (Please note that portions of this note may have been completed with a voice recognition program. Efforts were made to edit the dictations but occasionally words are mis-transcribed.)    Georgianna Burkitt, [address] PA-C  03/16/22 0145

## 2022-03-15 NOTE — ED NOTES
Nitro drip stopped per patient demand. Patient gives understanding that her BP is still high.  JAYY Arrington advised and gave verbal agreement that it was fine to stop Nitro drip at patients request.                    Timo Way RN  03/15/22 1945

## 2022-03-15 NOTE — PLAN OF CARE
Pt with severe SOB on US exam, pt sat in semi upright position and pillow behind head- breathing improved.   Jeramy HOPE notified and pt taken to Room 27 in ED with immediate Nurse attention

## 2022-03-16 PROBLEM — I50.23 ACUTE ON CHRONIC SYSTOLIC CONGESTIVE HEART FAILURE (HCC): Status: ACTIVE | Noted: 2022-03-16

## 2022-03-16 PROBLEM — I26.99 PULMONARY EMBOLISM (HCC): Status: ACTIVE | Noted: 2022-03-16

## 2022-03-16 LAB
ALBUMIN SERPL-MCNC: 1.8 GM/DL (ref 3.4–5)
ALBUMIN SERPL-MCNC: 1.8 GM/DL (ref 3.4–5)
ALP BLD-CCNC: 140 IU/L (ref 40–129)
ALP BLD-CCNC: 140 IU/L (ref 40–129)
ALT SERPL-CCNC: 24 U/L (ref 10–40)
ALT SERPL-CCNC: 24 U/L (ref 10–40)
ANION GAP SERPL CALCULATED.3IONS-SCNC: 7 MMOL/L (ref 4–16)
APTT: 19.3 SECONDS (ref 25.1–37.1)
APTT: 65.6 SECONDS (ref 25.1–37.1)
APTT: 76.3 SECONDS (ref 25.1–37.1)
AST SERPL-CCNC: 24 IU/L (ref 15–37)
AST SERPL-CCNC: 24 IU/L (ref 15–37)
BASOPHILS ABSOLUTE: 0 K/CU MM
BASOPHILS RELATIVE PERCENT: 0.9 % (ref 0–1)
BILIRUB SERPL-MCNC: 0.2 MG/DL (ref 0–1)
BILIRUB SERPL-MCNC: 0.2 MG/DL (ref 0–1)
BILIRUBIN DIRECT: 0.2 MG/DL (ref 0–0.3)
BILIRUBIN, INDIRECT: 0 MG/DL (ref 0–0.7)
BUN BLDV-MCNC: 14 MG/DL (ref 6–23)
CALCIUM SERPL-MCNC: 7.7 MG/DL (ref 8.3–10.6)
CHLORIDE BLD-SCNC: 108 MMOL/L (ref 99–110)
CO2: 22 MMOL/L (ref 21–32)
CREAT SERPL-MCNC: 1.2 MG/DL (ref 0.6–1.1)
DIFFERENTIAL TYPE: ABNORMAL
EOSINOPHILS ABSOLUTE: 0.1 K/CU MM
EOSINOPHILS RELATIVE PERCENT: 2.6 % (ref 0–3)
ESTIMATED AVERAGE GLUCOSE: 123 MG/DL
GFR AFRICAN AMERICAN: 56 ML/MIN/1.73M2
GFR NON-AFRICAN AMERICAN: 46 ML/MIN/1.73M2
GLUCOSE BLD-MCNC: 160 MG/DL (ref 70–99)
GLUCOSE BLD-MCNC: 160 MG/DL (ref 70–99)
GLUCOSE BLD-MCNC: 191 MG/DL (ref 70–99)
HBA1C MFR BLD: 5.9 % (ref 4.2–6.3)
HCT VFR BLD CALC: 38.1 % (ref 37–47)
HCT VFR BLD CALC: 39.6 % (ref 37–47)
HEMOGLOBIN: 13 GM/DL (ref 12.5–16)
HEMOGLOBIN: 14 GM/DL (ref 12.5–16)
IMMATURE NEUTROPHIL %: 0.7 % (ref 0–0.43)
INR BLD: 0.88 INDEX
LACTATE: 1.4 MMOL/L (ref 0.4–2)
LV EF: 33 %
LVEF MODALITY: NORMAL
LYMPHOCYTES ABSOLUTE: 1.3 K/CU MM
LYMPHOCYTES RELATIVE PERCENT: 27.6 % (ref 24–44)
MCH RBC QN AUTO: 32.7 PG (ref 27–31)
MCH RBC QN AUTO: 33.5 PG (ref 27–31)
MCHC RBC AUTO-ENTMCNC: 34.1 % (ref 32–36)
MCHC RBC AUTO-ENTMCNC: 35.4 % (ref 32–36)
MCV RBC AUTO: 94.7 FL (ref 78–100)
MCV RBC AUTO: 96 FL (ref 78–100)
MONOCYTES ABSOLUTE: 0.3 K/CU MM
MONOCYTES RELATIVE PERCENT: 7.5 % (ref 0–4)
NUCLEATED RBC %: 0 %
PDW BLD-RTO: 13.3 % (ref 11.7–14.9)
PDW BLD-RTO: 13.4 % (ref 11.7–14.9)
PLATELET # BLD: 128 K/CU MM (ref 140–440)
PLATELET # BLD: 134 K/CU MM (ref 140–440)
PMV BLD AUTO: 10.8 FL (ref 7.5–11.1)
PMV BLD AUTO: 10.9 FL (ref 7.5–11.1)
POTASSIUM SERPL-SCNC: 4.2 MMOL/L (ref 3.5–5.1)
PRO-BNP: ABNORMAL PG/ML
PROTHROMBIN TIME: 11.3 SECONDS (ref 11.7–14.5)
RBC # BLD: 3.97 M/CU MM (ref 4.2–5.4)
RBC # BLD: 4.18 M/CU MM (ref 4.2–5.4)
SEGMENTED NEUTROPHILS ABSOLUTE COUNT: 2.8 K/CU MM
SEGMENTED NEUTROPHILS RELATIVE PERCENT: 60.7 % (ref 36–66)
SODIUM BLD-SCNC: 137 MMOL/L (ref 135–145)
TOTAL IMMATURE NEUTOROPHIL: 0.03 K/CU MM
TOTAL NUCLEATED RBC: 0 K/CU MM
TOTAL PROTEIN: 4.6 GM/DL (ref 6.4–8.2)
TOTAL PROTEIN: 4.6 GM/DL (ref 6.4–8.2)
TROPONIN T: <0.01 NG/ML
TROPONIN T: <0.01 NG/ML
WBC # BLD: 4.6 K/CU MM (ref 4–10.5)
WBC # BLD: 7.4 K/CU MM (ref 4–10.5)

## 2022-03-16 PROCEDURE — 2580000003 HC RX 258: Performed by: NURSE PRACTITIONER

## 2022-03-16 PROCEDURE — 36415 COLL VENOUS BLD VENIPUNCTURE: CPT

## 2022-03-16 PROCEDURE — 85025 COMPLETE CBC W/AUTO DIFF WBC: CPT

## 2022-03-16 PROCEDURE — 83605 ASSAY OF LACTIC ACID: CPT

## 2022-03-16 PROCEDURE — 94761 N-INVAS EAR/PLS OXIMETRY MLT: CPT

## 2022-03-16 PROCEDURE — 84484 ASSAY OF TROPONIN QUANT: CPT

## 2022-03-16 PROCEDURE — 93306 TTE W/DOPPLER COMPLETE: CPT

## 2022-03-16 PROCEDURE — 6370000000 HC RX 637 (ALT 250 FOR IP): Performed by: NURSE PRACTITIONER

## 2022-03-16 PROCEDURE — 80076 HEPATIC FUNCTION PANEL: CPT

## 2022-03-16 PROCEDURE — 1200000000 HC SEMI PRIVATE

## 2022-03-16 PROCEDURE — 83880 ASSAY OF NATRIURETIC PEPTIDE: CPT

## 2022-03-16 PROCEDURE — 99222 1ST HOSP IP/OBS MODERATE 55: CPT | Performed by: INTERNAL MEDICINE

## 2022-03-16 PROCEDURE — 85730 THROMBOPLASTIN TIME PARTIAL: CPT

## 2022-03-16 PROCEDURE — 83036 HEMOGLOBIN GLYCOSYLATED A1C: CPT

## 2022-03-16 PROCEDURE — 82962 GLUCOSE BLOOD TEST: CPT

## 2022-03-16 PROCEDURE — 6370000000 HC RX 637 (ALT 250 FOR IP): Performed by: HOSPITALIST

## 2022-03-16 PROCEDURE — 6360000002 HC RX W HCPCS: Performed by: INTERNAL MEDICINE

## 2022-03-16 PROCEDURE — 2700000000 HC OXYGEN THERAPY PER DAY

## 2022-03-16 PROCEDURE — 85610 PROTHROMBIN TIME: CPT

## 2022-03-16 PROCEDURE — 6360000002 HC RX W HCPCS: Performed by: NURSE PRACTITIONER

## 2022-03-16 PROCEDURE — 80053 COMPREHEN METABOLIC PANEL: CPT

## 2022-03-16 RX ORDER — FUROSEMIDE 10 MG/ML
40 INJECTION INTRAMUSCULAR; INTRAVENOUS 2 TIMES DAILY
Status: DISCONTINUED | OUTPATIENT
Start: 2022-03-16 | End: 2022-03-17

## 2022-03-16 RX ORDER — 0.9 % SODIUM CHLORIDE 0.9 %
500 INTRAVENOUS SOLUTION INTRAVENOUS ONCE
Status: COMPLETED | OUTPATIENT
Start: 2022-03-16 | End: 2022-03-16

## 2022-03-16 RX ORDER — BUTALBITAL, ACETAMINOPHEN AND CAFFEINE 50; 325; 40 MG/1; MG/1; MG/1
1 TABLET ORAL EVERY 4 HOURS PRN
Status: DISCONTINUED | OUTPATIENT
Start: 2022-03-16 | End: 2022-03-17

## 2022-03-16 RX ORDER — KETOROLAC TROMETHAMINE 30 MG/ML
15 INJECTION, SOLUTION INTRAMUSCULAR; INTRAVENOUS ONCE
Status: COMPLETED | OUTPATIENT
Start: 2022-03-16 | End: 2022-03-16

## 2022-03-16 RX ORDER — DIPHENHYDRAMINE HYDROCHLORIDE 50 MG/ML
25 INJECTION INTRAMUSCULAR; INTRAVENOUS ONCE
Status: COMPLETED | OUTPATIENT
Start: 2022-03-16 | End: 2022-03-16

## 2022-03-16 RX ORDER — ONDANSETRON 2 MG/ML
4 INJECTION INTRAMUSCULAR; INTRAVENOUS ONCE
Status: COMPLETED | OUTPATIENT
Start: 2022-03-16 | End: 2022-03-16

## 2022-03-16 RX ORDER — PROCHLORPERAZINE EDISYLATE 5 MG/ML
10 INJECTION INTRAMUSCULAR; INTRAVENOUS ONCE
Status: COMPLETED | OUTPATIENT
Start: 2022-03-16 | End: 2022-03-16

## 2022-03-16 RX ADMIN — ACETAMINOPHEN 650 MG: 325 TABLET ORAL at 09:04

## 2022-03-16 RX ADMIN — BUTALBITAL, ACETAMINOPHEN AND CAFFEINE 1 TABLET: 50; 325; 40 TABLET ORAL at 16:08

## 2022-03-16 RX ADMIN — PROCHLORPERAZINE EDISYLATE 10 MG: 5 INJECTION INTRAMUSCULAR; INTRAVENOUS at 20:35

## 2022-03-16 RX ADMIN — QUETIAPINE FUMARATE 400 MG: 100 TABLET ORAL at 20:35

## 2022-03-16 RX ADMIN — DIPHENHYDRAMINE HYDROCHLORIDE 25 MG: 50 INJECTION INTRAMUSCULAR; INTRAVENOUS at 20:35

## 2022-03-16 RX ADMIN — METOPROLOL SUCCINATE 25 MG: 25 TABLET, EXTENDED RELEASE ORAL at 09:05

## 2022-03-16 RX ADMIN — KETOROLAC TROMETHAMINE 15 MG: 30 INJECTION, SOLUTION INTRAMUSCULAR; INTRAVENOUS at 20:35

## 2022-03-16 RX ADMIN — ONDANSETRON 4 MG: 2 INJECTION INTRAMUSCULAR; INTRAVENOUS at 20:35

## 2022-03-16 RX ADMIN — ENOXAPARIN SODIUM 60 MG: 100 INJECTION SUBCUTANEOUS at 20:34

## 2022-03-16 RX ADMIN — SODIUM CHLORIDE, PRESERVATIVE FREE 10 ML: 5 INJECTION INTRAVENOUS at 20:34

## 2022-03-16 RX ADMIN — ASPIRIN 81 MG: 81 TABLET, CHEWABLE ORAL at 09:05

## 2022-03-16 RX ADMIN — BUTALBITAL, ACETAMINOPHEN AND CAFFEINE 1 TABLET: 50; 325; 40 TABLET ORAL at 11:12

## 2022-03-16 RX ADMIN — SODIUM CHLORIDE, PRESERVATIVE FREE 10 ML: 5 INJECTION INTRAVENOUS at 09:11

## 2022-03-16 RX ADMIN — FAMOTIDINE 20 MG: 20 TABLET ORAL at 09:05

## 2022-03-16 RX ADMIN — SODIUM CHLORIDE 500 ML: 9 INJECTION, SOLUTION INTRAVENOUS at 20:36

## 2022-03-16 RX ADMIN — FUROSEMIDE 40 MG: 10 INJECTION, SOLUTION INTRAMUSCULAR; INTRAVENOUS at 09:05

## 2022-03-16 ASSESSMENT — PAIN SCALES - GENERAL
PAINLEVEL_OUTOF10: 2
PAINLEVEL_OUTOF10: 6
PAINLEVEL_OUTOF10: 7
PAINLEVEL_OUTOF10: 4
PAINLEVEL_OUTOF10: 8
PAINLEVEL_OUTOF10: 7
PAINLEVEL_OUTOF10: 7
PAINLEVEL_OUTOF10: 3
PAINLEVEL_OUTOF10: 4

## 2022-03-16 ASSESSMENT — PAIN DESCRIPTION - PAIN TYPE: TYPE: ACUTE PAIN;CHRONIC PAIN

## 2022-03-16 ASSESSMENT — PAIN DESCRIPTION - FREQUENCY: FREQUENCY: CONTINUOUS

## 2022-03-16 ASSESSMENT — PAIN SCALES - WONG BAKER: WONGBAKER_NUMERICALRESPONSE: 0

## 2022-03-16 ASSESSMENT — PAIN DESCRIPTION - DESCRIPTORS: DESCRIPTORS: HEADACHE

## 2022-03-16 NOTE — CARE COORDINATION
.CM has reviewed pt's chart for needs. CM screening shows that pt has PCP, insurance and is independent PTA. If needs arise please contact JIMY.   TE

## 2022-03-16 NOTE — H&P
V2.0  History and Physical      Name:  Cornell Elizondo /Age/Sex: 1965  (61 y.o. female)   MRN & CSN:  [id number] & [id number] Encounter Date/Time: 3/15/2022 10:20 PM EDT   Location:  ED27/ED-27 PCP: Galina Harp MD       Hospital Day: 1    Assessment and Plan:   Cornell Elizondo is a 61 y.o. female with a pmh of current everyday smoker, insulin-dependent diabetes who presents with acute DVT, pulmonary edema    Acute bilateral deep venous thromboembolism  Pulmonary edema  Elevated NT proBNP              Venous duplex bilateral lower extremity with extensive DVT bilateral lower extremities and left femoral vein which is noncompressible in the right femoral vein which is only partially compressible. CTA pulmonary arteries with central pulmonary embolism detected; bilateral pleural effusions and patchy groundglass opacity consistent with pulmonary edema noted  Echo pending to evaluate right heart strain              Consult cardiology              Started on heparin drip by ED   Echo in  shows EF of 50 to 55% , normal nuclear med stress test noted in May 2020 as well   Received Lasix 40 mg IV x1 in ED for pulmonary edema, will continue 20 IV twice daily   Chest x-ray with interstitial pulmonary edema with small left pleural effusion    Insulin-dependent type 2 diabetes   #Insulin dependent type 2 diabetes   -Stop home insulin, convert to weight-based long-acting glargine plus sliding scale with hypoglycemia protocol   -Hold home oral antihyperglycemics, check hemoglobin A1c    Schizophrenia   Continue home Seroquel   Chronic Conditions: continue home medication as ordered   Hepatitis C,  Tobacco dependence: Smokes 3 packs/day/day, nicotine lozenges and patch ordered patient was counseled on tobacco cessation. All testing  and results reviewed with patient . All questions answered. Patient and family voiced understanding    This patient was seen and examined in conjunction with Dr. Christo Navarro. He/She was agreeable with the plan and management as dictated above. Disposition:   Current Living situation: Independent  Expected Disposition: 3 days  Estimated D/C: 3 days    Diet ADULT DIET; Regular   GI Prophylaxis  [x] PPI,  [] H2 Blocker,  [] Carafate,  [] Diet/Tube Feeds   DVT Prophylaxis [] Lovenox, [x]  Heparin, [] SCDs, [] Ambulation,  [] NOAC   Code Status Full Code   Surrogate Decision Maker/ POA *         History from:     patient, electronic medical record    History of Present Illness:     Chief Complaint: Acute bilateral deep vein thrombosis (DVT) of femoral veins ([facility] [address]  Renee Hastings is a 61 y.o. female with pmh of schizophrenia, hepatitis C presents to the emergency department with a complaint of shortness of breath. Patient is a current everyday smoker. Patient also complains of bilateral lateral lower leg sweated swelling . Onset of symptoms is 2 weeks. Patient was seen in the ED 2 weeks ago and started on on doxycycline for a UTI and URI. Patient states her shortness of breath is worse at night when lying down with any exertion. She also continues to have dysuria and dark foul-smelling urine. She states she has been taking her antibiotics as prescribed. She denies any fevers chills or recent ill contacts. Denies any chest pain. Denies any productive cough with sputum. Denies any nausea vomiting or diarrhea. Additional review of symptoms as noted below     Review of Systems: Need 10 Elements   Review of Systems   Constitutional: Positive for fatigue. Negative for activity change, appetite change and diaphoresis. HENT: Negative for congestion and postnasal drip. Eyes: Negative for redness and visual disturbance. Respiratory: Positive for shortness of breath. Negative for cough. Cardiovascular: Positive for leg swelling. Negative for chest pain and palpitations. Gastrointestinal: Negative for diarrhea, nausea and vomiting.    Endocrine: Negative for cold intolerance and heat intolerance. Genitourinary: Negative for difficulty urinating and dysuria. Musculoskeletal: Negative for joint swelling and neck pain. Skin: Negative. Neurological: Negative for dizziness, speech difficulty and headaches. Psychiatric/Behavioral: Negative for agitation and confusion. Objective:   No intake or output data in the 24 hours ending 03/15/22 2220   Vitals:   Vitals:    03/15/22 1804 03/15/22 1911 03/15/22 2031 03/15/22 2214   BP: (!) 186/156 (!) 188/126 (!) 169/138 (!) 190/113   Pulse: 114 120 122 107   Resp: 20 24 28   Temp:       TempSrc:       SpO2: 99% 93% 97%    Weight:       Height:           Medications Prior to Admission     Prior to Admission medications    Medication Sig Start Date End Date Taking? Authorizing Provider   metoprolol succinate (TOPROL XL) 25 MG extended release tablet Take 1 tablet by mouth daily 3/3/22 4/2/22  Sharda Vu MD   ondansetron (ZOFRAN-ODT) 4 MG disintegrating tablet Take 1 tablet by mouth 3 times daily as needed for Nausea or Vomiting 3/3/22   Sharda Vu MD   dicyclomine (BENTYL) 10 MG capsule Take 1 capsule by mouth 4 times daily as needed (Abdominal cramping, diarrhea) 3/3/22   Sharda Vu MD   insulin glargine (LANTUS) 100 UNIT/ML injection vial Inject 40 Units into the skin nightly 8/5/20   Osmel Mcgill MD   insulin lispro (HUMALOG) 100 UNIT/ML injection vial Inject 10 Units into the skin 3 times daily (with meals) 8/5/20   Osmel Mcgill MD   Insulin Syringe-Needle U-100 ([employer] INS SYR .3CC/29GX0.5\") 29G X 1/2\" 0.3 ML MISC 1 each by Does not apply route daily 8/5/20   Osmel Mcgill MD   aspirin 81 MG chewable tablet Take 81 mg by mouth daily    Historical Provider, MD   QUEtiapine (SEROQUEL) 300 MG tablet Take 400 mg by mouth nightly    Historical Provider, MD       Physical Exam: Need 8 Elements   Physical Exam  Vitals and nursing note reviewed. HENT:      Head: Normocephalic.    Eyes:      Pupils: Pupils are equal, round, and reactive to light. Cardiovascular:      Rate and Rhythm: Normal rate and regular rhythm. Heart sounds: Gallop present. S3 sounds present. Pulmonary:      Effort: Pulmonary effort is normal.   Abdominal:      General: Abdomen is flat. Musculoskeletal:      Right lower le+ Pitting Edema present. Left lower le+ Pitting Edema present. Skin:     General: Skin is warm. Capillary Refill: Capillary refill takes more than 3 seconds. Neurological:      General: No focal deficit present. Past Medical History:   PMHx   Past Medical History:   Diagnosis Date    CAD (coronary artery disease)     Diabetes mellitus ([facility] [address]     Hepatitis C     Schizophrenia ([facility] [address]      PSHX:  has a past surgical history that includes Hysterectomy and Cholecystectomy. Allergies: Allergies   Allergen Reactions    Haldol [Haloperidol Lactate] Other (See Comments)     Unknown, severe reaction per mother    Penicillins      Fam HX:  family history includes Cancer in her father; No Known Problems in her mother.  D  Soc HX:   Social History     Socioeconomic History    Marital status: Legally      Spouse name: Not on file    Number of children: Not on file    Years of education: Not on file    Highest education level: Not on file   Occupational History    Not on file   Tobacco Use    Smoking status: Current Every Day Smoker     Packs/day: 3.00     Types: Cigars    Smokeless tobacco: Never Used   Vaping Use    Vaping Use: Never used   Substance and Sexual Activity    Alcohol use: Yes     Comment: occassionally    Drug use: Not Currently     Types: Marijuana Herbjaiden Handy)    Sexual activity: Not on file   Other Topics Concern    Not on file   Social History Narrative    Not on file     Social Determinants of Health     Financial Resource Strain:     Difficulty of Paying Living Expenses: Not on file   Food Insecurity:     Worried About Running Out of Food in the Last Year: Not on file    [address] in the Last Year: Not on file   Transportation Needs:     Lack of Transportation (Medical): Not on file    Lack of Transportation (Non-Medical):  Not on file   Physical Activity:     Days of Exercise per Week: Not on file    Minutes of Exercise per Session: Not on file   Stress:     Feeling of Stress : Not on file   Social Connections:     Frequency of Communication with Friends and Family: Not on file    Frequency of Social Gatherings with Friends and Family: Not on file    Attends Mosque Services: Not on file    Active Member of Clubs or Organizations: Not on file    Attends Club or Organization Meetings: Not on file    Marital Status: Not on file   Intimate Partner Violence:     Fear of Current or Ex-Partner: Not on file    Emotionally Abused: Not on file    Physically Abused: Not on file    Sexually Abused: Not on file   Housing Stability:     Unable to Pay for Housing in the Last Year: Not on file    Number of Places Lived in the Last Year: Not on file    Unstable Housing in the Last Year: Not on file       Medications:   Medications:    insulin glargine  40 Units SubCUTAneous Nightly    [START ON 3/16/2022] metoprolol succinate  25 mg Oral Daily    QUEtiapine  400 mg Oral Nightly    [START ON 3/16/2022] aspirin  81 mg Oral Daily    sodium chloride flush  5-40 mL IntraVENous 2 times per day    [START ON 3/16/2022] nicotine  1 patch TransDERmal Daily    [START ON 3/16/2022] famotidine  20 mg Oral Daily    [START ON 3/16/2022] insulin lispro  0.05 Units/kg SubCUTAneous TID     [START ON 3/16/2022] insulin lispro  0-12 Units SubCUTAneous TID WC    insulin lispro  0-6 Units SubCUTAneous Nightly      Infusions:    heparin (PORCINE) Infusion 18 Units/kg/hr (03/15/22 1929)    nitroGLYCERIN Stopped (03/15/22 1942)    dextrose      sodium chloride       PRN Meds: heparin (porcine), 80 Units/kg, PRN  heparin (porcine), 40 Units/kg, PRN  glucose, 15 g, PRN  dextrose, 12.5 g, PRN  glucagon (rDNA), 1 mg, PRN  dextrose, 100 mL/hr, PRN  sodium chloride flush, 5-40 mL, PRN  sodium chloride, 25 mL, PRN  ondansetron, 4 mg, Q8H PRN   Or  ondansetron, 4 mg, Q6H PRN  polyethylene glycol, 17 g, Daily PRN  acetaminophen, 650 mg, Q6H PRN   Or  acetaminophen, 650 mg, Q6H PRN  nicotine polacrilex, 2 mg, Q1H PRN        Labs      CBC:   Recent Labs     03/15/22  1503   WBC 10.2   HGB 17.3*        BMP:    Recent Labs     03/15/22  1503      K 4.6      CO2 19*   BUN 14   CREATININE 1.0   GLUCOSE 130*     Hepatic:   Recent Labs     03/15/22  1503   AST 36   ALT 37   BILITOT 0.3   ALKPHOS 212*     Lipids:   Lab Results   Component Value Date    CHOL 161 03/12/2021    HDL 72 03/12/2021    TRIG 163 03/12/2021     Hemoglobin A1C:   Lab Results   Component Value Date    LABA1C 5.0 03/12/2021     TSH: No results found for: TSH  Troponin:   Lab Results   Component Value Date    TROPONINT <0.010 03/15/2022    TROPONINT <0.010 03/03/2022    TROPONINT <0.010 05/27/2020     Lactic Acid: No results for input(s): LACTA in the last 72 hours.   BNP:   Recent Labs     03/15/22  1503   PROBNP 12,109*     UA:  Lab Results   Component Value Date    NITRU NEGATIVE 03/15/2022    COLORU YELLOW 03/15/2022    WBCUA 99 03/15/2022    RBCUA 9 03/15/2022    MUCUS RARE 03/15/2022    TRICHOMONAS NONE SEEN 04/12/2021    YEAST RARE 03/15/2022    BACTERIA NEGATIVE 03/15/2022    CLARITYU SLIGHTLY CLOUDY 03/15/2022    SPECGRAV 1.015 03/15/2022    LEUKOCYTESUR MODERATE 03/15/2022    UROBILINOGEN 0.2 03/15/2022    BILIRUBINUR NEGATIVE 03/15/2022    BLOODU MODERATE 03/15/2022    KETUA NEGATIVE 03/15/2022     Urine Cultures: No results found for: LABURIN  Blood Cultures: No results found for: BC  No results found for: BLOODCULT2  Organism: No results found for: ORG    Imaging/Diagnostics Last 24 Hours   XR CHEST PORTABLE    Result Date: 3/15/2022  EXAMINATION: ONE XRAY VIEW OF THE CHEST 3/15/2022 12:35 pm COMPARISON: 03/03/2022 HISTORY: ORDERING SYSTEM PROVIDED HISTORY: Sob TECHNOLOGIST PROVIDED HISTORY: Reason for exam:->Sob Reason for Exam: sob FINDINGS: Interstitial pulmonary edema is present. Small left pleural effusion. Heart size is at the upper limits of normal.  No pneumothorax. Interstitial pulmonary edema with small left pleural effusion. VL DUP LOWER EXTREMITY VENOUS BILATERAL    Result Date: 3/15/2022  EXAMINATION: DUPLEX VENOUS ULTRASOUND OF THE BILATERAL LOWER EXTREMITIES3/15/2022 1:26 pm TECHNIQUE: Duplex ultrasound using B-mode/gray scaled imaging, Doppler spectral analysis and color flow Doppler was obtained of the deep venous structures of the lower bilateral extremities. COMPARISON: None. HISTORY: ORDERING SYSTEM PROVIDED HISTORY: lower extremity swelling, ro dvt TECHNOLOGIST PROVIDED HISTORY: Reason for exam:->lower extremity swelling, ro dvt FINDINGS: Deep venous thrombus present within the right femoral vein which is only partially compressible and demonstrates some vascular flow consistent with nonocclusive thrombus. The calf veins of the right lower extremity are noncompressible occluding the anterior tibial vein, peroneal vein, and posterior tibial vein consistent with thrombus. The venous thrombus present within the left femoral vein which is noncompressible and demonstrates no significant flow. Flow is identified within the left popliteal vein. The calf veins of the left lower extremity also demonstrate occlusive thrombus. 1. Extensive deep venous thrombus of the bilateral lower extremities. Critical results were called by Jose Martínez MD to JAYY Rodriguez on 3/15/2022 at 14:54. CTA PULMONARY W CONTRAST    Result Date: 3/15/2022  EXAMINATION: CTA OF THE CHEST 3/15/2022 1:59 pm TECHNIQUE: CTA of the chest was performed after the administration of intravenous contrast.  Multiplanar reformatted images are provided for review.   MIP images are provided for review. Dose modulation, iterative reconstruction, and/or weight based adjustment of the mA/kV was utilized to reduce the radiation dose to as low as reasonably achievable. COMPARISON: None. HISTORY: ORDERING SYSTEM PROVIDED HISTORY: sob, bilateral dvt, ro pe TECHNOLOGIST PROVIDED HISTORY: Reason for exam:->sob, bilateral dvt, ro pe Decision Support Exception - unselect if not a suspected or confirmed emergency medical condition->Emergency Medical Condition (MA) Reason for Exam: sob, dvt's Additional signs and symptoms: none Relevant Medical/Surgical History: 75ml isovue 370 FINDINGS: Pulmonary Arteries: There is respiratory motion artifact along with streak artifact generated by the patient's right arm is at her side. These combine to limited evaluation of the segmental and subsegmental pulmonary arterial branches. No central pulmonary embolism is detected. Mediastinum: Visualized thyroid unremarkable. Mildly enlarged mediastinal lymph nodes are seen which are likely reactive. Esophagus is unremarkable. Cardiac chambers are unremarkable. No pericardial effusion. Thoracic aorta is normal in caliber. Lungs/pleura: Again, respiratory motion degrades imaging. There is a moderate right and a small moderate left pleural effusion. Adjacent airspace disease is noted. Mild patchy ground-glass infiltrates are detected as well, especially within the superior segment of the left lower lobe and the periphery of the right upper lobe. Interlobular septal thickening is noted. Upper Abdomen: Grossly unremarkable, but not well evaluated secondary to extensive respiratory motion artifact. Soft Tissues/Bones: No acute or suspicious bony abnormalities. Visualized extra thoracic soft tissues unremarkable. Limited evaluation of the segmental and subsegmental pulmonary arterial branches. No central pulmonary embolism is detected.  Bilateral pleural effusions, patchy ground-glass opacity, and interlobular septal thickening,

## 2022-03-16 NOTE — PROGRESS NOTES
[facility] HOSPITALIST PROGRESS NOTE      PCP: Galina Harp MD    Date of Admission: 3/15/2022    Subjective: no fevers     Brief Hospital summary patient is a 57-year-old female with history of schizophrenia, hepatitis C who was admitted with shortness of breath.   Patient had bilateral lower extremity swelling, was treated with UTI recently  Lower extremity duplex positive for extensive DVT, CTA positive for central PE, echocardiogram ordered, heparin started, cardiology consulted, Lasix started    Vitals signs:  Afebrile, sinus tachycardia, heart rate 85-1 02 range, blood pressure 150/24, on room air    Medications: Aspirin, famotidine, metoprolol, nicotine patch, Seroquel, heparin infusion    Antibiotics: None    Fluid status: Not documented    Labs:   Sodium 137, potassium 4.2, chloride 108, bicarb 22, creatinine 1.1  LFTs normal  WBC 4.6, hemoglobin 13, platelets 294    Imaging:   CTA showed did not show central or subsegmental PE bilateral pleural effusion, patchy groundglass opacities and interlobular septal thickening consistent with pulmonary edema    Lower extremity duplex positive for extensive DVT of bilateral lower extremities    Assessment/Plan:     Acute bilateral LE DVT   Pulmonary edema:   Admitted with dyspnea, C proBNP elevated, bilateral lower extremity edema  Venous duplex positive for extensive DVTs, heparin started  CT PE negative for PE  Groundglass opacities and pulmonary edema noted  Lasix started, creatinine 1.2, echocardiogram pending  Input output record, daily weight, salt and fluid intake  Continue Lasix twice daily  Continue to monitor urine output      Diabetes mellitus type 2: Sliding scale insulin diabetic diet for now, oral hypoglycemic held    History of schizophrenia: continue Seroquel     Hepatitis C chronic stable     tobacco dependence: Smokes 3 packs/day, nicotine patch    DVT prophlaxis:   Heparin infusion     Last BM:   As needed laxatives    Ambulation: As tolerated     Disposition:   Home     Diet: diabetic diet     Physical Exam Performed:       BP (!) 150/104   Pulse 102   Temp 97.8 °F (36.6 °C) (Oral)   Resp 20   Ht 5' 2\" (1.575 m)   Wt 127 lb (57.6 kg)   SpO2 96%   BMI 23.23 kg/m²     Physical Exam  Constitutional:       General: She is not in acute distress. Appearance: Normal appearance. HENT:      Head: Normocephalic and atraumatic. Right Ear: External ear normal.      Left Ear: External ear normal.   Eyes:      Extraocular Movements: Extraocular movements intact. Pupils: Pupils are equal, round, and reactive to light. Cardiovascular:      Rate and Rhythm: Normal rate and regular rhythm. Heart sounds: No murmur heard. Pulmonary:      Effort: Pulmonary effort is normal. No respiratory distress. Breath sounds: Normal breath sounds. No wheezing. Abdominal:      General: Bowel sounds are normal. There is no distension. Palpations: Abdomen is soft. Tenderness: There is no abdominal tenderness. Musculoskeletal:         General: No swelling. Cervical back: Normal range of motion. Skin:     General: Skin is warm. Neurological:      General: No focal deficit present. Mental Status: She is alert and oriented to person, place, and time. Cranial Nerves: No cranial nerve deficit.    Psychiatric:         Mood and Affect: Mood normal.         Labs:   Recent Labs     03/15/22  1503 03/15/22  2359 03/16/22  0601   WBC 10.2 7.4 4.6   HGB 17.3* 14.0 13.0   HCT 52.4* 39.6 38.1    134* 128*     Recent Labs     03/15/22  1503 03/16/22  0601    137   K 4.6 4.2    108   CO2 19* 22   BUN 14 14   CREATININE 1.0 1.2*   CALCIUM 9.1 7.7*     Recent Labs     03/15/22  1503 03/16/22  0601   AST 36 24  24   ALT 37 24  24   BILIDIR  --  0.2   BILITOT 0.3 0.2  0.2   ALKPHOS 212* 140*  140*     Recent Labs     03/16/22  0601   INR 0.88     No results for input(s): Randy Killings in the last 72 hours.    Urinalysis:      Lab Results   Component Value Date    NITRU NEGATIVE 03/15/2022    WBCUA 99 03/15/2022    BACTERIA NEGATIVE 03/15/2022    RBCUA 9 03/15/2022    BLOODU MODERATE 03/15/2022    SPECGRAV 1.015 03/15/2022       Radiology:  CTA PULMONARY W CONTRAST   Final Result   Limited evaluation of the segmental and subsegmental pulmonary arterial   branches. No central pulmonary embolism is detected. Bilateral pleural effusions, patchy ground-glass opacity, and interlobular   septal thickening, the constellation of which is most compatible with   pulmonary edema. That said, given the focality of the ground-glass infiltrates, infectious or   inflammatory pneumonitis should be considered as well, including viral   infection. VL DUP LOWER EXTREMITY VENOUS BILATERAL   Final Result   1. Extensive deep venous thrombus of the bilateral lower extremities. Critical results were called by Avis Coats MD to JAYY Sarabia on   3/15/2022 at 14:54. XR CHEST PORTABLE   Final Result   Interstitial pulmonary edema with small left pleural effusion.                  Lauri Hector MD  3/16/2022 9:14 AM

## 2022-03-16 NOTE — PATIENT CHOICE
Spoke with patient regarding a person calling for patient informaton Sharyn Pina [phone]. Per pt it is a \"doctor friend\" and we are not to give her any information at this time.

## 2022-03-16 NOTE — CONSULTS
CARDIOLOGY CONSULT NOTE   Reason for consultation:  SOb/PE    Referring physician:  Karla Felton MD     Primary care physician: Sarah Beth Novak MD      Dear  Dr. Karla Felton MD   Thanks for the consult. Chief Complaints :  Chief Complaint   Patient presents with    Shortness of Breath     short of breath since 03/03/2022, swelling to both legs, still has uti symptoms        History of present illness:Deb is a 61 y. o.year old who presents with shortness of breath ongoing for last several days she also noted increased swelling of the lower extremity and she came in for further evaluation  Denies any cough says symptoms started few weeks ago she has been getting over-the-counter as well as antibiotics prescriptions but symptoms are not getting any better during work-up in the emergency department she was noted to have bilateral PE L as well as lower extremity DVT  She is short of breath winded    Past medical history:    has a past medical history of CAD (coronary artery disease), Diabetes mellitus ([facility] [address] Hepatitis C, and Schizophrenia ([facility] [address] Past surgical history:   has a past surgical history that includes Hysterectomy and Cholecystectomy. Social History:   reports that she has been smoking cigars. She has been smoking about 3.00 packs per day. She has never used smokeless tobacco. She reports current alcohol use. She reports previous drug use. Drug: Marijuana Jorge Meckel).   Family history:   no family history of CAD, STROKE of DM at early age    Allergies   Allergen Reactions    Haldol [Haloperidol Lactate] Other (See Comments)     Unknown, severe reaction per mother    Penicillins        [Held by provider] furosemide (LASIX) injection 40 mg, BID  butalbital-acetaminophen-caffeine (FIORICET, ESGIC) per tablet 1 tablet, Q4H PRN  heparin (porcine) injection 4,610 Units, PRN  heparin (porcine) injection 2,300 Units, PRN  heparin 25,000 unit in sodium chloride 0.45% 250 mL (premix) infusion, Continuous  nitroGLYCERIN 50 mg in dextrose 5% 250 mL infusion, Continuous  metoprolol succinate (TOPROL XL) extended release tablet 25 mg, Daily  QUEtiapine (SEROQUEL) tablet 400 mg, Nightly  aspirin chewable tablet 81 mg, Daily  glucose (GLUTOSE) 40 % oral gel 15 g, PRN  glucagon (rDNA) injection 1 mg, PRN  dextrose 5 % solution, PRN  sodium chloride flush 0.9 % injection 5-40 mL, 2 times per day  sodium chloride flush 0.9 % injection 5-40 mL, PRN  0.9 % sodium chloride infusion, PRN  ondansetron (ZOFRAN-ODT) disintegrating tablet 4 mg, Q8H PRN   Or  ondansetron (ZOFRAN) injection 4 mg, Q6H PRN  polyethylene glycol (GLYCOLAX) packet 17 g, Daily PRN  nicotine (NICODERM CQ) 14 MG/24HR 1 patch, Daily  nicotine polacrilex (COMMIT) lozenge 2 mg, Q1H PRN  famotidine (PEPCID) tablet 20 mg, Daily  insulin lispro (HUMALOG) injection vial 3 Units, TID WC  insulin lispro (HUMALOG) injection vial 0-12 Units, TID WC  insulin lispro (HUMALOG) injection vial 0-6 Units, Nightly  dextrose bolus (hypoglycemia) 10% 125 mL, PRN   Or  dextrose bolus (hypoglycemia) 10% 250 mL, PRN      Current Facility-Administered Medications   Medication Dose Route Frequency Provider Last Rate Last Admin    [Held by provider] furosemide (LASIX) injection 40 mg  40 mg IntraVENous BID Rylie Suresh MD   40 mg at 03/16/22 0905    butalbital-acetaminophen-caffeine (FIORICET, ESGIC) per tablet 1 tablet  1 tablet Oral Q4H PRN Grabiel Fowler MD   1 tablet at 03/16/22 1112    heparin (porcine) injection 4,610 Units  80 Units/kg IntraVENous PRN YNES Lee CNP        heparin (porcine) injection 2,300 Units  40 Units/kg IntraVENous PRN YNES Lee CNP        heparin 25,000 unit in sodium chloride 0.45% 250 mL (premix) infusion  5-30 Units/kg/hr IntraVENous Continuous YNES Lee CNP 10.4 mL/hr at 03/16/22 0128 18 Units/kg/hr at 03/16/22 0128    nitroGLYCERIN 50 mg in dextrose 5% 250 mL infusion 5-200 mcg/min IntraVENous Continuous YNSE Mahmood CNP   Stopped at 03/15/22 1942    metoprolol succinate (TOPROL XL) extended release tablet 25 mg  25 mg Oral Daily YNES Mahmood CNP   25 mg at 03/16/22 0905    QUEtiapine (SEROQUEL) tablet 400 mg  400 mg Oral Nightly YNES Lee - CNP   400 mg at 03/15/22 2318    aspirin chewable tablet 81 mg  81 mg Oral Daily YNES Lee - CNP   81 mg at 03/16/22 0905    glucose (GLUTOSE) 40 % oral gel 15 g  15 g Oral PRN YNES Lee CNP        glucagon (rDNA) injection 1 mg  1 mg IntraMUSCular PRN YNES Lee CNP        dextrose 5 % solution  100 mL/hr IntraVENous PRN YNES Lee CNP        sodium chloride flush 0.9 % injection 5-40 mL  5-40 mL IntraVENous 2 times per day YNES Mahmood CNP   10 mL at 03/16/22 0911    sodium chloride flush 0.9 % injection 5-40 mL  5-40 mL IntraVENous PRN YNES Lee CNP        0.9 % sodium chloride infusion  25 mL IntraVENous PRN YNES Lee CNP        ondansetron (ZOFRAN-ODT) disintegrating tablet 4 mg  4 mg Oral Q8H PRN YNES Lee CNP        Or    ondansetron (ZOFRAN) injection 4 mg  4 mg IntraVENous Q6H PRN YNES Lee CNP        polyethylene glycol (GLYCOLAX) packet 17 g  17 g Oral Daily PRN YNES Lee CNP        nicotine (NICODERM CQ) 14 MG/24HR 1 patch  1 patch TransDERmal Daily YNES Lee CNP   1 patch at 03/16/22 0910    nicotine polacrilex (COMMIT) lozenge 2 mg  2 mg Oral Q1H PRN YNES Lee CNP        famotidine (PEPCID) tablet 20 mg  20 mg Oral Daily HayleeYNES Rodriguez CNP   20 mg at 03/16/22 0905    insulin lispro (HUMALOG) injection vial 3 Units  0.05 Units/kg SubCUTAneous TID  YNES Lee CNP        insulin lispro (HUMALOG) injection vial 0-12 Units  0-12 Units SubCUTAneous TID WC YNES Lee - CNP        insulin lispro (HUMALOG) injection vial 0-6 Units  0-6 Units SubCUTAneous Nightly YNES Lee CNP        dextrose bolus (hypoglycemia) 10% 125 mL  125 mL IntraVENous PRN YNES Lee - CNP        Or    dextrose bolus (hypoglycemia) 10% 250 mL  250 mL IntraVENous PRN YNES Lee - CNP         Review of Systems:   · Constitutional: No Fever or Weight Loss   · Eyes: No Decreased Vision  · ENT: No Headaches, Hearing Loss or Vertigo  · Cardiovascular: As per HPI  · Respiratory: As per HPI  · Gastrointestinal: No abdominal pain, appetite loss, blood in stools, constipation, diarrhea or heartburn  · Genitourinary: No dysuria, trouble voiding, or hematuria  · Musculoskeletal:  No gait disturbance, weakness or joint complaints  · Integumentary: No rash or pruritis  · Neurological: No TIA or stroke symptoms  · Psychiatric: Positive for bipolar disorder  · Endocrine: No malaise, fatigue or temperature intolerance  · Hematologic/Lymphatic: No bleeding problems, blood clots or swollen lymph nodes  · Allergic/Immunologic: No nasal congestion or hives  All systems negative except as marked. Physical Examination:    Vitals:    03/16/22 0451 03/16/22 0706 03/16/22 1139 03/16/22 1150   BP: 123/84 (!) 150/104 (!) 146/108    Pulse: 85 102 99    Resp:  20  25   Temp:  97.8 °F (36.6 °C) 97.4 °F (36.3 °C)    TempSrc:  Oral Oral    SpO2: 100% 96% 100% 98%   Weight:       Height:           General Appearance:  No distress, conversant    Constitutional:  Well developed, Well nourished, No acute distress, Non-toxic appearance.    HENT:  Normocephalic, Atraumatic, Bilateral external ears normal, Oropharynx moist, No oral exudates, Nose normal. Neck- Normal range of motion, No tenderness, Supple, No stridor,no apical-carotid delay  Lymphatics : no palpable lymph nodes  Eyes:  PERRL, EOMI, Conjunctiva normal, No discharge. Respiratory:  Normal breath sounds, No respiratory distress, No wheezing, No chest tenderness. ,no use of accessory muscles, crackles Present  Cardiovascular: (PMI) apex non displaced,no lifts no thrills, ankle swelling Present , 1+, s1 and s2 audible,Murmur. Present, JVD not noted    Abdomen /GI:  Bowel sounds normal, Soft, No tenderness, No masses, No gross visceromegaly   :  No costovertebral angle tenderness   Musculoskeletal:  No edema, no tenderness, no deformities.  Back- no tenderness  Integument:  Well hydrated, no rash   Lymphatic:  No lymphadenopathy noted   Neurologic:  Alert & oriented x 3, CN 2-12 normal, normal motor function, normal sensory function, no focal deficits noted           Medical decision making and Data review:    Lab Review   Recent Labs     03/16/22  0601   WBC 4.6   HGB 13.0   HCT 38.1   *      Recent Labs     03/16/22  0601      K 4.2      CO2 22   BUN 14   CREATININE 1.2*     Recent Labs     03/16/22  0601   AST 24  24   ALT 24  24   BILIDIR 0.2   BILITOT 0.2  0.2   ALKPHOS 140*  140*     Recent Labs     03/15/22  1503 03/15/22  2359 03/16/22  0601   TROPONINT <0.010 <0.010 <0.010       Recent Labs     03/15/22  1503   PROBNP 12,109*     Lab Results   Component Value Date    INR 0.88 03/16/2022    PROTIME 11.3 (L) 03/16/2022       EKG: (reviewed by myself)    ECHO:(reviewed by myself)    Chest Xray:(reviewed by myself)  Echocardiogram complete 2D with doppler with color    Result Date: 3/16/2022  Transthoracic Echocardiography Report (TTE)  Demographics   Patient Name       Beverley PEMBERTON Date of Study       03/16/2022   Date of Birth      1965         Gender              Female   Age                61 year(s)         Race                Black   Patient Number     [id number]         Room Number         3106   Visit Number       [id number]   Corporate ID       [id number]   Accession Number   [id number]         [id number]         TSHMAB KVZG [address]                                                            ELISA   Ordering Physician Jason Boone                     CNP                Physician           MD  Procedure Type of Study   TTE procedure:ECHOCARDIOGRAM COMPLETE 2D W DOPPLER W COLOR. Procedure Date Date: 03/16/2022 Start: 07:50 AM Study Location: Portable Technical Quality: Adequate visualization Indications:Deep vein thrombosis (DVT). Patient Status: Routine Height: 62 inches Weight: 127 pounds BSA: 1.58 m2 BMI: 23.23 kg/m2 HR: 93 bpm BP: 150/104 mmHg  Conclusions   Summary  Left ventricular systolic function is abnormal.  Ejection fraction is visually estimated at 30-35 %  Slight hypokinesis of the anteroseptal wall segment. Mild left ventricular hypertrophy. Severe eccentric mitral regurgitation (ERO: 0.40 cm sq, regurgitant volume:  62 ml). Essentially normal right ventricle; no evidence of right heart strain. Moderate tricuspid regurgitation; RVSP: 44 mmHg. Mild pulmonic regurgitation present. No evidence of any pericardial effusion. Bilateral pleural effusion. Inferior vena cava is dilated, measuring at 2.3 cm, but does collapse with  respiration. Signature   ------------------------------------------------------------------  Electronically signed by Fernanda Herman MD (Interpreting  physician) on 03/16/2022 at 11:15 AM  ------------------------------------------------------------------   Findings   Left Ventricle  Left ventricular systolic function is abnormal.  Ejection fraction is visually estimated at 30-35%  Slight hypokinesis of the anteroseptal wall segment. Mild left ventricular hypertrophy. Unable to determine diastolic function due to arrhythmia. Left Atrium  Essentially normal left atrium. Right Atrium  Essentially normal right atrium. Right Ventricle  Essentially normal right ventricle; no evidence of right heart strain.    Aortic Valve  Sclerotic, but non-stenotic aortic valve. Mitral Valve  Severe eccentric mitral regurgitation (ERO: 0.40 cm sq, regurgitant volume:  62 ml). Tricuspid Valve  Moderate tricuspid regurgitation; RVSP: 44 mmHg. Pulmonic Valve  Mild pulmonic regurgitation present. Pericardial Effusion  No evidence of any pericardial effusion. Pleural Effusion  Bilateral pleural effusion. Miscellaneous  Inferior vena cava is dilated, measuring at 2.3 cm, but does collapse with  respiration. The aorta is within normal limits.   M-Mode/2D Measurements & Calculations   LV Diastolic Dimension:  LV Systolic Dimension:  LA Dimension: 3.6 cmAO Root  5.21 cm                  4.64 cm                 Dimension: 3.4 cmLA Area:  LV FS:10.9 %             LV Volume Diastolic: 79 71.1 cm2  LV PW Diastolic: 2.35 cm ml  LV PW Systolic: 7.68 cm  LV Volume Systolic: 50  Septum Diastolic: 9.34   ml  cm                       LV EDV/LV EDV Index: 79 RV Diastolic Dimension:  Septum Systolic: 7.35 cm DX/68 I3NV ESV/LV ESV   2.17 cm  CO: 3.42 l/min           Index: 50 ml/32 m2  CI: 2.16 l/m*m2          EF Calculated (A4C):    LA/Aorta: 1.06                           36.7 %  LV Area Diastolic: 29.2  EF Calculated (2D):     LA volume/Index: 61 ml  cm2                      23.6 %                  /98Z1  LV Area Systolic: 73.4  cm2                      LV Length: 5.93 cm                            LVOT: 2 cm  Doppler Measurements & Calculations   MV Peak E-Wave: 126     AV Peak Velocity: 120 cm/s LVOT Peak Velocity: 92.1  cm/s                    AV Peak Gradient: 5.76     cm/s  MV Peak A-Wave: 62.3    mmHg                       LVOT Mean Velocity: 64.6  cm/s                    AV Mean Velocity: 91.2     cm/s  MV E/A Ratio: 2.02      cm/s                       LVOT Peak Gradient: 3  MV Peak Gradient: 6.35  AV Mean Gradient: 4 mmHg   mmHgLVOT Mean Gradient: 2  mmHg                    AV VTI: 16.7 cm            mmHg                          AV Area (Continuity):2.2   Estimated RVSP: 44 mmHg  MV P1/2t: 34 msec       cm2                        Estimated RAP:3 mmHg  MVA by PHT:6.47 cm2  MV Area (PISA): 0.37    LVOT VTI: 11.7 cm  cm2                                                TR Velocity:322 cm/s  MV E' Septal Velocity:  Estimated PASP: 44.47 mmHg TR Gradient:41.47 mmHg  4.66 cm/s  MV E' Lateral Velocity:  7.07 cm/s  MV E/E' septal: 27.04  MV E/E' lateral: 17.82  MR Velocity: 600 cm/s  MV ARCELIA PISA: 0.49 cm2  MR VTI: 167 cm  Alias Velocity: 38.5  cm/sPISA Radius: 1.1 cm  MV ARCELIA Volumetric: 0.37  cm2  PISA area: 7.6 cm2MR  flow rate: 292.6 ml/sMR  volume:81.83 ml      XR CHEST PORTABLE    Result Date: 3/15/2022  EXAMINATION: ONE XRAY VIEW OF THE CHEST 3/15/2022 12:35 pm COMPARISON: 03/03/2022 HISTORY: ORDERING SYSTEM PROVIDED HISTORY: Sob TECHNOLOGIST PROVIDED HISTORY: Reason for exam:->Sob Reason for Exam: sob FINDINGS: Interstitial pulmonary edema is present. Small left pleural effusion. Heart size is at the upper limits of normal.  No pneumothorax. Interstitial pulmonary edema with small left pleural effusion. XR CHEST PORTABLE    Result Date: 3/3/2022  EXAMINATION: ONE XRAY VIEW OF THE CHEST 3/3/2022 1:42 pm COMPARISON: 08/01/2020 HISTORY: ORDERING SYSTEM PROVIDED HISTORY: sob TECHNOLOGIST PROVIDED HISTORY: Reason for exam:->sob Reason for Exam: SOB FINDINGS: Small right pleural effusion. Mild bilateral perihilar opacities. No pneumothorax. Heart size is at the upper limits of normal.     Small right pleural effusion. Mild bilateral perihilar opacities, with the differential including pulmonary edema and atypical infection. VL DUP LOWER EXTREMITY VENOUS BILATERAL    Result Date: 3/15/2022  EXAMINATION: DUPLEX VENOUS ULTRASOUND OF THE BILATERAL LOWER EXTREMITIES3/15/2022 1:26 pm TECHNIQUE: Duplex ultrasound using B-mode/gray scaled imaging, Doppler spectral analysis and color flow Doppler was obtained of the deep venous structures of the lower bilateral extremities.  COMPARISON: None. HISTORY: ORDERING SYSTEM PROVIDED HISTORY: lower extremity swelling, ro dvt TECHNOLOGIST PROVIDED HISTORY: Reason for exam:->lower extremity swelling, ro dvt FINDINGS: Deep venous thrombus present within the right femoral vein which is only partially compressible and demonstrates some vascular flow consistent with nonocclusive thrombus. The calf veins of the right lower extremity are noncompressible occluding the anterior tibial vein, peroneal vein, and posterior tibial vein consistent with thrombus. The venous thrombus present within the left femoral vein which is noncompressible and demonstrates no significant flow. Flow is identified within the left popliteal vein. The calf veins of the left lower extremity also demonstrate occlusive thrombus. 1. Extensive deep venous thrombus of the bilateral lower extremities. Critical results were called by Jennifer Dobbs MD to JAYY Jacobsen on 3/15/2022 at 14:54. CTA PULMONARY W CONTRAST    Result Date: 3/15/2022  EXAMINATION: CTA OF THE CHEST 3/15/2022 1:59 pm TECHNIQUE: CTA of the chest was performed after the administration of intravenous contrast.  Multiplanar reformatted images are provided for review. MIP images are provided for review. Dose modulation, iterative reconstruction, and/or weight based adjustment of the mA/kV was utilized to reduce the radiation dose to as low as reasonably achievable. COMPARISON: None. HISTORY: ORDERING SYSTEM PROVIDED HISTORY: sob, bilateral dvt, ro pe TECHNOLOGIST PROVIDED HISTORY: Reason for exam:->sob, bilateral dvt, ro pe Decision Support Exception - unselect if not a suspected or confirmed emergency medical condition->Emergency Medical Condition (MA) Reason for Exam: sob, dvt's Additional signs and symptoms: none Relevant Medical/Surgical History: 75ml isovue 370 FINDINGS: Pulmonary Arteries: There is respiratory motion artifact along with streak artifact generated by the patient's right arm is at her side. These combine to limited evaluation of the segmental and subsegmental pulmonary arterial branches. No central pulmonary embolism is detected. Mediastinum: Visualized thyroid unremarkable. Mildly enlarged mediastinal lymph nodes are seen which are likely reactive. Esophagus is unremarkable. Cardiac chambers are unremarkable. No pericardial effusion. Thoracic aorta is normal in caliber. Lungs/pleura: Again, respiratory motion degrades imaging. There is a moderate right and a small moderate left pleural effusion. Adjacent airspace disease is noted. Mild patchy ground-glass infiltrates are detected as well, especially within the superior segment of the left lower lobe and the periphery of the right upper lobe. Interlobular septal thickening is noted. Upper Abdomen: Grossly unremarkable, but not well evaluated secondary to extensive respiratory motion artifact. Soft Tissues/Bones: No acute or suspicious bony abnormalities. Visualized extra thoracic soft tissues unremarkable. Limited evaluation of the segmental and subsegmental pulmonary arterial branches. No central pulmonary embolism is detected. Bilateral pleural effusions, patchy ground-glass opacity, and interlobular septal thickening, the constellation of which is most compatible with pulmonary edema. That said, given the focality of the ground-glass infiltrates, infectious or inflammatory pneumonitis should be considered as well, including viral infection. All labs, medications and tests reviewed by myself including data  from outside source , patient and available family . Continue all other medications of all above medical condition listed as is.      Impression:  Principal Problem:    Acute bilateral deep vein thrombosis (DVT) of femoral veins (HCC)  Active Problems:    Precordial pain    Type 2 diabetes mellitus without complication, with long-term current use of insulin (HCC)    Pulmonary embolism (HCC)    Acute on chronic systolic congestive heart failure ([facility] [address]  Resolved Problems:    * No resolved hospital problems. *      Assessment: 61 y. o.year old with PMH of  has a past medical history of CAD (coronary artery disease), Diabetes mellitus ([facility] [address] Hepatitis C, and Schizophrenia ([facility] [address] Plan and Recommendations:    Acute PE with DVT without any right heart strain pattern start anticoagulation and switch to Xarelto or Lovenox for now  Echo shows severe LV dysfunction with severe mitral regurgitation start diuresis Lasix chest x-ray finding could be pulmonary edema? BNP is extremely elevated I think she has congestive heart failure rather than pneumonia  CHF: Acute Systolic/ Diastolic decompensated heart failure. Appears to be volume overloaded . Agree with diuresis. We can try IV Lasix 40 mg BID. Depending on response we can titrate diuretics accordingly. Please continue [facility] recommended medical thearpy including Coreg/ Toprol XL  , ACE/ARB  and Aldactone 25 mg daily. Strict Is and Os and Daily weights. chf nurse consult  HTN: stable, continue present medications   She will need invasive work-up cardiac cath to define coronary anatomy when she is more stable  DVT prophylaxis if no contraindication  6. Dyslipidemia: continue statins           Thank you  much for consult and giving us the opportunity in contributing in the care of this patient. Please feel free to call me for any questions.        Johny Jackson MD, 3/16/2022 12:11 PM

## 2022-03-17 LAB
ANION GAP SERPL CALCULATED.3IONS-SCNC: 14 MMOL/L (ref 4–16)
BACTERIA: NEGATIVE /HPF
BILIRUBIN URINE: NEGATIVE MG/DL
BLOOD, URINE: ABNORMAL
BUN BLDV-MCNC: 24 MG/DL (ref 6–23)
CALCIUM SERPL-MCNC: 8.3 MG/DL (ref 8.3–10.6)
CHLORIDE BLD-SCNC: 108 MMOL/L (ref 99–110)
CLARITY: ABNORMAL
CO2: 20 MMOL/L (ref 21–32)
COLOR: YELLOW
CREAT SERPL-MCNC: 1.8 MG/DL (ref 0.6–1.1)
GFR AFRICAN AMERICAN: 35 ML/MIN/1.73M2
GFR NON-AFRICAN AMERICAN: 29 ML/MIN/1.73M2
GLUCOSE BLD-MCNC: 141 MG/DL (ref 70–99)
GLUCOSE BLD-MCNC: 275 MG/DL (ref 70–99)
GLUCOSE BLD-MCNC: 406 MG/DL (ref 70–99)
GLUCOSE, URINE: NEGATIVE MG/DL
HCT VFR BLD CALC: 42.6 % (ref 37–47)
HEMOGLOBIN: 14.4 GM/DL (ref 12.5–16)
HYALINE CASTS: 2 /LPF
KETONES, URINE: NEGATIVE MG/DL
LEUKOCYTE ESTERASE, URINE: ABNORMAL
MCH RBC QN AUTO: 32.9 PG (ref 27–31)
MCHC RBC AUTO-ENTMCNC: 33.8 % (ref 32–36)
MCV RBC AUTO: 97.3 FL (ref 78–100)
NITRITE URINE, QUANTITATIVE: NEGATIVE
PDW BLD-RTO: 13.6 % (ref 11.7–14.9)
PH, URINE: 6 (ref 5–8)
PLATELET # BLD: 112 K/CU MM (ref 140–440)
PMV BLD AUTO: 11.7 FL (ref 7.5–11.1)
POTASSIUM SERPL-SCNC: 5.3 MMOL/L (ref 3.5–5.1)
PROTEIN UA: 100 MG/DL
RBC # BLD: 4.38 M/CU MM (ref 4.2–5.4)
RBC URINE: 28 /HPF (ref 0–6)
SODIUM BLD-SCNC: 142 MMOL/L (ref 135–145)
SPECIFIC GRAVITY UA: 1.02 (ref 1–1.03)
SQUAMOUS EPITHELIAL: 2 /HPF
UROBILINOGEN, URINE: 0.2 MG/DL (ref 0.2–1)
WBC # BLD: 5.5 K/CU MM (ref 4–10.5)
WBC CLUMP: ABNORMAL /HPF
WBC UA: 164 /HPF (ref 0–5)

## 2022-03-17 PROCEDURE — 6360000002 HC RX W HCPCS: Performed by: HOSPITALIST

## 2022-03-17 PROCEDURE — 80048 BASIC METABOLIC PNL TOTAL CA: CPT

## 2022-03-17 PROCEDURE — 6360000002 HC RX W HCPCS: Performed by: NURSE PRACTITIONER

## 2022-03-17 PROCEDURE — 6370000000 HC RX 637 (ALT 250 FOR IP): Performed by: HOSPITALIST

## 2022-03-17 PROCEDURE — 6360000002 HC RX W HCPCS

## 2022-03-17 PROCEDURE — 6360000002 HC RX W HCPCS: Performed by: INTERNAL MEDICINE

## 2022-03-17 PROCEDURE — 6370000000 HC RX 637 (ALT 250 FOR IP): Performed by: NURSE PRACTITIONER

## 2022-03-17 PROCEDURE — 2580000003 HC RX 258: Performed by: NURSE PRACTITIONER

## 2022-03-17 PROCEDURE — 87086 URINE CULTURE/COLONY COUNT: CPT

## 2022-03-17 PROCEDURE — 2580000003 HC RX 258: Performed by: HOSPITALIST

## 2022-03-17 PROCEDURE — 82962 GLUCOSE BLOOD TEST: CPT

## 2022-03-17 PROCEDURE — 1200000000 HC SEMI PRIVATE

## 2022-03-17 PROCEDURE — 85027 COMPLETE CBC AUTOMATED: CPT

## 2022-03-17 PROCEDURE — APPSS45 APP SPLIT SHARED TIME 31-45 MINUTES: Performed by: NURSE PRACTITIONER

## 2022-03-17 PROCEDURE — 81001 URINALYSIS AUTO W/SCOPE: CPT

## 2022-03-17 PROCEDURE — 36415 COLL VENOUS BLD VENIPUNCTURE: CPT

## 2022-03-17 PROCEDURE — 99233 SBSQ HOSP IP/OBS HIGH 50: CPT | Performed by: INTERNAL MEDICINE

## 2022-03-17 RX ORDER — KETOROLAC TROMETHAMINE 30 MG/ML
15 INJECTION, SOLUTION INTRAMUSCULAR; INTRAVENOUS ONCE
Status: COMPLETED | OUTPATIENT
Start: 2022-03-17 | End: 2022-03-17

## 2022-03-17 RX ORDER — 0.9 % SODIUM CHLORIDE 0.9 %
250 INTRAVENOUS SOLUTION INTRAVENOUS ONCE
Status: DISCONTINUED | OUTPATIENT
Start: 2022-03-17 | End: 2022-03-17

## 2022-03-17 RX ORDER — HYDROMORPHONE HCL 110MG/55ML
0.5 PATIENT CONTROLLED ANALGESIA SYRINGE INTRAVENOUS EVERY 4 HOURS PRN
Status: DISCONTINUED | OUTPATIENT
Start: 2022-03-17 | End: 2022-03-22 | Stop reason: HOSPADM

## 2022-03-17 RX ORDER — FUROSEMIDE 10 MG/ML
40 INJECTION INTRAMUSCULAR; INTRAVENOUS 2 TIMES DAILY
Status: DISCONTINUED | OUTPATIENT
Start: 2022-03-17 | End: 2022-03-17

## 2022-03-17 RX ORDER — GUAIFENESIN AND CODEINE PHOSPHATE 100; 10 MG/5ML; MG/5ML
5 SOLUTION ORAL EVERY 4 HOURS PRN
Status: DISCONTINUED | OUTPATIENT
Start: 2022-03-17 | End: 2022-03-22 | Stop reason: HOSPADM

## 2022-03-17 RX ORDER — LACTULOSE 10 G/15ML
20 SOLUTION ORAL 3 TIMES DAILY PRN
Status: DISCONTINUED | OUTPATIENT
Start: 2022-03-17 | End: 2022-03-20

## 2022-03-17 RX ORDER — KETOROLAC TROMETHAMINE 30 MG/ML
INJECTION, SOLUTION INTRAMUSCULAR; INTRAVENOUS
Status: COMPLETED
Start: 2022-03-17 | End: 2022-03-17

## 2022-03-17 RX ORDER — GUAIFENESIN/DEXTROMETHORPHAN 100-10MG/5
5 SYRUP ORAL EVERY 4 HOURS PRN
Status: DISCONTINUED | OUTPATIENT
Start: 2022-03-17 | End: 2022-03-17

## 2022-03-17 RX ADMIN — QUETIAPINE FUMARATE 400 MG: 100 TABLET ORAL at 21:09

## 2022-03-17 RX ADMIN — KETOROLAC TROMETHAMINE 15 MG: 30 INJECTION, SOLUTION INTRAMUSCULAR at 00:50

## 2022-03-17 RX ADMIN — SODIUM CHLORIDE, PRESERVATIVE FREE 10 ML: 5 INJECTION INTRAVENOUS at 21:22

## 2022-03-17 RX ADMIN — GUAIFENESIN AND CODEINE PHOSPHATE 5 ML: 100; 10 SOLUTION ORAL at 21:09

## 2022-03-17 RX ADMIN — ONDANSETRON 4 MG: 2 INJECTION INTRAMUSCULAR; INTRAVENOUS at 16:35

## 2022-03-17 RX ADMIN — SODIUM CHLORIDE, PRESERVATIVE FREE 10 ML: 5 INJECTION INTRAVENOUS at 08:27

## 2022-03-17 RX ADMIN — FUROSEMIDE 40 MG: 10 INJECTION, SOLUTION INTRAMUSCULAR; INTRAVENOUS at 10:09

## 2022-03-17 RX ADMIN — ONDANSETRON 4 MG: 2 INJECTION INTRAMUSCULAR; INTRAVENOUS at 08:26

## 2022-03-17 RX ADMIN — HYDROMORPHONE HYDROCHLORIDE 0.5 MG: 1 INJECTION, SOLUTION INTRAMUSCULAR; INTRAVENOUS; SUBCUTANEOUS at 21:09

## 2022-03-17 RX ADMIN — SODIUM CHLORIDE 25 ML: 9 INJECTION, SOLUTION INTRAVENOUS at 16:40

## 2022-03-17 RX ADMIN — LACTULOSE 20 G: 10 SOLUTION ORAL at 10:09

## 2022-03-17 RX ADMIN — METOPROLOL SUCCINATE 25 MG: 25 TABLET, EXTENDED RELEASE ORAL at 08:26

## 2022-03-17 RX ADMIN — SODIUM ZIRCONIUM CYCLOSILICATE 10 G: 10 POWDER, FOR SUSPENSION ORAL at 18:30

## 2022-03-17 RX ADMIN — KETOROLAC TROMETHAMINE 15 MG: 30 INJECTION, SOLUTION INTRAMUSCULAR; INTRAVENOUS at 08:35

## 2022-03-17 RX ADMIN — ASPIRIN 81 MG: 81 TABLET, CHEWABLE ORAL at 08:26

## 2022-03-17 RX ADMIN — MAGNESIUM HYDROXIDE 30 ML: 2400 SUSPENSION ORAL at 10:09

## 2022-03-17 RX ADMIN — INSULIN LISPRO 12 UNITS: 100 INJECTION, SOLUTION INTRAVENOUS; SUBCUTANEOUS at 17:26

## 2022-03-17 RX ADMIN — HYDROMORPHONE HYDROCHLORIDE 0.5 MG: 1 INJECTION, SOLUTION INTRAMUSCULAR; INTRAVENOUS; SUBCUTANEOUS at 16:33

## 2022-03-17 RX ADMIN — INSULIN LISPRO 3 UNITS: 100 INJECTION, SOLUTION INTRAVENOUS; SUBCUTANEOUS at 21:18

## 2022-03-17 RX ADMIN — HYDROMORPHONE HYDROCHLORIDE 0.5 MG: 1 INJECTION, SOLUTION INTRAMUSCULAR; INTRAVENOUS; SUBCUTANEOUS at 12:36

## 2022-03-17 RX ADMIN — ENOXAPARIN SODIUM 60 MG: 100 INJECTION SUBCUTANEOUS at 08:38

## 2022-03-17 RX ADMIN — HYDRALAZINE HYDROCHLORIDE 10 MG: 20 INJECTION, SOLUTION INTRAMUSCULAR; INTRAVENOUS at 16:41

## 2022-03-17 RX ADMIN — FAMOTIDINE 20 MG: 20 TABLET ORAL at 08:26

## 2022-03-17 RX ADMIN — ONDANSETRON 4 MG: 2 INJECTION INTRAMUSCULAR; INTRAVENOUS at 22:36

## 2022-03-17 ASSESSMENT — PAIN SCALES - GENERAL
PAINLEVEL_OUTOF10: 8
PAINLEVEL_OUTOF10: 9
PAINLEVEL_OUTOF10: 7
PAINLEVEL_OUTOF10: 4
PAINLEVEL_OUTOF10: 6
PAINLEVEL_OUTOF10: 5
PAINLEVEL_OUTOF10: 6
PAINLEVEL_OUTOF10: 8
PAINLEVEL_OUTOF10: 8
PAINLEVEL_OUTOF10: 7
PAINLEVEL_OUTOF10: 9
PAINLEVEL_OUTOF10: 3

## 2022-03-17 ASSESSMENT — PAIN SCALES - WONG BAKER
WONGBAKER_NUMERICALRESPONSE: 0
WONGBAKER_NUMERICALRESPONSE: 8
WONGBAKER_NUMERICALRESPONSE: 0

## 2022-03-17 ASSESSMENT — PAIN DESCRIPTION - LOCATION
LOCATION: ABDOMEN;HEAD
LOCATION: ABDOMEN;HEAD

## 2022-03-17 ASSESSMENT — PAIN DESCRIPTION - PAIN TYPE
TYPE: ACUTE PAIN
TYPE: ACUTE PAIN
TYPE: ACUTE PAIN;CHRONIC PAIN
TYPE: ACUTE PAIN;CHRONIC PAIN
TYPE: ACUTE PAIN

## 2022-03-17 ASSESSMENT — PAIN DESCRIPTION - DESCRIPTORS
DESCRIPTORS: HEADACHE
DESCRIPTORS: HEADACHE

## 2022-03-17 ASSESSMENT — PAIN DESCRIPTION - FREQUENCY
FREQUENCY: CONTINUOUS
FREQUENCY: CONTINUOUS

## 2022-03-17 NOTE — PROGRESS NOTES
Cardiology Progress Note     Today's Plan increase BB  Consider stress test vs LHC once decongested    Admit Date:  3/15/2022    Consult reason/ Seen today for: SOB/PE  CARDIOLOGY ATTENDING ADDENDUM    MEDICAL DECISION MAKING;  1. Acute PE with DVT without any right heart strain pattern start anticoagulation and switch to Xarelto or Lovenox for now  2. I talked to her about left heart cath she says she will think about it we will hold tonight's dose of lovenox in case she decides to proceed with heart cath  3. Echo shows severe LV dysfunction with severe mitral regurgitation start diuresis Lasix chest x-ray finding could be pulmonary edema? BNP is extremely elevated I think she has congestive heart failure rather than pneumonia  4. CHF: Acute Systolic/ Diastolic decompensated heart failure. Appears to be volume overloaded . Agree with diuresis. We can try IV Lasix 40 mg BID. Depending on response we can titrate diuretics accordingly. Please continue Gudelines recommended medical thearpy including Coreg/ Toprol XL  , ACE/ARB  and Aldactone 25 mg daily. Strict Is and Os and Daily weights. chf nurse consult  5. HTN: stable, continue present medications   6. She will need invasive work-up cardiac cath to define coronary anatomy when she is more stable  7. DVT prophylaxis if no contraindication  6. Dyslipidemia: continue statins             HPI:  I have reviewed the HPI  And agree with above   Ruchi Carrion is a 61 y. o.year old who and presents with had concerns including Shortness of Breath (short of breath since 03/03/2022, swelling to both legs, still has uti symptoms).   Chief Complaint   Patient presents with    Shortness of Breath     short of breath since 03/03/2022, swelling to both legs, still has uti symptoms     Please review addendum/changes made to note above   Interval history: Echo shows new LV dysfunction EF of 30 to 35% with wall motion abnormality and severe MR    Physical Exam:  General:  Awake, alert, NAD  Head:normal  Eye:normal  Neck:  No JVD   Chest:  Clear to auscultation, respiration easy  Cardiovascular:  S1 and S2 audible, No added heart sounds, No signs of ankle edema, or volume overload, No evidence of JVD, No crackles  Abdomen:   nontender  Extremities:  *nio edema  Pulses; palpable  Neuro: grossly normal        I agree with the plan, which was planned by myself and discussed with advanced level provider. My documented MDM is a substantive portion of the supervisory note. I have seen ,spoken to  and examined this patient personally, independently of the advanced level provider. I have spent substantiate  portion of this encounter independently myself in examining patient and developing the medical management plan . I have reviewed the hospital care given to date and reviewed all pertinent labs and imaging. The plan was developed mutually at the time of the visit with the patient,  NP /PA  and myself. I have spoken with patient, nursing staff and provided written and verbal instructions . The above note has been reviewed and I agree with the assessment, diagnosis, and treatment plan with changes made by me as follows . Eleazar Diaz MD [facility] [place] 03/17/22   Subjective and  Overnight Events:  Continues with shortness of breath and cough    Telemetry SR    Assessment / Plan:   Shortness of breath multifactorial/ HF/ pleural effusion  ? Acute PE: CTA NO central PE: echo did not show right heart strain   Combined systolic/diastolic heart failure:  EF 30-35 % with mild LVH: decompensated: continues with shortness of breath-continue IV lasix bid / toprol: strict I/O and daily weights: no MRA d/t hyperkalemia   HTN: uncontrolled on admit-improving:   VHD: severe MVR: ERO 0.40 c, sq  DVT- extensive DVT of bilateral lower legs- on Lovenox          History of Presenting Illness:    Chief complain on admission : 61 y. o.year old who is admitted for  Chief Complaint   Patient presents with    Shortness of Breath     short of breath since 03/03/2022, swelling to both legs, still has uti symptoms        Past medical history:    has a past medical history of CAD (coronary artery disease), Diabetes mellitus ([facility] [address] Hepatitis C, and Schizophrenia ([facility] [address] Past surgical history:   has a past surgical history that includes Hysterectomy and Cholecystectomy. Social History:   reports that she has been smoking cigars. She has been smoking about 3.00 packs per day. She has never used smokeless tobacco. She reports current alcohol use. She reports previous drug use. Drug: Marijuana Eudelia Veronica). Family history:  family history includes Cancer in her father; No Known Problems in her mother. Allergies   Allergen Reactions    Haldol [Haloperidol Lactate] Other (See Comments)     Unknown, severe reaction per mother    Penicillins        Review of Systems:   All 14 systems were reviewed and are negative  Except for the positive findings which are documented     BP (!) 136/97   Pulse 99   Temp 98.1 °F (36.7 °C) (Oral)   Resp 23   Ht 5' 2\" (1.575 m)   Wt 127 lb (57.6 kg)   SpO2 95%   BMI 23.23 kg/m²   No intake or output data in the 24 hours ending 03/17/22 1324    Physical Exam:  Physical Exam  Vitals reviewed. Constitutional:       Appearance: Normal appearance. HENT:      Head: Normocephalic. Mouth/Throat:      Mouth: Mucous membranes are moist.   Eyes:      Pupils: Pupils are equal, round, and reactive to light. Neck:      Vascular: No carotid bruit. Cardiovascular:      Rate and Rhythm: Normal rate and regular rhythm. Heart sounds: Murmur heard. Pulmonary:      Breath sounds: Wheezing and rales present. Abdominal:      General: There is no distension. Tenderness: There is no abdominal tenderness. Musculoskeletal:      Right lower leg: Edema present. Left lower leg: Edema present.    Skin:     General: Skin is warm. Capillary Refill: Capillary refill takes less than 2 seconds. Neurological:      General: No focal deficit present. Mental Status: She is alert.    Psychiatric:         Mood and Affect: Mood normal.          Medications:    furosemide  40 mg IntraVENous BID    enoxaparin  1 mg/kg SubCUTAneous BID    metoprolol succinate  25 mg Oral Daily    QUEtiapine  400 mg Oral Nightly    aspirin  81 mg Oral Daily    sodium chloride flush  5-40 mL IntraVENous 2 times per day    nicotine  1 patch TransDERmal Daily    famotidine  20 mg Oral Daily    insulin lispro  0.05 Units/kg SubCUTAneous TID WC    insulin lispro  0-12 Units SubCUTAneous TID WC    insulin lispro  0-6 Units SubCUTAneous Nightly      dextrose      sodium chloride       magnesium hydroxide, lactulose, hydrALAZINE (APRESOLINE) ivpb, guaiFENesin-dextromethorphan, HYDROmorphone, heparin (porcine), glucose, glucagon (rDNA), dextrose, sodium chloride flush, sodium chloride, ondansetron **OR** ondansetron, polyethylene glycol, nicotine polacrilex, dextrose bolus (hypoglycemia) **OR** dextrose bolus (hypoglycemia)    Lab Data:  CBC:   Recent Labs     03/15/22  2359 03/16/22  0601 03/17/22  0645   WBC 7.4 4.6 5.5   HGB 14.0 13.0 14.4   HCT 39.6 38.1 42.6   MCV 94.7 96.0 97.3   * 128* 112*     BMP:   Recent Labs     03/15/22  1503 03/16/22  0601 03/17/22  1112    137 142   K 4.6 4.2 5.3*    108 108   CO2 19* 22 20*   BUN 14 14 24*   CREATININE 1.0 1.2* 1.8*     PT/INR:   Recent Labs     03/16/22  0601   PROTIME 11.3*   INR 0.88     BNP:    Recent Labs     03/15/22  1503 03/16/22  0601   PROBNP 12,109* 15,695*     TROPONIN:   Recent Labs     03/15/22  1503 03/15/22  2359 03/16/22  0601   TROPONINT <0.010 <0.010 <0.010              Impression:  Principal Problem:    Acute bilateral deep vein thrombosis (DVT) of femoral veins (HCC)  Active Problems:    Precordial pain    Type 2 diabetes mellitus without complication, with long-term current use of insulin ([facility] [address]    Pulmonary embolism (HCC)    Acute on chronic systolic congestive heart failure ([facility] [address]  Resolved Problems:    * No resolved hospital problems. *       All labs, medications and tests reviewed by myself, continue all other medications of all above medical condition listed as is except for changes mentioned above. Thank you   Please call with questions.     Electronically signed by YNES Noel CNP on 3/17/2022 at 1:24 PM

## 2022-03-17 NOTE — CONSULTS
RENAL DOSE ADJUSTMENT MADE PER P/T PROTOCOL    PREVIOUS ORDER:  Enoxaparin 60mg subq bid    Estimated Creatinine Clearance: 27 mL/min (A) (based on SCr of 1.8 mg/dL (H)). Recent Labs     03/15/22  1503 03/15/22  2359 03/16/22  0601 03/16/22  0601 03/17/22  0645 03/17/22  1112   BUN 14  --  14  --   --  24*   CREATININE 1.0  --  1.2*   < >  --  1.8*      < > 128*   < > 112*  --    INR  --   --  0.88  --   --   --     < > = values in this interval not displayed. NEW RENALLY ADJUSTED ORDER:  ENOXAPARIN 60MG SUBQ DAILY FOR CRCL = 27    Holly Cabezas.  MARIA TERESA Witt [facility]  3/17/2022 4:29 PM

## 2022-03-17 NOTE — PLAN OF CARE
Problem: Pain:  Goal: Pain level will decrease  Description: Pain level will decrease  Outcome: Ongoing  Goal: Control of acute pain  Description: Control of acute pain  Outcome: Ongoing  Goal: Control of chronic pain  Description: Control of chronic pain  Outcome: Ongoing     Problem: Discharge Planning:  Goal: Discharged to appropriate level of care  Description: Discharged to appropriate level of care  Outcome: Met This Shift     Problem: Falls - Risk of:  Goal: Will remain free from falls  Description: Will remain free from falls  Outcome: Met This Shift  Goal: Absence of physical injury  Description: Absence of physical injury  Outcome: Met This Shift

## 2022-03-17 NOTE — PROGRESS NOTES
[facility] HOSPITALIST PROGRESS NOTE      PCP: Charles Lindquist MD    Date of Admission: 3/15/2022    Subjective: continues to have headache    Brief Hospital summary patient is a 58-year-old female with history of schizophrenia, hepatitis C who was admitted with shortness of breath. Patient had bilateral lower extremity swelling, was treated with UTI recently  Lower extremity duplex positive for extensive DVT, CTA positive for central PE, echocardiogram ordered, heparin started, cardiology consulted, Lasix started    Vitals signs:  Afebrile, HR  range, on room air,   Blood pressure 136/97     Medications: aspirin, Lovenox, famotidine, lasix, SSI, metoprolol, Seroquel     Antibiotics: None    Fluid status: Not documented    Labs:   WBC 5.5, Hb 14.4, Plt 112    Imaging:   CTA showed did not show central or subsegmental PE bilateral pleural effusion, patchy groundglass opacities and interlobular septal thickening consistent with pulmonary edema    Lower extremity duplex positive for extensive DVT of bilateral lower extremities    ECHO shows EF of 30-35%, slight hypokinesis, RSVP 44 mmhg     Assessment/Plan:     Acute bilateral LE DVT   Pulmonary edema  Acute systolic CHF:   Admitted with dyspnea, C proBNP elevated, bilateral lower extremity edema  Venous duplex positive for extensive DVTs, heparin started  CT PE negative for PE  Groundglass opacities and pulmonary edema noted  Lasix started, creatinine 1.2, echocardiogram as above   Input output record, daily weight, salt and fluid intake    Continue to monitor urine output  Pro BNP elevated, appears dry clinically, dc lasix, creatinine 1.8   Cardiology consulted  Ischemic work up cardiology  I/O record, daily weight, salt and fluid restriction       MELANY: creatinine 1.8, sec.  To diuresis  Hold lasix  lokelma x 1 for hyperkalemia  Check BMP in am       Diabetes mellitus type 2: Sliding scale insulin diabetic diet for now,   Hold oral hypoglycemics Glucose 160-191 range     History of schizophrenia: continue Seroquel     Hepatitis C: chronic stable     tobacco dependence: Smokes 3 packs/day, nicotine patch    DVT prophlaxis:   Heparin infusion     Last BM:   As needed laxatives    Ambulation:   As tolerated     Disposition:   Home     Diet: diabetic diet     Physical Exam Performed:       BP (!) 136/97   Pulse 99   Temp 97.8 °F (36.6 °C) (Oral)   Resp 23   Ht 5' 2\" (1.575 m)   Wt 127 lb (57.6 kg)   SpO2 92%   BMI 23.23 kg/m²     Physical Exam  Constitutional:       General: She is not in acute distress. Appearance: Normal appearance. HENT:      Head: Normocephalic and atraumatic. Right Ear: External ear normal.      Left Ear: External ear normal.      Mouth/Throat:      Mouth: Mucous membranes are dry. Eyes:      Extraocular Movements: Extraocular movements intact. Pupils: Pupils are equal, round, and reactive to light. Cardiovascular:      Rate and Rhythm: Normal rate and regular rhythm. Heart sounds: No murmur heard. Pulmonary:      Effort: Pulmonary effort is normal. No respiratory distress. Breath sounds: Normal breath sounds. No wheezing. Abdominal:      General: Bowel sounds are normal. There is no distension. Palpations: Abdomen is soft. Tenderness: There is no abdominal tenderness. Musculoskeletal:         General: No swelling. Cervical back: Normal range of motion. Skin:     General: Skin is warm. Neurological:      General: No focal deficit present. Mental Status: She is alert and oriented to person, place, and time. Cranial Nerves: No cranial nerve deficit.    Psychiatric:         Mood and Affect: Mood normal.         Labs:   Recent Labs     03/15/22  2359 03/16/22  0601 03/17/22  0645   WBC 7.4 4.6 5.5   HGB 14.0 13.0 14.4   HCT 39.6 38.1 42.6   * 128* 112*     Recent Labs     03/15/22  1503 03/16/22  0601    137   K 4.6 4.2    108   CO2 19* 22   BUN 14 14   CREATININE 1.0 1.2*   CALCIUM 9.1 7.7*     Recent Labs     03/15/22  1503 03/16/22  0601   AST 36 24  24   ALT 37 24  24   BILIDIR  --  0.2   BILITOT 0.3 0.2  0.2   ALKPHOS 212* 140*  140*     Recent Labs     03/16/22  0601   INR 0.88     No results for input(s): CKTOTAL, TROPONINI in the last 72 hours. Urinalysis:      Lab Results   Component Value Date    NITRU NEGATIVE 03/15/2022    WBCUA 99 03/15/2022    BACTERIA NEGATIVE 03/15/2022    RBCUA 9 03/15/2022    BLOODU MODERATE 03/15/2022    SPECGRAV 1.015 03/15/2022       Radiology:  CTA PULMONARY W CONTRAST   Final Result   Limited evaluation of the segmental and subsegmental pulmonary arterial   branches. No central pulmonary embolism is detected. Bilateral pleural effusions, patchy ground-glass opacity, and interlobular   septal thickening, the constellation of which is most compatible with   pulmonary edema. That said, given the focality of the ground-glass infiltrates, infectious or   inflammatory pneumonitis should be considered as well, including viral   infection. VL DUP LOWER EXTREMITY VENOUS BILATERAL   Final Result   1. Extensive deep venous thrombus of the bilateral lower extremities. Critical results were called by Susannah Duverney, MD to JAYY Rodriguez on   3/15/2022 at 14:54. XR CHEST PORTABLE   Final Result   Interstitial pulmonary edema with small left pleural effusion.                  Marcos Betancur MD  3/17/2022 9:37 AM

## 2022-03-18 LAB
ACTIVATED CLOTTING TIME, LOW RANGE: 181 SEC
ANION GAP SERPL CALCULATED.3IONS-SCNC: 11 MMOL/L (ref 4–16)
BUN BLDV-MCNC: 25 MG/DL (ref 6–23)
CALCIUM SERPL-MCNC: 8.4 MG/DL (ref 8.3–10.6)
CHLORIDE BLD-SCNC: 102 MMOL/L (ref 99–110)
CO2: 24 MMOL/L (ref 21–32)
CREAT SERPL-MCNC: 1.6 MG/DL (ref 0.6–1.1)
CULTURE: ABNORMAL
CULTURE: ABNORMAL
GFR AFRICAN AMERICAN: 40 ML/MIN/1.73M2
GFR NON-AFRICAN AMERICAN: 33 ML/MIN/1.73M2
GLUCOSE BLD-MCNC: 120 MG/DL (ref 70–99)
GLUCOSE BLD-MCNC: 210 MG/DL (ref 70–99)
GLUCOSE BLD-MCNC: 68 MG/DL (ref 70–99)
GLUCOSE BLD-MCNC: 74 MG/DL (ref 70–99)
GLUCOSE BLD-MCNC: 92 MG/DL (ref 70–99)
HCT VFR BLD CALC: 43.3 % (ref 37–47)
HEMOGLOBIN: 14.2 GM/DL (ref 12.5–16)
Lab: ABNORMAL
MCH RBC QN AUTO: 32.4 PG (ref 27–31)
MCHC RBC AUTO-ENTMCNC: 32.8 % (ref 32–36)
MCV RBC AUTO: 98.9 FL (ref 78–100)
PDW BLD-RTO: 13.8 % (ref 11.7–14.9)
PLATELET # BLD: 111 K/CU MM (ref 140–440)
PMV BLD AUTO: 11.5 FL (ref 7.5–11.1)
POTASSIUM SERPL-SCNC: 4.9 MMOL/L (ref 3.5–5.1)
RBC # BLD: 4.38 M/CU MM (ref 4.2–5.4)
SODIUM BLD-SCNC: 137 MMOL/L (ref 135–145)
SPECIMEN: ABNORMAL
WBC # BLD: 10.7 K/CU MM (ref 4–10.5)

## 2022-03-18 PROCEDURE — 6360000002 HC RX W HCPCS: Performed by: NURSE PRACTITIONER

## 2022-03-18 PROCEDURE — 99233 SBSQ HOSP IP/OBS HIGH 50: CPT | Performed by: INTERNAL MEDICINE

## 2022-03-18 PROCEDURE — C1769 GUIDE WIRE: HCPCS

## 2022-03-18 PROCEDURE — 2500000003 HC RX 250 WO HCPCS

## 2022-03-18 PROCEDURE — 94761 N-INVAS EAR/PLS OXIMETRY MLT: CPT

## 2022-03-18 PROCEDURE — 4A023N7 MEASUREMENT OF CARDIAC SAMPLING AND PRESSURE, LEFT HEART, PERCUTANEOUS APPROACH: ICD-10-PCS | Performed by: INTERNAL MEDICINE

## 2022-03-18 PROCEDURE — B2111ZZ FLUOROSCOPY OF MULTIPLE CORONARY ARTERIES USING LOW OSMOLAR CONTRAST: ICD-10-PCS | Performed by: INTERNAL MEDICINE

## 2022-03-18 PROCEDURE — 2709999900 HC NON-CHARGEABLE SUPPLY

## 2022-03-18 PROCEDURE — 93458 L HRT ARTERY/VENTRICLE ANGIO: CPT

## 2022-03-18 PROCEDURE — APPSS45 APP SPLIT SHARED TIME 31-45 MINUTES: Performed by: NURSE PRACTITIONER

## 2022-03-18 PROCEDURE — 80048 BASIC METABOLIC PNL TOTAL CA: CPT

## 2022-03-18 PROCEDURE — C1887 CATHETER, GUIDING: HCPCS

## 2022-03-18 PROCEDURE — 6370000000 HC RX 637 (ALT 250 FOR IP): Performed by: INTERNAL MEDICINE

## 2022-03-18 PROCEDURE — 2580000003 HC RX 258: Performed by: NURSE PRACTITIONER

## 2022-03-18 PROCEDURE — 6360000002 HC RX W HCPCS: Performed by: HOSPITALIST

## 2022-03-18 PROCEDURE — 6360000002 HC RX W HCPCS

## 2022-03-18 PROCEDURE — B2141ZZ FLUOROSCOPY OF RIGHT HEART USING LOW OSMOLAR CONTRAST: ICD-10-PCS | Performed by: INTERNAL MEDICINE

## 2022-03-18 PROCEDURE — 93458 L HRT ARTERY/VENTRICLE ANGIO: CPT | Performed by: INTERNAL MEDICINE

## 2022-03-18 PROCEDURE — 1200000000 HC SEMI PRIVATE

## 2022-03-18 PROCEDURE — 6360000004 HC RX CONTRAST MEDICATION

## 2022-03-18 PROCEDURE — 85027 COMPLETE CBC AUTOMATED: CPT

## 2022-03-18 PROCEDURE — 85347 COAGULATION TIME ACTIVATED: CPT

## 2022-03-18 PROCEDURE — 2700000000 HC OXYGEN THERAPY PER DAY

## 2022-03-18 PROCEDURE — 6370000000 HC RX 637 (ALT 250 FOR IP): Performed by: NURSE PRACTITIONER

## 2022-03-18 PROCEDURE — 82962 GLUCOSE BLOOD TEST: CPT

## 2022-03-18 PROCEDURE — 36415 COLL VENOUS BLD VENIPUNCTURE: CPT

## 2022-03-18 PROCEDURE — C1894 INTRO/SHEATH, NON-LASER: HCPCS

## 2022-03-18 RX ORDER — LISINOPRIL 2.5 MG/1
2.5 TABLET ORAL DAILY
Status: DISCONTINUED | OUTPATIENT
Start: 2022-03-18 | End: 2022-03-22 | Stop reason: HOSPADM

## 2022-03-18 RX ADMIN — ASPIRIN 81 MG: 81 TABLET, CHEWABLE ORAL at 09:47

## 2022-03-18 RX ADMIN — LISINOPRIL 2.5 MG: 2.5 TABLET ORAL at 09:47

## 2022-03-18 RX ADMIN — HYDROMORPHONE HYDROCHLORIDE 0.5 MG: 2 INJECTION INTRAMUSCULAR; INTRAVENOUS; SUBCUTANEOUS at 09:47

## 2022-03-18 RX ADMIN — FAMOTIDINE 20 MG: 20 TABLET ORAL at 09:47

## 2022-03-18 RX ADMIN — ONDANSETRON 4 MG: 2 INJECTION INTRAMUSCULAR; INTRAVENOUS at 04:31

## 2022-03-18 RX ADMIN — SODIUM CHLORIDE, PRESERVATIVE FREE 10 ML: 5 INJECTION INTRAVENOUS at 09:51

## 2022-03-18 RX ADMIN — HYDROMORPHONE HYDROCHLORIDE 0.5 MG: 2 INJECTION INTRAMUSCULAR; INTRAVENOUS; SUBCUTANEOUS at 05:39

## 2022-03-18 RX ADMIN — QUETIAPINE FUMARATE 400 MG: 100 TABLET ORAL at 20:41

## 2022-03-18 RX ADMIN — INSULIN LISPRO 4 UNITS: 100 INJECTION, SOLUTION INTRAVENOUS; SUBCUTANEOUS at 17:22

## 2022-03-18 RX ADMIN — SODIUM CHLORIDE, PRESERVATIVE FREE 10 ML: 5 INJECTION INTRAVENOUS at 20:45

## 2022-03-18 RX ADMIN — HYDROMORPHONE HYDROCHLORIDE 0.5 MG: 2 INJECTION INTRAMUSCULAR; INTRAVENOUS; SUBCUTANEOUS at 20:42

## 2022-03-18 RX ADMIN — HYDROMORPHONE HYDROCHLORIDE 0.5 MG: 2 INJECTION INTRAMUSCULAR; INTRAVENOUS; SUBCUTANEOUS at 01:31

## 2022-03-18 RX ADMIN — HYDROMORPHONE HYDROCHLORIDE 0.5 MG: 2 INJECTION INTRAMUSCULAR; INTRAVENOUS; SUBCUTANEOUS at 15:08

## 2022-03-18 RX ADMIN — ONDANSETRON 4 MG: 2 INJECTION INTRAMUSCULAR; INTRAVENOUS at 20:42

## 2022-03-18 RX ADMIN — METOPROLOL SUCCINATE 25 MG: 25 TABLET, EXTENDED RELEASE ORAL at 09:47

## 2022-03-18 ASSESSMENT — PAIN SCALES - GENERAL
PAINLEVEL_OUTOF10: 6
PAINLEVEL_OUTOF10: 1
PAINLEVEL_OUTOF10: 6
PAINLEVEL_OUTOF10: 7
PAINLEVEL_OUTOF10: 3
PAINLEVEL_OUTOF10: 2
PAINLEVEL_OUTOF10: 6
PAINLEVEL_OUTOF10: 8
PAINLEVEL_OUTOF10: 6
PAINLEVEL_OUTOF10: 7
PAINLEVEL_OUTOF10: 7

## 2022-03-18 ASSESSMENT — PAIN DESCRIPTION - LOCATION
LOCATION: HEAD;HAND
LOCATION: ABDOMEN;HEAD
LOCATION: HEAD;HAND
LOCATION: ABDOMEN;HEAD

## 2022-03-18 ASSESSMENT — PAIN DESCRIPTION - ORIENTATION
ORIENTATION: RIGHT
ORIENTATION: RIGHT

## 2022-03-18 ASSESSMENT — PAIN DESCRIPTION - PAIN TYPE
TYPE: ACUTE PAIN

## 2022-03-18 NOTE — FLOWSHEET NOTE
Pt transported to 4119 in bed per RNx2.  Right radial site free of bleeding/hematoma at time of transfer

## 2022-03-18 NOTE — PROGRESS NOTES
Cardiology Progress Note     Today's Plan: for [facility] today     Admit Date:  3/15/2022    Consult reason/ Seen today for: SOB/PE  CARDIOLOGY ATTENDING ADDENDUM    MEDICAL DECISION MAKING;  1. Acute PE with DVT without any right heart strain patternswitch to Xarelto on Lovenox for now  2. I talked to her about left heart cath , she is in agreement   3. Echo shows severe LV dysfunction with severe mitral regurgitation start diuresis Lasix chest x-ray finding could be pulmonary edema?  BNP is extremely elevated I think she has congestive heart failure rather than pneumonia  4. CHF: Acute Systolic/ Diastolic decompensated heart failure. Appears to be volume overloaded . Agree with diuresis. continue  IV Lasix 40 mg BID. Depending on response we can titrate diuretics accordingly. Please continue Gudelines recommended medical thearpy including Coreg/ Toprol XL  , ACE/ARB  and Aldactone 25 mg daily. Strict Is and Os and Daily weights. chf nurse consult  5. HTN: stable, continue present medications   6. Renal failure due to CHF  7. She will need invasive work-up cardiac cath to define coronary anatomy when she is more stable  8. DVT prophylaxis if no contraindication  6.   Dyslipidemia: continue statins             HPI:  I have reviewed the HPI  And agree with above   Kia Wolfe is a 61 y. o.year old who and presents with had concerns including Shortness of Breath (short of breath since 03/03/2022, swelling to both legs, still has uti symptoms).   Chief Complaint   Patient presents with    Shortness of Breath     short of breath since 03/03/2022, swelling to both legs, still has uti symptoms     Please review addendum/changes made to note above   Interval history:  Sob on minimal exertion     Physical Exam:  General:  Awake, alert, NAD  Head:normal  Eye:normal  Neck:  No JVD   Chest:  Clear to auscultation, respiration easy  Cardiovascular:  S1 and S2 audible, No added heart sounds, No signs of ankle edema, or volume overload, No evidence of JVD, No crackles  Abdomen:   nontender  Extremities:  no* edema  Pulses; palpable  Neuro: grossly normal        I agree with the plan, which was planned by myself and discussed with advanced level provider. My documented MDM is a substantive portion of the supervisory note. I have seen ,spoken to  and examined this patient personally, independently of the advanced level provider. I have spent substantiate  portion of this encounter independently myself in examining patient and developing the medical management plan . I have reviewed the hospital care given to date and reviewed all pertinent labs and imaging. The plan was developed mutually at the time of the visit with the patient,  NP /PA  and myself. I have spoken with patient, nursing staff and provided written and verbal instructions . The above note has been reviewed and I agree with the assessment, diagnosis, and treatment plan with changes made by me as follows . Quan Pabon MD [facility] 03/18/22     Subjective and  Overnight Events:  Reports she is feeling better today- shortness of breath improved     Telemetry SR    Assessment / Plan:   Shortness of breath multifactorial/ HF/ pleural effusion  ? Acute PE: CTA NO central PE: echo did not show right heart strain   Combined systolic/diastolic heart failure:  EF 30-35 % with mild LVH: decompensated: shortness of breath improving-continue IV lasix bid / toprol: strict I/O and daily weights: no MRA d/t hyperkalemia : for [facility] today to define coronary anatomy   HTN: uncontrolled on admit-improving: on BB  VHD: severe MVR: ERO 0.40 c, sq -continue surveillance   DVT- extensive DVT of bilateral lower legs- on Lovenox          History of Presenting Illness:    Chief complain on admission : 61 y. o.year old who is admitted for  Chief Complaint   Patient presents with    Shortness of Breath     short of breath since 03/03/2022, swelling to both legs, still has uti symptoms        Past medical history:    has a past medical history of CAD (coronary artery disease), Diabetes mellitus ([facility] [address] Hepatitis C, and Schizophrenia ([facility] [address] Past surgical history:   has a past surgical history that includes Hysterectomy and Cholecystectomy. Social History:   reports that she has been smoking cigars. She has been smoking about 3.00 packs per day. She has never used smokeless tobacco. She reports current alcohol use. She reports previous drug use. Drug: Marijuana Herbjaiden Handy). Family history:  family history includes Cancer in her father; No Known Problems in her mother. Allergies   Allergen Reactions    Haldol [Haloperidol Lactate] Other (See Comments)     Unknown, severe reaction per mother    Penicillins        Review of Systems:   All 14 systems were reviewed and are negative  Except for the positive findings which are documented     BP (!) 112/90   Pulse 95   Temp 98.1 °F (36.7 °C)   Resp 15   Ht 5' 2\" (1.575 m)   Wt 146 lb 13.2 oz (66.6 kg)   SpO2 99%   BMI 26.85 kg/m²   No intake or output data in the 24 hours ending 03/18/22 8474    Physical Exam:  Physical Exam  Vitals reviewed. Constitutional:       Appearance: Normal appearance. HENT:      Head: Normocephalic. Mouth/Throat:      Mouth: Mucous membranes are moist.   Eyes:      Pupils: Pupils are equal, round, and reactive to light. Neck:      Vascular: No carotid bruit. Cardiovascular:      Rate and Rhythm: Normal rate and regular rhythm. Heart sounds: Murmur (systolic) heard. Pulmonary:      Breath sounds: Rales present. No wheezing. Abdominal:      General: There is no distension. Tenderness: There is no abdominal tenderness. Musculoskeletal:      Right lower leg: No edema. Left lower leg: No edema. Skin:     General: Skin is warm. Capillary Refill: Capillary refill takes less than 2 seconds.    Neurological:      General: No focal deficit present. Mental Status: She is alert.    Psychiatric:         Mood and Affect: Mood normal.          Medications:    [Held by provider] enoxaparin  1 mg/kg SubCUTAneous Daily    metoprolol succinate  25 mg Oral Daily    QUEtiapine  400 mg Oral Nightly    aspirin  81 mg Oral Daily    sodium chloride flush  5-40 mL IntraVENous 2 times per day    nicotine  1 patch TransDERmal Daily    famotidine  20 mg Oral Daily    insulin lispro  0.05 Units/kg SubCUTAneous TID WC    insulin lispro  0-12 Units SubCUTAneous TID WC    insulin lispro  0-6 Units SubCUTAneous Nightly      dextrose      sodium chloride 25 mL (03/17/22 1640)     magnesium hydroxide, lactulose, hydrALAZINE (APRESOLINE) ivpb, guaiFENesin-codeine, HYDROmorphone, heparin (porcine), glucose, glucagon (rDNA), dextrose, sodium chloride flush, sodium chloride, ondansetron **OR** ondansetron, polyethylene glycol, nicotine polacrilex, dextrose bolus (hypoglycemia) **OR** dextrose bolus (hypoglycemia)    Lab Data:  CBC:   Recent Labs     03/16/22  0601 03/17/22  0645 03/18/22  0411   WBC 4.6 5.5 10.7*   HGB 13.0 14.4 14.2   HCT 38.1 42.6 43.3   MCV 96.0 97.3 98.9   * 112* 111*     BMP:   Recent Labs     03/16/22  0601 03/17/22  1112 03/18/22  0411    142 137   K 4.2 5.3* 4.9    108 102   CO2 22 20* 24   BUN 14 24* 25*   CREATININE 1.2* 1.8* 1.6*     PT/INR:   Recent Labs     03/16/22  0601   PROTIME 11.3*   INR 0.88     BNP:    Recent Labs     03/15/22  1503 03/16/22  0601   PROBNP 12,109* 15,695*     TROPONIN:   Recent Labs     03/15/22  1503 03/15/22  2359 03/16/22  0601   TROPONINT <0.010 <0.010 <0.010              Impression:  Principal Problem:    Acute bilateral deep vein thrombosis (DVT) of femoral veins (HCC)  Active Problems:    Precordial pain    Type 2 diabetes mellitus without complication, with long-term current use of insulin (HCC)    Pulmonary embolism (HCC)    Acute on chronic systolic congestive heart failure [facility])  Resolved Problems:    * No resolved hospital problems. *       All labs, medications and tests reviewed by myself, continue all other medications of all above medical condition listed as is except for changes mentioned above. Thank you   Please call with questions.     Electronically signed by YNES Hairston CNP on 3/18/2022 at 8:28 AM

## 2022-03-18 NOTE — PROGRESS NOTES
[facility] HOSPITALIST PROGRESS NOTE      PCP: Marleni Marcum MD    Date of Admission: 3/15/2022    Subjective: continues to have headache    Brief Hospital summary patient is a 74-year-old female with history of schizophrenia, hepatitis C who was admitted with shortness of breath. Patient had bilateral lower extremity swelling, was treated with UTI recently  Lower extremity duplex positive for extensive DVT, CTA positive for central PE, echocardiogram ordered, heparin started, cardiology consulted, Lasix started    Vitals signs: Afebrile, heart rate 90s to 105 range, blood pressure 112/90, on 3 L of oxygen    Medications: aspirin, Lovenox, famotidine, lasix, SSI, metoprolol, Seroquel     Antibiotics: None    Fluid status: Not documented    Labs:   Sodium 137, potassium 4.9, chloride 102, bicarb 24, BUN 25, creatinine 1.6    Imaging:   CTA showed did not show central or subsegmental PE bilateral pleural effusion, patchy groundglass opacities and interlobular septal thickening consistent with pulmonary edema    Lower extremity duplex positive for extensive DVT of bilateral lower extremities    ECHO shows EF of 30-35%, slight hypokinesis, RSVP 44 mmhg     Assessment/Plan:     Acute bilateral LE DVT   Pulmonary edema  Acute systolic CHF:   Admitted with dyspnea, C proBNP elevated, bilateral lower extremity edema  Venous duplex positive for extensive DVTs, heparin started  CT PE negative for PE  Groundglass opacities and pulmonary edema noted  Lasix started, creatinine 1.2, echocardiogram as above   Input output record, daily weight, salt and fluid intake    Continue to monitor urine output  Pro BNP elevated, appears dry clinically, dc lasix, creatinine 1.8   Cardiology consulted  Ischemic work up cardiology  I/O record, daily weight, salt and fluid restriction     Heart cath planned planned for today   Check BMP in am       MELANY: creatinine 1.8, sec.  To diuresis versus Toradol  Hold lasix  lokelma x 1 for hyperkalemia, potassium down to 4.9  Creatinine improving, continue to hold Lasix  Check daily BMP        Diabetes mellitus type 2: Sliding scale insulin diabetic diet for now,   Hold oral hypoglycemics   Glucose   range     History of schizophrenia: continue Seroquel     Hepatitis C: chronic stable     tobacco dependence: Smokes 3 packs/day, nicotine patch    DVT prophlaxis:   Heparin infusion     Last BM:   As needed laxatives    Ambulation:   As tolerated     Disposition:   Home     Diet: diabetic diet     Physical Exam Performed:       BP (!) 112/90   Pulse 95   Temp 98.1 °F (36.7 °C)   Resp 15   Ht 5' 2\" (1.575 m)   Wt 146 lb 13.2 oz (66.6 kg)   SpO2 99%   BMI 26.85 kg/m²     Physical Exam  Constitutional:       General: She is not in acute distress. Appearance: Normal appearance. HENT:      Head: Normocephalic and atraumatic. Right Ear: External ear normal.      Left Ear: External ear normal.      Mouth/Throat:      Mouth: Mucous membranes are dry. Eyes:      Extraocular Movements: Extraocular movements intact. Pupils: Pupils are equal, round, and reactive to light. Cardiovascular:      Rate and Rhythm: Normal rate and regular rhythm. Heart sounds: No murmur heard. Pulmonary:      Effort: Pulmonary effort is normal. No respiratory distress. Breath sounds: Normal breath sounds. No wheezing. Abdominal:      General: Bowel sounds are normal. There is no distension. Palpations: Abdomen is soft. Tenderness: There is no abdominal tenderness. Musculoskeletal:         General: No swelling. Cervical back: Normal range of motion. Skin:     General: Skin is warm. Neurological:      General: No focal deficit present. Mental Status: She is alert and oriented to person, place, and time. Cranial Nerves: No cranial nerve deficit.    Psychiatric:         Mood and Affect: Mood normal.         Labs:   Recent Labs     03/16/22  0601 03/17/22  0645 03/18/22  0411   WBC 4.6 5.5 10.7*   HGB 13.0 14.4 14.2   HCT 38.1 42.6 43.3   * 112* 111*     Recent Labs     03/16/22  0601 03/17/22  1112 03/18/22  0411    142 137   K 4.2 5.3* 4.9    108 102   CO2 22 20* 24   BUN 14 24* 25*   CREATININE 1.2* 1.8* 1.6*   CALCIUM 7.7* 8.3 8.4     Recent Labs     03/15/22  1503 03/16/22  0601   AST 36 24  24   ALT 37 24  24   BILIDIR  --  0.2   BILITOT 0.3 0.2  0.2   ALKPHOS 212* 140*  140*     Recent Labs     03/16/22  0601   INR 0.88     No results for input(s): Ethelene Soulier in the last 72 hours. Urinalysis:      Lab Results   Component Value Date    NITRU NEGATIVE 03/17/2022    WBCUA 164 03/17/2022    BACTERIA NEGATIVE 03/17/2022    RBCUA 28 03/17/2022    BLOODU MODERATE 03/17/2022    SPECGRAV 1.020 03/17/2022       Radiology:  CTA PULMONARY W CONTRAST   Final Result   Limited evaluation of the segmental and subsegmental pulmonary arterial   branches. No central pulmonary embolism is detected. Bilateral pleural effusions, patchy ground-glass opacity, and interlobular   septal thickening, the constellation of which is most compatible with   pulmonary edema. That said, given the focality of the ground-glass infiltrates, infectious or   inflammatory pneumonitis should be considered as well, including viral   infection. VL DUP LOWER EXTREMITY VENOUS BILATERAL   Final Result   1. Extensive deep venous thrombus of the bilateral lower extremities. Critical results were called by Steve Haskins MD to Rolan Holstein, PA on   3/15/2022 at 14:54. XR CHEST PORTABLE   Final Result   Interstitial pulmonary edema with small left pleural effusion.                  Franklin Fleming MD  3/18/2022 8:22 AM

## 2022-03-19 LAB
ANION GAP SERPL CALCULATED.3IONS-SCNC: 11 MMOL/L (ref 4–16)
BUN BLDV-MCNC: 25 MG/DL (ref 6–23)
CALCIUM SERPL-MCNC: 8.3 MG/DL (ref 8.3–10.6)
CHLORIDE BLD-SCNC: 103 MMOL/L (ref 99–110)
CO2: 23 MMOL/L (ref 21–32)
CREAT SERPL-MCNC: 1.7 MG/DL (ref 0.6–1.1)
GFR AFRICAN AMERICAN: 37 ML/MIN/1.73M2
GFR NON-AFRICAN AMERICAN: 31 ML/MIN/1.73M2
GLUCOSE BLD-MCNC: 117 MG/DL (ref 70–99)
GLUCOSE BLD-MCNC: 127 MG/DL (ref 70–99)
GLUCOSE BLD-MCNC: 132 MG/DL (ref 70–99)
GLUCOSE BLD-MCNC: 168 MG/DL (ref 70–99)
GLUCOSE BLD-MCNC: 193 MG/DL (ref 70–99)
HCT VFR BLD CALC: 40.1 % (ref 37–47)
HEMOGLOBIN: 13 GM/DL (ref 12.5–16)
MCH RBC QN AUTO: 32.5 PG (ref 27–31)
MCHC RBC AUTO-ENTMCNC: 32.4 % (ref 32–36)
MCV RBC AUTO: 100.3 FL (ref 78–100)
PDW BLD-RTO: 13.5 % (ref 11.7–14.9)
PLATELET # BLD: 102 K/CU MM (ref 140–440)
PMV BLD AUTO: 11.5 FL (ref 7.5–11.1)
POTASSIUM SERPL-SCNC: 5.3 MMOL/L (ref 3.5–5.1)
RBC # BLD: 4 M/CU MM (ref 4.2–5.4)
SODIUM BLD-SCNC: 137 MMOL/L (ref 135–145)
WBC # BLD: 7.5 K/CU MM (ref 4–10.5)

## 2022-03-19 PROCEDURE — 6370000000 HC RX 637 (ALT 250 FOR IP): Performed by: NURSE PRACTITIONER

## 2022-03-19 PROCEDURE — 6370000000 HC RX 637 (ALT 250 FOR IP): Performed by: INTERNAL MEDICINE

## 2022-03-19 PROCEDURE — 2580000003 HC RX 258: Performed by: INTERNAL MEDICINE

## 2022-03-19 PROCEDURE — APPSS60 APP SPLIT SHARED TIME 46-60 MINUTES: Performed by: NURSE PRACTITIONER

## 2022-03-19 PROCEDURE — 82962 GLUCOSE BLOOD TEST: CPT

## 2022-03-19 PROCEDURE — 94761 N-INVAS EAR/PLS OXIMETRY MLT: CPT

## 2022-03-19 PROCEDURE — 85027 COMPLETE CBC AUTOMATED: CPT

## 2022-03-19 PROCEDURE — 1200000000 HC SEMI PRIVATE

## 2022-03-19 PROCEDURE — 6360000002 HC RX W HCPCS: Performed by: HOSPITALIST

## 2022-03-19 PROCEDURE — 6360000002 HC RX W HCPCS: Performed by: INTERNAL MEDICINE

## 2022-03-19 PROCEDURE — 36415 COLL VENOUS BLD VENIPUNCTURE: CPT

## 2022-03-19 PROCEDURE — 80048 BASIC METABOLIC PNL TOTAL CA: CPT

## 2022-03-19 PROCEDURE — 99233 SBSQ HOSP IP/OBS HIGH 50: CPT | Performed by: INTERNAL MEDICINE

## 2022-03-19 RX ORDER — 0.9 % SODIUM CHLORIDE 0.9 %
500 INTRAVENOUS SOLUTION INTRAVENOUS ONCE
Status: DISCONTINUED | OUTPATIENT
Start: 2022-03-19 | End: 2022-03-22 | Stop reason: HOSPADM

## 2022-03-19 RX ORDER — SODIUM CHLORIDE 9 MG/ML
25 INJECTION, SOLUTION INTRAVENOUS PRN
Status: DISCONTINUED | OUTPATIENT
Start: 2022-03-19 | End: 2022-03-22 | Stop reason: HOSPADM

## 2022-03-19 RX ORDER — ONDANSETRON 2 MG/ML
4 INJECTION INTRAMUSCULAR; INTRAVENOUS EVERY 6 HOURS PRN
Status: DISCONTINUED | OUTPATIENT
Start: 2022-03-19 | End: 2022-03-22 | Stop reason: HOSPADM

## 2022-03-19 RX ORDER — SODIUM CHLORIDE 0.9 % (FLUSH) 0.9 %
5-40 SYRINGE (ML) INJECTION PRN
Status: DISCONTINUED | OUTPATIENT
Start: 2022-03-19 | End: 2022-03-22 | Stop reason: HOSPADM

## 2022-03-19 RX ORDER — SODIUM CHLORIDE 0.9 % (FLUSH) 0.9 %
5-40 SYRINGE (ML) INJECTION EVERY 12 HOURS SCHEDULED
Status: DISCONTINUED | OUTPATIENT
Start: 2022-03-19 | End: 2022-03-22 | Stop reason: HOSPADM

## 2022-03-19 RX ORDER — FUROSEMIDE 20 MG/1
20 TABLET ORAL DAILY
Status: DISCONTINUED | OUTPATIENT
Start: 2022-03-19 | End: 2022-03-22 | Stop reason: HOSPADM

## 2022-03-19 RX ORDER — ACETAMINOPHEN 325 MG/1
650 TABLET ORAL EVERY 4 HOURS PRN
Status: DISCONTINUED | OUTPATIENT
Start: 2022-03-19 | End: 2022-03-22 | Stop reason: HOSPADM

## 2022-03-19 RX ORDER — HYDRALAZINE HYDROCHLORIDE 20 MG/ML
10 INJECTION INTRAMUSCULAR; INTRAVENOUS EVERY 10 MIN PRN
Status: DISCONTINUED | OUTPATIENT
Start: 2022-03-19 | End: 2022-03-22 | Stop reason: HOSPADM

## 2022-03-19 RX ADMIN — METOPROLOL SUCCINATE 25 MG: 25 TABLET, EXTENDED RELEASE ORAL at 09:10

## 2022-03-19 RX ADMIN — RIVAROXABAN 15 MG: 15 TABLET, FILM COATED ORAL at 18:04

## 2022-03-19 RX ADMIN — HYDROMORPHONE HYDROCHLORIDE 0.5 MG: 2 INJECTION INTRAMUSCULAR; INTRAVENOUS; SUBCUTANEOUS at 01:42

## 2022-03-19 RX ADMIN — QUETIAPINE FUMARATE 400 MG: 100 TABLET ORAL at 21:49

## 2022-03-19 RX ADMIN — SODIUM CHLORIDE, PRESERVATIVE FREE 10 ML: 5 INJECTION INTRAVENOUS at 09:10

## 2022-03-19 RX ADMIN — HYDROMORPHONE HYDROCHLORIDE 0.5 MG: 2 INJECTION INTRAMUSCULAR; INTRAVENOUS; SUBCUTANEOUS at 21:50

## 2022-03-19 RX ADMIN — HYDROMORPHONE HYDROCHLORIDE 0.5 MG: 2 INJECTION INTRAMUSCULAR; INTRAVENOUS; SUBCUTANEOUS at 10:23

## 2022-03-19 RX ADMIN — GUAIFENESIN AND CODEINE PHOSPHATE 5 ML: 100; 10 SOLUTION ORAL at 21:50

## 2022-03-19 RX ADMIN — ASPIRIN 81 MG: 81 TABLET, CHEWABLE ORAL at 09:10

## 2022-03-19 RX ADMIN — INSULIN LISPRO 2 UNITS: 100 INJECTION, SOLUTION INTRAVENOUS; SUBCUTANEOUS at 18:09

## 2022-03-19 RX ADMIN — INSULIN LISPRO 2 UNITS: 100 INJECTION, SOLUTION INTRAVENOUS; SUBCUTANEOUS at 09:13

## 2022-03-19 RX ADMIN — FAMOTIDINE 20 MG: 20 TABLET ORAL at 09:10

## 2022-03-19 RX ADMIN — HYDROMORPHONE HYDROCHLORIDE 0.5 MG: 2 INJECTION INTRAMUSCULAR; INTRAVENOUS; SUBCUTANEOUS at 17:07

## 2022-03-19 RX ADMIN — LISINOPRIL 2.5 MG: 2.5 TABLET ORAL at 09:10

## 2022-03-19 RX ADMIN — CEFAZOLIN SODIUM 1000 MG: 1 INJECTION, POWDER, FOR SOLUTION INTRAMUSCULAR; INTRAVENOUS at 18:07

## 2022-03-19 RX ADMIN — HYDROMORPHONE HYDROCHLORIDE 0.5 MG: 2 INJECTION INTRAMUSCULAR; INTRAVENOUS; SUBCUTANEOUS at 05:57

## 2022-03-19 RX ADMIN — POLYETHYLENE GLYCOL (3350) 17 G: 17 POWDER, FOR SOLUTION ORAL at 05:56

## 2022-03-19 RX ADMIN — FUROSEMIDE 20 MG: 20 TABLET ORAL at 18:03

## 2022-03-19 ASSESSMENT — PAIN DESCRIPTION - PAIN TYPE
TYPE: ACUTE PAIN

## 2022-03-19 ASSESSMENT — PAIN DESCRIPTION - ORIENTATION: ORIENTATION: RIGHT

## 2022-03-19 ASSESSMENT — PAIN SCALES - GENERAL
PAINLEVEL_OUTOF10: 9
PAINLEVEL_OUTOF10: 8
PAINLEVEL_OUTOF10: 8
PAINLEVEL_OUTOF10: 2
PAINLEVEL_OUTOF10: 8
PAINLEVEL_OUTOF10: 0
PAINLEVEL_OUTOF10: 2
PAINLEVEL_OUTOF10: 6

## 2022-03-19 ASSESSMENT — PAIN DESCRIPTION - LOCATION
LOCATION: HEAD;HAND
LOCATION: ABDOMEN
LOCATION: ABDOMEN

## 2022-03-19 ASSESSMENT — PAIN SCALES - WONG BAKER
WONGBAKER_NUMERICALRESPONSE: 2
WONGBAKER_NUMERICALRESPONSE: 2

## 2022-03-19 ASSESSMENT — PAIN DESCRIPTION - FREQUENCY: FREQUENCY: INTERMITTENT

## 2022-03-19 ASSESSMENT — PAIN DESCRIPTION - ONSET: ONSET: PROGRESSIVE

## 2022-03-19 ASSESSMENT — PAIN DESCRIPTION - DESCRIPTORS
DESCRIPTORS: CRAMPING
DESCRIPTORS: CRAMPING

## 2022-03-19 ASSESSMENT — PAIN DESCRIPTION - PROGRESSION
CLINICAL_PROGRESSION: RAPIDLY WORSENING

## 2022-03-19 ASSESSMENT — PAIN - FUNCTIONAL ASSESSMENT: PAIN_FUNCTIONAL_ASSESSMENT: ACTIVITIES ARE NOT PREVENTED

## 2022-03-19 NOTE — PROGRESS NOTES
Today's plan: contineu full dose anticoagulation for DVT, may switch it to oral xarelto    Admit Date:  3/15/2022    Subjective:ok    Chief complaints on admission  Chief Complaint   Patient presents with    Shortness of Breath     short of breath since 03/03/2022, swelling to both legs, still has uti symptoms         History of present illness:Deb is a 61 y. o.year old who  presents with had concerns including Shortness of Breath (short of breath since 03/03/2022, swelling to both legs, still has uti symptoms). Past medical history:    has a past medical history of CAD (coronary artery disease), Diabetes mellitus ([address] Hepatitis C, and Schizophrenia ([address] Past surgical history:   has a past surgical history that includes Hysterectomy and Cholecystectomy. Social History:   reports that she has been smoking cigars. She has been smoking about 3.00 packs per day. She has never used smokeless tobacco. She reports current alcohol use. She reports previous drug use. Drug: Marijuana Oleotis Garnica). Family history:  family history includes Cancer in her father; No Known Problems in her mother. Allergies   Allergen Reactions    Haldol [Haloperidol Lactate] Other (See Comments)     Unknown, severe reaction per mother    Penicillins          Objective:   /80   Pulse 97   Temp 98.2 °F (36.8 °C) (Oral)   Resp 16   Ht 5' 2\" (1.575 m)   Wt 146 lb 9.7 oz (66.5 kg)   SpO2 99%   BMI 26.81 kg/m²       Intake/Output Summary (Last 24 hours) at 3/19/2022 1617  Last data filed at 3/19/2022 1590  Gross per 24 hour   Intake 240 ml   Output --   Net 240 ml         Physical Exam:  Constitutional:  Well developed, Well nourished, No acute distress, Non-toxic appearance. HENT:  Normocephalic, Atraumatic, Bilateral external ears normal, Oropharynx moist, No oral exudates, Nose normal. Neck- Normal range of motion, No tenderness, Supple, No stridor. Eyes:  PERRL, EOMI, Conjunctiva normal, No discharge. Respiratory:  Normal breath sounds, No respiratory distress, No wheezing, No chest tenderness. ,no use of accessory muscles, diaphragm movement is normal  Cardiovascular: (PMI) apex non displaced,no lifts no thrills, no s3,no s4, Normal heart rate, Normal rhythm, No murmurs, No rubs, No gallops. Carotid arteries pulse and amplitude are normal no bruit, no abdominal bruit noted ( normal abdominal aorta ausculation), femoral arteries pulse and amplitude are normal no bruit, pedal pulses are normal  GI:  Bowel sounds normal, Soft, No tenderness, No masses, No pulsatile masses. : External genitalia appear normal, No masses or lesions. No discharge. No CVA tenderness. Musculoskeletal:  Intact distal pulses, No edema, No tenderness, No cyanosis, No clubbing. Good range of motion in all major joints. No tenderness to palpation or major deformities noted. Back- No tenderness. Integument:  Warm, Dry, No erythema, No rash. Lymphatic:  No lymphadenopathy noted. Neurologic:  Alert & oriented x 3, Normal motor function, Normal sensory function, No focal deficits noted.    Psychiatric:  Affect normal, Judgment normal, Mood normal.     Medications:    sodium chloride  500 mL IntraVENous Once    sodium chloride flush  5-40 mL IntraVENous 2 times per day    ceFAZolin  1,000 mg IntraVENous Q8H    sodium zirconium cyclosilicate  10 g Oral BID    rivaroxaban  15 mg Oral BID     Followed by   Berry Machado ON 4/9/2022] rivaroxaban  20 mg Oral Dinner    lisinopril  2.5 mg Oral Daily    metoprolol succinate  25 mg Oral Daily    QUEtiapine  400 mg Oral Nightly    aspirin  81 mg Oral Daily    sodium chloride flush  5-40 mL IntraVENous 2 times per day    nicotine  1 patch TransDERmal Daily    famotidine  20 mg Oral Daily    insulin lispro  0.05 Units/kg SubCUTAneous TID     insulin lispro  0-12 Units SubCUTAneous TID     insulin lispro  0-6 Units SubCUTAneous Nightly      sodium chloride      dextrose     

## 2022-03-19 NOTE — PROGRESS NOTES
ATTENDING PHYSICIAN'S PROGRESS NOTES    Patient:  Joshua Duncan    YOB: 1965    MRN: [id number]     Acct: [de-identified]     Admit date: 3/15/2022    Patient Seen, Chart, Consults notes, Labs, Radiology studies reviewed. SUBJECTIVE:   Day 4 of stay with acute DVT and pulmonary edema. Seen and evaluated at bedside. No new complaints. Feels fine. Denies calf pain or shortness of breath. Cardiology following and recommendations noted. Patient had left heart cath 3/18/2022    All other ROS negative except noted in HPI    Past, Family, Social History unchanged from admission. Diet:  ADULT DIET; Regular; 3 carb choices (45 gm/meal);  Low Fat/Low Chol/High Fiber/ANNETTE; Low Sodium (2 gm)    Medications:  Scheduled Meds:   sodium chloride  500 mL IntraVENous Once    sodium chloride flush  5-40 mL IntraVENous 2 times per day    ceFAZolin  1,000 mg IntraVENous Q8H    sodium zirconium cyclosilicate  10 g Oral BID    rivaroxaban  15 mg Oral BID WC    Followed by   Sol Ames ON 4/9/2022] rivaroxaban  20 mg Oral Dinner    lisinopril  2.5 mg Oral Daily    metoprolol succinate  25 mg Oral Daily    QUEtiapine  400 mg Oral Nightly    aspirin  81 mg Oral Daily    sodium chloride flush  5-40 mL IntraVENous 2 times per day    nicotine  1 patch TransDERmal Daily    famotidine  20 mg Oral Daily    insulin lispro  0.05 Units/kg SubCUTAneous TID WC    insulin lispro  0-12 Units SubCUTAneous TID     insulin lispro  0-6 Units SubCUTAneous Nightly     Continuous Infusions:   sodium chloride      dextrose      sodium chloride 25 mL (03/17/22 1640)     PRN Meds:sodium chloride flush, sodium chloride, acetaminophen, ondansetron, hydrALAZINE, magnesium hydroxide, lactulose, hydrALAZINE (APRESOLINE) ivpb, guaiFENesin-codeine, HYDROmorphone, heparin (porcine), glucose, glucagon (rDNA), dextrose, sodium chloride flush, sodium chloride, ondansetron **OR** ondansetron, Black   Patient Number     [id number]         Room Number         5374   Visit Number       [id number]   Corporate ID       [id number]   Accession Number   [id number]         [address] [place] [address] ,                                                            [facility]   Ordering Physician Lorena Balderrama                     CNP                Physician           MD  Procedure Type of Study   TTE procedure:ECHOCARDIOGRAM COMPLETE 2D W DOPPLER W COLOR. Procedure Date Date: 03/16/2022 Start: 07:50 AM Study Location: Portable Technical Quality: Adequate visualization Indications:Deep vein thrombosis (DVT). Patient Status: Routine Height: 62 inches Weight: 127 pounds BSA: 1.58 m2 BMI: 23.23 kg/m2 HR: 93 bpm BP: 150/104 mmHg  Conclusions   Summary  Left ventricular systolic function is abnormal.  Ejection fraction is visually estimated at 30-35 %  Slight hypokinesis of the anteroseptal wall segment. Mild left ventricular hypertrophy. Severe eccentric mitral regurgitation (ERO: 0.40 cm sq, regurgitant volume:  62 ml). Essentially normal right ventricle; no evidence of right heart strain. Moderate tricuspid regurgitation; RVSP: 44 mmHg. Mild pulmonic regurgitation present. No evidence of any pericardial effusion. Bilateral pleural effusion. Inferior vena cava is dilated, measuring at 2.3 cm, but does collapse with  respiration. Signature   ------------------------------------------------------------------  Electronically signed by Celestina Beaulieu MD (Interpreting  physician) on 03/16/2022 at 11:15 AM  ------------------------------------------------------------------   Findings   Left Ventricle  Left ventricular systolic function is abnormal.  Ejection fraction is visually estimated at 30-35%  Slight hypokinesis of the anteroseptal wall segment. Mild left ventricular hypertrophy. Unable to determine diastolic function due to arrhythmia.    Left Atrium  Essentially normal left atrium. Right Atrium  Essentially normal right atrium. Right Ventricle  Essentially normal right ventricle; no evidence of right heart strain. Aortic Valve  Sclerotic, but non-stenotic aortic valve. Mitral Valve  Severe eccentric mitral regurgitation (ERO: 0.40 cm sq, regurgitant volume:  62 ml). Tricuspid Valve  Moderate tricuspid regurgitation; RVSP: 44 mmHg. Pulmonic Valve  Mild pulmonic regurgitation present. Pericardial Effusion  No evidence of any pericardial effusion. Pleural Effusion  Bilateral pleural effusion. Miscellaneous  Inferior vena cava is dilated, measuring at 2.3 cm, but does collapse with  respiration. The aorta is within normal limits.   M-Mode/2D Measurements & Calculations   LV Diastolic Dimension:  LV Systolic Dimension:  LA Dimension: 3.6 cmAO Root  5.21 cm                  4.64 cm                 Dimension: 3.4 cmLA Area:  LV FS:10.9 %             LV Volume Diastolic: 79 84.8 cm2  LV PW Diastolic: 0.99 cm ml  LV PW Systolic: 9.19 cm  LV Volume Systolic: 50  Septum Diastolic: 2.21   ml  cm                       LV EDV/LV EDV Index: 79 RV Diastolic Dimension:  Septum Systolic: 0.68 cm TA/03 A1YI ESV/LV ESV   2.17 cm  CO: 3.42 l/min           Index: 50 ml/32 m2  CI: 2.16 l/m*m2          EF Calculated (A4C):    LA/Aorta: 1.06                           36.7 %  LV Area Diastolic: 21.9  EF Calculated (2D):     LA volume/Index: 61 ml  cm2                      23.6 %                  /69U2  LV Area Systolic: 94.6  cm2                      LV Length: 5.93 cm                            LVOT: 2 cm  Doppler Measurements & Calculations   MV Peak E-Wave: 126     AV Peak Velocity: 120 cm/s LVOT Peak Velocity: 92.1  cm/s                    AV Peak Gradient: 5.76     cm/s  MV Peak A-Wave: 62.3    mmHg                       LVOT Mean Velocity: 64.6  cm/s                    AV Mean Velocity: 91.2     cm/s  MV E/A Ratio: 2.02      cm/s                       LVOT Peak Gradient: 3  MV Peak Gradient: 6.35  AV Mean Gradient: 4 mmHg   mmHgLVOT Mean Gradient: 2  mmHg                    AV VTI: 16.7 cm            mmHg                          AV Area (Continuity):2.2   Estimated RVSP: 44 mmHg  MV P1/2t: 34 msec       cm2                        Estimated RAP:3 mmHg  MVA by PHT:6.47 cm2  MV Area (PISA): 0.37    LVOT VTI: 11.7 cm  cm2                                                TR Velocity:322 cm/s  MV E' Septal Velocity:  Estimated PASP: 44.47 mmHg TR Gradient:41.47 mmHg  4.66 cm/s  MV E' Lateral Velocity:  7.07 cm/s  MV E/E' septal: 27.04  MV E/E' lateral: 17.82  MR Velocity: 600 cm/s  MV ARCELIA PISA: 0.49 cm2  MR VTI: 167 cm  Alias Velocity: 38.5  cm/sPISA Radius: 1.1 cm  MV ARCELIA Volumetric: 0.37  cm2  PISA area: 7.6 cm2MR  flow rate: 292.6 ml/sMR  volume:81.83 ml      XR CHEST PORTABLE  Result Date: 3/15/2022  EXAMINATION: ONE XRAY VIEW OF THE CHEST 3/15/2022 12:35 pm COMPARISON: 03/03/2022 HISTORY: ORDERING SYSTEM PROVIDED HISTORY: Sob TECHNOLOGIST PROVIDED HISTORY: Reason for exam:->Sob Reason for Exam: sob FINDINGS: Interstitial pulmonary edema is present. Small left pleural effusion. Heart size is at the upper limits of normal.  No pneumothorax. Interstitial pulmonary edema with small left pleural effusion. VL DUP LOWER EXTREMITY VENOUS BILATERAL  Result Date: 3/15/2022  EXAMINATION: DUPLEX VENOUS ULTRASOUND OF THE BILATERAL LOWER EXTREMITIES3/15/2022 1:26 pm TECHNIQUE: Duplex ultrasound using B-mode/gray scaled imaging, Doppler spectral analysis and color flow Doppler was obtained of the deep venous structures of the lower bilateral extremities. COMPARISON: None.  HISTORY: ORDERING SYSTEM PROVIDED HISTORY: lower extremity swelling, ro dvt TECHNOLOGIST PROVIDED HISTORY: Reason for exam:->lower extremity swelling, ro dvt FINDINGS: Deep venous thrombus present within the right femoral vein which is only partially compressible and demonstrates some vascular flow consistent with nonocclusive thrombus. The calf veins of the right lower extremity are noncompressible occluding the anterior tibial vein, peroneal vein, and posterior tibial vein consistent with thrombus. The venous thrombus present within the left femoral vein which is noncompressible and demonstrates no significant flow. Flow is identified within the left popliteal vein. The calf veins of the left lower extremity also demonstrate occlusive thrombus. 1. Extensive deep venous thrombus of the bilateral lower extremities. Critical results were called by Roddy Ribeiro MD to JAYY Ochoa on 3/15/2022 at 14:54. CTA PULMONARY W CONTRAST  Result Date: 3/15/2022  EXAMINATION: CTA OF THE CHEST 3/15/2022 1:59 pm TECHNIQUE: CTA of the chest was performed after the administration of intravenous contrast.  Multiplanar reformatted images are provided for review. MIP images are provided for review. Dose modulation, iterative reconstruction, and/or weight based adjustment of the mA/kV was utilized to reduce the radiation dose to as low as reasonably achievable. COMPARISON: None. HISTORY: ORDERING SYSTEM PROVIDED HISTORY: sob, bilateral dvt, ro pe TECHNOLOGIST PROVIDED HISTORY: Reason for exam:->sob, bilateral dvt, ro pe Decision Support Exception - unselect if not a suspected or confirmed emergency medical condition->Emergency Medical Condition (MA) Reason for Exam: sob, dvt's Additional signs and symptoms: none Relevant Medical/Surgical History: 75ml isovue 370 FINDINGS: Pulmonary Arteries: There is respiratory motion artifact along with streak artifact generated by the patient's right arm is at her side. These combine to limited evaluation of the segmental and subsegmental pulmonary arterial branches. No central pulmonary embolism is detected. Mediastinum: Visualized thyroid unremarkable. Mildly enlarged mediastinal lymph nodes are seen which are likely reactive. Esophagus is unremarkable.   Cardiac chambers are unremarkable. No pericardial effusion. Thoracic aorta is normal in caliber. Lungs/pleura: Again, respiratory motion degrades imaging. There is a moderate right and a small moderate left pleural effusion. Adjacent airspace disease is noted. Mild patchy ground-glass infiltrates are detected as well, especially within the superior segment of the left lower lobe and the periphery of the right upper lobe. Interlobular septal thickening is noted. Upper Abdomen: Grossly unremarkable, but not well evaluated secondary to extensive respiratory motion artifact. Soft Tissues/Bones: No acute or suspicious bony abnormalities. Visualized extra thoracic soft tissues unremarkable. Limited evaluation of the segmental and subsegmental pulmonary arterial branches. No central pulmonary embolism is detected. Bilateral pleural effusions, patchy ground-glass opacity, and interlobular septal thickening, the constellation of which is most compatible with pulmonary edema. That said, given the focality of the ground-glass infiltrates, infectious or inflammatory pneumonitis should be considered as well, including viral infection. Echocardiogram   Summary   Left ventricular systolic function is abnormal.   Ejection fraction is visually estimated at 30-35 %   Slight hypokinesis of the anteroseptal wall segment. Mild left ventricular hypertrophy. Severe eccentric mitral regurgitation (ERO: 0.40 cm sq, regurgitant volume:   62 ml). Essentially normal right ventricle; no evidence of right heart strain. Moderate tricuspid regurgitation; RVSP: 44 mmHg. Mild pulmonic regurgitation present. No evidence of any pericardial effusion. Bilateral pleural effusion. Inferior vena cava is dilated, measuring at 2.3 cm, but does collapse with   respiration.       Signature      ------------------------------------------------------------------   Electronically signed by Krystina Mckay MD (Interpreting   physician) on 03/16/2022 at 11:15 AM  Physical Exam:  Vitals: /80   Pulse 97   Temp 98.2 °F (36.8 °C) (Oral)   Resp 16   Ht 5' 2\" (1.575 m)   Wt 146 lb 9.7 oz (66.5 kg)   SpO2 99%   BMI 26.81 kg/m²   24 hour intake/output:    Intake/Output Summary (Last 24 hours) at 3/19/2022 1559  Last data filed at 3/19/2022 0909  Gross per 24 hour   Intake 240 ml   Output --   Net 240 ml     Last 3 weights:   Wt Readings from Last 3 Encounters:   03/19/22 146 lb 9.7 oz (66.5 kg)   03/03/22 125 lb (56.7 kg)   04/12/21 115 lb (52.2 kg)       General appearance - alert, well appearing, and in no distress and normal weight  HEENT: Normocephalic, Atraumatic, Conjuctiva pink, PERRL, Oral mucosa normal, Lips, teeth and gums normal, Trachea midline, Thyroid normal and No noted lymphadenopathy  Chest - clear to auscultation, no wheezes, rales or rhonchi, symmetric air entry  Cardiovascular - normal rate, regular rhythm, normal S1, S2, no murmurs, rubs, clicks or gallops  Abdomen - soft, nontender, nondistended, no masses or organomegaly   Neurological - Alert and oriented, Normal speech, No focal findings or movement disorder noted and Motor and sensory grossly normal bilaterally  Integumentary - Skin color, texture, turgor normal. No Rashes or lesions  Musculoskeletal -Full ROM times 4 extremities, No clubbing or cyanosis and No peripheral edema      DVT prophylaxis: [] Lovenox                                 [] SCDs                                 [] SQ Heparin                                 [] Encourage ambulation           [x] Xarelto                 ASSESSMENT / PLAN :    Principal Problem:    Acute bilateral deep vein thrombosis (DVT) of femoral veins (HCC)  Active Problems:    Precordial pain    Type 2 diabetes mellitus without complication, with long-term current use of insulin (HCC)    Pulmonary embolism (HCC)    Acute on chronic systolic congestive heart failure (HCC)    Acute congestive heart failure ([address]  Resolved Problems:    * No resolved hospital problems. *    Acute bilateral LE DVT   Was on heparin drip on admission later transition to Lovenox. Lovenox  was held on the 17th for possible cardiac cath but was not resumed. Has not been on anticoagulation since then  Cardiology recommending Xarelto  Will start on Xarelto  She does not have a PE  Will consult oncology given extensive DVT/unprovoked    Pulmonary edema  Acute systolic CHF  NICM  Admitted with dyspnea,BNP elevated, bilateral lower extremity edema  Venous duplex positive for extensive DVTs, heparin started  CT PE negative for PE  Groundglass opacities and pulmonary edema noted  Lasix started, creatinine up from 1.2 to 1.7. Currently off diuretic  Echocardiogram results noted as above   Status post [facility] 3/18-Non obstructive CAD  NICM: severe LV dysfunction with severe mitral regurgitation . Monitor input/ output and daily weight. 1800 fluid restriction. .  Cardiology following recommends to switch from Lovenox to Xarelto  Continue to monitor     MELANY:  Mild hyperkalemia  Creatinine peaked at 1.8.  1.7 today. Likely sec to diuretics  Hold lasix. Monitor  Lokelma x 1 for hyperkalemia, potassium down to 4.9. Potassium 5.3 again today.   Will give Joseph Benne again, monitor  Check daily BMP    UTI  UA positive  Urine culture with group B strep   Start Ancef     Diabetes mellitus type 2:   Sliding scale insulin diabetic diet for now,   Hold oral hypoglycemics   Sugars good     History of schizophrenia:   Continue Seroquel      Hepatitis C:   chronic stable     Tobacco dependence:   Smokes 3 packs/day, nicotine patch    DVT prophlaxis:    Xarelto       Electronically signed by Iraj Blackman MD on 3/19/2022 at 3:59 PM    Rounding Hospitalist

## 2022-03-19 NOTE — PROGRESS NOTES
Cardiology Progress Note     Today's Plan: [facility]     Admit Date:  3/15/2022    Consult reason/ Seen today for: SOB    Subjective and  Overnight Events:  Patient resting quietly in bed. Right radial site is free of hematoma, no bleeding, fingers pink, no drainage, mobility intact, pulses palpable, patient denies any pain, wound is healing well. Assessment / Plan / Recommendation:     1. Acute PE with DVT without any right heart strain pattern switch to Xarelto on Lovenox for now  2. NICM: severe LV dysfunction with severe mitral regurgitation start diuresis Lasix chest x-ray finding could be pulmonary edema?    3. CHF: Acute Systolic decompensated heart failure. Appears euvolemic. Lasix D/C. Please continue Gudelines recommended medical thearpy including Coreg/ Toprol XL,  ACE/ARB. No Aldactone due to MELANY and hyperkalemia. Strict Is and Os and Daily weights. 4. HTN: stable, continue present medications   5. Renal failure due to CHF            Electronically signed by Jacqueline Hudson.  YNES Lam CNP on 3/19/2022 at 8:55 AM

## 2022-03-20 LAB
ANION GAP SERPL CALCULATED.3IONS-SCNC: 6 MMOL/L (ref 4–16)
BUN BLDV-MCNC: 22 MG/DL (ref 6–23)
CALCIUM SERPL-MCNC: 8.1 MG/DL (ref 8.3–10.6)
CHLORIDE BLD-SCNC: 104 MMOL/L (ref 99–110)
CO2: 23 MMOL/L (ref 21–32)
CREAT SERPL-MCNC: 1.3 MG/DL (ref 0.6–1.1)
GFR AFRICAN AMERICAN: 51 ML/MIN/1.73M2
GFR NON-AFRICAN AMERICAN: 42 ML/MIN/1.73M2
GLUCOSE BLD-MCNC: 104 MG/DL (ref 70–99)
GLUCOSE BLD-MCNC: 120 MG/DL (ref 70–99)
GLUCOSE BLD-MCNC: 150 MG/DL (ref 70–99)
GLUCOSE BLD-MCNC: 155 MG/DL (ref 70–99)
GLUCOSE BLD-MCNC: 159 MG/DL (ref 70–99)
GLUCOSE BLD-MCNC: 82 MG/DL (ref 70–99)
POTASSIUM SERPL-SCNC: 4.7 MMOL/L (ref 3.5–5.1)
SODIUM BLD-SCNC: 133 MMOL/L (ref 135–145)
SODIUM URINE: 90 MMOL/L (ref 35–167)

## 2022-03-20 PROCEDURE — 2580000003 HC RX 258: Performed by: INTERNAL MEDICINE

## 2022-03-20 PROCEDURE — 6370000000 HC RX 637 (ALT 250 FOR IP): Performed by: INTERNAL MEDICINE

## 2022-03-20 PROCEDURE — 36415 COLL VENOUS BLD VENIPUNCTURE: CPT

## 2022-03-20 PROCEDURE — 6360000002 HC RX W HCPCS: Performed by: INTERNAL MEDICINE

## 2022-03-20 PROCEDURE — 80048 BASIC METABOLIC PNL TOTAL CA: CPT

## 2022-03-20 PROCEDURE — 94761 N-INVAS EAR/PLS OXIMETRY MLT: CPT

## 2022-03-20 PROCEDURE — 84300 ASSAY OF URINE SODIUM: CPT

## 2022-03-20 PROCEDURE — 2700000000 HC OXYGEN THERAPY PER DAY

## 2022-03-20 PROCEDURE — 1200000000 HC SEMI PRIVATE

## 2022-03-20 PROCEDURE — 82962 GLUCOSE BLOOD TEST: CPT

## 2022-03-20 PROCEDURE — 99232 SBSQ HOSP IP/OBS MODERATE 35: CPT | Performed by: INTERNAL MEDICINE

## 2022-03-20 RX ORDER — POLYETHYLENE GLYCOL 3350 17 G/17G
17 POWDER, FOR SOLUTION ORAL DAILY
Status: DISCONTINUED | OUTPATIENT
Start: 2022-03-20 | End: 2022-03-20 | Stop reason: SDUPTHER

## 2022-03-20 RX ORDER — LACTULOSE 10 G/15ML
20 SOLUTION ORAL 3 TIMES DAILY
Status: DISCONTINUED | OUTPATIENT
Start: 2022-03-20 | End: 2022-03-20 | Stop reason: SDUPTHER

## 2022-03-20 RX ORDER — LACTULOSE 10 G/15ML
30 SOLUTION ORAL 2 TIMES DAILY
Status: DISCONTINUED | OUTPATIENT
Start: 2022-03-20 | End: 2022-03-22 | Stop reason: HOSPADM

## 2022-03-20 RX ORDER — POLYETHYLENE GLYCOL 3350 17 G/17G
17 POWDER, FOR SOLUTION ORAL DAILY
Status: DISCONTINUED | OUTPATIENT
Start: 2022-03-20 | End: 2022-03-22 | Stop reason: HOSPADM

## 2022-03-20 RX ADMIN — GUAIFENESIN AND CODEINE PHOSPHATE 5 ML: 100; 10 SOLUTION ORAL at 20:08

## 2022-03-20 RX ADMIN — CEFAZOLIN SODIUM 1000 MG: 1 INJECTION, POWDER, FOR SOLUTION INTRAMUSCULAR; INTRAVENOUS at 09:38

## 2022-03-20 RX ADMIN — POLYETHYLENE GLYCOL (3350) 17 G: 17 POWDER, FOR SOLUTION ORAL at 09:43

## 2022-03-20 RX ADMIN — LACTULOSE 30 G: 10 SOLUTION ORAL at 13:17

## 2022-03-20 RX ADMIN — RIVAROXABAN 15 MG: 15 TABLET, FILM COATED ORAL at 09:25

## 2022-03-20 RX ADMIN — SODIUM CHLORIDE 25 ML: 9 INJECTION, SOLUTION INTRAVENOUS at 09:37

## 2022-03-20 RX ADMIN — QUETIAPINE FUMARATE 400 MG: 100 TABLET ORAL at 20:08

## 2022-03-20 RX ADMIN — SODIUM ZIRCONIUM CYCLOSILICATE 10 G: 10 POWDER, FOR SUSPENSION ORAL at 09:26

## 2022-03-20 RX ADMIN — SODIUM ZIRCONIUM CYCLOSILICATE 10 G: 10 POWDER, FOR SUSPENSION ORAL at 02:01

## 2022-03-20 RX ADMIN — LISINOPRIL 2.5 MG: 2.5 TABLET ORAL at 09:26

## 2022-03-20 RX ADMIN — SODIUM CHLORIDE, PRESERVATIVE FREE 10 ML: 5 INJECTION INTRAVENOUS at 02:18

## 2022-03-20 RX ADMIN — SODIUM CHLORIDE, PRESERVATIVE FREE 10 ML: 5 INJECTION INTRAVENOUS at 09:26

## 2022-03-20 RX ADMIN — HYDROMORPHONE HYDROCHLORIDE 0.5 MG: 2 INJECTION INTRAMUSCULAR; INTRAVENOUS; SUBCUTANEOUS at 13:17

## 2022-03-20 RX ADMIN — SODIUM CHLORIDE, PRESERVATIVE FREE 10 ML: 5 INJECTION INTRAVENOUS at 17:51

## 2022-03-20 RX ADMIN — FUROSEMIDE 20 MG: 20 TABLET ORAL at 09:26

## 2022-03-20 RX ADMIN — FAMOTIDINE 20 MG: 20 TABLET ORAL at 09:26

## 2022-03-20 RX ADMIN — INSULIN LISPRO 2 UNITS: 100 INJECTION, SOLUTION INTRAVENOUS; SUBCUTANEOUS at 09:27

## 2022-03-20 RX ADMIN — CEFAZOLIN SODIUM 1000 MG: 1 INJECTION, POWDER, FOR SOLUTION INTRAMUSCULAR; INTRAVENOUS at 20:16

## 2022-03-20 RX ADMIN — CEFAZOLIN SODIUM 1000 MG: 1 INJECTION, POWDER, FOR SOLUTION INTRAMUSCULAR; INTRAVENOUS at 02:17

## 2022-03-20 RX ADMIN — SODIUM CHLORIDE, PRESERVATIVE FREE 10 ML: 5 INJECTION INTRAVENOUS at 20:08

## 2022-03-20 RX ADMIN — SODIUM CHLORIDE, PRESERVATIVE FREE 10 ML: 5 INJECTION INTRAVENOUS at 09:28

## 2022-03-20 RX ADMIN — GUAIFENESIN AND CODEINE PHOSPHATE 5 ML: 100; 10 SOLUTION ORAL at 02:21

## 2022-03-20 RX ADMIN — RIVAROXABAN 15 MG: 15 TABLET, FILM COATED ORAL at 17:51

## 2022-03-20 RX ADMIN — SODIUM CHLORIDE, PRESERVATIVE FREE 10 ML: 5 INJECTION INTRAVENOUS at 02:01

## 2022-03-20 RX ADMIN — METOPROLOL SUCCINATE 25 MG: 25 TABLET, EXTENDED RELEASE ORAL at 09:26

## 2022-03-20 RX ADMIN — LACTULOSE 30 G: 10 SOLUTION ORAL at 20:08

## 2022-03-20 RX ADMIN — HYDROMORPHONE HYDROCHLORIDE 0.5 MG: 2 INJECTION INTRAMUSCULAR; INTRAVENOUS; SUBCUTANEOUS at 02:11

## 2022-03-20 RX ADMIN — HYDROMORPHONE HYDROCHLORIDE 0.5 MG: 2 INJECTION INTRAMUSCULAR; INTRAVENOUS; SUBCUTANEOUS at 07:36

## 2022-03-20 RX ADMIN — INSULIN LISPRO 2 UNITS: 100 INJECTION, SOLUTION INTRAVENOUS; SUBCUTANEOUS at 17:52

## 2022-03-20 RX ADMIN — ASPIRIN 81 MG: 81 TABLET, CHEWABLE ORAL at 09:26

## 2022-03-20 RX ADMIN — HYDROMORPHONE HYDROCHLORIDE 0.5 MG: 2 INJECTION INTRAMUSCULAR; INTRAVENOUS; SUBCUTANEOUS at 20:08

## 2022-03-20 ASSESSMENT — PAIN DESCRIPTION - LOCATION
LOCATION: ABDOMEN

## 2022-03-20 ASSESSMENT — PAIN DESCRIPTION - PAIN TYPE
TYPE: ACUTE PAIN

## 2022-03-20 ASSESSMENT — PAIN SCALES - GENERAL
PAINLEVEL_OUTOF10: 6
PAINLEVEL_OUTOF10: 5
PAINLEVEL_OUTOF10: 8
PAINLEVEL_OUTOF10: 10
PAINLEVEL_OUTOF10: 8
PAINLEVEL_OUTOF10: 4
PAINLEVEL_OUTOF10: 10

## 2022-03-20 ASSESSMENT — PAIN - FUNCTIONAL ASSESSMENT
PAIN_FUNCTIONAL_ASSESSMENT: ACTIVITIES ARE NOT PREVENTED
PAIN_FUNCTIONAL_ASSESSMENT: PREVENTS OR INTERFERES SOME ACTIVE ACTIVITIES AND ADLS
PAIN_FUNCTIONAL_ASSESSMENT: ACTIVITIES ARE NOT PREVENTED

## 2022-03-20 ASSESSMENT — PAIN DESCRIPTION - PROGRESSION
CLINICAL_PROGRESSION: NOT CHANGED

## 2022-03-20 ASSESSMENT — PAIN DESCRIPTION - ORIENTATION
ORIENTATION: LEFT

## 2022-03-20 ASSESSMENT — PAIN DESCRIPTION - ONSET
ONSET: ON-GOING

## 2022-03-20 ASSESSMENT — PAIN DESCRIPTION - FREQUENCY
FREQUENCY: INTERMITTENT
FREQUENCY: CONTINUOUS
FREQUENCY: CONTINUOUS

## 2022-03-20 ASSESSMENT — PAIN DESCRIPTION - DESCRIPTORS
DESCRIPTORS: SHARP
DESCRIPTORS: SHARP;STABBING

## 2022-03-20 NOTE — PROGRESS NOTES
Conjunctiva normal, No discharge. Respiratory:  Normal breath sounds, No respiratory distress, No wheezing, No chest tenderness. ,no use of accessory muscles, diaphragm movement is normal  Cardiovascular: (PMI) apex non displaced,no lifts no thrills, no s3,no s4, Normal heart rate, Normal rhythm, No murmurs, No rubs, No gallops. Carotid arteries pulse and amplitude are normal no bruit, no abdominal bruit noted ( normal abdominal aorta ausculation), femoral arteries pulse and amplitude are normal no bruit, pedal pulses are normal  GI:  Bowel sounds normal, Soft, No tenderness, No masses, No pulsatile masses. : External genitalia appear normal, No masses or lesions. No discharge. No CVA tenderness. Musculoskeletal:  Intact distal pulses, No edema, No tenderness, No cyanosis, No clubbing. Good range of motion in all major joints. No tenderness to palpation or major deformities noted. Back- No tenderness. Integument:  Warm, Dry, No erythema, No rash. Lymphatic:  No lymphadenopathy noted. Neurologic:  Alert & oriented x 3, Normal motor function, Normal sensory function, No focal deficits noted.    Psychiatric:  Affect normal, Judgment normal, Mood normal.     Medications:    polyethylene glycol  17 g Oral Daily    lactulose  30 g Oral BID    sodium chloride  500 mL IntraVENous Once    sodium chloride flush  5-40 mL IntraVENous 2 times per day    ceFAZolin  1,000 mg IntraVENous Q8H    rivaroxaban  15 mg Oral BID     Followed by   Ju Padilla ON 4/9/2022] rivaroxaban  20 mg Oral Dinner    furosemide  20 mg Oral Daily    lisinopril  2.5 mg Oral Daily    metoprolol succinate  25 mg Oral Daily    QUEtiapine  400 mg Oral Nightly    aspirin  81 mg Oral Daily    sodium chloride flush  5-40 mL IntraVENous 2 times per day    nicotine  1 patch TransDERmal Daily    famotidine  20 mg Oral Daily    insulin lispro  0.05 Units/kg SubCUTAneous TID     insulin lispro  0-12 Units SubCUTAneous TID     insulin lispro  0-6 Units SubCUTAneous Nightly      sodium chloride      dextrose      sodium chloride 25 mL (03/20/22 0937)     sodium chloride flush, sodium chloride, acetaminophen, ondansetron, hydrALAZINE, magnesium hydroxide, hydrALAZINE (APRESOLINE) ivpb, guaiFENesin-codeine, HYDROmorphone, heparin (porcine), glucose, glucagon (rDNA), dextrose, sodium chloride flush, sodium chloride, ondansetron **OR** ondansetron, nicotine polacrilex, dextrose bolus (hypoglycemia) **OR** dextrose bolus (hypoglycemia)    Lab Data:  CBC:   Recent Labs     03/18/22  0411 03/19/22  0813   WBC 10.7* 7.5   HGB 14.2 13.0   HCT 43.3 40.1   MCV 98.9 100.3*   * 102*     BMP:   Recent Labs     03/18/22  0411 03/19/22  0813 03/20/22  0752    137 133*   K 4.9 5.3* 4.7    103 104   CO2 24 23 23   BUN 25* 25* 22   CREATININE 1.6* 1.7* 1.3*     LIVER PROFILE: No results for input(s): AST, ALT, LIPASE, BILIDIR, BILITOT, ALKPHOS in the last 72 hours. Invalid input(s): AMYLASE,  ALB  PT/INR: No results for input(s): PROTIME, INR in the last 72 hours. APTT:   No results for input(s): APTT in the last 72 hours. BNP:  No results for input(s): BNP in the last 72 hours. TROPONIN: @TROPONINI:3@      Assessment:  61 y. o.year old who is admitted for          Plan:  Acute dvt and pe: as above  Nicm: continue lasis 20mg daily  HTN? L stable, contineu lopressor and lisinopril  Renal failure:L start lasix  'Health maintenance: exerise and diet  All labs, medications and tests reviewed, continue all other medications of all above medical condition listed as is.       Ben Caban MD, MD 3/20/2022 6:07 PM

## 2022-03-20 NOTE — PLAN OF CARE
Problem: Falls - Risk of:  Goal: Will remain free from falls  Description: Will remain free from falls  3/20/2022 0458 by Johan Cronin RN  Outcome: Met This Shift  3/20/2022 0458 by Johan Cronin RN  Outcome: Met This Shift  Goal: Absence of physical injury  Description: Absence of physical injury  3/20/2022 0458 by Johan Cronin RN  Outcome: Met This Shift  3/20/2022 0458 by Johan Cronin RN  Outcome: Met This Shift     Problem: Discharge Planning:  Goal: Discharged to appropriate level of care  Description: Discharged to appropriate level of care  3/20/2022 0458 by Johan Cronin RN  Outcome: Ongoing  3/20/2022 0458 by Johan Cronin RN  Outcome: Ongoing     Problem: Pain:  Goal: Pain level will decrease  Description: Pain level will decrease  3/20/2022 0458 by Johan Cronin RN  Outcome: Not Met This Shift  3/20/2022 0458 by Johan Cronin RN  Outcome: Not Met This Shift  Goal: Control of acute pain  Description: Control of acute pain  3/20/2022 0458 by Johan Cronin RN  Outcome: Not Met This Shift  3/20/2022 0458 by Johan Cronin RN  Outcome: Not Met This Shift  Goal: Control of chronic pain  Description: Control of chronic pain  3/20/2022 0458 by Johan Cronin RN  Outcome: Not Met This Shift  3/20/2022 0458 by Johan Cronin RN  Outcome: Not Met This Shift

## 2022-03-20 NOTE — PROGRESS NOTES
ATTENDING PHYSICIAN'S PROGRESS NOTES    Patient:  Anders Bah    YOB: 1965    MRN: [id number]     Acct: [de-identified]     Admit date: 3/15/2022    Patient Seen, Chart, Consults notes, Labs, Radiology studies reviewed. SUBJECTIVE:   Day 5 of stay with acute DVT and pulmonary edema. Seen and evaluated at bedside. Complains of constipation. She has MiraLAX and lactulose orded as needed but states she has not been receiving. Change to scheduled. Informed patient that she has a UTI and she admits to dysuria and lower abdominal discomfort. All other ROS negative except noted in HPI    Past, Family, Social History unchanged from admission. Diet:  ADULT DIET; Regular; 3 carb choices (45 gm/meal);  Low Fat/Low Chol/High Fiber/ANNETTE; Low Sodium (2 gm)    Medications:  Scheduled Meds:   polyethylene glycol  17 g Oral Daily    lactulose  30 g Oral BID    sodium chloride  500 mL IntraVENous Once    sodium chloride flush  5-40 mL IntraVENous 2 times per day    ceFAZolin  1,000 mg IntraVENous Q8H    rivaroxaban  15 mg Oral BID WC    Followed by   Franky Bauman ON 4/9/2022] rivaroxaban  20 mg Oral Dinner    furosemide  20 mg Oral Daily    lisinopril  2.5 mg Oral Daily    metoprolol succinate  25 mg Oral Daily    QUEtiapine  400 mg Oral Nightly    aspirin  81 mg Oral Daily    sodium chloride flush  5-40 mL IntraVENous 2 times per day    nicotine  1 patch TransDERmal Daily    famotidine  20 mg Oral Daily    insulin lispro  0.05 Units/kg SubCUTAneous TID     insulin lispro  0-12 Units SubCUTAneous TID     insulin lispro  0-6 Units SubCUTAneous Nightly     Continuous Infusions:   sodium chloride      dextrose      sodium chloride 25 mL (03/20/22 0937)     PRN Meds:sodium chloride flush, sodium chloride, acetaminophen, ondansetron, hydrALAZINE, magnesium hydroxide, hydrALAZINE (APRESOLINE) ivpb, guaiFENesin-codeine, HYDROmorphone, heparin (porcine), glucose, glucagon (rDNA), dextrose, sodium chloride flush, sodium chloride, ondansetron **OR** ondansetron, nicotine polacrilex, dextrose bolus (hypoglycemia) **OR** dextrose bolus (hypoglycemia)    OBJECTIVE:  CBC:   Recent Labs     03/18/22  0411 03/19/22  0813   WBC 10.7* 7.5   HGB 14.2 13.0   * 102*     BMP:    Recent Labs     03/18/22  0411 03/19/22  0813 03/20/22  0752    137 133*   K 4.9 5.3* 4.7    103 104   CO2 24 23 23   BUN 25* 25* 22   CREATININE 1.6* 1.7* 1.3*   GLUCOSE 68* 132* 155*     Calcium:  Recent Labs     03/20/22  0752   CALCIUM 8.1*     Ionized Calcium:No results for input(s): IONCA in the last 72 hours. Magnesium:No results for input(s): MG in the last 72 hours. Phosphorus:No results for input(s): PHOS in the last 72 hours. BNP:No results for input(s): BNP in the last 72 hours. Glucose:  Recent Labs     03/19/22  1948 03/20/22  0739 03/20/22  1227   POCGLU 127* 159* 120*     HgbA1C: No results for input(s): LABA1C in the last 72 hours. INR: No results for input(s): INR in the last 72 hours. Hepatic: No results for input(s): ALKPHOS, ALT, AST, PROT, BILITOT, BILIDIR, LABALBU in the last 72 hours. Amylase and Lipase:No results for input(s): LACTA, AMYLASE in the last 72 hours. Lactic Acid: No results for input(s): LACTA in the last 72 hours. Troponin: No results for input(s): CKTOTAL, CKMB, TROPONINT in the last 72 hours. BNP: No results for input(s): BNP in the last 72 hours. Lipids: No results for input(s): CHOL, TRIG, HDL, LDL, LDLCALC in the last 72 hours.   ABGs:   Lab Results   Component Value Date    PH 7.41 08/02/2020    O2SAT 54.1 08/02/2020       Radiology reports as per the Radiologist  Radiology:   Echocardiogram complete 2D with doppler with color  Result Date: 3/16/2022  Transthoracic Echocardiography Report (TTE)  Demographics   Patient Name       Demarcus PEMBERTON Date of Study       03/16/2022   Date of Birth      1965         Gender Female   Age                61 year(s)         Race                Black   Patient Number     [id number]         Room Number         3106   Visit Number       [id number]   Corporate ID       [id number]   Accession Number   [id number]         [address] [place] [address] ,                                                            [facility]   Ordering Physician Cristobal Fitzpatrick Interpreting        Mona Sahni                     CNP                Physician           MD  Procedure Type of Study   TTE procedure:ECHOCARDIOGRAM COMPLETE 2D W DOPPLER W COLOR. Procedure Date Date: 03/16/2022 Start: 07:50 AM Study Location: Portable Technical Quality: Adequate visualization Indications:Deep vein thrombosis (DVT). Patient Status: Routine Height: 62 inches Weight: 127 pounds BSA: 1.58 m2 BMI: 23.23 kg/m2 HR: 93 bpm BP: 150/104 mmHg  Conclusions   Summary  Left ventricular systolic function is abnormal.  Ejection fraction is visually estimated at 30-35 %  Slight hypokinesis of the anteroseptal wall segment. Mild left ventricular hypertrophy. Severe eccentric mitral regurgitation (ERO: 0.40 cm sq, regurgitant volume:  62 ml). Essentially normal right ventricle; no evidence of right heart strain. Moderate tricuspid regurgitation; RVSP: 44 mmHg. Mild pulmonic regurgitation present. No evidence of any pericardial effusion. Bilateral pleural effusion. Inferior vena cava is dilated, measuring at 2.3 cm, but does collapse with  respiration. Signature   ------------------------------------------------------------------  Electronically signed by Mona Sahni MD (Interpreting  physician) on 03/16/2022 at 11:15 AM  ------------------------------------------------------------------   Findings   Left Ventricle  Left ventricular systolic function is abnormal.  Ejection fraction is visually estimated at 30-35%  Slight hypokinesis of the anteroseptal wall segment. Mild left ventricular hypertrophy.   Unable to determine diastolic function due to arrhythmia. Left Atrium  Essentially normal left atrium. Right Atrium  Essentially normal right atrium. Right Ventricle  Essentially normal right ventricle; no evidence of right heart strain. Aortic Valve  Sclerotic, but non-stenotic aortic valve. Mitral Valve  Severe eccentric mitral regurgitation (ERO: 0.40 cm sq, regurgitant volume:  62 ml). Tricuspid Valve  Moderate tricuspid regurgitation; RVSP: 44 mmHg. Pulmonic Valve  Mild pulmonic regurgitation present. Pericardial Effusion  No evidence of any pericardial effusion. Pleural Effusion  Bilateral pleural effusion. Miscellaneous  Inferior vena cava is dilated, measuring at 2.3 cm, but does collapse with  respiration. The aorta is within normal limits.   M-Mode/2D Measurements & Calculations   LV Diastolic Dimension:  LV Systolic Dimension:  LA Dimension: 3.6 cmAO Root  5.21 cm                  4.64 cm                 Dimension: 3.4 cmLA Area:  LV FS:10.9 %             LV Volume Diastolic: 79 56.4 cm2  LV PW Diastolic: 4.52 cm ml  LV PW Systolic: 5.20 cm  LV Volume Systolic: 50  Septum Diastolic: 1.53   ml  cm                       LV EDV/LV EDV Index: 79 RV Diastolic Dimension:  Septum Systolic: 8.66 cm YL/43 U6QU ESV/LV ESV   2.17 cm  CO: 3.42 l/min           Index: 50 ml/32 m2  CI: 2.16 l/m*m2          EF Calculated (A4C):    LA/Aorta: 1.06                           36.7 %  LV Area Diastolic: 62.2  EF Calculated (2D):     LA volume/Index: 61 ml  cm2                      23.6 %                  /26E2  LV Area Systolic: 63.8  cm2                      LV Length: 5.93 cm                            LVOT: 2 cm  Doppler Measurements & Calculations   MV Peak E-Wave: 126     AV Peak Velocity: 120 cm/s LVOT Peak Velocity: 92.1  cm/s                    AV Peak Gradient: 5.76     cm/s  MV Peak A-Wave: 62.3    mmHg                       LVOT Mean Velocity: 64.6  cm/s                    AV Mean Velocity: 91.2     cm/s MV E/A Ratio: 2.02      cm/s                       LVOT Peak Gradient: 3  MV Peak Gradient: 6.35  AV Mean Gradient: 4 mmHg   mmHgLVOT Mean Gradient: 2  mmHg                    AV VTI: 16.7 cm            mmHg                          AV Area (Continuity):2.2   Estimated RVSP: 44 mmHg  MV P1/2t: 34 msec       cm2                        Estimated RAP:3 mmHg  MVA by PHT:6.47 cm2  MV Area (PISA): 0.37    LVOT VTI: 11.7 cm  cm2                                                TR Velocity:322 cm/s  MV E' Septal Velocity:  Estimated PASP: 44.47 mmHg TR Gradient:41.47 mmHg  4.66 cm/s  MV E' Lateral Velocity:  7.07 cm/s  MV E/E' septal: 27.04  MV E/E' lateral: 17.82  MR Velocity: 600 cm/s  MV ARCELIA PISA: 0.49 cm2  MR VTI: 167 cm  Alias Velocity: 38.5  cm/sPISA Radius: 1.1 cm  MV ARCELIA Volumetric: 0.37  cm2  PISA area: 7.6 cm2MR  flow rate: 292.6 ml/sMR  volume:81.83 ml      XR CHEST PORTABLE  Result Date: 3/15/2022  EXAMINATION: ONE XRAY VIEW OF THE CHEST 3/15/2022 12:35 pm COMPARISON: 03/03/2022 HISTORY: ORDERING SYSTEM PROVIDED HISTORY: Sob TECHNOLOGIST PROVIDED HISTORY: Reason for exam:->Sob Reason for Exam: sob FINDINGS: Interstitial pulmonary edema is present. Small left pleural effusion. Heart size is at the upper limits of normal.  No pneumothorax. Interstitial pulmonary edema with small left pleural effusion. VL DUP LOWER EXTREMITY VENOUS BILATERAL  Result Date: 3/15/2022  EXAMINATION: DUPLEX VENOUS ULTRASOUND OF THE BILATERAL LOWER EXTREMITIES3/15/2022 1:26 pm TECHNIQUE: Duplex ultrasound using B-mode/gray scaled imaging, Doppler spectral analysis and color flow Doppler was obtained of the deep venous structures of the lower bilateral extremities. COMPARISON: None.  HISTORY: ORDERING SYSTEM PROVIDED HISTORY: lower extremity swelling, ro dvt TECHNOLOGIST PROVIDED HISTORY: Reason for exam:->lower extremity swelling, ro dvt FINDINGS: Deep venous thrombus present within the right femoral vein which is only partially compressible and demonstrates some vascular flow consistent with nonocclusive thrombus. The calf veins of the right lower extremity are noncompressible occluding the anterior tibial vein, peroneal vein, and posterior tibial vein consistent with thrombus. The venous thrombus present within the left femoral vein which is noncompressible and demonstrates no significant flow. Flow is identified within the left popliteal vein. The calf veins of the left lower extremity also demonstrate occlusive thrombus. 1. Extensive deep venous thrombus of the bilateral lower extremities. Critical results were called by Cari Auguste MD to Barnett Romberg, PA on 3/15/2022 at 14:54. CTA PULMONARY W CONTRAST  Result Date: 3/15/2022  EXAMINATION: CTA OF THE CHEST 3/15/2022 1:59 pm TECHNIQUE: CTA of the chest was performed after the administration of intravenous contrast.  Multiplanar reformatted images are provided for review. MIP images are provided for review. Dose modulation, iterative reconstruction, and/or weight based adjustment of the mA/kV was utilized to reduce the radiation dose to as low as reasonably achievable. COMPARISON: None. HISTORY: ORDERING SYSTEM PROVIDED HISTORY: sob, bilateral dvt, ro pe TECHNOLOGIST PROVIDED HISTORY: Reason for exam:->sob, bilateral dvt, ro pe Decision Support Exception - unselect if not a suspected or confirmed emergency medical condition->Emergency Medical Condition (MA) Reason for Exam: sob, dvt's Additional signs and symptoms: none Relevant Medical/Surgical History: 75ml isovue 370 FINDINGS: Pulmonary Arteries: There is respiratory motion artifact along with streak artifact generated by the patient's right arm is at her side. These combine to limited evaluation of the segmental and subsegmental pulmonary arterial branches. No central pulmonary embolism is detected. Mediastinum: Visualized thyroid unremarkable.   Mildly enlarged mediastinal lymph nodes are seen which are likely reactive. Esophagus is unremarkable. Cardiac chambers are unremarkable. No pericardial effusion. Thoracic aorta is normal in caliber. Lungs/pleura: Again, respiratory motion degrades imaging. There is a moderate right and a small moderate left pleural effusion. Adjacent airspace disease is noted. Mild patchy ground-glass infiltrates are detected as well, especially within the superior segment of the left lower lobe and the periphery of the right upper lobe. Interlobular septal thickening is noted. Upper Abdomen: Grossly unremarkable, but not well evaluated secondary to extensive respiratory motion artifact. Soft Tissues/Bones: No acute or suspicious bony abnormalities. Visualized extra thoracic soft tissues unremarkable. Limited evaluation of the segmental and subsegmental pulmonary arterial branches. No central pulmonary embolism is detected. Bilateral pleural effusions, patchy ground-glass opacity, and interlobular septal thickening, the constellation of which is most compatible with pulmonary edema. That said, given the focality of the ground-glass infiltrates, infectious or inflammatory pneumonitis should be considered as well, including viral infection. Echocardiogram   Summary   Left ventricular systolic function is abnormal.   Ejection fraction is visually estimated at 30-35 %   Slight hypokinesis of the anteroseptal wall segment. Mild left ventricular hypertrophy. Severe eccentric mitral regurgitation (ERO: 0.40 cm sq, regurgitant volume:   62 ml). Essentially normal right ventricle; no evidence of right heart strain. Moderate tricuspid regurgitation; RVSP: 44 mmHg. Mild pulmonic regurgitation present. No evidence of any pericardial effusion. Bilateral pleural effusion. Inferior vena cava is dilated, measuring at 2.3 cm, but does collapse with   respiration.    Signature   ------------------------------------------------------------------   Electronically signed by IMN Kristina Valerio MD (Interpreting   physician) on 03/16/2022 at 11:15 AM    Physical Exam:  Vitals: BP (!) 146/94   Pulse 85   Temp 98.9 °F (37.2 °C) (Oral)   Resp 16   Ht 5' 2\" (1.575 m)   Wt 146 lb 6.4 oz (66.4 kg)   SpO2 96%   BMI 26.78 kg/m²   24 hour intake/output:    Intake/Output Summary (Last 24 hours) at 3/20/2022 1449  Last data filed at 3/20/2022 1327  Gross per 24 hour   Intake --   Output 1500 ml   Net -1500 ml     Last 3 weights:   Wt Readings from Last 3 Encounters:   03/20/22 146 lb 6.4 oz (66.4 kg)   03/03/22 125 lb (56.7 kg)   04/12/21 115 lb (52.2 kg)       General appearance - alert, well appearing, and in no distress and normal weight  HEENT: Normocephalic, Atraumatic, Conjuctiva pink, PERRL, Oral mucosa normal, Lips, teeth and gums normal, Trachea midline, Thyroid normal and No noted lymphadenopathy  Chest - clear to auscultation, no wheezes, rales or rhonchi, symmetric air entry  Cardiovascular - normal rate, regular rhythm, normal S1, S2, no murmurs, rubs, clicks or gallops  Abdomen - soft, nontender, nondistended, no masses or organomegaly   Neurological - Alert and oriented, Normal speech, No focal findings or movement disorder noted and Motor and sensory grossly normal bilaterally  Integumentary - Skin color, texture, turgor normal. No Rashes or lesions  Musculoskeletal -Full ROM times 4 extremities, No clubbing or cyanosis and No peripheral edema      DVT prophylaxis: [] Lovenox                                 [] SCDs                                 [] SQ Heparin                                 [] Encourage ambulation           [x] Xarelto                 ASSESSMENT / PLAN :    Principal Problem:    Acute bilateral deep vein thrombosis (DVT) of femoral veins (HCC)  Active Problems:    Precordial pain    Type 2 diabetes mellitus without complication, with long-term current use of insulin (HCC)    Pulmonary embolism (HCC)    Acute on chronic systolic congestive heart failure (HCC)    Acute congestive heart failure ([facility] [address]  Resolved Problems:    * No resolved hospital problems. *    Acute bilateral Lower Extremity DVT   Was on heparin drip on admission later transition to Lovenox. Lovenox  was held on the 17th for possible cardiac cath but was not resumed. Xarelto started 03/19 per cardiology recommendation. She does not have a PE  Consulted oncology given extensive DVT/unprovoked. May need outpatient work-up. Pulmonary edema  Acute systolic CHF  NICM  Admitted with dyspnea,BNP elevated, bilateral lower extremity edema  Venous duplex positive for extensive DVTs, heparin started  CT PE negative for PE  Groundglass opacities and pulmonary edema noted  Lasix started, creatinine up from 1.2 to 1.7. Currently off diuretic and edema resolved. Creatinine trending down to 1.3 today. Continue to monitor  Echocardiogram results noted as above   Status post [facility] 3/18-Non obstructive CAD  NICM: severe LV dysfunction with severe mitral regurgitation . Monitor input/ output and daily weight. 1800 fluid restriction. .  Cardiology following   Continue to monitor     MELANY:  Mild hyperkalemia  Creatinine peaked at 1.8. Improved to 1.3 today. Likely sec to diuretics  Holding lasix. Monitor  Lokelma x 1 for hyperkalemia, potassium down to 4.9. Potassium 5.3 again 3/19. Lokelma 10 mg x 2 doses given and potassium 4.7 today. On low-dose ACE inhibitor. Continue to monitor . UTI  UA positive  Urine culture with group B strep   On Ancef. Plan to transition to p.o. antibiotic at discharge to complete course    Constipation  MiraLAX and lactulose ordered     Diabetes mellitus type 2:   Sliding scale insulin diabetic diet for now,   Hold oral hypoglycemics   Sugars good     History of schizophrenia:   Continue Seroquel      Hepatitis C:   Chronic, stable     Tobacco dependence:   Smokes 3 packs/day, nicotine patch. Counselled to quit.     DVT prophlaxis:    Xarelto     Disposition: Home tomorrow    Electronically signed by Sai Allen MD on 3/20/2022 at 2:49 PM    Rounding Hospitalist

## 2022-03-21 LAB
ANION GAP SERPL CALCULATED.3IONS-SCNC: 8 MMOL/L (ref 4–16)
BUN BLDV-MCNC: 15 MG/DL (ref 6–23)
CALCIUM SERPL-MCNC: 8.2 MG/DL (ref 8.3–10.6)
CHLORIDE BLD-SCNC: 103 MMOL/L (ref 99–110)
CO2: 25 MMOL/L (ref 21–32)
CREAT SERPL-MCNC: 0.9 MG/DL (ref 0.6–1.1)
GFR AFRICAN AMERICAN: >60 ML/MIN/1.73M2
GFR NON-AFRICAN AMERICAN: >60 ML/MIN/1.73M2
GLUCOSE BLD-MCNC: 103 MG/DL (ref 70–99)
GLUCOSE BLD-MCNC: 112 MG/DL (ref 70–99)
GLUCOSE BLD-MCNC: 136 MG/DL (ref 70–99)
GLUCOSE BLD-MCNC: 158 MG/DL (ref 70–99)
GLUCOSE BLD-MCNC: 172 MG/DL (ref 70–99)
HCT VFR BLD CALC: 38.1 % (ref 37–47)
HEMOGLOBIN: 12.8 GM/DL (ref 12.5–16)
MCH RBC QN AUTO: 32.7 PG (ref 27–31)
MCHC RBC AUTO-ENTMCNC: 33.6 % (ref 32–36)
MCV RBC AUTO: 97.4 FL (ref 78–100)
PDW BLD-RTO: 12.8 % (ref 11.7–14.9)
PLATELET # BLD: 131 K/CU MM (ref 140–440)
PMV BLD AUTO: 11.5 FL (ref 7.5–11.1)
POTASSIUM SERPL-SCNC: 4.8 MMOL/L (ref 3.5–5.1)
RBC # BLD: 3.91 M/CU MM (ref 4.2–5.4)
SODIUM BLD-SCNC: 136 MMOL/L (ref 135–145)
WBC # BLD: 7.3 K/CU MM (ref 4–10.5)

## 2022-03-21 PROCEDURE — 83090 ASSAY OF HOMOCYSTEINE: CPT

## 2022-03-21 PROCEDURE — 2580000003 HC RX 258: Performed by: INTERNAL MEDICINE

## 2022-03-21 PROCEDURE — 82962 GLUCOSE BLOOD TEST: CPT

## 2022-03-21 PROCEDURE — 6360000002 HC RX W HCPCS: Performed by: INTERNAL MEDICINE

## 2022-03-21 PROCEDURE — 6370000000 HC RX 637 (ALT 250 FOR IP): Performed by: INTERNAL MEDICINE

## 2022-03-21 PROCEDURE — 85027 COMPLETE CBC AUTOMATED: CPT

## 2022-03-21 PROCEDURE — 85305 CLOT INHIBIT PROT S TOTAL: CPT

## 2022-03-21 PROCEDURE — 81291 MTHFR GENE: CPT

## 2022-03-21 PROCEDURE — 86148 ANTI-PHOSPHOLIPID ANTIBODY: CPT

## 2022-03-21 PROCEDURE — 81240 F2 GENE: CPT

## 2022-03-21 PROCEDURE — 85303 CLOT INHIBIT PROT C ACTIVITY: CPT

## 2022-03-21 PROCEDURE — 81241 F5 GENE: CPT

## 2022-03-21 PROCEDURE — 36415 COLL VENOUS BLD VENIPUNCTURE: CPT

## 2022-03-21 PROCEDURE — 99222 1ST HOSP IP/OBS MODERATE 55: CPT | Performed by: INTERNAL MEDICINE

## 2022-03-21 PROCEDURE — 80048 BASIC METABOLIC PNL TOTAL CA: CPT

## 2022-03-21 PROCEDURE — 86146 BETA-2 GLYCOPROTEIN ANTIBODY: CPT

## 2022-03-21 PROCEDURE — 86147 CARDIOLIPIN ANTIBODY EA IG: CPT

## 2022-03-21 PROCEDURE — 1200000000 HC SEMI PRIVATE

## 2022-03-21 RX ORDER — METOPROLOL SUCCINATE 50 MG/1
50 TABLET, EXTENDED RELEASE ORAL DAILY
Status: DISCONTINUED | OUTPATIENT
Start: 2022-03-22 | End: 2022-03-22 | Stop reason: HOSPADM

## 2022-03-21 RX ADMIN — LACTULOSE 30 G: 10 SOLUTION ORAL at 22:01

## 2022-03-21 RX ADMIN — METOPROLOL SUCCINATE 25 MG: 25 TABLET, EXTENDED RELEASE ORAL at 09:30

## 2022-03-21 RX ADMIN — GUAIFENESIN AND CODEINE PHOSPHATE 5 ML: 100; 10 SOLUTION ORAL at 22:00

## 2022-03-21 RX ADMIN — QUETIAPINE FUMARATE 400 MG: 100 TABLET ORAL at 22:01

## 2022-03-21 RX ADMIN — POLYETHYLENE GLYCOL (3350) 17 G: 17 POWDER, FOR SOLUTION ORAL at 09:30

## 2022-03-21 RX ADMIN — ASPIRIN 81 MG: 81 TABLET, CHEWABLE ORAL at 09:30

## 2022-03-21 RX ADMIN — LACTULOSE 30 G: 10 SOLUTION ORAL at 09:53

## 2022-03-21 RX ADMIN — HYDRALAZINE HYDROCHLORIDE 10 MG: 20 INJECTION, SOLUTION INTRAMUSCULAR; INTRAVENOUS at 23:13

## 2022-03-21 RX ADMIN — HYDROMORPHONE HYDROCHLORIDE 0.5 MG: 2 INJECTION INTRAMUSCULAR; INTRAVENOUS; SUBCUTANEOUS at 18:12

## 2022-03-21 RX ADMIN — CEFAZOLIN SODIUM 1000 MG: 1 INJECTION, POWDER, FOR SOLUTION INTRAMUSCULAR; INTRAVENOUS at 09:29

## 2022-03-21 RX ADMIN — SODIUM CHLORIDE, PRESERVATIVE FREE 10 ML: 5 INJECTION INTRAVENOUS at 14:02

## 2022-03-21 RX ADMIN — HYDROMORPHONE HYDROCHLORIDE 0.5 MG: 2 INJECTION INTRAMUSCULAR; INTRAVENOUS; SUBCUTANEOUS at 14:02

## 2022-03-21 RX ADMIN — HYDROMORPHONE HYDROCHLORIDE 0.5 MG: 2 INJECTION INTRAMUSCULAR; INTRAVENOUS; SUBCUTANEOUS at 22:01

## 2022-03-21 RX ADMIN — RIVAROXABAN 15 MG: 15 TABLET, FILM COATED ORAL at 18:12

## 2022-03-21 RX ADMIN — HYDROMORPHONE HYDROCHLORIDE 0.5 MG: 2 INJECTION INTRAMUSCULAR; INTRAVENOUS; SUBCUTANEOUS at 09:30

## 2022-03-21 RX ADMIN — CEFAZOLIN SODIUM 1000 MG: 1 INJECTION, POWDER, FOR SOLUTION INTRAMUSCULAR; INTRAVENOUS at 02:30

## 2022-03-21 RX ADMIN — HYDROMORPHONE HYDROCHLORIDE 0.5 MG: 2 INJECTION INTRAMUSCULAR; INTRAVENOUS; SUBCUTANEOUS at 00:34

## 2022-03-21 RX ADMIN — HYDROMORPHONE HYDROCHLORIDE 0.5 MG: 2 INJECTION INTRAMUSCULAR; INTRAVENOUS; SUBCUTANEOUS at 04:57

## 2022-03-21 RX ADMIN — SODIUM CHLORIDE, PRESERVATIVE FREE 10 ML: 5 INJECTION INTRAVENOUS at 09:34

## 2022-03-21 RX ADMIN — LISINOPRIL 2.5 MG: 2.5 TABLET ORAL at 09:30

## 2022-03-21 RX ADMIN — FAMOTIDINE 20 MG: 20 TABLET ORAL at 09:30

## 2022-03-21 RX ADMIN — SODIUM CHLORIDE, PRESERVATIVE FREE 10 ML: 5 INJECTION INTRAVENOUS at 18:12

## 2022-03-21 RX ADMIN — RIVAROXABAN 15 MG: 15 TABLET, FILM COATED ORAL at 09:53

## 2022-03-21 RX ADMIN — CEFAZOLIN SODIUM 1000 MG: 1 INJECTION, POWDER, FOR SOLUTION INTRAMUSCULAR; INTRAVENOUS at 18:18

## 2022-03-21 RX ADMIN — SODIUM CHLORIDE, PRESERVATIVE FREE 10 ML: 5 INJECTION INTRAVENOUS at 22:01

## 2022-03-21 RX ADMIN — INSULIN LISPRO 2 UNITS: 100 INJECTION, SOLUTION INTRAVENOUS; SUBCUTANEOUS at 09:31

## 2022-03-21 RX ADMIN — FUROSEMIDE 20 MG: 20 TABLET ORAL at 09:30

## 2022-03-21 RX ADMIN — SODIUM CHLORIDE, PRESERVATIVE FREE 10 ML: 5 INJECTION INTRAVENOUS at 22:04

## 2022-03-21 ASSESSMENT — PAIN DESCRIPTION - ONSET
ONSET: ON-GOING

## 2022-03-21 ASSESSMENT — PAIN SCALES - WONG BAKER
WONGBAKER_NUMERICALRESPONSE: 2

## 2022-03-21 ASSESSMENT — PAIN DESCRIPTION - DESCRIPTORS
DESCRIPTORS: ACHING

## 2022-03-21 ASSESSMENT — PAIN DESCRIPTION - LOCATION
LOCATION: HEAD

## 2022-03-21 ASSESSMENT — PAIN SCALES - GENERAL
PAINLEVEL_OUTOF10: 10
PAINLEVEL_OUTOF10: 6
PAINLEVEL_OUTOF10: 9
PAINLEVEL_OUTOF10: 6
PAINLEVEL_OUTOF10: 10
PAINLEVEL_OUTOF10: 7
PAINLEVEL_OUTOF10: 10
PAINLEVEL_OUTOF10: 8
PAINLEVEL_OUTOF10: 7
PAINLEVEL_OUTOF10: 10
PAINLEVEL_OUTOF10: 6
PAINLEVEL_OUTOF10: 6

## 2022-03-21 ASSESSMENT — PAIN DESCRIPTION - PROGRESSION
CLINICAL_PROGRESSION: NOT CHANGED

## 2022-03-21 ASSESSMENT — PAIN DESCRIPTION - PAIN TYPE
TYPE: ACUTE PAIN

## 2022-03-21 ASSESSMENT — PAIN DESCRIPTION - FREQUENCY
FREQUENCY: INTERMITTENT

## 2022-03-21 ASSESSMENT — PAIN DESCRIPTION - ORIENTATION
ORIENTATION: LEFT

## 2022-03-21 NOTE — PROGRESS NOTES
ATTENDING PHYSICIAN'S PROGRESS NOTES    Patient:  Merly Young    YOB: 1965    MRN: [id number]     Acct: [de-identified]     Admit date: 3/15/2022    Patient Seen, Chart, Consults notes, Labs, Radiology studies reviewed. SUBJECTIVE:   Day 6 of stay with acute DVT and pulmonary edema. Seen and evaluated at bedside. Had 2 BMs. Still complaining of left flank pain even after having BMs. She is on treatment for UTI. Noted her BP also going up. Will get CT abdomen and pelvis to evaluate    All other ROS negative except noted in HPI    Past, Family, Social History unchanged from admission. Diet:  ADULT DIET; Regular; 3 carb choices (45 gm/meal);  Low Fat/Low Chol/High Fiber/ANNETTE; Low Sodium (2 gm)    Medications:  Scheduled Meds:   [START ON 3/22/2022] metoprolol succinate  50 mg Oral Daily    polyethylene glycol  17 g Oral Daily    lactulose  30 g Oral BID    sodium chloride  500 mL IntraVENous Once    sodium chloride flush  5-40 mL IntraVENous 2 times per day    ceFAZolin  1,000 mg IntraVENous Q8H    rivaroxaban  15 mg Oral BID WC    Followed by   Sindy Neumann ON 4/9/2022] rivaroxaban  20 mg Oral Dinner    furosemide  20 mg Oral Daily    lisinopril  2.5 mg Oral Daily    QUEtiapine  400 mg Oral Nightly    aspirin  81 mg Oral Daily    sodium chloride flush  5-40 mL IntraVENous 2 times per day    nicotine  1 patch TransDERmal Daily    famotidine  20 mg Oral Daily    insulin lispro  0.05 Units/kg SubCUTAneous TID WC    insulin lispro  0-12 Units SubCUTAneous TID WC    insulin lispro  0-6 Units SubCUTAneous Nightly     Continuous Infusions:   sodium chloride      dextrose      sodium chloride 25 mL (03/20/22 0937)     PRN Meds:sodium chloride flush, sodium chloride, acetaminophen, ondansetron, hydrALAZINE, magnesium hydroxide, hydrALAZINE (APRESOLINE) ivpb, guaiFENesin-codeine, HYDROmorphone, glucose, glucagon (rDNA), dextrose, sodium chloride flush, sodium chloride, ondansetron **OR** ondansetron, nicotine polacrilex, dextrose bolus (hypoglycemia) **OR** dextrose bolus (hypoglycemia)    OBJECTIVE:  CBC:   Recent Labs     03/19/22  0813 03/21/22  0938   WBC 7.5 7.3   HGB 13.0 12.8   * 131*     BMP:    Recent Labs     03/19/22  0813 03/20/22  0752 03/21/22  0938    133* 136   K 5.3* 4.7 4.8    104 103   CO2 23 23 25   BUN 25* 22 15   CREATININE 1.7* 1.3* 0.9   GLUCOSE 132* 155* 158*     Calcium:  Recent Labs     03/21/22  0938   CALCIUM 8.2*     Ionized Calcium:No results for input(s): IONCA in the last 72 hours. Magnesium:No results for input(s): MG in the last 72 hours. Phosphorus:No results for input(s): PHOS in the last 72 hours. BNP:No results for input(s): BNP in the last 72 hours. Glucose:  Recent Labs     03/20/22 2007 03/21/22  0747 03/21/22  1147   POCGLU 82 172* 136*     HgbA1C: No results for input(s): LABA1C in the last 72 hours. INR: No results for input(s): INR in the last 72 hours. Hepatic: No results for input(s): ALKPHOS, ALT, AST, PROT, BILITOT, BILIDIR, LABALBU in the last 72 hours. Amylase and Lipase:No results for input(s): LACTA, AMYLASE in the last 72 hours. Lactic Acid: No results for input(s): LACTA in the last 72 hours. Troponin: No results for input(s): CKTOTAL, CKMB, TROPONINT in the last 72 hours. BNP: No results for input(s): BNP in the last 72 hours. Lipids: No results for input(s): CHOL, TRIG, HDL, LDL, LDLCALC in the last 72 hours.   ABGs:   Lab Results   Component Value Date    PH 7.41 08/02/2020    O2SAT 54.1 08/02/2020     Radiology reports as per the Radiologist  Radiology:   Echocardiogram complete 2D with doppler with color  Result Date: 3/16/2022  Transthoracic Echocardiography Report (TTE)  Demographics   Patient Name       Wilton PEMBERTON Date of Study       03/16/2022   Date of Birth      1965         Gender              Female   Age                61 year(s)         Race Black   Patient Number     [id number]         Room Number         6241   Visit Number       [id number]   Corporate ID       [id number]   Accession Number   [id number]         [address] [place] [address] ,                                                            [facility]   Ordering Physician Conner Prakash                     CNP                Physician           MD  Procedure Type of Study   TTE procedure:ECHOCARDIOGRAM COMPLETE 2D W DOPPLER W COLOR. Procedure Date Date: 03/16/2022 Start: 07:50 AM Study Location: Portable Technical Quality: Adequate visualization Indications:Deep vein thrombosis (DVT). Patient Status: Routine Height: 62 inches Weight: 127 pounds BSA: 1.58 m2 BMI: 23.23 kg/m2 HR: 93 bpm BP: 150/104 mmHg  Conclusions   Summary  Left ventricular systolic function is abnormal.  Ejection fraction is visually estimated at 30-35 %  Slight hypokinesis of the anteroseptal wall segment. Mild left ventricular hypertrophy. Severe eccentric mitral regurgitation (ERO: 0.40 cm sq, regurgitant volume:  62 ml). Essentially normal right ventricle; no evidence of right heart strain. Moderate tricuspid regurgitation; RVSP: 44 mmHg. Mild pulmonic regurgitation present. No evidence of any pericardial effusion. Bilateral pleural effusion. Inferior vena cava is dilated, measuring at 2.3 cm, but does collapse with  respiration. Signature   ------------------------------------------------------------------  Electronically signed by Rina Finley MD (Interpreting  physician) on 03/16/2022 at 11:15 AM  ------------------------------------------------------------------   Findings   Left Ventricle  Left ventricular systolic function is abnormal.  Ejection fraction is visually estimated at 30-35%  Slight hypokinesis of the anteroseptal wall segment. Mild left ventricular hypertrophy. Unable to determine diastolic function due to arrhythmia.    Left Atrium Essentially normal left atrium. Right Atrium  Essentially normal right atrium. Right Ventricle  Essentially normal right ventricle; no evidence of right heart strain. Aortic Valve  Sclerotic, but non-stenotic aortic valve. Mitral Valve  Severe eccentric mitral regurgitation (ERO: 0.40 cm sq, regurgitant volume:  62 ml). Tricuspid Valve  Moderate tricuspid regurgitation; RVSP: 44 mmHg. Pulmonic Valve  Mild pulmonic regurgitation present. Pericardial Effusion  No evidence of any pericardial effusion. Pleural Effusion  Bilateral pleural effusion. Miscellaneous  Inferior vena cava is dilated, measuring at 2.3 cm, but does collapse with  respiration. The aorta is within normal limits.   M-Mode/2D Measurements & Calculations   LV Diastolic Dimension:  LV Systolic Dimension:  LA Dimension: 3.6 cmAO Root  5.21 cm                  4.64 cm                 Dimension: 3.4 cmLA Area:  LV FS:10.9 %             LV Volume Diastolic: 79 77.8 cm2  LV PW Diastolic: 7.03 cm ml  LV PW Systolic: 2.87 cm  LV Volume Systolic: 50  Septum Diastolic: 0.99   ml  cm                       LV EDV/LV EDV Index: 79 RV Diastolic Dimension:  Septum Systolic: 7.90 cm HQ/82 T0LV ESV/LV ESV   2.17 cm  CO: 3.42 l/min           Index: 50 ml/32 m2  CI: 2.16 l/m*m2          EF Calculated (A4C):    LA/Aorta: 1.06                           36.7 %  LV Area Diastolic: 60.2  EF Calculated (2D):     LA volume/Index: 61 ml  cm2                      23.6 %                  /05S0  LV Area Systolic: 44.3  cm2                      LV Length: 5.93 cm                            LVOT: 2 cm  Doppler Measurements & Calculations   MV Peak E-Wave: 126     AV Peak Velocity: 120 cm/s LVOT Peak Velocity: 92.1  cm/s                    AV Peak Gradient: 5.76     cm/s  MV Peak A-Wave: 62.3    mmHg                       LVOT Mean Velocity: 64.6  cm/s                    AV Mean Velocity: 91.2     cm/s  MV E/A Ratio: 2.02      cm/s                       LVOT Peak Gradient: 3  MV Peak Gradient: 6.35  AV Mean Gradient: 4 mmHg   mmHgLVOT Mean Gradient: 2  mmHg                    AV VTI: 16.7 cm            mmHg                          AV Area (Continuity):2.2   Estimated RVSP: 44 mmHg  MV P1/2t: 34 msec       cm2                        Estimated RAP:3 mmHg  MVA by PHT:6.47 cm2  MV Area (PISA): 0.37    LVOT VTI: 11.7 cm  cm2                                                TR Velocity:322 cm/s  MV E' Septal Velocity:  Estimated PASP: 44.47 mmHg TR Gradient:41.47 mmHg  4.66 cm/s  MV E' Lateral Velocity:  7.07 cm/s  MV E/E' septal: 27.04  MV E/E' lateral: 17.82  MR Velocity: 600 cm/s  MV ARCELIA PISA: 0.49 cm2  MR VTI: 167 cm  Alias Velocity: 38.5  cm/sPISA Radius: 1.1 cm  MV ARCELIA Volumetric: 0.37  cm2  PISA area: 7.6 cm2MR  flow rate: 292.6 ml/sMR  volume:81.83 ml      XR CHEST PORTABLE  Result Date: 3/15/2022  EXAMINATION: ONE XRAY VIEW OF THE CHEST 3/15/2022 12:35 pm COMPARISON: 03/03/2022 HISTORY: ORDERING SYSTEM PROVIDED HISTORY: Sob TECHNOLOGIST PROVIDED HISTORY: Reason for exam:->Sob Reason for Exam: sob FINDINGS: Interstitial pulmonary edema is present. Small left pleural effusion. Heart size is at the upper limits of normal.  No pneumothorax. Interstitial pulmonary edema with small left pleural effusion. VL DUP LOWER EXTREMITY VENOUS BILATERAL  Result Date: 3/15/2022  EXAMINATION: DUPLEX VENOUS ULTRASOUND OF THE BILATERAL LOWER EXTREMITIES3/15/2022 1:26 pm TECHNIQUE: Duplex ultrasound using B-mode/gray scaled imaging, Doppler spectral analysis and color flow Doppler was obtained of the deep venous structures of the lower bilateral extremities. COMPARISON: None.  HISTORY: ORDERING SYSTEM PROVIDED HISTORY: lower extremity swelling, ro dvt TECHNOLOGIST PROVIDED HISTORY: Reason for exam:->lower extremity swelling, ro dvt FINDINGS: Deep venous thrombus present within the right femoral vein which is only partially compressible and demonstrates some vascular flow consistent with nonocclusive thrombus. The calf veins of the right lower extremity are noncompressible occluding the anterior tibial vein, peroneal vein, and posterior tibial vein consistent with thrombus. The venous thrombus present within the left femoral vein which is noncompressible and demonstrates no significant flow. Flow is identified within the left popliteal vein. The calf veins of the left lower extremity also demonstrate occlusive thrombus. 1. Extensive deep venous thrombus of the bilateral lower extremities. Critical results were called by Steve Haskins MD to Rolan Holstein, PA on 3/15/2022 at 14:54. CTA PULMONARY W CONTRAST  Result Date: 3/15/2022  EXAMINATION: CTA OF THE CHEST 3/15/2022 1:59 pm TECHNIQUE: CTA of the chest was performed after the administration of intravenous contrast.  Multiplanar reformatted images are provided for review. MIP images are provided for review. Dose modulation, iterative reconstruction, and/or weight based adjustment of the mA/kV was utilized to reduce the radiation dose to as low as reasonably achievable. COMPARISON: None. HISTORY: ORDERING SYSTEM PROVIDED HISTORY: sob, bilateral dvt, ro pe TECHNOLOGIST PROVIDED HISTORY: Reason for exam:->sob, bilateral dvt, ro pe Decision Support Exception - unselect if not a suspected or confirmed emergency medical condition->Emergency Medical Condition (MA) Reason for Exam: sob, dvt's Additional signs and symptoms: none Relevant Medical/Surgical History: 75ml isovue 370 FINDINGS: Pulmonary Arteries: There is respiratory motion artifact along with streak artifact generated by the patient's right arm is at her side. These combine to limited evaluation of the segmental and subsegmental pulmonary arterial branches. No central pulmonary embolism is detected. Mediastinum: Visualized thyroid unremarkable. Mildly enlarged mediastinal lymph nodes are seen which are likely reactive. Esophagus is unremarkable.   Cardiac chambers are AM    Physical Exam:  Vitals: BP (!) 152/99   Pulse 96   Temp 99 °F (37.2 °C)   Resp 16   Ht 5' 2\" (1.575 m)   Wt 143 lb 1.6 oz (64.9 kg)   SpO2 98%   BMI 26.17 kg/m²   24 hour intake/output:    Intake/Output Summary (Last 24 hours) at 3/21/2022 1611  Last data filed at 3/21/2022 0300  Gross per 24 hour   Intake 340 ml   Output --   Net 340 ml     Last 3 weights:   Wt Readings from Last 3 Encounters:   03/21/22 143 lb 1.6 oz (64.9 kg)   03/03/22 125 lb (56.7 kg)   04/12/21 115 lb (52.2 kg)       General appearance - alert, well appearing, and in no distress and normal weight  HEENT: Normocephalic, Atraumatic, Conjuctiva pink, PERRL, Oral mucosa normal, Lips, teeth and gums normal, Trachea midline, Thyroid normal and No noted lymphadenopathy  Chest - clear to auscultation, no wheezes, rales or rhonchi, symmetric air entry  Cardiovascular - normal rate, regular rhythm, normal S1, S2, no murmurs, rubs, clicks or gallops  Abdomen - soft, nondistended, no masses or organomegaly, tender left mid abdomen to the left flank  Neurological - Alert and oriented, Normal speech, No focal findings or movement disorder noted and Motor and sensory grossly normal bilaterally  Integumentary - Skin color, texture, turgor normal. No Rashes or lesions  Musculoskeletal -Full ROM times 4 extremities, No clubbing or cyanosis and No peripheral edema      DVT prophylaxis: [] Lovenox                                 [] SCDs                                 [] SQ Heparin                                 [] Encourage ambulation           [x] Xarelto                 ASSESSMENT / PLAN :    Principal Problem:    Acute bilateral deep vein thrombosis (DVT) of femoral veins (HCC)  Active Problems:    Precordial pain    Type 2 diabetes mellitus without complication, with long-term current use of insulin (HCC)    Pulmonary embolism (HCC)    Acute on chronic systolic congestive heart failure (HCC)    Acute congestive heart failure (HCC)  Resolved Problems:    * No resolved hospital problems. *    Acute bilateral Lower Extremity DVT   Was on heparin drip on admission later transition to Lovenox. Currently on Xarelto started 03/19 per cardiology recommendation. She does not have a PE. Appreciate  heme-onc eval and recommendation. Hypercoagulable work-up ordered and she will follow with heme-onc clinic for results. They recommend that she keeps her legs elevated and wear compression stockings. Pulmonary edema  Acute systolic CHF  NICM  Admitted with dyspnea,BNP elevated, bilateral lower extremity edema  Venous duplex positive for extensive DVT  CT PE negative for PE  Groundglass opacities and pulmonary edema noted  Lasix started, creatinine up from 1.2 to 1.7. Lasix held and creatinine has normalized . She is back on low-dose Lasix. Monitor   Echocardiogram results noted as above   Status post [facility] 3/18-Non obstructive CAD  NICM: Severe LV dysfunction with severe mitral regurgitation . Monitor input/ output and daily weight. 1800 fluid restriction. .  Cardiology following      MELANY:  Mild hyperkalemia  MELANY secondary to diuretics  Creatinine peaked at 1.8. Held lasix and renal function has normalized. She is back on low-dose Lasix ,monitor   On low-dose ACE inhibitor. Versie Renee given for hyperkalemia which has now resolved. Continue to monitor . UTI  UA positive  Urine culture with group B strep   On Ancef.     Plan to transition to p.o. antibiotic at discharge to complete course  Check CT abdomen and pelvis without contrast given left flank pain    Constipation  Improved with MiraLAX and lactulose.     Diabetes mellitus type 2:   Sliding scale insulin diabetic diet for now,   Hold oral hypoglycemics   Sugars good    Hypertension  Increase Toprol-XL from 25 to 50 mg daily  Continue low-dose Lasix and lisinopril  Monitor     History of schizophrenia:   Continue Seroquel      Hepatitis C:   Chronic, stable     Tobacco dependence:   Smokes 3 packs/day, nicotine patch. Counselled to quit. DVT prophlaxis:    Xarelto. Surrogate decision maker: Sister     Disposition: Home tomorrow if BP improved and CT abdomen negative.        Electronically signed by Bentley Jimenes MD on 3/21/2022 at 4:11 PM    Rounding Hospitalist

## 2022-03-21 NOTE — CONSULTS
ONCOLOGY HEMATOLOGY CARE ([facility])  CONSULTATION REPORT    3/21/2022  7:07 AM    Patient:    Renee Hastings  : 1965   61 y.o. MRN: [id number]  Admitted: 3/15/2022  1:56 PM ATT: Avel Chase MD   [id number]  AdmitDx: Urinary tract infection without hematuria, site unspecified [N39.0]  Acute bilateral deep vein thrombosis (DVT) of femoral veins (HCC) [I82.413]  Acute deep vein thrombosis (DVT) of both lower extremities, unspecified vein (HCC) [I82.403]  Acute congestive heart failure, unspecified heart failure type ([facility] [address] [I50.9]  PCP: Lyn Carbajal MD    Reason for Consult: Bilateral LE DVT    Requesting Physician:  Avel Chase MD      History Obtained From:  Patient and review of all records      [address] [facility]    Chief Complaint   Patient presents with    Shortness of Breath     short of breath since 2022, swelling to both legs, still has uti symptoms       HISTORY OF PRESENT ILLNESS   Renee Hastings is a 61 y.o. female with history of hepatitis C, presented with complaints of worsening shortness of breath, mild chest pain and productive cough. She presented early March to the emergency department and was treated for URI. She then started having worsening shortness of breath and also lower extremity edema. He also reported dysuria and dark foul-smelling urine. Denied any fever. Denied any long travel history. Denied any previous history of thromboembolism. Denied any history of steroid use. She reported that she was pretty much active before all this symptoms started. Reported constipation for the past week and had 1 small bowel movement yesterday after she was given some bowel regimen. Denied nausea vomiting. Denied any weight loss. She was found to have PE and DVT and is on DOAC. Denied any overt bleeding    3/20/2022 BMP with sodium of 133 potassium of 4.7 creatinine 1.3.   2022 with a WBC of 7.5 hemoglobin 13 hematocrit 40 MCV of 100.3 Social History     Socioeconomic History    Marital status: Legally      Spouse name: Not on file    Number of children: Not on file    Years of education: Not on file    Highest education level: Not on file   Occupational History    Not on file   Tobacco Use    Smoking status: Current Every Day Smoker     Packs/day: 3.00     Types: Cigars    Smokeless tobacco: Never Used   Vaping Use    Vaping Use: Never used   Substance and Sexual Activity    Alcohol use: Yes     Comment: occassionally    Drug use: Not Currently     Types: Marijuana Lyndel Alejandro)    Sexual activity: Not on file   Other Topics Concern    Not on file   Social History Narrative    Not on file     Social Determinants of Health     Financial Resource Strain:     Difficulty of Paying Living Expenses: Not on file   Food Insecurity:     Worried About Running Out of Food in the Last Year: Not on file    Supa of Food in the Last Year: Not on file   Transportation Needs:     Lack of Transportation (Medical): Not on file    Lack of Transportation (Non-Medical):  Not on file   Physical Activity:     Days of Exercise per Week: Not on file    Minutes of Exercise per Session: Not on file   Stress:     Feeling of Stress : Not on file   Social Connections:     Frequency of Communication with Friends and Family: Not on file    Frequency of Social Gatherings with Friends and Family: Not on file    Attends [employer] Services: Not on file    Active Member of [address] [place] [address] or Organizations: Not on file    Attends Club or Organization Meetings: Not on file    Marital Status: Not on file   Intimate Partner Violence:     Fear of Current or Ex-Partner: Not on file    Emotionally Abused: Not on file    Physically Abused: Not on file    Sexually Abused: Not on file   Housing Stability:     Unable to Pay for Housing in the Last Year: Not on file    Number of [place] in the Last Year: Not on file    Unstable Housing in the Last Year: Not on file       Review of systems:  Constitutional:  No weight loss, No fever, No chills, No night sweats. Energy level fair  Eyes:  No diplopia, No transient or permanent loss of vision, No scotomata. ENT / Mouth:  No epistaxis, No dysphagia, No hoarseness, No oral ulcers, No gingival bleeding. No sore throat, No postnasal drip, No nasal drip, No mouth pain, No sinus pain, No tinnitus, Normal hearing. Cardiovascular:  No chest pain, No palpitations, No syncope,  Respiratory:  No cough. No hemoptysis, No pleurisy, No wheezing  Gastrointestinal:  No abdominal pain, No abdominal cramping, No nausea, No vomiting,  No diarrhea, No hematochezia, No melena, No jaundice, No dyspepsia, No dysphagia. Musculoskeletal:  No muscle pain, No swollen joints, No joint redness, No bone pain, No spine tenderness. Skin:  No rash, No nodules, No pruritus, No lesions. Neurologic:  No confusion, No seizures, No syncope, No tremor, No speech change, No headache, No hiccups, No abnormal gait, No sensory changes, No weakness. Psychiatric:  No depression, No anxiety, Concentration normal.  Endocrine:  No polyuria, No polydipsia, No hot flashes, No thyroid symptoms. Hematologic:  No epistaxis, No gingival bleeding, No petechiae, No ecchymosis. Lymphatic:  No lymphadenopathy, No lymphedema. Allergy / Immunologic:  No eczema, No frequent mucous infections, No frequent respiratory infections, No recurrent urticarial, No frequent skin infections.     PHYSICAL EXAM      Vitals: BP (!) 157/97   Pulse 94   Temp 99.1 °F (37.3 °C) (Oral)   Resp 15   Ht 5' 2\" (1.575 m)   Wt 143 lb 1.6 oz (64.9 kg)   SpO2 96%   BMI 26.17 kg/m²     CONSTITUTIONAL: awake, alert, cooperative, no apparent distress   EYES: CANDACE, No pallor or any icterus  ENT: ATNC  NECK: No JVD  HEMATOLOGIC/LYMPHATIC: no cervical, supraclavicular or axillary lymphadenopathy   LUNGS:dec bs bases but ctab  CARDIOVASCULAR: s1s2 rrr no murmurs  ABDOMEN: soft ntnd bs pos  MUSCULOSKELETAL: full range of motion noted, tone is normal  NEUROLOGIC: GI  SKIN: No rash  EXTREMITIES: bilateral LE edema       LABORATORY RESULTS  CBC:   Recent Labs     03/19/22  0813   WBC 7.5   HGB 13.0   *     BMP:    Recent Labs     03/19/22  0813 03/20/22  0752    133*   K 5.3* 4.7    104   CO2 23 23   BUN 25* 22   CREATININE 1.7* 1.3*   GLUCOSE 132* 155*     Hepatic: No results for input(s): AST, ALT, ALB, BILITOT, ALKPHOS in the last 72 hours. INR: No results for input(s): INR in the last 72 hours. RADIOLOGY REPORTS  reviewed    IMPRESSION & RECOMMENDATIONS:  Bilateral LE DVT: Could be provoked secondary to bilateral lower extremity edema from pulmonary edema versus some inactivity from URI symptoms/UTI. No prior history of thromboembolism and long car travel. No history of steroid use. No family history of thromboembolism. No signs or symptoms of underlying malignancy. Nonetheless hypercoagulable work-up has been ordered. Okay to continue DOAC as long as the platelet counts are more than 80 K and duration of use pending above results. Monitor for any bleeding. Recommend increase activity levels, lower extremity compressions socks, feet elevation. Avoid any falls or deep cuts. Thrombocytopenia most probably could be secondary to splenic suppression from hepatitis C, portal hypertension. Pulmonary edema, is on diuretics and followed by cardiology. Constipation recommend adequate fluid intake and aggressive bowel regimen. Continue other medical care    ECOG Performance Status (ECOG Scale 0-4): 1-2    This plan was discussed with the patient and she verbalized understanding. We will continue to follow the patient as outpatient    Thank you for allowing us to participate in the care of this patient.      Viola Machado MD, 3/21/2022, 7:07 AM

## 2022-03-21 NOTE — PLAN OF CARE
Problem: Pain:  Goal: Pain level will decrease  Description: Pain level will decrease  Outcome: Ongoing  Goal: Control of acute pain  Description: Control of acute pain  Outcome: Ongoing  Goal: Control of chronic pain  Description: Control of chronic pain  Outcome: Ongoing     Problem: Discharge Planning:  Goal: Discharged to appropriate level of care  Description: Discharged to appropriate level of care  Outcome: Ongoing     Problem: Falls - Risk of:  Goal: Will remain free from falls  Description: Will remain free from falls  Outcome: Ongoing  Goal: Absence of physical injury  Description: Absence of physical injury  Outcome: Ongoing     Problem: Venous Thromboembolism:  Goal: Absence of signs or symptoms of impaired coagulation  Description: Absence of signs or symptoms of impaired coagulation  Outcome: Ongoing     Problem:  Bowel/Gastric:  Goal: Ability to achieve a regular elimination pattern will improve  Description: Ability to achieve a regular elimination pattern will improve  Outcome: Ongoing

## 2022-03-22 ENCOUNTER — APPOINTMENT (OUTPATIENT)
Dept: CT IMAGING | Age: 57
DRG: 286 | End: 2022-03-22
Payer: COMMERCIAL

## 2022-03-22 VITALS
WEIGHT: 131.5 LBS | HEART RATE: 90 BPM | DIASTOLIC BLOOD PRESSURE: 101 MMHG | TEMPERATURE: 98.1 F | RESPIRATION RATE: 14 BRPM | HEIGHT: 62 IN | SYSTOLIC BLOOD PRESSURE: 178 MMHG | BODY MASS INDEX: 24.2 KG/M2 | OXYGEN SATURATION: 98 %

## 2022-03-22 PROBLEM — I42.8 NICM (NONISCHEMIC CARDIOMYOPATHY) (HCC): Status: ACTIVE | Noted: 2022-03-22

## 2022-03-22 PROBLEM — N17.9 AKI (ACUTE KIDNEY INJURY) (HCC): Status: ACTIVE | Noted: 2022-03-22

## 2022-03-22 PROBLEM — I10 PRIMARY HYPERTENSION: Status: ACTIVE | Noted: 2022-03-22

## 2022-03-22 LAB
GLUCOSE BLD-MCNC: 114 MG/DL (ref 70–99)
GLUCOSE BLD-MCNC: 152 MG/DL (ref 70–99)
GLUCOSE BLD-MCNC: 156 MG/DL (ref 70–99)
GLUCOSE BLD-MCNC: 170 MG/DL (ref 70–99)
HOMOCYSTEINE: 15.1 UMOL/L (ref 0–10)

## 2022-03-22 PROCEDURE — 6360000002 HC RX W HCPCS: Performed by: INTERNAL MEDICINE

## 2022-03-22 PROCEDURE — 2580000003 HC RX 258: Performed by: INTERNAL MEDICINE

## 2022-03-22 PROCEDURE — 6370000000 HC RX 637 (ALT 250 FOR IP): Performed by: INTERNAL MEDICINE

## 2022-03-22 PROCEDURE — 82962 GLUCOSE BLOOD TEST: CPT

## 2022-03-22 PROCEDURE — 74176 CT ABD & PELVIS W/O CONTRAST: CPT

## 2022-03-22 RX ORDER — CEPHALEXIN 500 MG/1
500 CAPSULE ORAL 4 TIMES DAILY
Qty: 28 CAPSULE | Refills: 0 | Status: SHIPPED | OUTPATIENT
Start: 2022-03-22 | End: 2022-03-29

## 2022-03-22 RX ORDER — QUETIAPINE FUMARATE 400 MG/1
400 TABLET, FILM COATED ORAL NIGHTLY
Qty: 7 TABLET | Refills: 0 | Status: ON HOLD | OUTPATIENT
Start: 2022-03-22 | End: 2022-05-06 | Stop reason: HOSPADM

## 2022-03-22 RX ORDER — POLYETHYLENE GLYCOL 3350 17 G/17G
17 POWDER, FOR SOLUTION ORAL DAILY
Qty: 30 EACH | Refills: 1 | Status: SHIPPED | OUTPATIENT
Start: 2022-03-23 | End: 2022-04-22

## 2022-03-22 RX ORDER — METOPROLOL SUCCINATE 50 MG/1
25 TABLET, EXTENDED RELEASE ORAL DAILY
Qty: 30 TABLET | Refills: 0 | Status: SHIPPED | OUTPATIENT
Start: 2022-03-22 | End: 2022-04-04

## 2022-03-22 RX ORDER — OXYCODONE HYDROCHLORIDE AND ACETAMINOPHEN 5; 325 MG/1; MG/1
1 TABLET ORAL EVERY 6 HOURS PRN
Qty: 15 TABLET | Refills: 0 | Status: SHIPPED | OUTPATIENT
Start: 2022-03-22 | End: 2022-03-27

## 2022-03-22 RX ORDER — LISINOPRIL 2.5 MG/1
2.5 TABLET ORAL DAILY
Qty: 30 TABLET | Refills: 1 | Status: SHIPPED | OUTPATIENT
Start: 2022-03-23 | End: 2022-04-04

## 2022-03-22 RX ORDER — FUROSEMIDE 20 MG/1
20 TABLET ORAL DAILY
Qty: 30 TABLET | Refills: 1 | Status: ON HOLD | OUTPATIENT
Start: 2022-03-23 | End: 2022-05-06 | Stop reason: SDUPTHER

## 2022-03-22 RX ORDER — HYDRALAZINE HYDROCHLORIDE 20 MG/ML
10 INJECTION INTRAMUSCULAR; INTRAVENOUS ONCE
Status: DISCONTINUED | OUTPATIENT
Start: 2022-03-22 | End: 2022-03-22 | Stop reason: HOSPADM

## 2022-03-22 RX ADMIN — POLYETHYLENE GLYCOL (3350) 17 G: 17 POWDER, FOR SOLUTION ORAL at 08:42

## 2022-03-22 RX ADMIN — FAMOTIDINE 20 MG: 20 TABLET ORAL at 08:56

## 2022-03-22 RX ADMIN — HYDROMORPHONE HYDROCHLORIDE 0.5 MG: 2 INJECTION INTRAMUSCULAR; INTRAVENOUS; SUBCUTANEOUS at 08:32

## 2022-03-22 RX ADMIN — LACTULOSE 30 G: 10 SOLUTION ORAL at 08:43

## 2022-03-22 RX ADMIN — METOPROLOL SUCCINATE 50 MG: 50 TABLET, EXTENDED RELEASE ORAL at 08:42

## 2022-03-22 RX ADMIN — FUROSEMIDE 20 MG: 20 TABLET ORAL at 08:42

## 2022-03-22 RX ADMIN — HYDRALAZINE HYDROCHLORIDE 10 MG: 20 INJECTION, SOLUTION INTRAMUSCULAR; INTRAVENOUS at 16:17

## 2022-03-22 RX ADMIN — INSULIN LISPRO 2 UNITS: 100 INJECTION, SOLUTION INTRAVENOUS; SUBCUTANEOUS at 13:30

## 2022-03-22 RX ADMIN — HYDROMORPHONE HYDROCHLORIDE 0.5 MG: 2 INJECTION INTRAMUSCULAR; INTRAVENOUS; SUBCUTANEOUS at 02:21

## 2022-03-22 RX ADMIN — CEFAZOLIN SODIUM 1000 MG: 1 INJECTION, POWDER, FOR SOLUTION INTRAMUSCULAR; INTRAVENOUS at 08:41

## 2022-03-22 RX ADMIN — SODIUM CHLORIDE 25 ML: 9 INJECTION, SOLUTION INTRAVENOUS at 08:40

## 2022-03-22 RX ADMIN — CEFAZOLIN SODIUM 1000 MG: 1 INJECTION, POWDER, FOR SOLUTION INTRAMUSCULAR; INTRAVENOUS at 01:28

## 2022-03-22 RX ADMIN — LISINOPRIL 2.5 MG: 2.5 TABLET ORAL at 08:42

## 2022-03-22 RX ADMIN — ASPIRIN 81 MG: 81 TABLET, CHEWABLE ORAL at 08:42

## 2022-03-22 RX ADMIN — RIVAROXABAN 15 MG: 15 TABLET, FILM COATED ORAL at 08:42

## 2022-03-22 RX ADMIN — HYDROMORPHONE HYDROCHLORIDE 0.5 MG: 2 INJECTION INTRAMUSCULAR; INTRAVENOUS; SUBCUTANEOUS at 13:34

## 2022-03-22 ASSESSMENT — PAIN SCALES - GENERAL
PAINLEVEL_OUTOF10: 8
PAINLEVEL_OUTOF10: 0

## 2022-03-22 ASSESSMENT — PAIN SCALES - WONG BAKER
WONGBAKER_NUMERICALRESPONSE: 2

## 2022-03-22 ASSESSMENT — PAIN DESCRIPTION - PAIN TYPE
TYPE: ACUTE PAIN
TYPE: ACUTE PAIN

## 2022-03-22 ASSESSMENT — PAIN DESCRIPTION - PROGRESSION
CLINICAL_PROGRESSION: NOT CHANGED

## 2022-03-22 ASSESSMENT — PAIN DESCRIPTION - FREQUENCY: FREQUENCY: INTERMITTENT

## 2022-03-22 ASSESSMENT — PAIN DESCRIPTION - DESCRIPTORS: DESCRIPTORS: ACHING

## 2022-03-22 ASSESSMENT — PAIN DESCRIPTION - LOCATION
LOCATION: FLANK
LOCATION: FLANK;HEAD

## 2022-03-22 ASSESSMENT — PAIN DESCRIPTION - ONSET: ONSET: ON-GOING

## 2022-03-22 ASSESSMENT — PAIN DESCRIPTION - ORIENTATION
ORIENTATION: LEFT
ORIENTATION: LEFT

## 2022-03-22 NOTE — PROGRESS NOTES
Comprehensive Nutrition Assessment    Type and Reason for Visit:  Initial,RD Nutrition Re-Screen/LOS    Nutrition Recommendations/Plan:   Continue current diet order     Nutrition Assessment:  Admitted with DVT. Currently pt is on a carb controlled, cardiac diet. Reports good appetite/intake dos and PTA. Hx of DM. RD briefly d/w patient survival skills/nutrition therapy over heart health and diabetes prior pt going CT. Left handout with provider contact for further reference in room. Noted CT shows perinephric edema. Hx of weight fluctuating between 120-140's lb within the last two years, possibly due to fluids. Follow as low nutrition risk. Malnutrition Assessment:  Malnutrition Status: At risk for malnutrition (Comment)    Context:  Acute Illness     Findings of the 6 clinical characteristics of malnutrition:  Energy Intake:  No significant decrease in energy intake  Weight Loss:  No significant weight loss     Body Fat Loss:  1 - Mild body fat loss     Muscle Mass Loss:  Unable to assess    Fluid Accumulation:  1 - Mild Generalized   Strength:  Not Performed    Estimated Daily Nutrient Needs:  Energy (kcal):  9760-5078 (25-30 kcals/kg); Weight Used for Energy Requirements:  Current     Protein (g):  50-60 (1-1.2 g/kg); Weight Used for Protein Requirements:  Ideal        Fluid (ml/day):  1650; Method Used for Fluid Requirements:  1 ml/kcal      Nutrition Related Findings:  Last BM 3/21, POCT 170, A1c 5.9      Wounds:  None       Current Nutrition Therapies:    ADULT DIET; Regular; 3 carb choices (45 gm/meal); Low Fat/Low Chol/High Fiber/ANNETTE; Low Sodium (2 gm)    Anthropometric Measures:  · Height: 5' 2\" (157.5 cm)  · Current Body Weight: 131 lb 6.3 oz (59.6 kg)   · Admission Body Weight: 124 lb 5.4 oz (56.4 kg)    · Usual Body Weight:  (fluctuates between 115-146 lb per hx)     · Ideal Body Weight: 110 lbs; % Ideal Body Weight 119.4 %   · BMI: 24  · BMI Categories: Normal Weight (BMI 18.5-24. 9) Nutrition Diagnosis:   · Predicted inadequate energy intake related to renal dysfunction as evidenced by localized or generalized fluid accumulation (h/o weight fluctuation)    Nutrition Interventions:   Food and/or Nutrient Delivery:  Modify Current Diet  Nutrition Education/Counseling:  Survival skills/brief education completed   Coordination of Nutrition Care:  Continue to monitor while inpatient    Goals:  Pt will consume at least 75% of meals and tolerate diet order       Nutrition Monitoring and Evaluation:   Behavioral-Environmental Outcomes:  None Identified   Food/Nutrient Intake Outcomes:  Diet Advancement/Tolerance  Physical Signs/Symptoms Outcomes:  Biochemical Data,Fluid Status or Edema,Meal Time Behavior,Weight     Discharge Planning:    Recommend pursue outpatient diabetes education,Continue current diet     Electronically signed by Brody Altamirano RD, LD on 3/22/22 at 2:31 PM EDT    Contact: [phone]

## 2022-03-22 NOTE — CARE COORDINATION
Discussed in ID , Plan is home with xerolato today. Scripts to outpatient pharmacy, they state will take care of it and have coupons if needed. 1500 Pt had medications from [facility] outpt pharmacy in room.

## 2022-03-22 NOTE — PLAN OF CARE
Problem: Pain:  Goal: Pain level will decrease  Description: Pain level will decrease  3/22/2022 0048 by Kelsi Guido RN  Outcome: Ongoing  3/21/2022 1525 by Levy Hastings RN  Outcome: Ongoing  Goal: Control of acute pain  Description: Control of acute pain  3/22/2022 0048 by Kelsi Guido RN  Outcome: Ongoing  3/21/2022 1525 by Levy Hastings RN  Outcome: Ongoing  Goal: Control of chronic pain  Description: Control of chronic pain  3/22/2022 0048 by Kelsi Guido RN  Outcome: Ongoing  3/21/2022 1525 by Levy Hastings RN  Outcome: Ongoing     Problem: Discharge Planning:  Goal: Discharged to appropriate level of care  Description: Discharged to appropriate level of care  3/22/2022 0048 by Kelsi Guido RN  Outcome: Ongoing  3/21/2022 1525 by Levy Hastings RN  Outcome: Ongoing     Problem: Falls - Risk of:  Goal: Will remain free from falls  Description: Will remain free from falls  3/22/2022 0048 by Kelsi Guido RN  Outcome: Ongoing  3/21/2022 1525 by Levy Hastings RN  Outcome: Ongoing  Goal: Absence of physical injury  Description: Absence of physical injury  3/22/2022 0048 by Kelsi Guido RN  Outcome: Ongoing  3/21/2022 1525 by Levy Hastings RN  Outcome: Ongoing     Problem: Venous Thromboembolism:  Goal: Absence of signs or symptoms of impaired coagulation  Description: Absence of signs or symptoms of impaired coagulation  3/22/2022 0048 by Kelsi Guido RN  Outcome: Ongoing  3/21/2022 1525 by Levy Hastings RN  Outcome: Ongoing     Problem:  Bowel/Gastric:  Goal: Ability to achieve a regular elimination pattern will improve  Description: Ability to achieve a regular elimination pattern will improve  3/22/2022 0048 by Kelsi Guido RN  Outcome: Ongoing  3/21/2022 1525 by Levy Hastings RN  Outcome: Ongoing

## 2022-03-22 NOTE — FLOWSHEET NOTE
Patient received IVPB hydralazine for hypertension. BP remained high after one hour. Patient attributes elevated BP to being anxious and wanting to get home. Dr. Bon Romero made aware and okay with patient still being discharged.

## 2022-03-22 NOTE — PROGRESS NOTES
Outpatient Pharmacy Progress Note for Meds-to-Beds    Total number of Prescriptions Filled: 1  The following medications were dispensed to the patient during the discharge process:  Xarelto Starter pack    Additional Documentation:  Medication(s) were delivered to the patient's room prior to discharge   Spoke with provider to clarify the duration of therapy for Xarelto 15mg. Patient should complete total therapy of Xarelto 15mg BID x 21 days followed by Xarelto 20mg once daily. Patient is on day 4 of therapy. Thank you for letting us serve your patients.   [facility]    [address], [address] [facility]    Phone: [phone]    Fax: [phone]

## 2022-03-22 NOTE — DISCHARGE SUMMARY
hypercoagulable work-up. They will follow with her as outpatient. They recommend that she keeps her legs elevated and wear compression stockings and she endorsed understanding.     Pulmonary edema  Acute systolic CHF  NICM  Admitted with dyspnea,BNP elevated, bilateral lower extremity edema  Venous duplex positive for extensive DVT  CT PE negative for PE  Groundglass opacities and pulmonary edema noted. Cardiology followed  Lasix started, creatinine jumped up from 1.2 to 1.7. Lasix held and creatinine has normalized . She is back on low-dose Lasix p.o and will be discharged with same. She will follow with cardiology in 3 weeks. Echocardiogram with EF 30 to 35%, abnormal LV systolic function . Status post [facility] 3/18-Non obstructive CAD and cardiology recommends medical management. She is on aspirin beta blocker and ACE inhibitor    MELANY:  Mild hyperkalemia  MELANY secondary to diuretics  Creatinine peaked at 1.8. Held lasix and renal function has normalized. Creatinine 0.9 at discharge. She is back on low-dose Lasix. On low-dose ACE inhibitor. Geovanna Bourdon given for hyperkalemia which has now resolved.     UTI  UA positive  Urine culture with group B strep   On IV Ancef. She will be discharged with p.o. Keflex to complete course  Check CT abdomen and pelvis without contrast done given left flank pain and was unremarkable except for mild perinephric edema around the kidneys which is about the same compared to 2020. She was getting Dilaudid here and requested p.o. medication at discharge. She was given Percocet 5 mg 15 tablets.     Constipation  Improved with MiraLAX and lactulose.   Will be discharged with MiraLAX     Diabetes mellitus type 2:   Sliding scale insulin diabetic diet for now,   Hold oral hypoglycemics   Sugars good  Home medications resumed at discharge    Hypertension  Not at goal patient is also anxious as she has been wanting to go home since yesterday  Increased Toprol-XL from 25 to 50 mg tablets by mouth daily  What changed: medication strength     QUEtiapine 400 MG tablet  Commonly known as: SEROQUEL  Take 1 tablet by mouth nightly for 7 days  What changed: medication strength        CONTINUE taking these medications    AIMSCO INS SYR .3CC/29GX0.5\" 29G X 1/2\" 0.3 ML Misc  Generic drug: Insulin Syringe-Needle U-100  1 each by Does not apply route daily     aspirin 81 MG chewable tablet     dicyclomine 10 MG capsule  Commonly known as: BENTYL  Take 1 capsule by mouth 4 times daily as needed (Abdominal cramping, diarrhea)     insulin glargine 100 UNIT/ML injection vial  Commonly known as: LANTUS  Inject 40 Units into the skin nightly     insulin lispro 100 UNIT/ML injection vial  Commonly known as: HUMALOG  Inject 10 Units into the skin 3 times daily (with meals)     ondansetron 4 MG disintegrating tablet  Commonly known as: ZOFRAN-ODT  Take 1 tablet by mouth 3 times daily as needed for Nausea or Vomiting           Where to Get Your Medications      You can get these medications from any pharmacy    Bring a paper prescription for each of these medications  · cephALEXin 500 MG capsule  · furosemide 20 MG tablet  · lisinopril 2.5 MG tablet  · metoprolol succinate 50 MG extended release tablet  · oxyCODONE-acetaminophen 5-325 MG per tablet  · polyethylene glycol 17 g packet  · QUEtiapine 400 MG tablet     Information about where to get these medications is not yet available    Ask your nurse or doctor about these medications  · rivaroxaban 15 MG Tabs tablet  · rivaroxaban 20 MG Tabs tablet       Physical Exam:    Vitals:  Vitals:    03/22/22 1549 03/22/22 1619 03/22/22 1715 03/22/22 1717   BP: (!) 164/123  (!) 165/113 (!) 178/101   Pulse: 90      Resp:       Temp:       TempSrc:       SpO2:  98%     Weight:       Height:         Weight: Weight: 131 lb 8 oz (59.6 kg)     24 hour intake/output:No intake or output data in the 24 hours ending 03/22/22 1728    General appearance - alert, well appearing, and in no distress. Chest -bilateral good air entry with slight bibasilar crackles ,no wheeze  Heart - normal rate, regular rhythm, normal S1, S2, no murmurs, rubs, clicks or gallops  Abdomen - soft, nontender, nondistended, no masses or organomegaly  Obese: Yes; Protuberant: No   Neurological - alert, oriented, normal speech, no focal findings or movement disorder noted  Extremities - peripheral pulses normal, no pedal edema, no clubbing or cyanosis  Skin - normal coloration and turgor, no rashes, no suspicious skin lesions noted    Procedures: [facility]    Diagnostic Test:      Radiology reports as per the Radiologist  Radiology:   Echocardiogram complete 2D with doppler with color  Result Date: 3/16/2022  Transthoracic Echocardiography Report (TTE)  Demographics   Patient Name       Janett PEMBERTON Date of Study       03/16/2022   Date of Birth      1965         Gender              Female   Age                61 year(s)         Race                Black   Patient Number     [id number]         Room Number         3106   Visit Number       [id number]   Corporate ID       [id number]   Accession Number   [id number]         [address] [place] [address] ,                                                            [facility]   Ordering Physician Fidelina Constantino Interpreting        Johana Vale CNP                Physician           MD  Procedure Type of Study   TTE procedure:ECHOCARDIOGRAM COMPLETE 2D W DOPPLER W COLOR. Procedure Date Date: 03/16/2022 Start: 07:50 AM Study Location: Portable Technical Quality: Adequate visualization Indications:Deep vein thrombosis (DVT). Patient Status: Routine Height: 62 inches Weight: 127 pounds BSA: 1.58 m2 BMI: 23.23 kg/m2 HR: 93 bpm BP: 150/104 mmHg  Conclusions   Summary  Left ventricular systolic function is abnormal.  Ejection fraction is visually estimated at 30-35 %  Slight hypokinesis of the anteroseptal wall segment.   Mild left ventricular hypertrophy. Severe eccentric mitral regurgitation (ERO: 0.40 cm sq, regurgitant volume:  62 ml). Essentially normal right ventricle; no evidence of right heart strain. Moderate tricuspid regurgitation; RVSP: 44 mmHg. Mild pulmonic regurgitation present. No evidence of any pericardial effusion. Bilateral pleural effusion. Inferior vena cava is dilated, measuring at 2.3 cm, but does collapse with  respiration. Signature   ------------------------------------------------------------------  Electronically signed by Iwona Schmitt MD (Interpreting  physician) on 03/16/2022 at 11:15 AM  ------------------------------------------------------------------   Findings   Left Ventricle  Left ventricular systolic function is abnormal.  Ejection fraction is visually estimated at 30-35%  Slight hypokinesis of the anteroseptal wall segment. Mild left ventricular hypertrophy. Unable to determine diastolic function due to arrhythmia. Left Atrium  Essentially normal left atrium. Right Atrium  Essentially normal right atrium. Right Ventricle  Essentially normal right ventricle; no evidence of right heart strain. Aortic Valve  Sclerotic, but non-stenotic aortic valve. Mitral Valve  Severe eccentric mitral regurgitation (ERO: 0.40 cm sq, regurgitant volume:  62 ml). Tricuspid Valve  Moderate tricuspid regurgitation; RVSP: 44 mmHg. Pulmonic Valve  Mild pulmonic regurgitation present. Pericardial Effusion  No evidence of any pericardial effusion. Pleural Effusion  Bilateral pleural effusion. Miscellaneous  Inferior vena cava is dilated, measuring at 2.3 cm, but does collapse with  respiration. The aorta is within normal limits.   M-Mode/2D Measurements &     XR CHEST PORTABLE  Result Date: 3/15/2022  EXAMINATION: ONE XRAY VIEW OF THE CHEST 3/15/2022 12:35 pm COMPARISON: 03/03/2022 HISTORY: ORDERING SYSTEM PROVIDED HISTORY: Sob TECHNOLOGIST PROVIDED HISTORY: Reason for exam:->Sob Reason for Exam: sob FINDINGS: Interstitial pulmonary edema is present. Small left pleural effusion. Heart size is at the upper limits of normal.  No pneumothorax. Interstitial pulmonary edema with small left pleural effusion. VL DUP LOWER EXTREMITY VENOUS BILATERAL  Result Date: 3/15/2022  EXAMINATION: DUPLEX VENOUS ULTRASOUND OF THE BILATERAL LOWER EXTREMITIES3/15/2022 1:26 pm TECHNIQUE: Duplex ultrasound using B-mode/gray scaled imaging, Doppler spectral analysis and color flow Doppler was obtained of the deep venous structures of the lower bilateral extremities. COMPARISON: None. HISTORY: ORDERING SYSTEM PROVIDED HISTORY: lower extremity swelling, ro dvt TECHNOLOGIST PROVIDED HISTORY: Reason for exam:->lower extremity swelling, ro dvt FINDINGS: Deep venous thrombus present within the right femoral vein which is only partially compressible and demonstrates some vascular flow consistent with nonocclusive thrombus. The calf veins of the right lower extremity are noncompressible occluding the anterior tibial vein, peroneal vein, and posterior tibial vein consistent with thrombus. The venous thrombus present within the left femoral vein which is noncompressible and demonstrates no significant flow. Flow is identified within the left popliteal vein. The calf veins of the left lower extremity also demonstrate occlusive thrombus. 1. Extensive deep venous thrombus of the bilateral lower extremities. Critical results were called by Noemí Arango MD to JAYY Lynne on 3/15/2022 at 14:54. CTA PULMONARY W CONTRAST  Result Date: 3/15/2022  EXAMINATION: CTA OF THE CHEST 3/15/2022 1:59 pm TECHNIQUE: CTA of the chest was performed after the administration of intravenous contrast.  Multiplanar reformatted images are provided for review. MIP images are provided for review.  Dose modulation, iterative reconstruction, and/or weight based adjustment of the mA/kV was utilized to reduce the radiation dose to as low as reasonably achievable. COMPARISON: None. HISTORY: ORDERING SYSTEM PROVIDED HISTORY: sob, bilateral dvt, ro pe TECHNOLOGIST PROVIDED HISTORY: Reason for exam:->sob, bilateral dvt, ro pe Decision Support Exception - unselect if not a suspected or confirmed emergency medical condition->Emergency Medical Condition (MA) Reason for Exam: sob, dvt's Additional signs and symptoms: none Relevant Medical/Surgical History: 75ml isovue 370 FINDINGS: Pulmonary Arteries: There is respiratory motion artifact along with streak artifact generated by the patient's right arm is at her side. These combine to limited evaluation of the segmental and subsegmental pulmonary arterial branches. No central pulmonary embolism is detected. Mediastinum: Visualized thyroid unremarkable. Mildly enlarged mediastinal lymph nodes are seen which are likely reactive. Esophagus is unremarkable. Cardiac chambers are unremarkable. No pericardial effusion. Thoracic aorta is normal in caliber. Lungs/pleura: Again, respiratory motion degrades imaging. There is a moderate right and a small moderate left pleural effusion. Adjacent airspace disease is noted. Mild patchy ground-glass infiltrates are detected as well, especially within the superior segment of the left lower lobe and the periphery of the right upper lobe. Interlobular septal thickening is noted. Upper Abdomen: Grossly unremarkable, but not well evaluated secondary to extensive respiratory motion artifact. Soft Tissues/Bones: No acute or suspicious bony abnormalities. Visualized extra thoracic soft tissues unremarkable. Limited evaluation of the segmental and subsegmental pulmonary arterial branches. No central pulmonary embolism is detected. Bilateral pleural effusions, patchy ground-glass opacity, and interlobular septal thickening, the constellation of which is most compatible with pulmonary edema.  That said, given the focality of the ground-glass infiltrates, infectious or inflammatory pneumonitis should be considered as well, including viral infection. Results for orders placed or performed during the hospital encounter of 03/15/22   COVID-19, Rapid    Specimen: Nasopharyngeal   Result Value Ref Range    Source THROAT     SARS-CoV-2, NAAT NOT DETECTED NOT DETECTED   Culture, Urine    Specimen: Urine   Result Value Ref Range    Specimen URINE     Special Requests NONE     Culture Final Report     Culture (A)      STREP AGALACTIAE (BETA STREP GROUP B) Susceptibility testing of penicillin and other beta lactams is not necessary for beta hemolytic Streptococci since resistant strains have not been identified.  (CLSI M100)   CBC with Auto Differential   Result Value Ref Range    WBC 10.2 4.0 - 10.5 K/CU MM    RBC 5.28 4.2 - 5.4 M/CU MM    Hemoglobin 17.3 (H) 12.5 - 16.0 GM/DL    Hematocrit 52.4 (H) 37 - 47 %    MCV 99.2 78 - 100 FL    MCH 32.8 (H) 27 - 31 PG    MCHC 33.0 32.0 - 36.0 %    RDW 14.0 11.7 - 14.9 %    Platelets 382 956 - 627 K/CU MM    MPV 11.0 7.5 - 11.1 FL    Differential Type AUTOMATED DIFFERENTIAL     Segs Relative 63.3 36 - 66 %    Lymphocytes % 28.6 24 - 44 %    Monocytes % 5.8 (H) 0 - 4 %    Eosinophils % 1.2 0 - 3 %    Basophils % 0.6 0 - 1 %    Segs Absolute 6.5 K/CU MM    Lymphocytes Absolute 2.9 K/CU MM    Monocytes Absolute 0.6 K/CU MM    Eosinophils Absolute 0.1 K/CU MM    Basophils Absolute 0.1 K/CU MM    Nucleated RBC % 0.0 %    Total Nucleated RBC 0.0 K/CU MM    Total Immature Neutrophil 0.05 K/CU MM    Immature Neutrophil % 0.5 (H) 0 - 0.43 %   Comprehensive Metabolic Panel   Result Value Ref Range    Sodium 135 135 - 145 MMOL/L    Potassium 4.6 3.5 - 5.1 MMOL/L    Chloride 103 99 - 110 mMol/L    CO2 19 (L) 21 - 32 MMOL/L    BUN 14 6 - 23 MG/DL    CREATININE 1.0 0.6 - 1.1 MG/DL    Glucose 130 (H) 70 - 99 MG/DL    Calcium 9.1 8.3 - 10.6 MG/DL    Albumin 2.6 (L) 3.4 - 5.0 GM/DL    Total Protein 7.2 6.4 - 8.2 GM/DL    Total Bilirubin 0.3 0.0 - 1.0 MG/DL    ALT 37 10 - 40 U/L    AST 36 15 - 37 IU/L    Alkaline Phosphatase 212 (H) 40 - 129 IU/L    GFR Non- 57 (L) >60 mL/min/1.73m2    GFR African American >60 >60 mL/min/1.73m2    Anion Gap 13 4 - 16   Troponin   Result Value Ref Range    Troponin T <0.010 <0.01 NG/ML   Brain Natriuretic Peptide   Result Value Ref Range    Pro-BNP 12,109 (H) <300 PG/ML   Urinalysis   Result Value Ref Range    Color, UA YELLOW (A) YELLOW    Clarity, UA SLIGHTLY CLOUDY CLEAR    Glucose, Urine NEGATIVE NEGATIVE MG/DL    Bilirubin Urine NEGATIVE NEGATIVE MG/DL    Ketones, Urine NEGATIVE NEGATIVE MG/DL    Specific Gravity, UA 1.015 1.001 - 1.035    Blood, Urine MODERATE NEGATIVE    pH, Urine 7.0 5.0 - 8.0    Protein, UA >300 MG/DL NEGATIVE MG/DL    Urobilinogen, Urine 0.2 0.2 - 1.0 MG/DL    Nitrite Urine, Quantitative NEGATIVE NEGATIVE    Leukocyte Esterase, Urine MODERATE NEGATIVE    RBC, UA 9 (H) 0 - 6 /HPF    WBC, UA 99 (H) 0 - 5 /HPF    Bacteria, UA NEGATIVE NEGATIVE /HPF    WBC Clumps, UA RARE /HPF    Yeast, UA RARE /HPF    Squam Epithel, UA 5 /HPF    Mucus, UA RARE (A) NEGATIVE HPF    Hyaline Casts, UA 2 /LPF    Ca Oxalate Susie, UA OCCASIONAL /HPF   APTT   Result Value Ref Range    aPTT 20.3 (L) 25.1 - 37.1 SECONDS   Hemoglobin A1c   Result Value Ref Range    Hemoglobin A1C 5.9 4.2 - 6.3 %    eAG 123 mg/dL   Comprehensive Metabolic Panel w/ Reflex to MG   Result Value Ref Range    Sodium 137 135 - 145 MMOL/L    Potassium 4.2 3.5 - 5.1 MMOL/L    Chloride 108 99 - 110 mMol/L    CO2 22 21 - 32 MMOL/L    BUN 14 6 - 23 MG/DL    CREATININE 1.2 (H) 0.6 - 1.1 MG/DL    Glucose 160 (H) 70 - 99 MG/DL    Calcium 7.7 (L) 8.3 - 10.6 MG/DL    Albumin 1.8 (L) 3.4 - 5.0 GM/DL    Total Protein 4.6 (L) 6.4 - 8.2 GM/DL    Total Bilirubin 0.2 0.0 - 1.0 MG/DL    ALT 24 10 - 40 U/L    AST 24 15 - 37 IU/L    Alkaline Phosphatase 140 (H) 40 - 129 IU/L    GFR Non- 46 (L) >60 mL/min/1.73m2    GFR  56 (L) >60 mL/min/1.73m2    Anion Gap 7 4 - 16   Hepatic function panel   Result Value Ref Range    Albumin 1.8 (L) 3.4 - 5.0 GM/DL    Total Bilirubin 0.2 0.0 - 1.0 MG/DL    Bilirubin, Direct 0.2 0.0 - 0.3 MG/DL    Bilirubin, Indirect 0.0 0 - 0.7 MG/DL    Alkaline Phosphatase 140 (H) 40 - 129 IU/L    AST 24 15 - 37 IU/L    ALT 24 10 - 40 U/L    Total Protein 4.6 (L) 6.4 - 8.2 GM/DL   Lactic Acid   Result Value Ref Range    Lactate 1.4 0.4 - 2.0 mMOL/L   Troponin   Result Value Ref Range    Troponin T <0.010 <0.01 NG/ML   CBC with Auto Differential   Result Value Ref Range    WBC 4.6 4.0 - 10.5 K/CU MM    RBC 3.97 (L) 4.2 - 5.4 M/CU MM    Hemoglobin 13.0 12.5 - 16.0 GM/DL    Hematocrit 38.1 37 - 47 %    MCV 96.0 78 - 100 FL    MCH 32.7 (H) 27 - 31 PG    MCHC 34.1 32.0 - 36.0 %    RDW 13.4 11.7 - 14.9 %    Platelets 005 (L) 453 - 440 K/CU MM    MPV 10.8 7.5 - 11.1 FL    Differential Type AUTOMATED DIFFERENTIAL     Segs Relative 60.7 36 - 66 %    Lymphocytes % 27.6 24 - 44 %    Monocytes % 7.5 (H) 0 - 4 %    Eosinophils % 2.6 0 - 3 %    Basophils % 0.9 0 - 1 %    Segs Absolute 2.8 K/CU MM    Lymphocytes Absolute 1.3 K/CU MM    Monocytes Absolute 0.3 K/CU MM    Eosinophils Absolute 0.1 K/CU MM    Basophils Absolute 0.0 K/CU MM    Nucleated RBC % 0.0 %    Total Nucleated RBC 0.0 K/CU MM    Total Immature Neutrophil 0.03 K/CU MM    Immature Neutrophil % 0.7 (H) 0 - 0.43 %   Protime-INR   Result Value Ref Range    Protime 11.3 (L) 11.7 - 14.5 SECONDS    INR 0.88 INDEX   CBC   Result Value Ref Range    WBC 7.4 4.0 - 10.5 K/CU MM    RBC 4.18 (L) 4.2 - 5.4 M/CU MM    Hemoglobin 14.0 12.5 - 16.0 GM/DL    Hematocrit 39.6 37 - 47 %    MCV 94.7 78 - 100 FL    MCH 33.5 (H) 27 - 31 PG    MCHC 35.4 32.0 - 36.0 %    RDW 13.3 11.7 - 14.9 %    Platelets 409 (L) 140 - 440 K/CU MM    MPV 10.9 7.5 - 11.1 FL   APTT   Result Value Ref Range    aPTT 65.6 (H) 25.1 - 37.1 SECONDS   APTT   Result Value Ref Range    aPTT 76.3 (H) 25.1 - 37.1 SECONDS   Troponin   Result Value Ref Range    Troponin T <0.010 <0.01 NG/ML   Brain Natriuretic Peptide   Result Value Ref Range    Pro-BNP 15,695 (H) <300 PG/ML   APTT   Result Value Ref Range    aPTT 19.3 (L) 25.1 - 37.1 SECONDS   CBC   Result Value Ref Range    WBC 5.5 4.0 - 10.5 K/CU MM    RBC 4.38 4.2 - 5.4 M/CU MM    Hemoglobin 14.4 12.5 - 16.0 GM/DL    Hematocrit 42.6 37 - 47 %    MCV 97.3 78 - 100 FL    MCH 32.9 (H) 27 - 31 PG    MCHC 33.8 32.0 - 36.0 %    RDW 13.6 11.7 - 14.9 %    Platelets 235 (L) 261 - 440 K/CU MM    MPV 11.7 (H) 7.5 - 11.1 FL   Urinalysis with Reflex to Culture    Specimen: Urine   Result Value Ref Range    Color, UA YELLOW YELLOW    Clarity, UA SLIGHTLY CLOUDY (A) CLEAR    Glucose, Urine NEGATIVE NEGATIVE MG/DL    Bilirubin Urine NEGATIVE NEGATIVE MG/DL    Ketones, Urine NEGATIVE NEGATIVE MG/DL    Specific Gravity, UA 1.020 1.001 - 1.035    Blood, Urine MODERATE (A) NEGATIVE    pH, Urine 6.0 5.0 - 8.0    Protein,  (A) NEGATIVE MG/DL    Urobilinogen, Urine 0.2 0.2 - 1.0 MG/DL    Nitrite Urine, Quantitative NEGATIVE NEGATIVE    Leukocyte Esterase, Urine MODERATE (A) NEGATIVE    RBC, UA 28 (H) 0 - 6 /HPF    WBC,  (H) 0 - 5 /HPF    Bacteria, UA NEGATIVE NEGATIVE /HPF    WBC Clumps, UA RARE /HPF    Squam Epithel, UA 2 /HPF    Hyaline Casts, UA 2 /LPF   Basic Metabolic Panel w/ Reflex to MG   Result Value Ref Range    Sodium 142 135 - 145 MMOL/L    Potassium 5.3 (H) 3.5 - 5.1 MMOL/L    Chloride 108 99 - 110 mMol/L    CO2 20 (L) 21 - 32 MMOL/L    Anion Gap 14 4 - 16    BUN 24 (H) 6 - 23 MG/DL    CREATININE 1.8 (H) 0.6 - 1.1 MG/DL    Glucose 141 (H) 70 - 99 MG/DL    Calcium 8.3 8.3 - 10.6 MG/DL    GFR Non- 29 (L) >60 mL/min/1.73m2    GFR  35 (L) >60 NU/WFF/5.77I0   Basic Metabolic Panel w/ Reflex to MG   Result Value Ref Range    Sodium 137 135 - 145 MMOL/L    Potassium 4.9 3.5 - 5.1 MMOL/L    Chloride 102 99 - 110 mMol/L    CO2 24 21 - 32 MMOL/L    Anion Gap 11 4 - 16    BUN 25 (H) 6 - 23 MG/DL    CREATININE 1.6 (H) 0.6 - 1.1 MG/DL    Glucose 68 (L) 70 - 99 MG/DL    Calcium 8.4 8.3 - 10.6 MG/DL    GFR Non- 33 (L) >60 mL/min/1.73m2    GFR  40 (L) >60 mL/min/1.73m2   CBC   Result Value Ref Range    WBC 10.7 (H) 4.0 - 10.5 K/CU MM    RBC 4.38 4.2 - 5.4 M/CU MM    Hemoglobin 14.2 12.5 - 16.0 GM/DL    Hematocrit 43.3 37 - 47 %    MCV 98.9 78 - 100 FL    MCH 32.4 (H) 27 - 31 PG    MCHC 32.8 32.0 - 36.0 %    RDW 13.8 11.7 - 14.9 %    Platelets 390 (L) 296 - 440 K/CU MM    MPV 11.5 (H) 7.5 - 11.1 FL   Sodium, urine, random   Result Value Ref Range    Sodium, Ur 90 35 - 167 MMOL/L   CBC   Result Value Ref Range    WBC 7.5 4.0 - 10.5 K/CU MM    RBC 4.00 (L) 4.2 - 5.4 M/CU MM    Hemoglobin 13.0 12.5 - 16.0 GM/DL    Hematocrit 40.1 37 - 47 %    .3 (H) 78 - 100 FL    MCH 32.5 (H) 27 - 31 PG    MCHC 32.4 32.0 - 36.0 %    RDW 13.5 11.7 - 14.9 %    Platelets 398 (L) 534 - 440 K/CU MM    MPV 11.5 (H) 7.5 - 11.1 FL   Basic Metabolic Panel w/ Reflex to MG   Result Value Ref Range    Sodium 137 135 - 145 MMOL/L    Potassium 5.3 (H) 3.5 - 5.1 MMOL/L    Chloride 103 99 - 110 mMol/L    CO2 23 21 - 32 MMOL/L    Anion Gap 11 4 - 16    BUN 25 (H) 6 - 23 MG/DL    CREATININE 1.7 (H) 0.6 - 1.1 MG/DL    Glucose 132 (H) 70 - 99 MG/DL    Calcium 8.3 8.3 - 10.6 MG/DL    GFR Non- 31 (L) >60 mL/min/1.73m2    GFR  37 (L) >60 /VXU/3.57T8   Basic Metabolic Panel w/ Reflex to MG   Result Value Ref Range    Sodium 133 (L) 135 - 145 MMOL/L    Potassium 4.7 3.5 - 5.1 MMOL/L    Chloride 104 99 - 110 mMol/L    CO2 23 21 - 32 MMOL/L    Anion Gap 6 4 - 16    BUN 22 6 - 23 MG/DL    CREATININE 1.3 (H) 0.6 - 1.1 MG/DL    Glucose 155 (H) 70 - 99 MG/DL    Calcium 8.1 (L) 8.3 - 10.6 MG/DL    GFR Non- 42 (L) >60 mL/min/1.73m2    GFR  51 (L) >60 mL/min/1.73m2   CBC   Result Value Ref Range    WBC 7.3 4.0 - 10.5 K/CU MM    RBC 3.91 (L) 4.2 - 5.4 M/CU MM    Hemoglobin 12.8 12.5 - 16.0 GM/DL    Hematocrit 38.1 37 - 47 %    MCV 97.4 78 - 100 FL    MCH 32.7 (H) 27 - 31 PG    MCHC 33.6 32.0 - 36.0 %    RDW 12.8 11.7 - 14.9 %    Platelets 553 (L) 780 - 440 K/CU MM    MPV 11.5 (H) 7.5 - 11.1 FL   Basic Metabolic Panel w/ Reflex to MG   Result Value Ref Range    Sodium 136 135 - 145 MMOL/L    Potassium 4.8 3.5 - 5.1 MMOL/L    Chloride 103 99 - 110 mMol/L    CO2 25 21 - 32 MMOL/L    Anion Gap 8 4 - 16    BUN 15 6 - 23 MG/DL    CREATININE 0.9 0.6 - 1.1 MG/DL    Glucose 158 (H) 70 - 99 MG/DL    Calcium 8.2 (L) 8.3 - 10.6 MG/DL    GFR Non-African American >60 >60 mL/min/1.73m2    GFR African American >60 >60 mL/min/1.73m2   Homocysteine   Result Value Ref Range    Homocysteine 15.1 (H) 0 - 10 umol/L   POCT Glucose   Result Value Ref Range    POC Glucose 191 (H) 70 - 99 MG/DL   POCT Glucose   Result Value Ref Range    POC Glucose 160 (H) 70 - 99 MG/DL   POCT Glucose   Result Value Ref Range    POC Glucose 275 (H) 70 - 99 MG/DL   POCT Glucose   Result Value Ref Range    POC Glucose 74 70 - 99 MG/DL   POCT Glucose   Result Value Ref Range    POC Glucose 406 (H) 70 - 99 MG/DL   POCT Glucose   Result Value Ref Range    POC Glucose 92 70 - 99 MG/DL   POCT Glucose   Result Value Ref Range    POC Glucose 210 (H) 70 - 99 MG/DL   POCT Glucose   Result Value Ref Range    POC Glucose 120 (H) 70 - 99 MG/DL   POCT Glucose   Result Value Ref Range    POC Glucose 193 (H) 70 - 99 MG/DL   POCT Glucose   Result Value Ref Range    POC Glucose 117 (H) 70 - 99 MG/DL   POCT Glucose   Result Value Ref Range    POC Glucose 168 (H) 70 - 99 MG/DL   POCT Glucose   Result Value Ref Range    POC Glucose 127 (H) 70 - 99 MG/DL   POCT Glucose   Result Value Ref Range    POC Glucose 159 (H) 70 - 99 MG/DL   POCT Glucose   Result Value Ref Range    POC Glucose 120 (H) 70 - 99 MG/DL   POCT Glucose   Result Value Ref Range    POC Glucose 150 (H) 70 - 99 MG/DL   POCT Glucose   Result Value Ref Range    POC Glucose 104 (H) 70 - 99 MG/DL   POCT Glucose   Result Value Ref Range    POC Glucose 82 70 - 99 MG/DL   POCT Glucose   Result Value Ref Range    POC Glucose 172 (H) 70 - 99 MG/DL   POCT Glucose   Result Value Ref Range    POC Glucose 136 (H) 70 - 99 MG/DL   POC ACT-Low Range   Result Value Ref Range    Activated Clotting Time, Low Range 181 SEC   POCT Glucose   Result Value Ref Range    POC Glucose 112 (H) 70 - 99 MG/DL   POCT Glucose   Result Value Ref Range    POC Glucose 103 (H) 70 - 99 MG/DL   POCT Glucose   Result Value Ref Range    POC Glucose 114 (H) 70 - 99 MG/DL   POCT Glucose   Result Value Ref Range    POC Glucose 170 (H) 70 - 99 MG/DL   POCT Glucose   Result Value Ref Range    POC Glucose 156 (H) 70 - 99 MG/DL   EKG 12 Lead   Result Value Ref Range    Ventricular Rate 109 BPM    Atrial Rate 109 BPM    P-R Interval 122 ms    QRS Duration 82 ms    Q-T Interval 312 ms    QTc Calculation (Bazett) 420 ms    P Axis 70 degrees    R Axis -5 degrees    T Axis 52 degrees    Diagnosis       Sinus tachycardia  Left atrial enlargement  Anterior infarct (cited on or before 26-MAY-2020)  Abnormal ECG  When compared with ECG of 03-MAR-2022 14:05,  No significant change was found  Confirmed by LILLIAN Mcclendon ([id number]) on 3/15/2022 8:56:37 PM         Diet:  ADULT DIET; Regular; 3 carb choices (45 gm/meal);  Low Fat/Low Chol/High Fiber/2 gm Na    Activity:  Activity as tolerated    Follow-up:  in 1 week with Tia Riley MD, for HTN/DVT    Disposition: home    Condition: Stable      Time Spent: 40 minutes    Electronically signed by Stanford Jones MD on 3/22/2022 at 5:28 PM    Discharging Hospitalist

## 2022-03-22 NOTE — FLOWSHEET NOTE
Discharge instructions reviewed with patient. Patient does not have a family doctor. Patient advised to follow up with [facility] within one week.

## 2022-03-23 LAB
ANTICARDIOLIPIN IGA ANTIBODY: <10 APL
ANTICARDIOLIPIN IGG ANTIBODY: 48 GPL
BETA 2 GLYCOPROT.1 IGA AB: <10 SAU
BETA 2 GLYCOPROT.1 IGM AB: <10 SMU
BETA-2 GLYCOPROTEIN 1 IGG ANTIBODY: <10 SGU
CARDIOLIPIN AB IGM: 23 MPL
PHOSPHATIDYLSERINE IGA ANTIBODY: 0 APS (ref 0–19)
PHOSPHATIDYLSERINE IGG ANTIBODY: 10 GPS (ref 0–15)
PHOSPHATIDYLSERINE IGM ANTIBODY: 1 MPS (ref 0–21)

## 2022-03-24 LAB
FACTOR V LEIDEN: NEGATIVE
MTHFR BY PCR SPECIMEN: NORMAL
MTHFR INTERPRETATION: NORMAL
MTHFR MUTATION A1298C: NEGATIVE
MTHFR MUTATION C677T: NEGATIVE
PROTHROMBIN G20210A MUTATION: NEGATIVE

## 2022-03-25 LAB
PROTEIN C ACTIVITY: 130 % (ref 83–168)
PROTEIN S ACTIVITY: 67 % (ref 57–131)

## 2022-04-04 ENCOUNTER — OFFICE VISIT (OUTPATIENT)
Dept: CARDIOLOGY CLINIC | Age: 57
End: 2022-04-04
Payer: COMMERCIAL

## 2022-04-04 VITALS
WEIGHT: 125 LBS | SYSTOLIC BLOOD PRESSURE: 130 MMHG | HEIGHT: 62 IN | DIASTOLIC BLOOD PRESSURE: 70 MMHG | OXYGEN SATURATION: 97 % | HEART RATE: 93 BPM | BODY MASS INDEX: 23 KG/M2

## 2022-04-04 DIAGNOSIS — I26.99 OTHER PULMONARY EMBOLISM WITHOUT ACUTE COR PULMONALE, UNSPECIFIED CHRONICITY (HCC): ICD-10-CM

## 2022-04-04 DIAGNOSIS — I42.8 NICM (NONISCHEMIC CARDIOMYOPATHY) (HCC): ICD-10-CM

## 2022-04-04 DIAGNOSIS — F17.200 SMOKING: ICD-10-CM

## 2022-04-04 DIAGNOSIS — I82.413 ACUTE BILATERAL DEEP VEIN THROMBOSIS (DVT) OF FEMORAL VEINS (HCC): Primary | ICD-10-CM

## 2022-04-04 DIAGNOSIS — E11.9 TYPE 2 DIABETES MELLITUS WITHOUT COMPLICATION, WITH LONG-TERM CURRENT USE OF INSULIN (HCC): ICD-10-CM

## 2022-04-04 DIAGNOSIS — Z79.4 TYPE 2 DIABETES MELLITUS WITHOUT COMPLICATION, WITH LONG-TERM CURRENT USE OF INSULIN (HCC): ICD-10-CM

## 2022-04-04 DIAGNOSIS — I10 PRIMARY HYPERTENSION: ICD-10-CM

## 2022-04-04 DIAGNOSIS — I50.23 ACUTE ON CHRONIC SYSTOLIC CONGESTIVE HEART FAILURE (HCC): ICD-10-CM

## 2022-04-04 DIAGNOSIS — E87.0 HYPEROSMOLAR HYPONATREMIA: ICD-10-CM

## 2022-04-04 DIAGNOSIS — R07.2 PRECORDIAL PAIN: ICD-10-CM

## 2022-04-04 DIAGNOSIS — E87.1 HYPEROSMOLAR HYPONATREMIA: ICD-10-CM

## 2022-04-04 DIAGNOSIS — I34.0 NONRHEUMATIC MITRAL VALVE REGURGITATION: ICD-10-CM

## 2022-04-04 PROCEDURE — 4004F PT TOBACCO SCREEN RCVD TLK: CPT | Performed by: INTERNAL MEDICINE

## 2022-04-04 PROCEDURE — 3017F COLORECTAL CA SCREEN DOC REV: CPT | Performed by: INTERNAL MEDICINE

## 2022-04-04 PROCEDURE — G8420 CALC BMI NORM PARAMETERS: HCPCS | Performed by: INTERNAL MEDICINE

## 2022-04-04 PROCEDURE — 3044F HG A1C LEVEL LT 7.0%: CPT | Performed by: INTERNAL MEDICINE

## 2022-04-04 PROCEDURE — 1111F DSCHRG MED/CURRENT MED MERGE: CPT | Performed by: INTERNAL MEDICINE

## 2022-04-04 PROCEDURE — G8427 DOCREV CUR MEDS BY ELIG CLIN: HCPCS | Performed by: INTERNAL MEDICINE

## 2022-04-04 PROCEDURE — 2022F DILAT RTA XM EVC RTNOPTHY: CPT | Performed by: INTERNAL MEDICINE

## 2022-04-04 PROCEDURE — 99214 OFFICE O/P EST MOD 30 MIN: CPT | Performed by: INTERNAL MEDICINE

## 2022-04-04 RX ORDER — LISINOPRIL 5 MG/1
5 TABLET ORAL DAILY
Qty: 30 TABLET | Refills: 2 | Status: ON HOLD | OUTPATIENT
Start: 2022-04-04 | End: 2022-05-06 | Stop reason: HOSPADM

## 2022-04-04 RX ORDER — METOPROLOL SUCCINATE 50 MG/1
50 TABLET, EXTENDED RELEASE ORAL DAILY
Qty: 30 TABLET | Refills: 3 | Status: SHIPPED | OUTPATIENT
Start: 2022-04-04 | End: 2022-05-19 | Stop reason: SDUPTHER

## 2022-04-04 NOTE — ASSESSMENT & PLAN NOTE
Non Ischemic cardiomyopathy with ejection fraction of 3035% will uptitrate lisinopril to 5 mg daily and increase Toprol-XL to 50 mg daily  EF is 30 to 35%.   Repeat echo by June 2022 to evaluate further plan will uptitrate meds monthly will evaluate every month

## 2022-04-04 NOTE — PROGRESS NOTES
CARDIOLOGY  NOTE    Chief Complaint: DVT /cardiomyopathy    HPI:   Geetha Patel is a 61y.o. year old who has Past medical history as noted below. Geetha Patel was admitted in early March 2022 with severe shortness of breath she has history of schizophrenia and hepatitis C and was found to have bilateral lower extremity swelling work-up revealed bilateral extensive DVT of both lower extremity she was evaluated by hematology oncology and currently hypercoagulable work-up is in progress. Her echo shows severe cardiomyopathy  With EF of 3035% and hypokinesis of the anteroseptal wall with eccentric severe  mitral regurgitation. Cardiac cath revealed no significant obstructive coronary artery disease she was started on guideline recommended medical therapy but because of low blood pressures she had difficulty tolerating them. She is feeling much better now.   Currently taking 20 mg of Lasix and Xarelto 15 mg twice a day  She says she smokes several cigars a day but is trying to cut down    Current Outpatient Medications   Medication Sig Dispense Refill    lisinopril (PRINIVIL;ZESTRIL) 5 MG tablet Take 1 tablet by mouth daily 30 tablet 2    metoprolol succinate (TOPROL XL) 50 MG extended release tablet Take 1 tablet by mouth daily 30 tablet 3    rivaroxaban (XARELTO) 15 MG TABS tablet Take 1 tablet by mouth 2 times daily (with meals) for 17 days 8 tablet 0    [START ON 4/9/2022] rivaroxaban (XARELTO) 20 MG TABS tablet Take 1 tablet by mouth Daily with supper 30 tablet 1    furosemide (LASIX) 20 MG tablet Take 1 tablet by mouth daily 30 tablet 1    polyethylene glycol (GLYCOLAX) 17 g packet Take 17 g by mouth daily 30 each 1    ondansetron (ZOFRAN-ODT) 4 MG disintegrating tablet Take 1 tablet by mouth 3 times daily as needed for Nausea or Vomiting 21 tablet 0    dicyclomine (BENTYL) 10 MG capsule Take 1 capsule by mouth 4 times daily as needed (Abdominal cramping, diarrhea) 20 capsule 1    insulin glargine (LANTUS) 100 UNIT/ML injection vial Inject 40 Units into the skin nightly 1 vial 0    insulin lispro (HUMALOG) 100 UNIT/ML injection vial Inject 10 Units into the skin 3 times daily (with meals) 1 vial 0    Insulin Syringe-Needle U-100 (AIMSCO INS SYR .3CC/29GX0.5\") 29G X 1/2\" 0.3 ML MISC 1 each by Does not apply route daily 100 each 3    aspirin 81 MG chewable tablet Take 81 mg by mouth daily      QUEtiapine (SEROQUEL) 400 MG tablet Take 1 tablet by mouth nightly for 7 days 7 tablet 0     No current facility-administered medications for this visit. Allergies:   Haldol [haloperidol lactate] and Penicillins    Patient History:  Past Medical History:   Diagnosis Date    CAD (coronary artery disease)     Diabetes mellitus ([facility] [address]     Hepatitis C     [facility]      Past Surgical History:   Procedure Laterality Date    CHOLECYSTECTOMY      HYSTERECTOMY       Family History   Problem Relation Age of Onset    No Known Problems Mother     Cancer Father      Social History     Tobacco Use    Smoking status: Current Every Day Smoker     Packs/day: 3.00     Types: Cigars    Smokeless tobacco: Never Used   Substance Use Topics    Alcohol use: Yes     Comment: occassionally        Review of Systems:   · Constitutional: No Fever or Weight Loss   · Eyes: No Decreased Vision  · ENT: No Headaches, Hearing Loss or Vertigo  · Cardiovascular: as per note above   · Respiratory: No cough or wheezing and as per note above.    · Gastrointestinal: No abdominal pain, appetite loss, blood in stools, constipation, diarrhea or heartburn  · Genitourinary: No dysuria, trouble voiding, or hematuria  · Musculoskeletal:  denies any new  joint aches , swelling  or pain   · Integumentary: No rash or pruritis  · Neurological: No TIA or stroke symptoms  · Psychiatric: No anxiety or depression  · Endocrine: No malaise, fatigue or temperature intolerance  · Hematologic/Lymphatic: No bleeding problems, blood clots or swollen lymph nodes  · Allergic/Immunologic: No nasal congestion or hives    Objective:      Physical Exam:  /70   Pulse 93   Ht 5' 2\" (1.575 m)   Wt 125 lb (56.7 kg)   SpO2 97%   BMI 22.86 kg/m²   Wt Readings from Last 3 Encounters:   04/04/22 125 lb (56.7 kg)   03/21/22 131 lb 8 oz (59.6 kg)   03/03/22 125 lb (56.7 kg)     Body mass index is 22.86 kg/m². Vitals:    04/04/22 1022   BP: 130/70   Pulse: 93   SpO2: 97%        General Appearance:  No distress, conversant  Constitutional:  Well developed, Well nourished, No acute distress, Non-toxic appearance. HENT:  Normocephalic, Atraumatic, Bilateral external ears normal, Oropharynx moist, No oral exudates, Nose normal. Neck- Normal range of motion, No tenderness, Supple, No stridor,no apical-carotid delay  Eyes:  PERRL, EOMI, Conjunctiva normal, No discharge. Respiratory:  Normal breath sounds, No respiratory distress, No wheezing, No chest tenderness. ,no use of accessory muscles, NO crackles  Cardiovascular: (PMI) apex non displaced,no lifts no thrills,S1 and S2 audible, No added heart sounds, No signs of ankle edema, or volume overload, No evidence of JVD, No crackles  GI:  Bowel sounds normal, Soft, No tenderness, No masses, No gross visceromegaly   :  No costovertebral angle tenderness   Musculoskeletal:  No edema, no tenderness, no deformities.  Back- no tenderness  Integument:  Well hydrated, no rash   Lymphatic:  No lymphadenopathy noted   Neurologic:  Alert & oriented x 3, CN 2-12 normal, normal motor function, normal sensory function, no focal deficits noted   Psychiatric:  Speech and behavior appropriate       Medical decision making and Data review:  DATA:  Lab Results   Component Value Date    TROPONINT <0.010 03/16/2022     BNP:    Lab Results   Component Value Date    PROBNP 15,695 (H) 03/16/2022     PT/INR:  No results found for: [employer]  Lab Results   Component Value Date    LABA1C 5.9 03/16/2022    LABA1C 5.0 03/12/2021     Lab Results   Component Value Date    CHOL 161 03/12/2021    TRIG 163 (H) 03/12/2021    HDL 72 03/12/2021    LDLDIRECT 70 03/12/2021     Lab Results   Component Value Date    ALT 24 03/16/2022    ALT 24 03/16/2022    AST 24 03/16/2022    AST 24 03/16/2022     No results for input(s): WBC, HGB, HCT, MCV, PLT in the last 72 hours. TSH: No results found for: TSH  Lab Results   Component Value Date    AST 24 03/16/2022    AST 24 03/16/2022    ALT 24 03/16/2022    ALT 24 03/16/2022    BILIDIR 0.2 03/16/2022    BILITOT 0.2 03/16/2022    BILITOT 0.2 03/16/2022    ALKPHOS 140 (H) 03/16/2022    ALKPHOS 140 (H) 03/16/2022     Lab Results   Component Value Date    PROBNP 15,695 (H) 03/16/2022    PROBNP 12,109 (H) 03/15/2022     Lab Results   Component Value Date    LABA1C 5.9 03/16/2022    LABA1C 5.0 03/12/2021     Lab Results   Component Value Date    WBC 7.3 03/21/2022    HGB 12.8 03/21/2022    HCT 38.1 03/21/2022     (L) 03/21/2022       Echo 3/022     Summary   Left ventricular systolic function is abnormal.   Ejection fraction is visually estimated at 30-35 %   Slight hypokinesis of the anteroseptal wall segment. Mild left ventricular hypertrophy. Severe eccentric mitral regurgitation (ERO: 0.40 cm sq, regurgitant volume:   62 ml). Essentially normal right ventricle; no evidence of right heart strain. Moderate tricuspid regurgitation; RVSP: 44 mmHg. Mild pulmonic regurgitation present. No evidence of any pericardial effusion. Bilateral pleural effusion. Inferior vena cava is dilated, measuring at 2.3 cm, but does collapse with  Cath 3/18/22  Cardiac Arteries and Lesion Findings     LMCA: Normal.     LAD: Normal.     LCx: Normal.     RCA: Mild Lumen Irregularity. 10% prox RCA            All labs, medications and tests reviewed by myself including data and history from outside source , patient and available family . Assessment & Plan:      1.  Acute bilateral deep vein thrombosis (DVT) of femoral veins ([facility] [address]    2. Acute on chronic systolic congestive heart failure ([address]    3. Hyperosmolar hyponatremia    4. NICM (nonischemic cardiomyopathy) ([address]    5. Precordial pain    6. Primary hypertension    7. Other pulmonary embolism without acute cor pulmonale, unspecified chronicity ([facility] [address]    8. Type 2 diabetes mellitus without complication, with long-term current use of insulin ([facility])    9. Smoking    10. Nonrheumatic mitral valve regurgitation         Acute on chronic systolic congestive heart failure ([address]  Cath in March 2022 did not reveal any obstructive coronary artery disease. Non Ischemic cardiomyopathy with ejection fraction of 3035% will uptitrate lisinopril to 5 mg daily and increase Toprol-XL to 50 mg daily  EF is 30 to 35%. Repeat echo by June 2022 to evaluate further plan will uptitrate meds monthly will evaluate every month    Acute bilateral deep vein thrombosis (DVT) of femoral veins ([facility])   Bilateral extensive DVT started on Xarelto continue as per protocol she is seeing hematology for hypercoagulable work-up    Nonrheumatic mitral valve regurgitation  Severe  mitral regurgitation will repeat echo in 3 months once she has been uptitrated on cardiac meds    Primary hypertension   Gradually titrate up medication but blood pressures well controlled today continue Lasix 20 mg daily    Smoking  Encouraged to quit smoking    Type 2 diabetes mellitus without complication, with long-term current use of insulin ([facility] [address]   Needs to establish care with PCP currently on insulin     Dyslipidemia :  All available lab work was reviewed. Patient was advised to repeat lab work before next visit. Necessary orders were placed , instructions given by myself       Counseled extensively and medication compliance urged. We discussed that for the  prevention of ASCVD our  goal is aggressive risk modification. Patient is encouraged to exercise if they can , educated about  brisk walk for 30 minutes  at least 3 to 4 times a week if there are no physical limitations  Various goals were discussed and questions answered. Continue current medications. Appropriate prescriptions are addressed and refills ordered. Questions answered and patient verbalizes understanding. Call for any problems, questions, or concerns. Greater than 60 % of time spent counseling besides reviewing data and images     Continue all other medications of all above medical condition listed as is. Return in about 1 month (around 5/4/2022). Please note this report has been partially produced using speech recognition software and may contain errors related to that system including errors in grammar, punctuation, and spelling, as well as words and phrases that may be inappropriate. If there are any questions or concerns please feel free to contact the dictating provider for clarification.

## 2022-04-04 NOTE — ASSESSMENT & PLAN NOTE
Gradually titrate up medication but blood pressures well controlled today continue Lasix 20 mg daily

## 2022-04-04 NOTE — ASSESSMENT & PLAN NOTE
Bilateral extensive DVT started on Xarelto continue as per protocol she is seeing hematology for hypercoagulable work-up

## 2022-05-02 ENCOUNTER — HOSPITAL ENCOUNTER (OUTPATIENT)
Age: 57
Setting detail: OBSERVATION
Discharge: HOME | End: 2022-05-06
Attending: EMERGENCY MEDICINE | Admitting: INTERNAL MEDICINE
Payer: COMMERCIAL

## 2022-05-02 ENCOUNTER — APPOINTMENT (OUTPATIENT)
Dept: CT IMAGING | Age: 57
End: 2022-05-02
Payer: COMMERCIAL

## 2022-05-02 ENCOUNTER — APPOINTMENT (OUTPATIENT)
Dept: GENERAL RADIOLOGY | Age: 57
End: 2022-05-02
Payer: COMMERCIAL

## 2022-05-02 DIAGNOSIS — N17.9 AKI (ACUTE KIDNEY INJURY) (HCC): ICD-10-CM

## 2022-05-02 DIAGNOSIS — J81.0 ACUTE PULMONARY EDEMA (HCC): Primary | ICD-10-CM

## 2022-05-02 DIAGNOSIS — R10.84 GENERALIZED ABDOMINAL PAIN: ICD-10-CM

## 2022-05-02 PROBLEM — I50.9 NEW ONSET OF CONGESTIVE HEART FAILURE (HCC): Status: ACTIVE | Noted: 2022-05-02

## 2022-05-02 LAB
ALBUMIN SERPL-MCNC: 2.3 GM/DL (ref 3.4–5)
ALP BLD-CCNC: 139 IU/L (ref 40–129)
ALT SERPL-CCNC: 16 U/L (ref 10–40)
ANION GAP SERPL CALCULATED.3IONS-SCNC: 9 MMOL/L (ref 4–16)
AST SERPL-CCNC: 19 IU/L (ref 15–37)
BASOPHILS ABSOLUTE: 0.1 K/CU MM
BASOPHILS RELATIVE PERCENT: 0.6 % (ref 0–1)
BILIRUB SERPL-MCNC: 0.5 MG/DL (ref 0–1)
BUN BLDV-MCNC: 15 MG/DL (ref 6–23)
CALCIUM SERPL-MCNC: 8.1 MG/DL (ref 8.3–10.6)
CHLORIDE BLD-SCNC: 103 MMOL/L (ref 99–110)
CO2: 22 MMOL/L (ref 21–32)
CREAT SERPL-MCNC: 1 MG/DL (ref 0.6–1.1)
DIFFERENTIAL TYPE: ABNORMAL
EOSINOPHILS ABSOLUTE: 0 K/CU MM
EOSINOPHILS RELATIVE PERCENT: 0.3 % (ref 0–3)
GFR AFRICAN AMERICAN: >60 ML/MIN/1.73M2
GFR NON-AFRICAN AMERICAN: 57 ML/MIN/1.73M2
GLUCOSE BLD-MCNC: 144 MG/DL (ref 70–99)
GLUCOSE BLD-MCNC: 183 MG/DL (ref 70–99)
HCT VFR BLD CALC: 52 % (ref 37–47)
HEMOGLOBIN: 17.1 GM/DL (ref 12.5–16)
IMMATURE NEUTROPHIL %: 0.4 % (ref 0–0.43)
LYMPHOCYTES ABSOLUTE: 2.6 K/CU MM
LYMPHOCYTES RELATIVE PERCENT: 23.3 % (ref 24–44)
MCH RBC QN AUTO: 33 PG (ref 27–31)
MCHC RBC AUTO-ENTMCNC: 32.9 % (ref 32–36)
MCV RBC AUTO: 100.4 FL (ref 78–100)
MONOCYTES ABSOLUTE: 0.7 K/CU MM
MONOCYTES RELATIVE PERCENT: 6.4 % (ref 0–4)
NUCLEATED RBC %: 0 %
PDW BLD-RTO: 13.9 % (ref 11.7–14.9)
PLATELET # BLD: 219 K/CU MM (ref 140–440)
PMV BLD AUTO: 10.7 FL (ref 7.5–11.1)
POTASSIUM SERPL-SCNC: 4.4 MMOL/L (ref 3.5–5.1)
PRO-BNP: ABNORMAL PG/ML
RBC # BLD: 5.18 M/CU MM (ref 4.2–5.4)
REASON FOR REJECTION: NORMAL
REJECTED TEST: NORMAL
SEGMENTED NEUTROPHILS ABSOLUTE COUNT: 7.7 K/CU MM
SEGMENTED NEUTROPHILS RELATIVE PERCENT: 69 % (ref 36–66)
SODIUM BLD-SCNC: 134 MMOL/L (ref 135–145)
TOTAL IMMATURE NEUTOROPHIL: 0.04 K/CU MM
TOTAL NUCLEATED RBC: 0 K/CU MM
TOTAL PROTEIN: 6 GM/DL (ref 6.4–8.2)
TROPONIN T: <0.01 NG/ML
WBC # BLD: 11.1 K/CU MM (ref 4–10.5)

## 2022-05-02 PROCEDURE — G0378 HOSPITAL OBSERVATION PER HR: HCPCS

## 2022-05-02 PROCEDURE — 6370000000 HC RX 637 (ALT 250 FOR IP): Performed by: NURSE PRACTITIONER

## 2022-05-02 PROCEDURE — 84484 ASSAY OF TROPONIN QUANT: CPT

## 2022-05-02 PROCEDURE — 6360000002 HC RX W HCPCS: Performed by: NURSE PRACTITIONER

## 2022-05-02 PROCEDURE — 83036 HEMOGLOBIN GLYCOSYLATED A1C: CPT

## 2022-05-02 PROCEDURE — 96376 TX/PRO/DX INJ SAME DRUG ADON: CPT

## 2022-05-02 PROCEDURE — 71275 CT ANGIOGRAPHY CHEST: CPT

## 2022-05-02 PROCEDURE — 83880 ASSAY OF NATRIURETIC PEPTIDE: CPT

## 2022-05-02 PROCEDURE — 85025 COMPLETE CBC W/AUTO DIFF WBC: CPT

## 2022-05-02 PROCEDURE — 6370000000 HC RX 637 (ALT 250 FOR IP): Performed by: HOSPITALIST

## 2022-05-02 PROCEDURE — 6370000000 HC RX 637 (ALT 250 FOR IP): Performed by: EMERGENCY MEDICINE

## 2022-05-02 PROCEDURE — 6360000002 HC RX W HCPCS: Performed by: EMERGENCY MEDICINE

## 2022-05-02 PROCEDURE — 99285 EMERGENCY DEPT VISIT HI MDM: CPT

## 2022-05-02 PROCEDURE — 96374 THER/PROPH/DIAG INJ IV PUSH: CPT

## 2022-05-02 PROCEDURE — 93005 ELECTROCARDIOGRAM TRACING: CPT | Performed by: EMERGENCY MEDICINE

## 2022-05-02 PROCEDURE — 6360000004 HC RX CONTRAST MEDICATION: Performed by: EMERGENCY MEDICINE

## 2022-05-02 PROCEDURE — 80053 COMPREHEN METABOLIC PANEL: CPT

## 2022-05-02 PROCEDURE — 82962 GLUCOSE BLOOD TEST: CPT

## 2022-05-02 PROCEDURE — 96375 TX/PRO/DX INJ NEW DRUG ADDON: CPT

## 2022-05-02 PROCEDURE — 71045 X-RAY EXAM CHEST 1 VIEW: CPT

## 2022-05-02 RX ORDER — SODIUM CHLORIDE 0.9 % (FLUSH) 0.9 %
10 SYRINGE (ML) INJECTION PRN
Status: DISCONTINUED | OUTPATIENT
Start: 2022-05-02 | End: 2022-05-06 | Stop reason: HOSPADM

## 2022-05-02 RX ORDER — ONDANSETRON 2 MG/ML
4 INJECTION INTRAMUSCULAR; INTRAVENOUS EVERY 6 HOURS PRN
Status: DISCONTINUED | OUTPATIENT
Start: 2022-05-02 | End: 2022-05-06 | Stop reason: HOSPADM

## 2022-05-02 RX ORDER — POLYETHYLENE GLYCOL 3350 17 G
2 POWDER IN PACKET (EA) ORAL
Status: DISCONTINUED | OUTPATIENT
Start: 2022-05-02 | End: 2022-05-06 | Stop reason: HOSPADM

## 2022-05-02 RX ORDER — FUROSEMIDE 10 MG/ML
40 INJECTION INTRAMUSCULAR; INTRAVENOUS 2 TIMES DAILY
Status: DISCONTINUED | OUTPATIENT
Start: 2022-05-02 | End: 2022-05-03

## 2022-05-02 RX ORDER — INSULIN LISPRO 100 [IU]/ML
0-3 INJECTION, SOLUTION INTRAVENOUS; SUBCUTANEOUS NIGHTLY
Status: DISCONTINUED | OUTPATIENT
Start: 2022-05-02 | End: 2022-05-06 | Stop reason: HOSPADM

## 2022-05-02 RX ORDER — ACETAMINOPHEN 650 MG/1
650 SUPPOSITORY RECTAL EVERY 6 HOURS PRN
Status: DISCONTINUED | OUTPATIENT
Start: 2022-05-02 | End: 2022-05-06 | Stop reason: HOSPADM

## 2022-05-02 RX ORDER — HYDROCODONE BITARTRATE AND ACETAMINOPHEN 5; 325 MG/1; MG/1
1 TABLET ORAL EVERY 6 HOURS PRN
Status: DISCONTINUED | OUTPATIENT
Start: 2022-05-02 | End: 2022-05-06 | Stop reason: HOSPADM

## 2022-05-02 RX ORDER — HYDROCODONE BITARTRATE AND ACETAMINOPHEN 5; 325 MG/1; MG/1
1 TABLET ORAL ONCE
Status: COMPLETED | OUTPATIENT
Start: 2022-05-02 | End: 2022-05-02

## 2022-05-02 RX ORDER — LISINOPRIL 5 MG/1
10 TABLET ORAL ONCE
Status: COMPLETED | OUTPATIENT
Start: 2022-05-02 | End: 2022-05-02

## 2022-05-02 RX ORDER — ASPIRIN 81 MG/1
81 TABLET, CHEWABLE ORAL DAILY
Status: DISCONTINUED | OUTPATIENT
Start: 2022-05-03 | End: 2022-05-06 | Stop reason: HOSPADM

## 2022-05-02 RX ORDER — PROMETHAZINE HYDROCHLORIDE 25 MG/ML
25 INJECTION, SOLUTION INTRAMUSCULAR; INTRAVENOUS ONCE
Status: COMPLETED | OUTPATIENT
Start: 2022-05-02 | End: 2022-05-02

## 2022-05-02 RX ORDER — ONDANSETRON 4 MG/1
4 TABLET, ORALLY DISINTEGRATING ORAL EVERY 8 HOURS PRN
Status: DISCONTINUED | OUTPATIENT
Start: 2022-05-02 | End: 2022-05-06 | Stop reason: HOSPADM

## 2022-05-02 RX ORDER — INSULIN LISPRO 100 [IU]/ML
0-6 INJECTION, SOLUTION INTRAVENOUS; SUBCUTANEOUS
Status: DISCONTINUED | OUTPATIENT
Start: 2022-05-02 | End: 2022-05-06 | Stop reason: HOSPADM

## 2022-05-02 RX ORDER — POLYETHYLENE GLYCOL 3350 17 G/17G
17 POWDER, FOR SOLUTION ORAL DAILY PRN
Status: DISCONTINUED | OUTPATIENT
Start: 2022-05-02 | End: 2022-05-06 | Stop reason: HOSPADM

## 2022-05-02 RX ORDER — SODIUM CHLORIDE 0.9 % (FLUSH) 0.9 %
5-40 SYRINGE (ML) INJECTION EVERY 12 HOURS SCHEDULED
Status: DISCONTINUED | OUTPATIENT
Start: 2022-05-02 | End: 2022-05-06 | Stop reason: HOSPADM

## 2022-05-02 RX ORDER — MORPHINE SULFATE 4 MG/ML
4 INJECTION, SOLUTION INTRAMUSCULAR; INTRAVENOUS ONCE
Status: COMPLETED | OUTPATIENT
Start: 2022-05-02 | End: 2022-05-02

## 2022-05-02 RX ORDER — LISINOPRIL 5 MG/1
5 TABLET ORAL DAILY
Status: DISCONTINUED | OUTPATIENT
Start: 2022-05-02 | End: 2022-05-06 | Stop reason: HOSPADM

## 2022-05-02 RX ORDER — SODIUM CHLORIDE 9 MG/ML
INJECTION, SOLUTION INTRAVENOUS PRN
Status: DISCONTINUED | OUTPATIENT
Start: 2022-05-02 | End: 2022-05-06 | Stop reason: HOSPADM

## 2022-05-02 RX ORDER — LISINOPRIL 5 MG/1
5 TABLET ORAL DAILY
Status: DISCONTINUED | OUTPATIENT
Start: 2022-05-03 | End: 2022-05-02

## 2022-05-02 RX ORDER — METOPROLOL SUCCINATE 50 MG/1
50 TABLET, EXTENDED RELEASE ORAL ONCE
Status: COMPLETED | OUTPATIENT
Start: 2022-05-02 | End: 2022-05-02

## 2022-05-02 RX ORDER — METOPROLOL SUCCINATE 50 MG/1
50 TABLET, EXTENDED RELEASE ORAL DAILY
Status: DISCONTINUED | OUTPATIENT
Start: 2022-05-02 | End: 2022-05-06 | Stop reason: HOSPADM

## 2022-05-02 RX ORDER — INSULIN GLARGINE 100 [IU]/ML
40 INJECTION, SOLUTION SUBCUTANEOUS NIGHTLY
Status: DISCONTINUED | OUTPATIENT
Start: 2022-05-02 | End: 2022-05-05

## 2022-05-02 RX ORDER — FUROSEMIDE 10 MG/ML
20 INJECTION INTRAMUSCULAR; INTRAVENOUS ONCE
Status: COMPLETED | OUTPATIENT
Start: 2022-05-02 | End: 2022-05-02

## 2022-05-02 RX ORDER — ONDANSETRON 2 MG/ML
4 INJECTION INTRAMUSCULAR; INTRAVENOUS EVERY 6 HOURS PRN
Status: DISCONTINUED | OUTPATIENT
Start: 2022-05-02 | End: 2022-05-02 | Stop reason: SDUPTHER

## 2022-05-02 RX ORDER — METOPROLOL SUCCINATE 50 MG/1
50 TABLET, EXTENDED RELEASE ORAL DAILY
Status: DISCONTINUED | OUTPATIENT
Start: 2022-05-03 | End: 2022-05-02

## 2022-05-02 RX ORDER — ACETAMINOPHEN 325 MG/1
650 TABLET ORAL EVERY 6 HOURS PRN
Status: DISCONTINUED | OUTPATIENT
Start: 2022-05-02 | End: 2022-05-06 | Stop reason: HOSPADM

## 2022-05-02 RX ORDER — ENOXAPARIN SODIUM 100 MG/ML
40 INJECTION SUBCUTANEOUS DAILY
Status: DISCONTINUED | OUTPATIENT
Start: 2022-05-02 | End: 2022-05-02

## 2022-05-02 RX ADMIN — IOPAMIDOL 75 ML: 755 INJECTION, SOLUTION INTRAVENOUS at 16:31

## 2022-05-02 RX ADMIN — INSULIN LISPRO 1 UNITS: 100 INJECTION, SOLUTION INTRAVENOUS; SUBCUTANEOUS at 21:30

## 2022-05-02 RX ADMIN — MORPHINE SULFATE 4 MG: 4 INJECTION, SOLUTION INTRAMUSCULAR; INTRAVENOUS at 13:01

## 2022-05-02 RX ADMIN — FUROSEMIDE 20 MG: 10 INJECTION, SOLUTION INTRAMUSCULAR; INTRAVENOUS at 17:44

## 2022-05-02 RX ADMIN — HYDROCODONE BITARTRATE AND ACETAMINOPHEN 1 TABLET: 5; 325 TABLET ORAL at 17:42

## 2022-05-02 RX ADMIN — METOPROLOL SUCCINATE 50 MG: 50 TABLET, EXTENDED RELEASE ORAL at 14:05

## 2022-05-02 RX ADMIN — PROMETHAZINE HYDROCHLORIDE 25 MG: 25 INJECTION INTRAMUSCULAR; INTRAVENOUS at 18:44

## 2022-05-02 RX ADMIN — FUROSEMIDE 40 MG: 10 INJECTION, SOLUTION INTRAMUSCULAR; INTRAVENOUS at 20:44

## 2022-05-02 RX ADMIN — ONDANSETRON 4 MG: 4 TABLET, ORALLY DISINTEGRATING ORAL at 22:20

## 2022-05-02 RX ADMIN — RIVAROXABAN 20 MG: 20 TABLET, FILM COATED ORAL at 20:41

## 2022-05-02 RX ADMIN — ONDANSETRON 4 MG: 2 INJECTION INTRAMUSCULAR; INTRAVENOUS at 13:01

## 2022-05-02 RX ADMIN — LISINOPRIL 5 MG: 5 TABLET ORAL at 21:11

## 2022-05-02 RX ADMIN — METOPROLOL SUCCINATE 50 MG: 50 TABLET, EXTENDED RELEASE ORAL at 21:11

## 2022-05-02 RX ADMIN — LISINOPRIL 10 MG: 5 TABLET ORAL at 12:57

## 2022-05-02 ASSESSMENT — ENCOUNTER SYMPTOMS
ALLERGIC/IMMUNOLOGIC NEGATIVE: 1
SHORTNESS OF BREATH: 1
GASTROINTESTINAL NEGATIVE: 1
EYES NEGATIVE: 1

## 2022-05-02 ASSESSMENT — PAIN DESCRIPTION - PAIN TYPE: TYPE: ACUTE PAIN

## 2022-05-02 ASSESSMENT — PAIN SCALES - GENERAL
PAINLEVEL_OUTOF10: 10
PAINLEVEL_OUTOF10: 5
PAINLEVEL_OUTOF10: 8

## 2022-05-02 ASSESSMENT — PAIN - FUNCTIONAL ASSESSMENT: PAIN_FUNCTIONAL_ASSESSMENT: 0-10

## 2022-05-02 ASSESSMENT — PAIN DESCRIPTION - FREQUENCY: FREQUENCY: CONTINUOUS

## 2022-05-02 ASSESSMENT — PAIN DESCRIPTION - LOCATION: LOCATION: ABDOMEN

## 2022-05-02 NOTE — ED NOTES
Perfect served MD about giving BP meds early d/t nursing supervisor stating that pt cannot go to obs with elevated BP     Brent Morales RN  05/02/22 [id number]

## 2022-05-02 NOTE — ED PROVIDER NOTES
EKG:  Normal sinus rhythm with a rate of 95. HI interval 114, QRS 82, QTc 475. No ST elevations or depressions. Nonspecific T waves in the lateral leads. Left ventricular perjury. Impression: Abnormal EKG. When compared to previous EKG from 3/15/2022, the previously noted tachycardia is resolved and the nonspecific T waves in left-ventricular hypertrophy are new.       Edilia Cantu MD  05/02/22 [id number]

## 2022-05-02 NOTE — ED NOTES
Pt pulse ox checked while ambulating. While walking pt pulse ox dropped to 91% RA. Pt assisted back into bed pulse ox back up to 97%.      Negrita Aaron  05/02/22 [id number]

## 2022-05-02 NOTE — ED PROVIDER NOTES
Triage Chief Complaint:   Shortness of Breath (reports was hospitalized for same in march )    [place]:  Davina Case is a 61 y.o. female that presents with shortness of breath. Patient was in baseline state of health until last couple weeks when the above started. Patient reports that she was actually hospitalized for the same in March and was found to have thromboembolic disease was placed on Xarelto reporting adherence. Patient has not been taking all of her blood pressure medications as she is out of some of them. Patient reports no lisinopril or Toprol-XL today. Shortness of breath seems to be worse with exertion and improved with resting. There is no chest pain. No fever or cough. Of note patient underwent cardiac cath just mid March of this year which demonstrated nonobstructive coronary artery disease.     ROS:  General:  No fevers, no chills, no weakness  Eyes:  No recent vison changes, no discharge  ENT:  No sore throat, no nasal congestion, no hearing changes  Cardiovascular:  No chest pain, no palpitations  Respiratory:  + shortness of breath, no cough, no wheezing  Gastrointestinal:  No pain, no nausea, no vomiting, no diarrhea  Musculoskeletal:  No muscle pain, no joint pain  Skin:  No rash, no pruritis, no easy bruising  Neurologic:  No speech problems, no headache, no extremity numbness, no extremity tingling, no extremity weakness  Psychiatric:  No anxiety  Genitourinary:  No dysuria, no hematuria  Endocrine:  No unexpected weight gain, no unexpected weight loss  Extremities:  no edema, no pain    Past Medical History:   Diagnosis Date    CAD (coronary artery disease)     Diabetes mellitus (HCC)     Hepatitis C     [facility]      Past Surgical History:   Procedure Laterality Date    CHOLECYSTECTOMY      HYSTERECTOMY       Family History   Problem Relation Age of Onset    No Known Problems Mother     Cancer Father      Social History     Socioeconomic History    Marital status: Legally      Spouse name: Not on file    Number of children: Not on file    Years of education: Not on file    Highest education level: Not on file   Occupational History    Not on file   Tobacco Use    Smoking status: Current Every Day Smoker     Packs/day: 3.00     Types: Cigars    Smokeless tobacco: Never Used   Vaping Use    Vaping Use: Never used   Substance and Sexual Activity    Alcohol use: Yes     Comment: occassionally    Drug use: Not Currently     Types: Marijuana Paullette Nim)    Sexual activity: Not on file   Other Topics Concern    Not on file   Social History Narrative    Not on file     Social Determinants of Health     Financial Resource Strain:     Difficulty of Paying Living Expenses: Not on file   Food Insecurity:     Worried About Running Out of Food in the Last Year: Not on file    Supa of Food in the Last Year: Not on file   Transportation Needs:     Lack of Transportation (Medical): Not on file    Lack of Transportation (Non-Medical):  Not on file   Physical Activity:     Days of Exercise per Week: Not on file    Minutes of Exercise per Session: Not on file   Stress:     Feeling of Stress : Not on file   Social Connections:     Frequency of Communication with Friends and Family: Not on file    Frequency of Social Gatherings with Friends and Family: Not on file    Attends Zoroastrian Services: Not on file    Active Member of [address] [place] [address] or Organizations: Not on file    Attends Club or Organization Meetings: Not on file    Marital Status: Not on file   Intimate Partner Violence:     Fear of Current or Ex-Partner: Not on file    Emotionally Abused: Not on file    Physically Abused: Not on file    Sexually Abused: Not on file   Housing Stability:     Unable to Pay for Housing in the Last Year: Not on file    Number of [place] in the Last Year: Not on file    Unstable Housing in the Last Year: Not on file     Current Facility-Administered Medications Medication Dose Route Frequency Provider Last Rate Last Admin    ondansetron (ZOFRAN) injection 4 mg  4 mg IntraVENous Q6H PRN Coni De Los Santos MD   4 mg at 05/02/22 1301    furosemide (LASIX) injection 20 mg  20 mg IntraVENous Once Coni De Los Santos MD        HYDROcodone-acetaminophen [facility]) 5-325 MG per tablet 1 tablet  1 tablet Oral Once Coni De Los Santos MD        [facility]) injection 25 mg  25 mg IntraMUSCular Once Coni De Los Santos MD         Current Outpatient Medications   Medication Sig Dispense Refill    lisinopril (PRINIVIL;ZESTRIL) 5 MG tablet Take 1 tablet by mouth daily 30 tablet 2    metoprolol succinate (TOPROL XL) 50 MG extended release tablet Take 1 tablet by mouth daily 30 tablet 3    rivaroxaban (XARELTO) 15 MG TABS tablet Take 1 tablet by mouth 2 times daily (with meals) for 17 days 8 tablet 0    rivaroxaban (XARELTO) 20 MG TABS tablet Take 1 tablet by mouth Daily with supper 30 tablet 1    furosemide (LASIX) 20 MG tablet Take 1 tablet by mouth daily 30 tablet 1    QUEtiapine (SEROQUEL) 400 MG tablet Take 1 tablet by mouth nightly for 7 days 7 tablet 0    ondansetron (ZOFRAN-ODT) 4 MG disintegrating tablet Take 1 tablet by mouth 3 times daily as needed for Nausea or Vomiting 21 tablet 0    dicyclomine (BENTYL) 10 MG capsule Take 1 capsule by mouth 4 times daily as needed (Abdominal cramping, diarrhea) 20 capsule 1    insulin glargine (LANTUS) 100 UNIT/ML injection vial Inject 40 Units into the skin nightly 1 vial 0    insulin lispro (HUMALOG) 100 UNIT/ML injection vial Inject 10 Units into the skin 3 times daily (with meals) 1 vial 0    Insulin Syringe-Needle U-100 (AIMSC[employer] INS SYR .3CC/29GX0.5\") 29G X 1/2\" 0.3 ML MISC 1 each by Does not apply route daily 100 each 3    aspirin 81 MG chewable tablet Take 81 mg by mouth daily       Allergies   Allergen Reactions    Haldol [Haloperidol Lactate] Other (See Comments)     Unknown, severe reaction per mother    Penicillins        Nursing Notes Reviewed    Physical Exam:  ED Triage Vitals   Enc Vitals Group      BP 05/02/22 1119 (!) 158/128      Pulse 05/02/22 1119 97      Resp 05/02/22 1119 15      Temp 05/02/22 1119 98.5 °F (36.9 °C)      Temp Source 05/02/22 1119 Oral      SpO2 05/02/22 1137 98 %      Weight 05/02/22 1119 123 lb (55.8 kg)      Height 05/02/22 1119 5' 2\" (1.575 m)      Head Circumference --       Peak Flow --       Pain Score --       Pain Loc --       Pain Edu? --       Excl. in [address]? --        My pulse ox interpretation is - normal    General appearance:  No acute distress. Sitting upright in bed. Appears nontoxic. Pleasant. Skin:  Warm. Dry. No diaphoresis. Eye:  Extraocular movements intact. Ears, nose, mouth and throat:  Oral mucosa moist   Neck:  Trachea midline. Extremity:  No swelling. Normal ROM     Heart:  Regular rate and rhythm, normal S1 & S2, no extra heart sounds. Perfusion:  Intact   Respiratory:  Lungs clear to auscultation bilaterally. Respirations nonlabored. Speaking clearly in full sentences. No respiratory distress. Abdominal:  Normal bowel sounds. Soft. Nontender. Non distended. Back:  No CVA tenderness to palpation     Neurological:  Alert and oriented times 3. No focal neuro deficits.              Psychiatric:  Appropriate    I have reviewed and interpreted all of the currently available lab results from this visit (if applicable):  Results for orders placed or performed during the hospital encounter of 05/02/22   CBC with Auto Differential   Result Value Ref Range    WBC 11.1 (H) 4.0 - 10.5 K/CU MM    RBC 5.18 4.2 - 5.4 M/CU MM    Hemoglobin 17.1 (H) 12.5 - 16.0 GM/DL    Hematocrit 52.0 (H) 37 - 47 %    .4 (H) 78 - 100 FL    MCH 33.0 (H) 27 - 31 PG    MCHC 32.9 32.0 - 36.0 %    RDW 13.9 11.7 - 14.9 %    Platelets 406 569 - 337 K/CU MM    MPV 10.7 7.5 - 11.1 FL    Differential Type AUTOMATED DIFFERENTIAL     Segs Relative 69.0 (H) 36 - 66 %    Lymphocytes % 23.3 (L) 24 - 44 % Monocytes % 6.4 (H) 0 - 4 %    Eosinophils % 0.3 0 - 3 %    Basophils % 0.6 0 - 1 %    Segs Absolute 7.7 K/CU MM    Lymphocytes Absolute 2.6 K/CU MM    Monocytes Absolute 0.7 K/CU MM    Eosinophils Absolute 0.0 K/CU MM    Basophils Absolute 0.1 K/CU MM    Nucleated RBC % 0.0 %    Total Nucleated RBC 0.0 K/CU MM    Total Immature Neutrophil 0.04 K/CU MM    Immature Neutrophil % 0.4 0 - 0.43 %   Comprehensive Metabolic Panel w/ Reflex to MG   Result Value Ref Range    Sodium 134 (L) 135 - 145 MMOL/L    Potassium 4.4 3.5 - 5.1 MMOL/L    Chloride 103 99 - 110 mMol/L    CO2 22 21 - 32 MMOL/L    BUN 15 6 - 23 MG/DL    CREATININE 1.0 0.6 - 1.1 MG/DL    Glucose 144 (H) 70 - 99 MG/DL    Calcium 8.1 (L) 8.3 - 10.6 MG/DL    Albumin 2.3 (L) 3.4 - 5.0 GM/DL    Total Protein 6.0 (L) 6.4 - 8.2 GM/DL    Total Bilirubin 0.5 0.0 - 1.0 MG/DL    ALT 16 10 - 40 U/L    AST 19 15 - 37 IU/L    Alkaline Phosphatase 139 (H) 40 - 129 IU/L    GFR Non- 57 (L) >60 mL/min/1.73m2    GFR African American >60 >60 mL/min/1.73m2    Anion Gap 9 4 - 16   Brain Natriuretic Peptide   Result Value Ref Range    Pro-BNP 16,222 (H) <300 PG/ML   Troponin   Result Value Ref Range    Troponin T <0.010 <0.01 NG/ML   EKG 12 Lead   Result Value Ref Range    Ventricular Rate 95 BPM    Atrial Rate 95 BPM    P-R Interval 114 ms    QRS Duration 82 ms    Q-T Interval 378 ms    QTc Calculation (Bazett) 475 ms    P Axis 66 degrees    R Axis -6 degrees    T Axis 112 degrees    Diagnosis       Normal sinus rhythm  Biatrial enlargement  Left ventricular hypertrophy  T wave abnormality, consider lateral ischemia  Abnormal ECG  When compared with ECG of 15-MAR-2022 11:16,  T wave inversion now evident in Lateral leads  QT has lengthened        Radiographs (if obtained):  [] The following radiograph was interpreted by myself in the absence of a radiologist:   [x] Radiologist's Report Reviewed:  CTA PULMONARY W CONTRAST   Final Result   No evidence of pulmonary embolism. Small to moderate size bilateral pleural   effusions with ground-glass opacity identified in the upper lung fields left   greater than right suggesting either edema or infiltrate. XR CHEST PORTABLE   Final Result   1. No active pulmonary disease. EKG (if obtained): (All EKG's are interpreted by myself in the absence of a cardiologist)  12 lead EKG per my interpretation:  Normal Sinus Rhythm at 95  Axis is   Left axis deviation  QTc is  within an acceptable range  There is specific T wave changes appreciated; T wave inversions in V5 and V6. There is no specific ST wave changes appreciated. LVH  No STEMI    Prior EKG to compare with was available and LVH and T wave inversions are new when compared to prior from 3/15/2022. Chart review shows recent radiographs:  XR CHEST PORTABLE    Result Date: 5/2/2022  EXAMINATION: ONE XRAY VIEW OF THE CHEST 5/2/2022 11:33 am COMPARISON: 03/15/2022. HISTORY: ORDERING SYSTEM PROVIDED HISTORY: SOB TECHNOLOGIST PROVIDED HISTORY: Reason for exam:->SOB Reason for Exam: sob FINDINGS: The heart size is within normal limits. The pulmonary vasculature is also within normal limits. No acute infiltrates are seen. The costophrenic angles are sharp bilaterally. No pneumothoraces are noted. 1. No active pulmonary disease. MDM:  Pt presents as above. Emergent conditions considered. Presentation prompted initial broad work-up with labs, EKG and imaging. EKG with normal sinus rhythm as above. Chest x-ray negative for acute cardiopulmonary process. BNP is elevated suggestive some degree of volume overload. CBC is with mild leukocytosis. CMP is with mild hyperglycemia without elevated anion gap  metabolic acidosis. Troponin negative. Patient is given her home lisinopril and Toprol-XL.     CTA imaging is pursued as patient has been nonadherent with some of her medications of concern and she stopped taking her Xarelto and that she may have underlying pulmonary embolism given her known DVT. CTA demonstrating small to moderate sized bilateral pleural effusions as well as some groundglass opacities in the upper lung fields suggesting edema given overall clinical picture and elevated BNP. IV diuresis initiated with Lasix. Ambulatory pulse ox trial with patient becoming tachypneic and with relative hypoxemia to 90%. Decision made to admit the patient at this time for further diuresis and evaluation. Questions sought and answered with the patient. They voice understanding and agree with plan. CRITICAL CARE NOTE:  There was a high probability of clinically significant life-threatening deterioration of the patient's condition requiring my urgent intervention due to pulmonary edema and volume overload. IV diuresis, direct intubation x-ray imaging, medication review, review of most recent hospitalization, frequent reassessments throughout ED course was performed to address this. Total critical care time is 35 minutes. This includes vital sign monitoring, pulse oximetry monitoring, telemetry monitoring, clinical response to the IV medications, reviewing the nursing notes, consultation time, dictation/documentation time, and interpretation of the lab work. This time excludes time spent performing procedures and separately billable procedures and family discussion time. Care of this patient occurred during the COVID-19 pandemic. Clinical Impression:  1. Acute pulmonary edema (HCC)      Disposition referral (if applicable):  No follow-up provider specified. Disposition medications (if applicable):  New Prescriptions    No medications on file       Comment: Please note this report has been produced using speech recognition software and may contain errors related to that system including errors in grammar, punctuation, and spelling, as well as words and phrases that may be inappropriate.  If there are any questions or concerns please feel free to contact the dictating provider for clarification.        Marybeth Pedraza MD  05/02/22 2019

## 2022-05-02 NOTE — H&P
V2.0  History and Physical      Name:  Rizwan Oh /Age/Sex: 1965  (61 y.o. female)   MRN & CSN:  [id number] & [id number] Encounter Date/Time: 2022 6:30 PM EDT   Location:  [facility] PCP: No primary care provider on file. Hospital Day: 1    Assessment and Plan:   Rizwan Oh is a 61 y.o. female with a pmh of DVT and PE, on Xarelto, type 2 diabetes, systolic congestive heart failure, smoking who presents with acute on chronic systolic congestive heart failure    Hospital Problems           Last Modified POA              Acute on chronic systolic congestive heart failure  Acute respiratory failure  -Worsening shortness of breath for a week  -Echocardiogram on March 15 showed LVEF 30 to 35%  -BNP level today was 16,000  -Admit to observation  -Oxygen panel  -IV diuretics  -Cardiology consult    History of hypertension  -Continue metoprolol and lisinopril    History of PE and DVT  -Continue Xarelto    Type 2 diabetes  -Insulin sliding scale  -Update hemoglobin A1c    Disposition:   Current Living situation: Home  Expected Disposition: Home  Estimated D/C: 1 to 2 days    Diet ADULT DIET; Regular; Low Sodium (2 gm)   DVT Prophylaxis [] Lovenox, []  Heparin, [] SCDs, [] Ambulation,  [] Eliquis, [x] Xarelto   Code Status Full Code   Surrogate Decision Maker/ POA      History from:     patient    History of Present Illness:     Chief Complaint: <principal problem not specified>  Rizwan Oh is a 61 y.o. female with pmh of PE and DVT, remote heart attack x3 on 15 years ago, congestive heart failure with LVEF 30 to 35% who presents with shortness of breath for a week. Patient was recently diagnosed of PE in March. She is on Xarelto 20 Mg daily. Patient stated in the past couple of weeks, she noticed worsening shortness of breath. The symptoms got worse with exertion. No cough. No fever. Patient visited ED today. She was found BNP level elevated at 16,000.   CTA ruled out PE. Small to moderate bilateral pleural effusion noted. Patient has to wear 1 L NC oxygen to keep ED O2 sat around 92. Patient stated she was not diagnosed of congestive heart failure. However her echocardiogram in March 15 showed systolic congestive heart failure with LVEF 30 to 35%. Patient is not taking diuretics. She is on metoprolol and lisinopril. Review of Systems: Need 10 Elements   Review of Systems   Constitutional: Negative. HENT: Negative. Eyes: Negative. Respiratory: Positive for shortness of breath. Cardiovascular: Negative. Gastrointestinal: Negative. Endocrine: Negative. Genitourinary: Negative. Musculoskeletal: Negative. Skin: Negative. Allergic/Immunologic: Negative. Neurological: Negative. Hematological: Negative. Psychiatric/Behavioral: Negative. Objective:   No intake or output data in the 24 hours ending 05/02/22 1843   Vitals:   Vitals:    05/02/22 1457 05/02/22 1530 05/02/22 1600 05/02/22 1730   BP:  (!) 161/124 (!) 156/111 (!) 156/118   Pulse: 97 90 90 84   Resp: 23 13 16 20   Temp:       TempSrc:       SpO2: 91% 94% 91% 93%   Weight:       Height:           Medications Prior to Admission     Prior to Admission medications    Medication Sig Start Date End Date Taking?  Authorizing Provider   lisinopril (PRINIVIL;ZESTRIL) 5 MG tablet Take 1 tablet by mouth daily 4/4/22   Bozena Buckley MD   metoprolol succinate (TOPROL XL) 50 MG extended release tablet Take 1 tablet by mouth daily 4/4/22 5/4/22  Bozena Buckley MD   rivaroxaban (XARELTO) 15 MG TABS tablet Take 1 tablet by mouth 2 times daily (with meals) for 17 days 3/22/22 4/8/22  Latoya Perry MD   rivaroxaban (XARELTO) 20 MG TABS tablet Take 1 tablet by mouth Daily with supper 4/9/22 5/9/22  Latoya Perry MD   furosemide (LASIX) 20 MG tablet Take 1 tablet by mouth daily 3/23/22   Latoya Perry MD   QUEtiapine (SEROQUEL) 400 MG tablet Take 1 tablet by mouth nightly for 7 days 3/22/22 3/29/22  Latoya Perry MD   ondansetron (ZOFRAN-ODT) 4 MG disintegrating tablet Take 1 tablet by mouth 3 times daily as needed for Nausea or Vomiting 3/3/22   Laverne Rahman MD   dicyclomine (BENTYL) 10 MG capsule Take 1 capsule by mouth 4 times daily as needed (Abdominal cramping, diarrhea) 3/3/22   Laverne Rahman MD   insulin glargine (LANTUS) 100 UNIT/ML injection vial Inject 40 Units into the skin nightly 8/5/20   Joy Robles MD   insulin lispro (HUMALOG) 100 UNIT/ML injection vial Inject 10 Units into the skin 3 times daily (with meals) 8/5/20   Joy Robles MD   Insulin Syringe-Needle U-100 ([employer]IM[employer] INS SYR .3CC/29GX0.5\") 29G X 1/2\" 0.3 ML MISC 1 each by Does not apply route daily 8/5/20   Joy Robles MD   aspirin 81 MG chewable tablet Take 81 mg by mouth daily    Historical Provider, MD       Physical Exam: Need 8 Elements   Physical Exam  HENT:      Head: Normocephalic. Nose: Nose normal.      Mouth/Throat:      Mouth: Mucous membranes are moist.   Eyes:      Pupils: Pupils are equal, round, and reactive to light. Cardiovascular:      Rate and Rhythm: Normal rate and regular rhythm. Pulses: Normal pulses. Pulmonary:      Effort: Pulmonary effort is normal.      Breath sounds: Normal breath sounds. Abdominal:      General: Abdomen is flat. Musculoskeletal:         General: Normal range of motion. Cervical back: Normal range of motion. Comments: No edema noted in bilateral lower extremities. Skin:     General: Skin is warm. Capillary Refill: Capillary refill takes less than 2 seconds. Neurological:      General: No focal deficit present. Mental Status: She is alert and oriented to person, place, and time.    Psychiatric:         Mood and Affect: Mood normal.            Past Medical History:   PMHx   Past Medical History:   Diagnosis Date    CAD (coronary artery disease)     Diabetes mellitus ([facility] [address]     Hepatitis C     [facility] PSHX:  has a past surgical history that includes Hysterectomy and Cholecystectomy. Allergies: Allergies   Allergen Reactions    Haldol [Haloperidol Lactate] Other (See Comments)     Unknown, severe reaction per mother    Penicillins      Fam HX: family history includes Cancer in her father; No Known Problems in her mother. Soc HX:   Social History     Socioeconomic History    Marital status: Legally      Spouse name: None    Number of children: None    Years of education: None    Highest education level: None   Occupational History    None   Tobacco Use    Smoking status: Current Every Day Smoker     Packs/day: 3.00     Types: Cigars    Smokeless tobacco: Never Used   Vaping Use    Vaping Use: Never used   Substance and Sexual Activity    Alcohol use: Yes     Comment: occassionally    Drug use: Not Currently     Types: Marijuana [place])    Sexual activity: None   Other Topics Concern    None   Social History Narrative    None     Social Determinants of Health     Financial Resource Strain:     Difficulty of Paying Living Expenses: Not on file   Food Insecurity:     Worried About Running Out of Food in the Last Year: Not on file    Supa of Food in the Last Year: Not on file   Transportation Needs:     Lack of Transportation (Medical): Not on file    Lack of Transportation (Non-Medical):  Not on file   Physical Activity:     Days of Exercise per Week: Not on file    Minutes of Exercise per Session: Not on file   Stress:     Feeling of Stress : Not on file   Social Connections:     Frequency of Communication with Friends and Family: Not on file    Frequency of Social Gatherings with Friends and Family: Not on file    Attends Synagogue Services: Not on file    Active Member of Clubs or Organizations: Not on file    Attends Club or Organization Meetings: Not on file    Marital Status: Not on file   Intimate Partner Violence:     Fear of Current or Ex-Partner: Not on file Component Value Date    NITRU NEGATIVE 03/17/2022    COLORU YELLOW 03/17/2022    WBCUA 164 03/17/2022    RBCUA 28 03/17/2022    MUCUS RARE 03/15/2022    TRICHOMONAS NONE SEEN 04/12/2021    YEAST RARE 03/15/2022    BACTERIA NEGATIVE 03/17/2022    CLARITYU SLIGHTLY CLOUDY 03/17/2022    SPECGRAV 1.020 03/17/2022    LEUKOCYTESUR MODERATE 03/17/2022    UROBILINOGEN 0.2 03/17/2022    BILIRUBINUR NEGATIVE 03/17/2022    BLOODU MODERATE 03/17/2022    KETUA NEGATIVE 03/17/2022     Urine Cultures: No results found for: Jessenia Palm  Blood Cultures: No results found for: BC  No results found for: BLOODCULT2  Organism: No results found for: ORG    Imaging/Diagnostics Last 24 Hours   XR CHEST PORTABLE    Result Date: 5/2/2022  EXAMINATION: ONE XRAY VIEW OF THE CHEST 5/2/2022 11:33 am COMPARISON: 03/15/2022. HISTORY: ORDERING SYSTEM PROVIDED HISTORY: SOB TECHNOLOGIST PROVIDED HISTORY: Reason for exam:->SOB Reason for Exam: sob FINDINGS: The heart size is within normal limits. The pulmonary vasculature is also within normal limits. No acute infiltrates are seen. The costophrenic angles are sharp bilaterally. No pneumothoraces are noted. 1. No active pulmonary disease. CTA PULMONARY W CONTRAST    Result Date: 5/2/2022  EXAMINATION: CTA OF THE CHEST 5/2/2022 3:58 pm TECHNIQUE: CTA of the chest was performed after the administration of intravenous contrast.  Multiplanar reformatted images are provided for review. MIP images are provided for review. Dose modulation, iterative reconstruction, and/or weight based adjustment of the mA/kV was utilized to reduce the radiation dose to as low as reasonably achievable. COMPARISON: None.  HISTORY: ORDERING SYSTEM PROVIDED HISTORY: known DVT; ?non adherent to xarelto TECHNOLOGIST PROVIDED HISTORY: Reason for exam:->known DVT; ?non adherent to xarelto Decision Support Exception - unselect if not a suspected or confirmed emergency medical condition->Emergency Medical Condition (MA) Reason for Exam: known DVT; ?non adherent to xarelto Relevant Medical/Surgical History: 75 ml isovue 370  lot [id number] FINDINGS: Pulmonary Arteries: Pulmonary arteries are adequately opacified for evaluation. No evidence of intraluminal filling defect to suggest pulmonary embolism. Main pulmonary artery is normal in caliber. Mediastinum: No evidence of mediastinal lymphadenopathy. The heart and pericardium demonstrate no acute abnormality. There is no acute abnormality of the thoracic aorta. There is no pericardial effusion. Mild enlargement the cardiac chambers. Lungs/pleura: There is to moderate size bilateral pleural effusions. There is some atelectatic changes in lung bases with patchy ground-glass opacity seen in the upper lobes bilaterally suggesting possible edema or infiltrate. Upper Abdomen: Limited images of the upper abdomen are unremarkable. Soft Tissues/Bones: No acute bone or soft tissue abnormality. No evidence of pulmonary embolism. Small to moderate size bilateral pleural effusions with ground-glass opacity identified in the upper lung fields left greater than right suggesting either edema or infiltrate.        Personally reviewed Lab Studies, Imaging, and discussed case with Dr. Radha Dozier    Electronically signed by Martha Vargas CNP on 5/2/2022 at 6:42 PM

## 2022-05-03 LAB
ANION GAP SERPL CALCULATED.3IONS-SCNC: 9 MMOL/L (ref 4–16)
BACTERIA: NEGATIVE /HPF
BILIRUBIN URINE: NEGATIVE MG/DL
BLOOD, URINE: ABNORMAL
BUN BLDV-MCNC: 17 MG/DL (ref 6–23)
CALCIUM SERPL-MCNC: 9.2 MG/DL (ref 8.3–10.6)
CHLORIDE BLD-SCNC: 96 MMOL/L (ref 99–110)
CLARITY: ABNORMAL
CO2: 30 MMOL/L (ref 21–32)
COLOR: YELLOW
CREAT SERPL-MCNC: 1.3 MG/DL (ref 0.6–1.1)
ESTIMATED AVERAGE GLUCOSE: 126 MG/DL
GFR AFRICAN AMERICAN: 51 ML/MIN/1.73M2
GFR NON-AFRICAN AMERICAN: 42 ML/MIN/1.73M2
GLUCOSE BLD-MCNC: 106 MG/DL (ref 70–99)
GLUCOSE BLD-MCNC: 119 MG/DL (ref 70–99)
GLUCOSE BLD-MCNC: 139 MG/DL (ref 70–99)
GLUCOSE BLD-MCNC: 163 MG/DL (ref 70–99)
GLUCOSE BLD-MCNC: 98 MG/DL (ref 70–99)
GLUCOSE, URINE: NEGATIVE MG/DL
HBA1C MFR BLD: 6 % (ref 4.2–6.3)
KETONES, URINE: NEGATIVE MG/DL
LEUKOCYTE ESTERASE, URINE: ABNORMAL
NITRITE URINE, QUANTITATIVE: NEGATIVE
PH, URINE: 7 (ref 5–8)
POTASSIUM SERPL-SCNC: 4.6 MMOL/L (ref 3.5–5.1)
PROTEIN UA: >300 MG/DL
RBC URINE: 59 /HPF (ref 0–6)
SODIUM BLD-SCNC: 135 MMOL/L (ref 135–145)
SPECIFIC GRAVITY UA: 1.01 (ref 1–1.03)
SQUAMOUS EPITHELIAL: 2 /HPF
TRICHOMONAS: ABNORMAL /HPF
UROBILINOGEN, URINE: 0.2 MG/DL (ref 0.2–1)
WBC CLUMP: ABNORMAL /HPF
WBC UA: 240 /HPF (ref 0–5)

## 2022-05-03 PROCEDURE — 6360000002 HC RX W HCPCS: Performed by: PHYSICIAN ASSISTANT

## 2022-05-03 PROCEDURE — 2580000003 HC RX 258: Performed by: NURSE PRACTITIONER

## 2022-05-03 PROCEDURE — 6370000000 HC RX 637 (ALT 250 FOR IP): Performed by: NURSE PRACTITIONER

## 2022-05-03 PROCEDURE — 96365 THER/PROPH/DIAG IV INF INIT: CPT

## 2022-05-03 PROCEDURE — 6360000002 HC RX W HCPCS: Performed by: NURSE PRACTITIONER

## 2022-05-03 PROCEDURE — 81001 URINALYSIS AUTO W/SCOPE: CPT

## 2022-05-03 PROCEDURE — G0378 HOSPITAL OBSERVATION PER HR: HCPCS

## 2022-05-03 PROCEDURE — 87086 URINE CULTURE/COLONY COUNT: CPT

## 2022-05-03 PROCEDURE — 93308 TTE F-UP OR LMTD: CPT

## 2022-05-03 PROCEDURE — 96376 TX/PRO/DX INJ SAME DRUG ADON: CPT

## 2022-05-03 PROCEDURE — A4216 STERILE WATER/SALINE, 10 ML: HCPCS | Performed by: NURSE PRACTITIONER

## 2022-05-03 PROCEDURE — 94664 DEMO&/EVAL PT USE INHALER: CPT

## 2022-05-03 PROCEDURE — 94150 VITAL CAPACITY TEST: CPT

## 2022-05-03 PROCEDURE — 2580000003 HC RX 258: Performed by: PHYSICIAN ASSISTANT

## 2022-05-03 PROCEDURE — 83036 HEMOGLOBIN GLYCOSYLATED A1C: CPT

## 2022-05-03 PROCEDURE — 82962 GLUCOSE BLOOD TEST: CPT

## 2022-05-03 PROCEDURE — 80048 BASIC METABOLIC PNL TOTAL CA: CPT

## 2022-05-03 PROCEDURE — 94761 N-INVAS EAR/PLS OXIMETRY MLT: CPT

## 2022-05-03 PROCEDURE — 6370000000 HC RX 637 (ALT 250 FOR IP): Performed by: HOSPITALIST

## 2022-05-03 PROCEDURE — 36415 COLL VENOUS BLD VENIPUNCTURE: CPT

## 2022-05-03 PROCEDURE — 99215 OFFICE O/P EST HI 40 MIN: CPT | Performed by: INTERNAL MEDICINE

## 2022-05-03 RX ORDER — FUROSEMIDE 10 MG/ML
20 INJECTION INTRAMUSCULAR; INTRAVENOUS 2 TIMES DAILY
Status: DISCONTINUED | OUTPATIENT
Start: 2022-05-03 | End: 2022-05-04

## 2022-05-03 RX ADMIN — SODIUM CHLORIDE, PRESERVATIVE FREE 10 ML: 5 INJECTION INTRAVENOUS at 08:50

## 2022-05-03 RX ADMIN — LISINOPRIL 5 MG: 5 TABLET ORAL at 08:49

## 2022-05-03 RX ADMIN — INSULIN LISPRO 1 UNITS: 100 INJECTION, SOLUTION INTRAVENOUS; SUBCUTANEOUS at 16:10

## 2022-05-03 RX ADMIN — FUROSEMIDE 40 MG: 10 INJECTION, SOLUTION INTRAMUSCULAR; INTRAVENOUS at 08:49

## 2022-05-03 RX ADMIN — ONDANSETRON 4 MG: 2 INJECTION INTRAMUSCULAR; INTRAVENOUS at 17:07

## 2022-05-03 RX ADMIN — FUROSEMIDE 20 MG: 10 INJECTION, SOLUTION INTRAVENOUS at 17:07

## 2022-05-03 RX ADMIN — ASPIRIN 81 MG 81 MG: 81 TABLET ORAL at 08:49

## 2022-05-03 RX ADMIN — METOPROLOL SUCCINATE 50 MG: 50 TABLET, EXTENDED RELEASE ORAL at 08:49

## 2022-05-03 RX ADMIN — ONDANSETRON 4 MG: 2 INJECTION INTRAMUSCULAR; INTRAVENOUS at 08:49

## 2022-05-03 RX ADMIN — HYDROCODONE BITARTRATE AND ACETAMINOPHEN 1 TABLET: 5; 325 TABLET ORAL at 20:59

## 2022-05-03 RX ADMIN — SODIUM CHLORIDE, PRESERVATIVE FREE 10 ML: 5 INJECTION INTRAVENOUS at 20:54

## 2022-05-03 RX ADMIN — RIVAROXABAN 20 MG: 20 TABLET, FILM COATED ORAL at 17:07

## 2022-05-03 RX ADMIN — HYDROCODONE BITARTRATE AND ACETAMINOPHEN 1 TABLET: 5; 325 TABLET ORAL at 03:03

## 2022-05-03 RX ADMIN — CEFTRIAXONE SODIUM 1000 MG: 1 INJECTION, POWDER, FOR SOLUTION INTRAMUSCULAR; INTRAVENOUS at 13:10

## 2022-05-03 RX ADMIN — HYDROCODONE BITARTRATE AND ACETAMINOPHEN 1 TABLET: 5; 325 TABLET ORAL at 13:09

## 2022-05-03 ASSESSMENT — PAIN SCALES - GENERAL
PAINLEVEL_OUTOF10: 7
PAINLEVEL_OUTOF10: 8
PAINLEVEL_OUTOF10: 9
PAINLEVEL_OUTOF10: 7
PAINLEVEL_OUTOF10: 5
PAINLEVEL_OUTOF10: 10

## 2022-05-03 ASSESSMENT — PAIN DESCRIPTION - ORIENTATION: ORIENTATION: OTHER (COMMENT)

## 2022-05-03 ASSESSMENT — PAIN DESCRIPTION - LOCATION
LOCATION: ABDOMEN

## 2022-05-03 ASSESSMENT — PAIN DESCRIPTION - DESCRIPTORS: DESCRIPTORS: CRAMPING;OTHER (COMMENT)

## 2022-05-03 NOTE — ED NOTES
Christina Rosas pts neighbor would like to be to pts list of contacts [phone] Sherrill Kaur RN  05/02/22 2124

## 2022-05-03 NOTE — ED NOTES
Called pharmacy about getting metoprolol and lisinopril added to be given     Clary Ross RN  05/02/22 2047

## 2022-05-03 NOTE — ED NOTES
Messaged MD again about changing bed placement to step down as nursing supervisor stated it had not been done yet     Saira Kilpatrick, RN  05/02/22 2033

## 2022-05-03 NOTE — ED NOTES
MD messaged back stating ok to give meds and she would change bed to step down     Sung Simpson, RN  05/02/22 2035

## 2022-05-03 NOTE — CONSULTS
CARDIOLOGY CONSULT NOTE   Reason for consultation:  CHF    Referring physician:  Dilan Grier MD     Primary care physician: Dilan Grier MD       Dear Dilan Grier MD   Thanks for the consult. History of present illness:Deb is a 61 y. o.year old who  presents with for shortness of breath which is MODERATE, worse over few, intermittent, self limiting, not associated with cough or fever, gets worse with activity and better with rest,    Chief Complaint   Patient presents with    Shortness of Breath     reports was hospitalized for same in march      Blood pressure, cholesterol, blood glucose and weight are well controlled. Past medical history:    has a past medical history of CAD (coronary artery disease), Diabetes mellitus ([address] Hepatitis C, and Schizophrenia ([facility] [address] Past surgical history:   has a past surgical history that includes Hysterectomy and Cholecystectomy. Social History:   reports that she has been smoking cigars. She has been smoking about 3.00 packs per day. She has never used smokeless tobacco. She reports current alcohol use. She reports previous drug use. Drug: Marijuana [place]).   Family history:   no family history of CAD, STROKE of DM    Allergies   Allergen Reactions    Haldol [Haloperidol Lactate] Other (See Comments)     Unknown, severe reaction per mother    Penicillins        furosemide (LASIX) injection 20 mg, BID  cefTRIAXone (ROCEPHIN) 1000 mg IVPB in 50 mL D5W minibag, Q24H  aspirin chewable tablet 81 mg, Daily  [Held by provider] insulin glargine (LANTUS) injection vial 40 Units, Nightly  rivaroxaban (XARELTO) tablet 20 mg, Dinner  sodium chloride flush 0.9 % injection 5-40 mL, 2 times per day  sodium chloride flush 0.9 % injection 10 mL, PRN  0.9 % sodium chloride infusion, PRN  ondansetron (ZOFRAN-ODT) disintegrating tablet 4 mg, Q8H PRN   Or  ondansetron (ZOFRAN) injection 4 mg, Q6H PRN  polyethylene glycol (GLYCOLAX) packet 17 g, Daily PRN  nicotine polacrilex (COMMIT) lozenge 2 mg, Q1H PRN  acetaminophen (TYLENOL) tablet 650 mg, Q6H PRN   Or  acetaminophen (TYLENOL) suppository 650 mg, Q6H PRN  insulin lispro (HUMALOG) injection vial 0-6 Units, TID WC  insulin lispro (HUMALOG) injection vial 0-3 Units, Nightly  [Held by provider] lisinopril (PRINIVIL;ZESTRIL) tablet 5 mg, Daily  metoprolol succinate (TOPROL XL) extended release tablet 50 mg, Daily  HYDROcodone-acetaminophen (NORCO) 5-325 MG per tablet 1 tablet, Q6H PRN      Current Facility-Administered Medications   Medication Dose Route Frequency Provider Last Rate Last Admin    furosemide (LASIX) injection 20 mg  20 mg IntraVENous BID Linda Arceo PA-C        cefTRIAXone (ROCEPHIN) 1000 mg IVPB in 50 mL D5W minibag  1,000 mg IntraVENous Q24H Linda Arceo PA-C 100 mL/hr at 05/03/22 1310 1,000 mg at 05/03/22 1310    aspirin chewable tablet 81 mg  81 mg Oral Daily YNES Zhao CNP   81 mg at 05/03/22 0849    [Held by provider] insulin glargine (LANTUS) injection vial 40 Units  40 Units SubCUTAneous Nightly YNES Zhao CNP        rivaroxaban (XARELTO) tablet 20 mg  20 mg Oral Dinner YNES Zhao CNP   20 mg at 05/02/22 2041    sodium chloride flush 0.9 % injection 5-40 mL  5-40 mL IntraVENous 2 times per day YNES Zhao CNP   10 mL at 05/03/22 0850    sodium chloride flush 0.9 % injection 10 mL  10 mL IntraVENous PRN YNES Zhao CNP        0.9 % sodium chloride infusion   IntraVENous PRN YNES Zhao CNP        ondansetron (ZOFRAN-ODT) disintegrating tablet 4 mg  4 mg Oral Q8H PRN YNES Zhao CNP   4 mg at 05/02/22 2220    Or    ondansetron (ZOFRAN) injection 4 mg  4 mg IntraVENous Q6H PRN YNES Zhao CNP   4 mg at 05/03/22 0849    polyethylene glycol (GLYCOLAX) packet 17 g  17 g Oral Daily PRN YNES Zhao CNP        nicotine polacrilex (COMMIT) lozenge 2 mg  2 mg Oral Q1H PRN YNES Zhao CNP        acetaminophen (TYLENOL) tablet 650 mg  650 mg Oral Q6H PRN YNES Lorenz CNP        Or    acetaminophen (TYLENOL) suppository 650 mg  650 mg Rectal Q6H PRN YNES Lorenz CNP        insulin lispro (HUMALOG) injection vial 0-6 Units  0-6 Units SubCUTAneous TID WC YNES Lorenz - CNP        insulin lispro (HUMALOG) injection vial 0-3 Units  0-3 Units SubCUTAneous Nightly YNES Lorenz CNP   1 Units at 05/02/22 2130    [Held by provider] lisinopril (PRINIVIL;ZESTRIL) tablet 5 mg  5 mg Oral Daily Cheryl Najjar, MD   5 mg at 05/03/22 0849    metoprolol succinate (TOPROL XL) extended release tablet 50 mg  50 mg Oral Daily Cheryl Najjar, MD   50 mg at 05/03/22 0849    HYDROcodone-acetaminophen (NORCO) 5-325 MG per tablet 1 tablet  1 tablet Oral Q6H PRN YNES Avila CNP   1 tablet at 05/03/22 1309     Review of Systems:   · Constitutional: No Fever or Weight Loss   · Eyes: No Decreased Vision  · ENT: No Headaches, Hearing Loss or Vertigo  · Cardiovascular: No chest pain, dyspnea on exertion, palpitations or loss of consciousness  · Respiratory: No cough or wheezing    · Gastrointestinal: No abdominal pain, appetite loss, blood in stools, constipation, diarrhea or heartburn  · Genitourinary: No dysuria, trouble voiding, or hematuria  · Musculoskeletal:  No gait disturbance, weakness or joint complaints  · Integumentary: No rash or pruritis  · Neurological: No TIA or stroke symptoms  · Psychiatric: No anxiety or depression  · Endocrine: No malaise, fatigue or temperature intolerance  · Hematologic/Lymphatic: No bleeding problems, blood clots or swollen lymph nodes  · Allergic/Immunologic: No nasal congestion or hives  All systems negative except as marked.      ·   ·      Physical Examination:    Vitals:    05/03/22 0845   BP: (!) 139/92   Pulse: 86   Resp: 22   Temp: 97.9 °F (36.6 °C)   SpO2: 96%      Wt Readings from Last 3 Encounters:   05/03/22 129 lb 13.6 oz (58.9 kg)   04/04/22 125 lb (56.7 kg)   03/21/22 131 lb 8 oz (59.6 kg)     Body mass index is 23.75 kg/m². General Appearance:  No distress, conversant    Constitutional:  Well developed, Well nourished, No acute distress, Non-toxic appearance. HENT:  Normocephalic, Atraumatic, Bilateral external ears normal, Oropharynx moist, No oral exudates, Nose normal. Neck- Normal range of motion, No tenderness, Supple, No stridor,no apical-carotid delay, no carotid bruit  Eyes:  PERRL, EOMI, Conjunctiva normal, No discharge. Respiratory:  Normal breath sounds, No respiratory distress, No wheezing, No chest tenderness. ,no use of accessory muscles, diaphragm movement is normal  Cardiovascular: (PMI) apex non displaced,no lifts no thrills, no s3,no s4, Normal heart rate, Normal rhythm, No murmurs, No rubs, No gallops. Carotid arteries pulse and amplitude are normal no bruit, no abdominal bruit noted ( normal abdominal aorta ausculation), femoral arteries pulse and amplitude are normal no bruit, pedal pulses are normal  GI:  Bowel sounds normal, Soft, No tenderness, No masses, No pulsatile masses, no hepatosplenomegally, no bruits  : External genitalia appear normal, No masses or lesions. No discharge. No CVA tenderness. Musculoskeletal:  Intact distal pulses, No edema, No tenderness, No cyanosis, No clubbing. Good range of motion in all major joints. No tenderness to palpation or major deformities noted. Back- No tenderness. Integument:  Warm, Dry, No erythema, No rash. Skin: no rash, no ulcers  Lymphatic:  No lymphadenopathy noted. Neurologic:  Alert & oriented x 3, Normal motor function, Normal sensory function, No focal deficits noted.    Psychiatric:  Affect normal, Judgment normal, Mood normal.   Lab Review   Recent Labs     05/02/22  1235   WBC 11.1*   HGB 17.1*   HCT 52.0*         Recent Labs     05/03/22  0213      K 4.6   CL 96*   CO2 30   BUN 17   CREATININE 1.3*     Recent Labs     05/02/22  1435   AST 19   ALT 16   BILITOT 0.5   ALKPHOS 139*     No results for input(s): TROPONINI in the last 72 hours. No results found for: BNP  Lab Results   Component Value Date    INR 0.88 03/16/2022    PROTIME 11.3 (L) 03/16/2022         EKG: Normal sinus rhythm    Chest Xray: NAD    ECHO:Left ventricular systolic function is abnormal.   Ejection fraction is visually estimated at 30-35 %   Slight hypokinesis of the anteroseptal wall segment. Mild left ventricular hypertrophy. Severe eccentric mitral regurgitation (ERO: 0.40 cm sq, regurgitant volume:   62 ml). Essentially normal right ventricle; no evidence of right heart strain. Moderate tricuspid regurgitation; RVSP: 44 mmHg. Mild pulmonic regurgitation present. No evidence of any pericardial effusion. Bilateral pleural effusion. Inferior vena cava is dilated, measuring at 2.3 cm, but does collapse with   respiration. Labs, echo, meds reviewed  Assessment: 61 y. o.year old with PMH of  has a past medical history of CAD (coronary artery disease), Diabetes mellitus ([facility] [address] Hepatitis C, and Schizophrenia ([facility] [address] Recommendations:    1. Shortness of breath, most likely combination of nonischemic cardiomyopathy and COPD, recommend to stop smoking she also has a severe eccentric mitral regurgitation and moderate tricuspid regurgitation continue IV Lasix at this time  2. Is nonischemic cardiomyopathy, continue lisinopril continue metoprolol continue Lasix  3. History of DVT and PE continue Xarelt  4. Renal failure resolved\  5. Hypertension stable continue stable on Lopressor  All labs, medications and tests reviewed, continue all other medications of all above medical condition listed as is.          Flavia Brown MD, 5/3/2022 2:01 PM

## 2022-05-03 NOTE — PROGRESS NOTES
Hospitalist Progress Note      Name:  Yamini Gibbs /Age/Sex: 1965  (61 y.o. female)   MRN & CSN:  [id number] & [id number] Admission Date/Time: 2022 11:56 AM   Location:  [address] [place] [address] PCP: No primary care provider on file. Hospital Day: 2                                               Attending Physician Sukhdev Suarez    Assessment and Plan:   Yamini Gibbs is a 61 y.o.  female  who presents with Acute on chronic systolic congestive heart failure ([address]      Acute on chronic systolic HFrEF exacerbation  o Acutely decompensated. Elevated BNP. Troponin undetectable. Recent LHC 3/19/22 with non-obstructive CAD. o Last echo EF 30-35% in 3/22. Repeat echo pending  o Tele monitoring  o Consult cardiology pending  o Lasix IV 20 mg BID  o Strict I/O and daily weights   o Continue home metoprolol, holding lisinopril in setting of MELANY, resume as able. Consider adding aldactone prior to discharge     Acute kidney injury   Proteinuria   o Baseline around 0.8-1. Patient clinically appears euvolemic. Possibly 2/2 increased lasix dose. Had MELANY previously in 2022 thought to be secondary to lasix use  o Proteinuria present previously upon trending  o Hold home lisinopril overnight, resume as able if MELANY improving  o Started on lasix 40 mg BID on admission, reduced to 20 mg BID today     Urinary Tract Infection  o Patient symptomatic.  UA showing WBC and RBC. o Urine culture pending.   o Started on rocephin. Patient has allergy listed to penicillin, but I discussed with patient and she states she doesn't remember ever having a reaction to penicillin and will monitor for reaction to rocephin  o Trend labs     Nonrheumatic mitral valve regurgitation  o Medical management     DVT/PE  o Continue xarelto  o Following with heme/onc outpatient     CAD  o Continue ASA     Essential hypertension  o Continue metoprolol.  Lisinopril on hold given MELANY     Nicotine abuse  o Nicotine patch/gum PRN     DM II   o Monitor BS and cover with medium dose SSI  o Hold PO medications while inpatient  o Hypoglycemic protocol  o Hemoglobin A1C 6.0     Chronic conditions:   Schizophrenia   Hepatitis C    Diet ADULT DIET; Regular; Low Sodium (2 gm)   DVT Prophylaxis xarelto   GI Prophylaxis [] PPI,  [] H2 Blocker,  [] Carafate,  [] Diet/Tube Feeds   Code Status Full Code   Disposition Patient requires continued admission due to cardio work up and CHF exacerbation     -Patient assessment and plan discussed with supervising physician-  Overnight events:     Nemo Infante is a 61 y.o.  female, who presented with Shortness of Breath (reports was hospitalized for same in march )      Patient was seen and evaluated at bedside by myself. No acute overnight events. Patient reports feeling lower abdominal cramping. States she feels she had a recent yeast infection. Denies dysuria or hematuria. Objective:   No intake or output data in the 24 hours ending 05/03/22 1213   Vitals:   Vitals:    05/03/22 0333 05/03/22 0600 05/03/22 0755 05/03/22 0845   BP:    (!) 139/92   Pulse:    86   Resp: 16 28 22   Temp:    97.9 °F (36.6 °C)   TempSrc:    Oral   SpO2:   95% 96%   Weight:  129 lb 13.6 oz (58.9 kg)     Height:         Physical Exam: 05/03/22     General: NAD  Eyes: EOMI  ENT: neck supple  Cardiovascular: Regular rate. Respiratory: Clear to auscultation  Gastrointestinal: Minimally tender in lower region, no peritoneal signs, rebounding or guarding  Genitourinary: no suprapubic tenderness  Musculoskeletal: No edema  Skin: warm, dry  Neuro: Alert. Psych: Mood appropriate.      Medications:   Medications:    aspirin  81 mg Oral Daily    [Held by provider] insulin glargine  40 Units SubCUTAneous Nightly    rivaroxaban  20 mg Oral Dinner    sodium chloride flush  5-40 mL IntraVENous 2 times per day    furosemide  40 mg IntraVENous BID    insulin lispro  0-6 Units SubCUTAneous TID WC    insulin lispro  0-3 Units SubCUTAneous Nightly    lisinopril  5 mg Oral Daily    metoprolol succinate  50 mg Oral Daily      Infusions:    sodium chloride       PRN Meds: sodium chloride flush, 10 mL, PRN  sodium chloride, , PRN  ondansetron, 4 mg, Q8H PRN   Or  ondansetron, 4 mg, Q6H PRN  polyethylene glycol, 17 g, Daily PRN  nicotine polacrilex, 2 mg, Q1H PRN  acetaminophen, 650 mg, Q6H PRN   Or  acetaminophen, 650 mg, Q6H PRN  HYDROcodone-acetaminophen, 1 tablet, Q6H PRN        LABS  CBC   Recent Labs     05/02/22  1235   WBC 11.1*   HGB 17.1*   HCT 52.0*         RENAL  Recent Labs     05/02/22  1435 05/03/22  0213   * 135   K 4.4 4.6    96*   CO2 22 30   BUN 15 17   CREATININE 1.0 1.3*     LFT'S  Recent Labs     05/02/22  1435   AST 19   ALT 16   BILITOT 0.5   ALKPHOS 139*     COAG  No results for input(s): INR in the last 72 hours. CARDIAC ENZYMES  No results for input(s): CKTOTAL, CKMB, CKMBINDEX, TROPONINI in the last 72 hours. Radiology this visit:  Reviewed. XR CHEST PORTABLE    Result Date: 5/2/2022  EXAMINATION: ONE XRAY VIEW OF THE CHEST 5/2/2022 11:33 am COMPARISON: 03/15/2022. HISTORY: ORDERING SYSTEM PROVIDED HISTORY: SOB TECHNOLOGIST PROVIDED HISTORY: Reason for exam:->SOB Reason for Exam: sob FINDINGS: The heart size is within normal limits. The pulmonary vasculature is also within normal limits. No acute infiltrates are seen. The costophrenic angles are sharp bilaterally. No pneumothoraces are noted. 1. No active pulmonary disease. CTA PULMONARY W CONTRAST    Result Date: 5/2/2022  EXAMINATION: CTA OF THE CHEST 5/2/2022 3:58 pm TECHNIQUE: CTA of the chest was performed after the administration of intravenous contrast.  Multiplanar reformatted images are provided for review. MIP images are provided for review. Dose modulation, iterative reconstruction, and/or weight based adjustment of the mA/kV was utilized to reduce the radiation dose to as low as reasonably achievable.  COMPARISON: None. HISTORY: ORDERING SYSTEM PROVIDED HISTORY: known DVT; ?non adherent to xarelto TECHNOLOGIST PROVIDED HISTORY: Reason for exam:->known DVT; ?non adherent to xarelto Decision Support Exception - unselect if not a suspected or confirmed emergency medical condition->Emergency Medical Condition (MA) Reason for Exam: known DVT; ?non adherent to xarelto Relevant Medical/Surgical History: 75 ml isovue 370  lot [id number] FINDINGS: Pulmonary Arteries: Pulmonary arteries are adequately opacified for evaluation. No evidence of intraluminal filling defect to suggest pulmonary embolism. Main pulmonary artery is normal in caliber. Mediastinum: No evidence of mediastinal lymphadenopathy. The heart and pericardium demonstrate no acute abnormality. There is no acute abnormality of the thoracic aorta. There is no pericardial effusion. Mild enlargement the cardiac chambers. Lungs/pleura: There is to moderate size bilateral pleural effusions. There is some atelectatic changes in lung bases with patchy ground-glass opacity seen in the upper lobes bilaterally suggesting possible edema or infiltrate. Upper Abdomen: Limited images of the upper abdomen are unremarkable. Soft Tissues/Bones: No acute bone or soft tissue abnormality. No evidence of pulmonary embolism. Small to moderate size bilateral pleural effusions with ground-glass opacity identified in the upper lung fields left greater than right suggesting either edema or infiltrate.        Norma Perry PA-C  Hospitalist  5/3/2022, 12:13 PM

## 2022-05-04 ENCOUNTER — APPOINTMENT (OUTPATIENT)
Dept: GENERAL RADIOLOGY | Age: 57
End: 2022-05-04
Payer: COMMERCIAL

## 2022-05-04 LAB
ANION GAP SERPL CALCULATED.3IONS-SCNC: 9 MMOL/L (ref 4–16)
BUN BLDV-MCNC: 17 MG/DL (ref 6–23)
CALCIUM SERPL-MCNC: 8.4 MG/DL (ref 8.3–10.6)
CHLORIDE BLD-SCNC: 94 MMOL/L (ref 99–110)
CO2: 28 MMOL/L (ref 21–32)
CREAT SERPL-MCNC: 1.3 MG/DL (ref 0.6–1.1)
CULTURE: NORMAL
GFR AFRICAN AMERICAN: 51 ML/MIN/1.73M2
GFR NON-AFRICAN AMERICAN: 42 ML/MIN/1.73M2
GLUCOSE BLD-MCNC: 138 MG/DL (ref 70–99)
GLUCOSE BLD-MCNC: 138 MG/DL (ref 70–99)
GLUCOSE BLD-MCNC: 168 MG/DL (ref 70–99)
GLUCOSE BLD-MCNC: 190 MG/DL (ref 70–99)
GLUCOSE BLD-MCNC: 192 MG/DL (ref 70–99)
Lab: NORMAL
POTASSIUM SERPL-SCNC: 4.6 MMOL/L (ref 3.5–5.1)
SODIUM BLD-SCNC: 131 MMOL/L (ref 135–145)
SPECIMEN: NORMAL

## 2022-05-04 PROCEDURE — 76937 US GUIDE VASCULAR ACCESS: CPT

## 2022-05-04 PROCEDURE — 2580000003 HC RX 258: Performed by: NURSE PRACTITIONER

## 2022-05-04 PROCEDURE — A4216 STERILE WATER/SALINE, 10 ML: HCPCS | Performed by: NURSE PRACTITIONER

## 2022-05-04 PROCEDURE — 2580000003 HC RX 258: Performed by: PHYSICIAN ASSISTANT

## 2022-05-04 PROCEDURE — 6360000002 HC RX W HCPCS: Performed by: PHYSICIAN ASSISTANT

## 2022-05-04 PROCEDURE — G0378 HOSPITAL OBSERVATION PER HR: HCPCS

## 2022-05-04 PROCEDURE — 94761 N-INVAS EAR/PLS OXIMETRY MLT: CPT

## 2022-05-04 PROCEDURE — 6370000000 HC RX 637 (ALT 250 FOR IP): Performed by: NURSE PRACTITIONER

## 2022-05-04 PROCEDURE — 99214 OFFICE O/P EST MOD 30 MIN: CPT | Performed by: INTERNAL MEDICINE

## 2022-05-04 PROCEDURE — 6370000000 HC RX 637 (ALT 250 FOR IP): Performed by: FAMILY MEDICINE

## 2022-05-04 PROCEDURE — 80048 BASIC METABOLIC PNL TOTAL CA: CPT

## 2022-05-04 PROCEDURE — 6360000002 HC RX W HCPCS: Performed by: NURSE PRACTITIONER

## 2022-05-04 PROCEDURE — 94150 VITAL CAPACITY TEST: CPT

## 2022-05-04 PROCEDURE — 6370000000 HC RX 637 (ALT 250 FOR IP): Performed by: HOSPITALIST

## 2022-05-04 PROCEDURE — 36415 COLL VENOUS BLD VENIPUNCTURE: CPT

## 2022-05-04 PROCEDURE — 82962 GLUCOSE BLOOD TEST: CPT

## 2022-05-04 PROCEDURE — 96366 THER/PROPH/DIAG IV INF ADDON: CPT

## 2022-05-04 PROCEDURE — 96376 TX/PRO/DX INJ SAME DRUG ADON: CPT

## 2022-05-04 PROCEDURE — 74018 RADEX ABDOMEN 1 VIEW: CPT

## 2022-05-04 RX ORDER — DOCUSATE SODIUM 100 MG/1
100 CAPSULE, LIQUID FILLED ORAL 2 TIMES DAILY
Status: DISCONTINUED | OUTPATIENT
Start: 2022-05-04 | End: 2022-05-06 | Stop reason: HOSPADM

## 2022-05-04 RX ORDER — FUROSEMIDE 20 MG/1
20 TABLET ORAL DAILY
Status: DISCONTINUED | OUTPATIENT
Start: 2022-05-05 | End: 2022-05-06

## 2022-05-04 RX ADMIN — POLYETHYLENE GLYCOL (3350) 17 G: 17 POWDER, FOR SOLUTION ORAL at 02:47

## 2022-05-04 RX ADMIN — ONDANSETRON 4 MG: 2 INJECTION INTRAMUSCULAR; INTRAVENOUS at 02:36

## 2022-05-04 RX ADMIN — FUROSEMIDE 20 MG: 10 INJECTION, SOLUTION INTRAVENOUS at 08:08

## 2022-05-04 RX ADMIN — ONDANSETRON 4 MG: 2 INJECTION INTRAMUSCULAR; INTRAVENOUS at 08:16

## 2022-05-04 RX ADMIN — SODIUM CHLORIDE, PRESERVATIVE FREE 10 ML: 5 INJECTION INTRAVENOUS at 08:09

## 2022-05-04 RX ADMIN — ONDANSETRON 4 MG: 4 TABLET, ORALLY DISINTEGRATING ORAL at 10:05

## 2022-05-04 RX ADMIN — INSULIN LISPRO 1 UNITS: 100 INJECTION, SOLUTION INTRAVENOUS; SUBCUTANEOUS at 17:12

## 2022-05-04 RX ADMIN — ONDANSETRON 4 MG: 2 INJECTION INTRAMUSCULAR; INTRAVENOUS at 17:19

## 2022-05-04 RX ADMIN — POLYETHYLENE GLYCOL (3350) 17 G: 17 POWDER, FOR SOLUTION ORAL at 08:08

## 2022-05-04 RX ADMIN — CEFTRIAXONE SODIUM 1000 MG: 1 INJECTION, POWDER, FOR SOLUTION INTRAMUSCULAR; INTRAVENOUS at 13:19

## 2022-05-04 RX ADMIN — ASPIRIN 81 MG 81 MG: 81 TABLET ORAL at 08:08

## 2022-05-04 RX ADMIN — INSULIN LISPRO 1 UNITS: 100 INJECTION, SOLUTION INTRAVENOUS; SUBCUTANEOUS at 12:37

## 2022-05-04 RX ADMIN — HYDROCODONE BITARTRATE AND ACETAMINOPHEN 1 TABLET: 5; 325 TABLET ORAL at 17:19

## 2022-05-04 RX ADMIN — HYDROCODONE BITARTRATE AND ACETAMINOPHEN 1 TABLET: 5; 325 TABLET ORAL at 23:28

## 2022-05-04 RX ADMIN — HYDROCODONE BITARTRATE AND ACETAMINOPHEN 1 TABLET: 5; 325 TABLET ORAL at 10:51

## 2022-05-04 RX ADMIN — METOPROLOL SUCCINATE 50 MG: 50 TABLET, EXTENDED RELEASE ORAL at 08:08

## 2022-05-04 RX ADMIN — HYDROCODONE BITARTRATE AND ACETAMINOPHEN 1 TABLET: 5; 325 TABLET ORAL at 02:35

## 2022-05-04 RX ADMIN — DOCUSATE SODIUM 100 MG: 100 CAPSULE, LIQUID FILLED ORAL at 20:08

## 2022-05-04 RX ADMIN — SODIUM CHLORIDE, PRESERVATIVE FREE 10 ML: 5 INJECTION INTRAVENOUS at 20:19

## 2022-05-04 RX ADMIN — RIVAROXABAN 20 MG: 20 TABLET, FILM COATED ORAL at 17:13

## 2022-05-04 RX ADMIN — DOCUSATE SODIUM 100 MG: 100 CAPSULE, LIQUID FILLED ORAL at 11:34

## 2022-05-04 RX ADMIN — ONDANSETRON 4 MG: 2 INJECTION INTRAMUSCULAR; INTRAVENOUS at 23:29

## 2022-05-04 ASSESSMENT — PAIN SCALES - GENERAL
PAINLEVEL_OUTOF10: 10
PAINLEVEL_OUTOF10: 8
PAINLEVEL_OUTOF10: 0

## 2022-05-04 ASSESSMENT — PAIN DESCRIPTION - ORIENTATION: ORIENTATION: MID

## 2022-05-04 ASSESSMENT — PAIN DESCRIPTION - DESCRIPTORS: DESCRIPTORS: ACHING;CRAMPING

## 2022-05-04 ASSESSMENT — PAIN - FUNCTIONAL ASSESSMENT: PAIN_FUNCTIONAL_ASSESSMENT: ACTIVITIES ARE NOT PREVENTED

## 2022-05-04 ASSESSMENT — PAIN DESCRIPTION - LOCATION: LOCATION: ABDOMEN

## 2022-05-04 NOTE — CONSULTS
IV Consult complete. Nexiva 20g 1.75\" Extra Long PIV inserted in right FA x1 attempt with sterile ultrasound guidance. Brisk blood return, flushes without resistance.

## 2022-05-04 NOTE — PROGRESS NOTES
Hospitalist Progress Note      Name:  Kacey Arenas /Age/Sex: 1965  (61 y.o. female)   MRN & CSN:  [id number] & [id number] Admission Date/Time: 2022 11:56 AM   Location:  5295/9026-X PCP: No primary care provider on file. Hospital Day: 3                                                   Assessment and Plan:     Kacey Arenas is a 61 y.o.  female with PMH CHF, chronic kidney disease, nonrheumatic mitral valve regurgitation, history of DVT/PE, CAD, HTN, nicotine abuse and diabetes presented to [facility] on 2022 with complaints of shortness of breath. She was hospitalized for further work-up/treatment and cardiology evaluation:      Acute on chronic systolic HFrEF exacerbation: acutely decompensated. presented with SOB; BNP 16K. CXR unremarkable. CTA nonacute. 5/3/2020 2 repeat TTE with known low EF of 30 to 35%. S/p IV Lasix; now appears euvolemic. Resume home Lasix at 20 mg daily starting      Acute kidney injury: baseline around 0.8-1. Possibly 2/2 increased lasix dose. Had MELANY previously in 2022 thought to be secondary to lasix use. Monitor closely resuming ACE. May benefit from nephrology evaluation    UTI: On IV ceftriaxone; started 5/3. Follow urine culture and narrow ATB accordingly    ABD pain: with constipation. Delaying discharge. On MiraLAX, docusate. Check KUB    Nonrheumatic mitral valve regurgitation: Medical management    DVT/PE: per hx. Follows with hematology. Continue home Xarelto    CAD: per hx. On ASA, BB continue ASA    Essential hypertension: per hx. Cont home BB, ACE. DM II: per hx. Hgb A1c 6%. Holding home insulin. On SSI. Monitor blood sugar and titrate PRN     Chronic conditions:   Schizophrenia   Hepatitis C    Diet ADULT DIET; Regular;  Low Sodium (2 gm)   DVT Prophylaxis xarelto   GI Prophylaxis [] PPI,  [] H2 Blocker,  [] Carafate,  [x] Diet/Tube Feeds   Code Status Full Code   Disposition Patient requires continued admission due to cardio work up and CHF exacerbation       Overnight events:     Seen and examined at bedside. Patient is new to me, information obtained from chart review and patient report. Says her breathing is much better. She denies chest pain, no shortness of breath. Her main complaint today is abdominal discomfort and constipation. Says she does not feel she is able to go home today    Objective:   No intake or output data in the 24 hours ending 05/04/22 1306   Vitals:   Vitals:    05/03/22 2129 05/04/22 0215 05/04/22 0305 05/04/22 0800   BP:  (!) 128/99  (!) 137/97   Pulse:  78  71   Resp: 18 17 15 17   Temp:  97.8 °F (36.6 °C)  98.4 °F (36.9 °C)   TempSrc:  Oral  Oral   SpO2:  94%  94%   Weight:  132 lb 15 oz (60.3 kg)     Height:         Physical Exam: 05/04/22     General: NAD  Eyes: EOMI  ENT: neck supple  Cardiovascular: Regular rate. Respiratory: Clear to auscultation  Gastrointestinal: Minimally tender in lower region, no peritoneal signs, rebounding or guarding  Genitourinary: no suprapubic tenderness  Musculoskeletal: No edema  Skin: warm, dry  Neuro: Alert. Psych: Mood appropriate.      Medications:   Medications:    docusate sodium  100 mg Oral BID    [START ON 5/5/2022] furosemide  20 mg Oral Daily    cefTRIAXone (ROCEPHIN) IV  1,000 mg IntraVENous Q24H    aspirin  81 mg Oral Daily    [Held by provider] insulin glargine  40 Units SubCUTAneous Nightly    rivaroxaban  20 mg Oral Dinner    sodium chloride flush  5-40 mL IntraVENous 2 times per day    insulin lispro  0-6 Units SubCUTAneous TID     insulin lispro  0-3 Units SubCUTAneous Nightly    lisinopril  5 mg Oral Daily    metoprolol succinate  50 mg Oral Daily      Infusions:    sodium chloride       PRN Meds: sodium chloride flush, 10 mL, PRN  sodium chloride, , PRN  ondansetron, 4 mg, Q8H PRN   Or  ondansetron, 4 mg, Q6H PRN  polyethylene glycol, 17 g, Daily PRN  nicotine polacrilex, 2 mg, Q1H PRN  acetaminophen, 650 mg, Q6H PRN   Or  acetaminophen, 650 mg, Q6H PRN  HYDROcodone-acetaminophen, 1 tablet, Q6H PRN        LABS  CBC   Recent Labs     05/02/22  1235   WBC 11.1*   HGB 17.1*   HCT 52.0*         RENAL  Recent Labs     05/02/22  1435 05/03/22  0213 05/04/22  0341   * 135 131*   K 4.4 4.6 4.6    96* 94*   CO2 22 30 28   BUN 15 17 17   CREATININE 1.0 1.3* 1.3*     LFT'S  Recent Labs     05/02/22  1435   AST 19   ALT 16   BILITOT 0.5   ALKPHOS 139*     COAG  No results for input(s): INR in the last 72 hours. CARDIAC ENZYMES  No results for input(s): CKTOTAL, CKMB, CKMBINDEX, TROPONINI in the last 72 hours. Radiology this visit:  Reviewed. XR CHEST PORTABLE    Result Date: 5/2/2022  EXAMINATION: ONE XRAY VIEW OF THE CHEST 5/2/2022 11:33 am COMPARISON: 03/15/2022. HISTORY: ORDERING SYSTEM PROVIDED HISTORY: SOB TECHNOLOGIST PROVIDED HISTORY: Reason for exam:->SOB Reason for Exam: sob FINDINGS: The heart size is within normal limits. The pulmonary vasculature is also within normal limits. No acute infiltrates are seen. The costophrenic angles are sharp bilaterally. No pneumothoraces are noted. 1. No active pulmonary disease. CTA PULMONARY W CONTRAST    Result Date: 5/2/2022  EXAMINATION: CTA OF THE CHEST 5/2/2022 3:58 pm TECHNIQUE: CTA of the chest was performed after the administration of intravenous contrast.  Multiplanar reformatted images are provided for review. MIP images are provided for review. Dose modulation, iterative reconstruction, and/or weight based adjustment of the mA/kV was utilized to reduce the radiation dose to as low as reasonably achievable. COMPARISON: None.  HISTORY: ORDERING SYSTEM PROVIDED HISTORY: known DVT; ?non adherent to xarelto TECHNOLOGIST PROVIDED HISTORY: Reason for exam:->known DVT; ?non adherent to xarelto Decision Support Exception - unselect if not a suspected or confirmed emergency medical condition->Emergency Medical Condition (MA) Reason for Exam: known DVT; ?non adherent to xarelto Relevant Medical/Surgical History: 75 ml isovue 370  lot [id number] FINDINGS: Pulmonary Arteries: Pulmonary arteries are adequately opacified for evaluation. No evidence of intraluminal filling defect to suggest pulmonary embolism. Main pulmonary artery is normal in caliber. Mediastinum: No evidence of mediastinal lymphadenopathy. The heart and pericardium demonstrate no acute abnormality. There is no acute abnormality of the thoracic aorta. There is no pericardial effusion. Mild enlargement the cardiac chambers. Lungs/pleura: There is to moderate size bilateral pleural effusions. There is some atelectatic changes in lung bases with patchy ground-glass opacity seen in the upper lobes bilaterally suggesting possible edema or infiltrate. Upper Abdomen: Limited images of the upper abdomen are unremarkable. Soft Tissues/Bones: No acute bone or soft tissue abnormality. No evidence of pulmonary embolism. Small to moderate size bilateral pleural effusions with ground-glass opacity identified in the upper lung fields left greater than right suggesting either edema or infiltrate.        Ciara Barone PA-C  Hospitalist  5/4/2022, 1:06 PM

## 2022-05-04 NOTE — PROGRESS NOTES
Today's plan: Patient feels better can be discharged      Admit Date:  5/2/2022    Subjective: Better      Chief complaints on admission  Chief Complaint   Patient presents with    Shortness of Breath     reports was hospitalized for same in march          History of present illness:Deb is a 61 y. o.year old who  presents with had concerns including Shortness of Breath (reports was hospitalized for same in march ). Past medical history:    has a past medical history of CAD (coronary artery disease), Diabetes mellitus ([facility] [address] Hepatitis C, and Schizophrenia ([facility] [address] Past surgical history:   has a past surgical history that includes Hysterectomy and Cholecystectomy. Social History:   reports that she has been smoking cigars. She has been smoking about 3.00 packs per day. She has never used smokeless tobacco. She reports current alcohol use. She reports previous drug use. Drug: Marijuana [place]). Family history:  family history includes Cancer in her father; No Known Problems in her mother. Allergies   Allergen Reactions    Haldol [Haloperidol Lactate] Other (See Comments)     Unknown, severe reaction per mother    Penicillins          Objective:   BP (!) 137/97   Pulse 71   Temp 98.4 °F (36.9 °C) (Oral)   Resp 17   Ht 5' 2\" (1.575 m)   Wt 132 lb 15 oz (60.3 kg)   SpO2 94%   BMI 24.31 kg/m²   No intake or output data in the 24 hours ending 05/04/22 1245    TELEMETRY: Sinus     Physical Exam:  Constitutional:  Well developed, Well nourished, No acute distress, Non-toxic appearance. HENT:  Normocephalic, Atraumatic, Bilateral external ears normal, Oropharynx moist, No oral exudates, Nose normal. Neck- Normal range of motion, No tenderness, Supple, No stridor. Eyes:  PERRL, EOMI, Conjunctiva normal, No discharge. Respiratory:  Normal breath sounds, No respiratory distress, No wheezing, No chest tenderness. ,no use of accessory muscles, diaphragm movement is normal  Cardiovascular: (PMI) apex non displaced,no lifts no thrills, no s3,no s4, Normal heart rate, Normal rhythm, No murmurs, No rubs, No gallops. Carotid arteries pulse and amplitude are normal no bruit, no abdominal bruit noted ( normal abdominal aorta ausculation), femoral arteries pulse and amplitude are normal no bruit, pedal pulses are normal  GI:  Bowel sounds normal, Soft, No tenderness, No masses, No pulsatile masses. : External genitalia appear normal, No masses or lesions. No discharge. No CVA tenderness. Musculoskeletal:  Intact distal pulses, No edema, No tenderness, No cyanosis, No clubbing. Good range of motion in all major joints. No tenderness to palpation or major deformities noted. Back- No tenderness. Integument:  Warm, Dry, No erythema, No rash. Lymphatic:  No lymphadenopathy noted. Neurologic:  Alert & oriented x 3, Normal motor function, Normal sensory function, No focal deficits noted.    Psychiatric:  Affect normal, Judgment normal, Mood normal.     Medications:    docusate sodium  100 mg Oral BID    cefTRIAXone (ROCEPHIN) IV  1,000 mg IntraVENous Q24H    aspirin  81 mg Oral Daily    [Held by provider] insulin glargine  40 Units SubCUTAneous Nightly    rivaroxaban  20 mg Oral Dinner    sodium chloride flush  5-40 mL IntraVENous 2 times per day    insulin lispro  0-6 Units SubCUTAneous TID WC    insulin lispro  0-3 Units SubCUTAneous Nightly    [Held by provider] lisinopril  5 mg Oral Daily    metoprolol succinate  50 mg Oral Daily      sodium chloride       sodium chloride flush, sodium chloride, ondansetron **OR** ondansetron, polyethylene glycol, nicotine polacrilex, acetaminophen **OR** acetaminophen, HYDROcodone-acetaminophen    Lab Data:  CBC:   Recent Labs     05/02/22  1235   WBC 11.1*   HGB 17.1*   HCT 52.0*   .4*        BMP:   Recent Labs     05/02/22  1435 05/03/22  0213 05/04/22  0341   * 135 131*   K 4.4 4.6 4.6    96* 94*   CO2 22 30 28   BUN 15 17 17 CREATININE 1.0 1.3* 1.3*     LIVER PROFILE:   Recent Labs     05/02/22  1435   AST 19   ALT 16   BILITOT 0.5   ALKPHOS 139*     PT/INR: No results for input(s): PROTIME, INR in the last 72 hours. APTT: No results for input(s): APTT in the last 72 hours. BNP:  No results for input(s): BNP in the last 72 hours. TROPONIN: @TROPONINI:3@      Assessment:  61 y. o.year old who is admitted for          Plan:  1. Shortness of breath, most likely combination of nonischemic cardiomyopathy and COPD, recommend to stop smoking she also has a severe eccentric mitral regurgitation and moderate tricuspid regurgitation continue IV Lasix at this time  2. Is nonischemic cardiomyopathy, continue lisinopril continue metoprolol continue Lasix  3. History of DVT and PE continue Xarelt  4. Renal failure resolved\  5. Hypertension stable continue stable on Lopressor    All labs, medications and tests reviewed, continue all other medications of all above medical condition listed as is.       Flavia Brown MD, MD 5/4/2022 12:45 PM

## 2022-05-05 LAB
ANION GAP SERPL CALCULATED.3IONS-SCNC: 13 MMOL/L (ref 4–16)
BUN BLDV-MCNC: 21 MG/DL (ref 6–23)
CALCIUM SERPL-MCNC: 7.7 MG/DL (ref 8.3–10.6)
CHLORIDE BLD-SCNC: 93 MMOL/L (ref 99–110)
CO2: 25 MMOL/L (ref 21–32)
CREAT SERPL-MCNC: 1.5 MG/DL (ref 0.6–1.1)
EKG ATRIAL RATE: 95 BPM
EKG DIAGNOSIS: NORMAL
EKG P AXIS: 66 DEGREES
EKG P-R INTERVAL: 114 MS
EKG Q-T INTERVAL: 378 MS
EKG QRS DURATION: 82 MS
EKG QTC CALCULATION (BAZETT): 475 MS
EKG R AXIS: -6 DEGREES
EKG T AXIS: 112 DEGREES
EKG VENTRICULAR RATE: 95 BPM
GFR AFRICAN AMERICAN: 43 ML/MIN/1.73M2
GFR NON-AFRICAN AMERICAN: 36 ML/MIN/1.73M2
GLUCOSE BLD-MCNC: 126 MG/DL (ref 70–99)
GLUCOSE BLD-MCNC: 159 MG/DL (ref 70–99)
GLUCOSE BLD-MCNC: 186 MG/DL (ref 70–99)
GLUCOSE BLD-MCNC: 188 MG/DL (ref 70–99)
GLUCOSE BLD-MCNC: 197 MG/DL (ref 70–99)
POTASSIUM SERPL-SCNC: 4 MMOL/L (ref 3.5–5.1)
SODIUM BLD-SCNC: 131 MMOL/L (ref 135–145)

## 2022-05-05 PROCEDURE — 6370000000 HC RX 637 (ALT 250 FOR IP): Performed by: NURSE PRACTITIONER

## 2022-05-05 PROCEDURE — 36415 COLL VENOUS BLD VENIPUNCTURE: CPT

## 2022-05-05 PROCEDURE — 99214 OFFICE O/P EST MOD 30 MIN: CPT | Performed by: INTERNAL MEDICINE

## 2022-05-05 PROCEDURE — 94761 N-INVAS EAR/PLS OXIMETRY MLT: CPT

## 2022-05-05 PROCEDURE — 96376 TX/PRO/DX INJ SAME DRUG ADON: CPT

## 2022-05-05 PROCEDURE — 6360000002 HC RX W HCPCS: Performed by: NURSE PRACTITIONER

## 2022-05-05 PROCEDURE — 93010 ELECTROCARDIOGRAM REPORT: CPT | Performed by: INTERNAL MEDICINE

## 2022-05-05 PROCEDURE — 94150 VITAL CAPACITY TEST: CPT

## 2022-05-05 PROCEDURE — 6370000000 HC RX 637 (ALT 250 FOR IP): Performed by: FAMILY MEDICINE

## 2022-05-05 PROCEDURE — 6370000000 HC RX 637 (ALT 250 FOR IP): Performed by: INTERNAL MEDICINE

## 2022-05-05 PROCEDURE — A4216 STERILE WATER/SALINE, 10 ML: HCPCS | Performed by: NURSE PRACTITIONER

## 2022-05-05 PROCEDURE — 6370000000 HC RX 637 (ALT 250 FOR IP): Performed by: HOSPITALIST

## 2022-05-05 PROCEDURE — 80048 BASIC METABOLIC PNL TOTAL CA: CPT

## 2022-05-05 PROCEDURE — G0378 HOSPITAL OBSERVATION PER HR: HCPCS

## 2022-05-05 PROCEDURE — 2580000003 HC RX 258: Performed by: NURSE PRACTITIONER

## 2022-05-05 PROCEDURE — 82962 GLUCOSE BLOOD TEST: CPT

## 2022-05-05 RX ORDER — DEXTROSE MONOHYDRATE 50 MG/ML
100 INJECTION, SOLUTION INTRAVENOUS PRN
Status: DISCONTINUED | OUTPATIENT
Start: 2022-05-05 | End: 2022-05-06 | Stop reason: HOSPADM

## 2022-05-05 RX ORDER — LANOLIN ALCOHOL/MO/W.PET/CERES
3 CREAM (GRAM) TOPICAL NIGHTLY PRN
Status: DISCONTINUED | OUTPATIENT
Start: 2022-05-05 | End: 2022-05-05

## 2022-05-05 RX ORDER — SENNA PLUS 8.6 MG/1
1 TABLET ORAL ONCE
Status: COMPLETED | OUTPATIENT
Start: 2022-05-05 | End: 2022-05-05

## 2022-05-05 RX ORDER — SPIRONOLACTONE 50 MG/1
25 TABLET, FILM COATED ORAL DAILY
Status: DISCONTINUED | OUTPATIENT
Start: 2022-05-05 | End: 2022-05-06 | Stop reason: HOSPADM

## 2022-05-05 RX ORDER — DEXTROSE MONOHYDRATE 25 G/50ML
12.5 INJECTION, SOLUTION INTRAVENOUS PRN
Status: DISCONTINUED | OUTPATIENT
Start: 2022-05-05 | End: 2022-05-05

## 2022-05-05 RX ORDER — QUETIAPINE FUMARATE 200 MG/1
400 TABLET, FILM COATED ORAL NIGHTLY
Status: DISCONTINUED | OUTPATIENT
Start: 2022-05-05 | End: 2022-05-05

## 2022-05-05 RX ORDER — INSULIN GLARGINE 100 [IU]/ML
10 INJECTION, SOLUTION SUBCUTANEOUS NIGHTLY
Status: DISCONTINUED | OUTPATIENT
Start: 2022-05-05 | End: 2022-05-06 | Stop reason: HOSPADM

## 2022-05-05 RX ORDER — QUETIAPINE FUMARATE 200 MG/1
400 TABLET, FILM COATED ORAL NIGHTLY
Status: DISCONTINUED | OUTPATIENT
Start: 2022-05-06 | End: 2022-05-06 | Stop reason: HOSPADM

## 2022-05-05 RX ORDER — NICOTINE POLACRILEX 4 MG
15 LOZENGE BUCCAL PRN
Status: DISCONTINUED | OUTPATIENT
Start: 2022-05-05 | End: 2022-05-05

## 2022-05-05 RX ORDER — QUETIAPINE FUMARATE 25 MG/1
25 TABLET, FILM COATED ORAL NIGHTLY PRN
Status: DISCONTINUED | OUTPATIENT
Start: 2022-05-05 | End: 2022-05-05

## 2022-05-05 RX ADMIN — INSULIN LISPRO 1 UNITS: 100 INJECTION, SOLUTION INTRAVENOUS; SUBCUTANEOUS at 19:35

## 2022-05-05 RX ADMIN — INSULIN LISPRO 1 UNITS: 100 INJECTION, SOLUTION INTRAVENOUS; SUBCUTANEOUS at 17:11

## 2022-05-05 RX ADMIN — INSULIN GLARGINE 10 UNITS: 100 INJECTION, SOLUTION SUBCUTANEOUS at 19:36

## 2022-05-05 RX ADMIN — QUETIAPINE FUMARATE 25 MG: 25 TABLET ORAL at 20:25

## 2022-05-05 RX ADMIN — ONDANSETRON 4 MG: 2 INJECTION INTRAMUSCULAR; INTRAVENOUS at 18:28

## 2022-05-05 RX ADMIN — QUETIAPINE FUMARATE 375 MG: 200 TABLET ORAL at 21:44

## 2022-05-05 RX ADMIN — LISINOPRIL 5 MG: 5 TABLET ORAL at 08:24

## 2022-05-05 RX ADMIN — HYDROCODONE BITARTRATE AND ACETAMINOPHEN 1 TABLET: 5; 325 TABLET ORAL at 18:28

## 2022-05-05 RX ADMIN — METOPROLOL SUCCINATE 50 MG: 50 TABLET, EXTENDED RELEASE ORAL at 08:23

## 2022-05-05 RX ADMIN — FUROSEMIDE 20 MG: 20 TABLET ORAL at 08:23

## 2022-05-05 RX ADMIN — DOCUSATE SODIUM 100 MG: 100 CAPSULE, LIQUID FILLED ORAL at 19:35

## 2022-05-05 RX ADMIN — SODIUM CHLORIDE, PRESERVATIVE FREE 10 ML: 5 INJECTION INTRAVENOUS at 19:37

## 2022-05-05 RX ADMIN — SODIUM CHLORIDE, PRESERVATIVE FREE 10 ML: 5 INJECTION INTRAVENOUS at 08:24

## 2022-05-05 RX ADMIN — RIVAROXABAN 20 MG: 20 TABLET, FILM COATED ORAL at 17:11

## 2022-05-05 RX ADMIN — SENNOSIDES 8.6 MG: 8.6 TABLET, FILM COATED ORAL at 08:25

## 2022-05-05 RX ADMIN — ONDANSETRON 4 MG: 2 INJECTION INTRAMUSCULAR; INTRAVENOUS at 08:23

## 2022-05-05 RX ADMIN — ASPIRIN 81 MG 81 MG: 81 TABLET ORAL at 08:23

## 2022-05-05 RX ADMIN — DOCUSATE SODIUM 100 MG: 100 CAPSULE, LIQUID FILLED ORAL at 08:24

## 2022-05-05 RX ADMIN — SPIRONOLACTONE 25 MG: 50 TABLET ORAL at 15:42

## 2022-05-05 RX ADMIN — POLYETHYLENE GLYCOL (3350) 17 G: 17 POWDER, FOR SOLUTION ORAL at 08:23

## 2022-05-05 ASSESSMENT — ENCOUNTER SYMPTOMS
NAUSEA: 1
ABDOMINAL PAIN: 1
COUGH: 1
ALLERGIC/IMMUNOLOGIC NEGATIVE: 1
SHORTNESS OF BREATH: 1
EYES NEGATIVE: 1

## 2022-05-05 NOTE — PROGRESS NOTES
V2.0  [facility] [place] [facility] Hospitalist Progress Note      Name:  Angy Vazquez /Age/Sex: 1965  (61 y.o. female)   MRN & CSN:  [id number] & [id number] Encounter Date/Time: 2022 9:00 AM EDT    Location:  [address] [place] [address]-A PCP: No primary care provider on file. Hospital Day: 4    Assessment and Plan:     Angy Vazquez is a 61 y.o.  female with PMH CHF, chronic kidney disease, nonrheumatic mitral valve regurgitation, history of DVT/PE, CAD, HTN, nicotine abuse and diabetes presented to [facility] on 2022 with complaints of shortness of breath. She was hospitalized for further work-up/treatment and cardiology evaluation:        Acute on chronic systolic HFrEF exacerbation:   -Acutely decompensated. presented with SOB; BNP 16K. CXR unremarkable. CTA nonacute.    -5/3/2022 repeat TTE with known low EF of 30 to 35%. S/p IV lasix    -Improving, concern for over diuresis. On room air   - Hold Lasix/Lisinopril worsening Scr 1.5 (1.3) Repeat BMP in am  -Monitor volume status   -Cardiology following      Acute kidney injury:   -baseline around 0.8-1  -Had MELANY previously in 2022 thought to be secondary to lasix use  -Worsening Scr 1.5 (1.3) Likely related to diuretic. BMP in am  - Hold nephrotoxic agents (home lasix/lisinopril). - monitor volume status    Electrolyte abnormalities:   Hyponatremia:  Hypocalcemia:  -Monitor labs/trends   -Replete as needed     UTI: On IV ceftriaxone; started 5/3.    -Urine culture negative, discontinue Ceftriaxone        ABD pain: with constipation. -KUB nonobstructive gas pattern/colonic stool-Continue bowel regimen, added Senna     Nonrheumatic mitral valve regurgitation: Medical management     DVT/PE: per hx. Follows with hematology. Continue home Xarelto     CAD: per hx. On ASA, BB continue ASA     Essential hypertension: per hx. Cont home BB.  -Hold home Lisinopril due to worsening MELANY      DM II: per hx. Hgb A1c 6%. On SSI.   Monitor blood sugar and titrate PRN -Resume Lantus, decrease to 10 units nightly. Hgb A1c %, blood sugar stable   -Recommend follow up with PCP 1 week after discharge      Chronic conditions:  · Schizophrenia  · Hepatitis C      Diet ADULT DIET; Regular; Low Sodium (2 gm)   DVT Prophylaxis [] Lovenox, []  Heparin, [] SCDs, [] Ambulation,  [] Eliquis, [x] Xarelto  [] Coumadin   Code Status Full Code   Disposition From: Home  Expected Disposition: Home   Estimated Date of Discharge: 5/6  Patient requires continued admission due to: worsening Scr   Surrogate Decision Maker/ POA Patient      Subjective:     Chief Complaint: Shortness of Breath (reports was hospitalized for same in march )     She complains of mild abdominal tenderness with palpation, intermittent nausea. She denies headache, emesis, dysuria. + cough, but states she is trying to quit smoking. + shortness of breath with activity. Review of Systems:    Review of Systems   Constitutional: Negative. HENT: Negative. Eyes: Negative. Respiratory: Positive for cough and shortness of breath. Cardiovascular: Negative. Gastrointestinal: Positive for abdominal pain and nausea. Endocrine: Negative. Genitourinary: Negative. Musculoskeletal: Negative. Skin: Negative. Allergic/Immunologic: Negative. Neurological: Negative. Hematological: Negative. Psychiatric/Behavioral: Negative. Objective:   No intake or output data in the 24 hours ending 05/05/22 0900     Vitals:   Vitals:    05/05/22 0823   BP: (!) 125/92   Pulse: 79   Resp:    Temp:    SpO2:        Physical Exam:     General: Resting in bed in no acute distress. Eyes: EOMI  ENT: neck supple  Cardiovascular: Regular rate. Respiratory: LLE rhonchi, CTA anteriorly, RLL  Gastrointestinal: Soft, mild tenderness to palpation, + intermittent nausea   Genitourinary: no suprapubic tenderness  Musculoskeletal: No edema  Skin: warm, dry  Neuro: Alert. Psych: Mood appropriate.      Medications: Medications:    docusate sodium  100 mg Oral BID    furosemide  20 mg Oral Daily    cefTRIAXone (ROCEPHIN) IV  1,000 mg IntraVENous Q24H    aspirin  81 mg Oral Daily    [Held by provider] insulin glargine  40 Units SubCUTAneous Nightly    rivaroxaban  20 mg Oral Dinner    sodium chloride flush  5-40 mL IntraVENous 2 times per day    insulin lispro  0-6 Units SubCUTAneous TID WC    insulin lispro  0-3 Units SubCUTAneous Nightly    lisinopril  5 mg Oral Daily    metoprolol succinate  50 mg Oral Daily      Infusions:    sodium chloride       PRN Meds: sodium chloride flush, 10 mL, PRN  sodium chloride, , PRN  ondansetron, 4 mg, Q8H PRN   Or  ondansetron, 4 mg, Q6H PRN  polyethylene glycol, 17 g, Daily PRN  nicotine polacrilex, 2 mg, Q1H PRN  acetaminophen, 650 mg, Q6H PRN   Or  acetaminophen, 650 mg, Q6H PRN  HYDROcodone-acetaminophen, 1 tablet, Q6H PRN      Labs      Recent Results (from the past 24 hour(s))   POCT Glucose    Collection Time: 05/04/22 12:34 PM   Result Value Ref Range    POC Glucose 168 (H) 70 - 99 MG/DL   POCT Glucose    Collection Time: 05/04/22  4:22 PM   Result Value Ref Range    POC Glucose 190 (H) 70 - 99 MG/DL   POCT Glucose    Collection Time: 05/04/22  8:02 PM   Result Value Ref Range    POC Glucose 138 (H) 70 - 99 MG/DL   Basic Metabolic Panel w/ Reflex to MG    Collection Time: 05/05/22  3:20 AM   Result Value Ref Range    Sodium 131 (L) 135 - 145 MMOL/L    Potassium 4.0 3.5 - 5.1 MMOL/L    Chloride 93 (L) 99 - 110 mMol/L    CO2 25 21 - 32 MMOL/L    Anion Gap 13 4 - 16    BUN 21 6 - 23 MG/DL    CREATININE 1.5 (H) 0.6 - 1.1 MG/DL    Glucose 197 (H) 70 - 99 MG/DL    Calcium 7.7 (L) 8.3 - 10.6 MG/DL    GFR Non- 36 (L) >60 mL/min/1.73m2    GFR  43 (L) >60 mL/min/1.73m2   POCT Glucose    Collection Time: 05/05/22  8:22 AM   Result Value Ref Range    POC Glucose 126 (H) 70 - 99 MG/DL        Imaging/Diagnostics Last 24 Hours   XR ABDOMEN (KUB) (SINGLE AP VIEW)    Result Date: 5/5/2022  EXAMINATION: ONE SUPINE XRAY VIEW(S) OF THE ABDOMEN 5/4/2022 3:56 pm COMPARISON: CT abdomen/pelvis performed 3/22/2022 HISTORY: ORDERING SYSTEM PROVIDED HISTORY: ABD pain; constipation TECHNOLOGIST PROVIDED HISTORY: Reason for exam:->ABD pain; constipation Reason for Exam: constipation FINDINGS: Nonobstructive bowel gas pattern. Scattered gas is noted throughout the small and large bowel. Mild scattered colonic stool demonstrated. No significant rectal stool. Right upper quadrant surgical clips. No acute osseous abnormality. 1. Nonobstructive bowel gas pattern. 2. Mild scattered colonic stool.        Electronically signed by Claudette Frederic, APRN - CNP on 5/5/2022 at 9:00 AM

## 2022-05-05 NOTE — PROGRESS NOTES
Today's plan: Patient feels better can be discharged, and will add  Aldactone 25 mg p.o. daily continue lisinopril metoprolol      Admit Date:  5/2/2022    Subjective: Better      Chief complaints on admission  Chief Complaint   Patient presents with    Shortness of Breath     reports was hospitalized for same in march          History of present illness:Deb is a 61 y. o.year old who  presents with had concerns including Shortness of Breath (reports was hospitalized for same in march ). Past medical history:    has a past medical history of CAD (coronary artery disease), Diabetes mellitus ([facility] [address] Hepatitis C, and Schizophrenia ([facility] [address] Past surgical history:   has a past surgical history that includes Hysterectomy and Cholecystectomy. Social History:   reports that she has been smoking cigars. She has been smoking about 3.00 packs per day. She has never used smokeless tobacco. She reports current alcohol use. She reports previous drug use. Drug: Marijuana Ellie Kales). Family history:  family history includes Cancer in her father; No Known Problems in her mother. Allergies   Allergen Reactions    Haldol [Haloperidol Lactate] Other (See Comments)     Unknown, severe reaction per mother    Penicillins          Objective:   BP (!) 125/92   Pulse 79   Temp 97.7 °F (36.5 °C) (Oral)   Resp 18   Ht 5' 2\" (1.575 m)   Wt 124 lb 9 oz (56.5 kg)   SpO2 95%   BMI 22.78 kg/m²       Intake/Output Summary (Last 24 hours) at 5/5/2022 1259  Last data filed at 5/5/2022 0934  Gross per 24 hour   Intake 200 ml   Output --   Net 200 ml       TELEMETRY: Sinus     Physical Exam:  Constitutional:  Well developed, Well nourished, No acute distress, Non-toxic appearance. HENT:  Normocephalic, Atraumatic, Bilateral external ears normal, Oropharynx moist, No oral exudates, Nose normal. Neck- Normal range of motion, No tenderness, Supple, No stridor. Eyes:  PERRL, EOMI, Conjunctiva normal, No discharge.    Respiratory: Normal breath sounds, No respiratory distress, No wheezing, No chest tenderness. ,no use of accessory muscles, diaphragm movement is normal  Cardiovascular: (PMI) apex non displaced,no lifts no thrills, no s3,no s4, Normal heart rate, Normal rhythm, No murmurs, No rubs, No gallops. Carotid arteries pulse and amplitude are normal no bruit, no abdominal bruit noted ( normal abdominal aorta ausculation), femoral arteries pulse and amplitude are normal no bruit, pedal pulses are normal  GI:  Bowel sounds normal, Soft, No tenderness, No masses, No pulsatile masses. : External genitalia appear normal, No masses or lesions. No discharge. No CVA tenderness. Musculoskeletal:  Intact distal pulses, No edema, No tenderness, No cyanosis, No clubbing. Good range of motion in all major joints. No tenderness to palpation or major deformities noted. Back- No tenderness. Integument:  Warm, Dry, No erythema, No rash. Lymphatic:  No lymphadenopathy noted. Neurologic:  Alert & oriented x 3, Normal motor function, Normal sensory function, No focal deficits noted.    Psychiatric:  Affect normal, Judgment normal, Mood normal.     Medications:    insulin glargine  10 Units SubCUTAneous Nightly    docusate sodium  100 mg Oral BID    [Held by provider] furosemide  20 mg Oral Daily    aspirin  81 mg Oral Daily    rivaroxaban  20 mg Oral Dinner    sodium chloride flush  5-40 mL IntraVENous 2 times per day    insulin lispro  0-6 Units SubCUTAneous TID WC    insulin lispro  0-3 Units SubCUTAneous Nightly    [Held by provider] lisinopril  5 mg Oral Daily    metoprolol succinate  50 mg Oral Daily      dextrose      sodium chloride       glucagon (rDNA), dextrose, glucose, dextrose bolus **OR** dextrose bolus, sodium chloride flush, sodium chloride, ondansetron **OR** ondansetron, polyethylene glycol, nicotine polacrilex, acetaminophen **OR** acetaminophen, HYDROcodone-acetaminophen    Lab Data:  CBC:   No results for input(s): WBC, HGB, HCT, MCV, PLT in the last 72 hours. BMP:   Recent Labs     05/03/22  0213 05/04/22  0341 05/05/22  0320    131* 131*   K 4.6 4.6 4.0   CL 96* 94* 93*   CO2 30 28 25   BUN 17 17 21   CREATININE 1.3* 1.3* 1.5*     LIVER PROFILE:   Recent Labs     05/02/22  1435   AST 19   ALT 16   BILITOT 0.5   ALKPHOS 139*     PT/INR: No results for input(s): PROTIME, INR in the last 72 hours. APTT: No results for input(s): APTT in the last 72 hours. BNP:  No results for input(s): BNP in the last 72 hours. TROPONIN: @TROPONINI:3@      Assessment:  61 y. o.year old who is admitted for          Plan:  1. Shortness of breath, most likely combination of nonischemic cardiomyopathy and COPD, recommend to stop smoking she also has a severe eccentric mitral regurgitation and moderate tricuspid regurgitation continue IV Lasix at this time  2. Is nonischemic cardiomyopathy, continue lisinopril continue metoprolol continue Lasix  3. History of DVT and PE continue Xarelt  4. Renal failure resolved\  5. Hypertension stable continue stable on Lopressor    All labs, medications and tests reviewed, continue all other medications of all above medical condition listed as is.       Claudine Sunshine MD, MD 5/5/2022 12:59 PM

## 2022-05-06 VITALS
HEART RATE: 87 BPM | WEIGHT: 125 LBS | DIASTOLIC BLOOD PRESSURE: 82 MMHG | HEIGHT: 62 IN | RESPIRATION RATE: 25 BRPM | BODY MASS INDEX: 23 KG/M2 | OXYGEN SATURATION: 95 % | SYSTOLIC BLOOD PRESSURE: 125 MMHG | TEMPERATURE: 98 F

## 2022-05-06 LAB
ANION GAP SERPL CALCULATED.3IONS-SCNC: 11 MMOL/L (ref 4–16)
BUN BLDV-MCNC: 21 MG/DL (ref 6–23)
CALCIUM IONIZED: 4.36 MG/DL (ref 4.48–5.28)
CALCIUM SERPL-MCNC: 8 MG/DL (ref 8.3–10.6)
CHLORIDE BLD-SCNC: 103 MMOL/L (ref 99–110)
CO2: 26 MMOL/L (ref 21–32)
CREAT SERPL-MCNC: 1.4 MG/DL (ref 0.6–1.1)
GFR AFRICAN AMERICAN: 47 ML/MIN/1.73M2
GFR NON-AFRICAN AMERICAN: 39 ML/MIN/1.73M2
GLUCOSE BLD-MCNC: 151 MG/DL (ref 70–99)
GLUCOSE BLD-MCNC: 157 MG/DL (ref 70–99)
GLUCOSE BLD-MCNC: 388 MG/DL (ref 70–99)
IONIZED CA: 1.09 MMOL/L (ref 1.12–1.32)
POTASSIUM SERPL-SCNC: 4.1 MMOL/L (ref 3.5–5.1)
SODIUM BLD-SCNC: 140 MMOL/L (ref 135–145)

## 2022-05-06 PROCEDURE — 6370000000 HC RX 637 (ALT 250 FOR IP): Performed by: NURSE PRACTITIONER

## 2022-05-06 PROCEDURE — 6370000000 HC RX 637 (ALT 250 FOR IP): Performed by: HOSPITALIST

## 2022-05-06 PROCEDURE — 6370000000 HC RX 637 (ALT 250 FOR IP): Performed by: INTERNAL MEDICINE

## 2022-05-06 PROCEDURE — 82330 ASSAY OF CALCIUM: CPT

## 2022-05-06 PROCEDURE — G0378 HOSPITAL OBSERVATION PER HR: HCPCS

## 2022-05-06 PROCEDURE — 82962 GLUCOSE BLOOD TEST: CPT

## 2022-05-06 PROCEDURE — A4216 STERILE WATER/SALINE, 10 ML: HCPCS | Performed by: NURSE PRACTITIONER

## 2022-05-06 PROCEDURE — 80048 BASIC METABOLIC PNL TOTAL CA: CPT

## 2022-05-06 PROCEDURE — 94761 N-INVAS EAR/PLS OXIMETRY MLT: CPT

## 2022-05-06 PROCEDURE — 36415 COLL VENOUS BLD VENIPUNCTURE: CPT

## 2022-05-06 PROCEDURE — 6370000000 HC RX 637 (ALT 250 FOR IP): Performed by: FAMILY MEDICINE

## 2022-05-06 PROCEDURE — 2580000003 HC RX 258: Performed by: NURSE PRACTITIONER

## 2022-05-06 RX ORDER — TRAZODONE HYDROCHLORIDE 50 MG/1
50 TABLET ORAL NIGHTLY PRN
Qty: 7 TABLET | Refills: 0 | Status: SHIPPED | OUTPATIENT
Start: 2022-05-06 | End: 2022-05-19

## 2022-05-06 RX ORDER — SPIRONOLACTONE 25 MG/1
25 TABLET ORAL DAILY
Qty: 30 TABLET | Refills: 3 | Status: SHIPPED | OUTPATIENT
Start: 2022-05-07

## 2022-05-06 RX ORDER — HYDROCODONE BITARTRATE AND ACETAMINOPHEN 5; 325 MG/1; MG/1
1 TABLET ORAL EVERY 6 HOURS PRN
Qty: 12 TABLET | Refills: 0 | Status: SHIPPED | OUTPATIENT
Start: 2022-05-06 | End: 2022-05-06

## 2022-05-06 RX ORDER — LISINOPRIL 5 MG/1
2.5 TABLET ORAL DAILY
Qty: 45 TABLET | Refills: 1 | Status: SHIPPED | OUTPATIENT
Start: 2022-05-06 | End: 2022-05-19 | Stop reason: SDUPTHER

## 2022-05-06 RX ORDER — INSULIN GLARGINE 100 [IU]/ML
10 INJECTION, SOLUTION SUBCUTANEOUS NIGHTLY
Qty: 10 ML | Refills: 3 | Status: SHIPPED | OUTPATIENT
Start: 2022-05-06 | End: 2022-05-19

## 2022-05-06 RX ORDER — FUROSEMIDE 20 MG/1
20 TABLET ORAL SEE ADMIN INSTRUCTIONS
Qty: 30 TABLET | Refills: 1 | Status: SHIPPED | OUTPATIENT
Start: 2022-05-06

## 2022-05-06 RX ORDER — HYDROCODONE BITARTRATE AND ACETAMINOPHEN 5; 325 MG/1; MG/1
1 TABLET ORAL EVERY 6 HOURS PRN
Qty: 12 TABLET | Refills: 0 | Status: SHIPPED | OUTPATIENT
Start: 2022-05-06 | End: 2022-05-09

## 2022-05-06 RX ADMIN — DOCUSATE SODIUM 100 MG: 100 CAPSULE, LIQUID FILLED ORAL at 08:39

## 2022-05-06 RX ADMIN — INSULIN LISPRO 5 UNITS: 100 INJECTION, SOLUTION INTRAVENOUS; SUBCUTANEOUS at 08:40

## 2022-05-06 RX ADMIN — SODIUM CHLORIDE, PRESERVATIVE FREE 10 ML: 5 INJECTION INTRAVENOUS at 08:39

## 2022-05-06 RX ADMIN — METOPROLOL SUCCINATE 50 MG: 50 TABLET, EXTENDED RELEASE ORAL at 08:39

## 2022-05-06 RX ADMIN — ASPIRIN 81 MG 81 MG: 81 TABLET ORAL at 08:39

## 2022-05-06 RX ADMIN — SPIRONOLACTONE 25 MG: 50 TABLET ORAL at 08:39

## 2022-05-06 NOTE — PROGRESS NOTES
Outpatient Pharmacy Progress Note for Meds-to-Beds    Total number of Prescriptions Filled: 8  The following medications were dispensed to the patient during the discharge process:   Trazodone 50mg   Alcohol swabs   Lisinopril 5mg   Pen needles   Basaglar Kwikpen   Hydrocodone-acetaminophen 5-325mg    Additional Documentation:   Medication(s) were delivered to the patient's room prior to discharge      Thank you for letting us serve your patients.   [address] [facility]    [address] [facility]    Phone: [phone]    Fax: [phone]

## 2022-05-06 NOTE — DISCHARGE SUMMARY
V2.0  Discharge Summary    Name:  Alicia Everett /Age/Sex: 1965 (79 y.o. female)   Admit Date: 2022  Discharge Date: 22    MRN & CSN:  [id number] & [id number] Encounter Date and Time 22 10:00 AM EDT    Attending:  Louisa Morales MD Discharging Provider: YNES Reid - [facility] Course:     Brief HPI: Leanna Peñaloza a 61 y. o. female with PMH CHF, chronic kidney disease, nonrheumatic mitral valve regurgitation, history of DVT/PE, CAD, HTN, nicotine abuse and diabetes presented to [facility] on 2022 with complaints of shortness of breath.  She was hospitalized for further work-up/treatment and cardiology evaluation:        Acute on chronic systolic HFrEF exacerbation:   -Acutely decompensated. presented with SOB; BNP 16K.  CXR unremarkable.  CTA nonacute.    -5/3/2022 repeat TTE with known low EF of 30 to 35%. S/p IV lasix    -Improving, concern for over diuresis. On room air   -Hold Lasix/Lisinopril worsening Scr 1.5, repeat  1.4 improving   -Cardiology consulted with recommendations for Aldactone/Lasix daily  -Decreased Lisinopril 2.5 mg daily, blood pressure trends stable and MELANY, improving. Will require BMP in one week to monitor kidney function  -Patient instructed to continue aldactone, BB, and decreased lisinopril dose 2.5 mg daily. Instructed to monitor weight daily, if increases by 2-3 pounds a day or 5 pounds in a week to take a Lasix.  Detailed instructions were included in discharge instructions  -Follow up with Cardiology in 1 month       Acute kidney injury:   -baseline around 0.8-1  -Had MELANY previously in 2022 thought to be secondary to lasix use  -Worsening Scr 1.5 (1.3) Likely related to diuretic/overdiuresis; improving 1.4 today  -Lisinopril dose decreased to 2.5 mg daily  - Follow up with PCP/BMP in 1 week       Electrolyte imbalance:   Hyponatremia:  Hypocalcemia:  -Monitor labs/trends   -Replete as needed  -improved      UTI: On IV ceftriaxone; started 5/3.    -Urine culture negative, discontinue Ceftriaxone   -No urinary symptoms       ABD pain: with constipation.    -KUB nonobstructive gas pattern/colonic stool; continue bowel regimen   -Vicodin PRN for abdominal pain x 3 days PRN     Insomnia:  -Prescription Trazodone 50 mg nightly x1 week upon discharge  -Follow up to discuss further treatment options with PCP         Nonrheumatic mitral valve regurgitation: Medical management     DVT/PE: per hx. Follows with hematology.  Continue home Xarelto     CAD: per hx. On ASA, BB      Essential hypertension: per hx. Cont home BB.  -Lisinopril dose decreased to 2.5 mg daily      DM II: per hx. Hgb A1c 6%.  On SSI.  Monitor blood sugar and titrate PRN   -Resume Lantus, decrease to 10 units nightly. Hgb A1c 6%, blood sugar stable  -Blood sugar 388 this am obtained after ate breakfast. Blood sugar improved 151 this afternoon.   -Lantus dose decreased to 10 units daily.   -Recommend follow up with PCP 1 week after discharge      Chronic conditions:  · Schizophrenia  · Hepatitis C    Detailed instructions regarding monitoring weight with administering PRN lasix if weight increases more than 2-3 pounds in a day or 5 pounds in a week. Serum creatinine improving 1.4, instructed to obtain BMP and follow up with PCP within a week. Will need close follow up for diabetes management, decreased Lantus dose to 10 units daily; Hgb A1c 6%. Dietary instructions provided at discharge. A three day PRN prescription for Vicodin was prescribed for abdominal pain, instructed to discuss with PCP if persists. Follow up in 1 month or sooner if experiencing new symptoms with Cardiology. The patient expressed appropriate understanding of, and agreement with the discharge recommendations, medications, and plan.      Consults this admission:  IP CONSULT TO HOSPITALIST  IP CONSULT TO CARDIOLOGY  IP CONSULT TO IV TEAM    Discharge Diagnosis:   Acute on chronic systolic BENTYL  Take 1 capsule by mouth 4 times daily as needed (Abdominal cramping, diarrhea)     insulin lispro 100 UNIT/ML injection vial  Commonly known as: HUMALOG  Inject 10 Units into the skin 3 times daily (with meals)     metoprolol succinate 50 MG extended release tablet  Commonly known as: TOPROL XL  Take 1 tablet by mouth daily        STOP taking these medications    ondansetron 4 MG disintegrating tablet  Commonly known as: ZOFRAN-ODT     QUEtiapine 400 MG tablet  Commonly known as: SEROQUEL           Where to Get Your Medications      These medications were sent to [address] [place] [address] 22, [facility]    Phone: [phone]   · furosemide 20 MG tablet  · HYDROcodone-acetaminophen 5-325 MG per tablet  · insulin glargine 100 UNIT/ML injection vial  · lisinopril 5 MG tablet  · spironolactone 25 MG tablet        Objective Findings at Discharge:   /82   Pulse 87   Temp 98 °F (36.7 °C) (Oral)   Resp 25   Ht 5' 2\" (1.575 m)   Wt 125 lb (56.7 kg)   SpO2 95%   BMI 22.86 kg/m²       Physical Exam:   General: Sitting in bed in no acute distress   Eyes: EOMI  ENT: neck supple  Cardiovascular: Regular rate. Respiratory: Clear to auscultation, on room air   Gastrointestinal: Soft, non tender to slight palpation   Genitourinary: no suprapubic tenderness  Musculoskeletal: No edema  Skin: warm, dry  Neuro: Alert. Psych: Mood appropriate.          Labs and Imaging   Echocardiogram limited    Result Date: 5/3/2022  Transthoracic Echocardiography Report (TTE)  Demographics   Patient Name      Aditi PEMBERTON  Date of Study       05/03/2022   Date of Birth     1965          Gender              Female   Age               61 year(s)          Race                Black   Patient Number    [id number]          Room Number         3550   Visit Number      [id number]   Corporate ID      [id number] Accession Number  [id number]          [address] [place] [address]   Ordering          Zaria Galloway ALLYSSA         Interpreting        Charles Ramey  Physician                             Physician           Yaron Sandoval MD  Procedure Type of Study   TTE procedure:ECHOCARDIOGRAM LIMITED. Procedure Date Date: 05/03/2022 Start: 08:50 AM Study Location: Portable Technical Quality: Adequate visualization Indications:Congestive heart failure. Patient Status: Routine Height: 62 inches Weight: 123 pounds BSA: 1.55 m2 BMI: 22.5 kg/m2 HR: 86 bpm BP: 157/115 mmHg  Conclusions   Summary  This is a limited echocardiogram.  Left ventricular systolic function is abnormal.  Ejection fraction is visually estimated at 30-35%. Global hypokinesis noted. Moderate bilateral atrial enlargement. Severe mitral regurgitation; (ERO 0.42 cm sq, Regurgitant volume 71 ml). Severe tricuspid regurgitation; RVSP: 61 mmHg. Mild to moderate pulmonic regurgitation present. No evidence of any pericardial effusion. Bilateral pleural effusion. Signature   ------------------------------------------------------------------  Electronically signed by Harsh Chun MD  (Interpreting physician) on 05/03/2022 at 06:01 PM  ------------------------------------------------------------------   Findings   Left Ventricle  Left ventricular systolic function is abnormal.  Ejection fraction is visually estimated at 30-35%. Global hypokinesis noted. Left Atrium  Moderately dilated left atrium. Right Atrium  Moderately dilated right atrium. Right Ventricle  Essentially normal right ventricle. Aortic Valve  Structurally normal aortic valve. Mitral Valve  Severe mitral regurgitation; (ERO 0.42 cm sq, Regurgitant volume 71 ml). Tricuspid Valve  Severe tricuspid regurgitation; RVSP: 61 mmHg. Pulmonic Valve  Mild to moderate pulmonic regurgitation present.    Pericardial Effusion  No evidence of any pericardial effusion. Pleural Effusion  Bilateral pleural effusion. Miscellaneous  The aorta is within normal limits. M-Mode/2D Measurements & Calculations   LV Diastolic Dimension:  LV Systolic Dimension:  LA Dimension: 3.6 cmLA  4.53 cm                  3.73 cm                 Area: 19.2 cm2  LV FS:17.7 %             LV Volume Diastolic: 56  LV PW Diastolic: 3.92 cm ml  LV PW Systolic: 7.16 cm  LV Volume Systolic: 38  Septum Diastolic: 3.24   ml                      RV Diastolic Dimension:  cm                       LV EDV/LV EDV Index: 56 2.61 cm  Septum Systolic: 2.89 cm IZ/07 [id number] ESV/LV ESV                           Index: 38 ml/25 m2                           EF Calculated (A4C):    LA volume/Index: 56 ml  LV Area Diastolic: 56.5  87.7 %                  /36m2  cm2                      EF Calculated (2D):  LV Area Systolic: 40.6   06.4 %  cm2                           LV Length: 5.95 cm  Doppler Measurements & Calculations                                                        Estimated RVSP: 61 mmHg                                                       Estimated RAP:8 mmHg   MV Area (PISA): 0.21 cm2                              Estimated PASP: 52.89    TR Velocity:335 cm/s                              mmHg                     TR Gradient:44.89 mmHg   MR Velocity: 572 cm/s  MV ARCELIA PISA: 0.21 cm2  MR VTI: 171 cm  Alias Velocity: 38.5  cm/sPISA Radius: 0.7 cm  MV ARCELIA Volumetric: 0.21 cm2  PISA area: 3.08 cm2MR flow  rate: 118.58 ml/sMR  volume:35.91 ml      XR ABDOMEN (KUB) (SINGLE AP VIEW)    Result Date: 5/5/2022  EXAMINATION: ONE SUPINE XRAY VIEW(S) OF THE ABDOMEN 5/4/2022 3:56 pm COMPARISON: CT abdomen/pelvis performed 3/22/2022 HISTORY: ORDERING SYSTEM PROVIDED HISTORY: ABD pain; constipation TECHNOLOGIST PROVIDED HISTORY: Reason for exam:->ABD pain; constipation Reason for Exam: constipation FINDINGS: Nonobstructive bowel gas pattern.   Scattered gas is noted throughout the small and large bowel. Mild scattered colonic stool demonstrated. No significant rectal stool. Right upper quadrant surgical clips. No acute osseous abnormality. 1. Nonobstructive bowel gas pattern. 2. Mild scattered colonic stool. XR CHEST PORTABLE    Result Date: 5/2/2022  EXAMINATION: ONE XRAY VIEW OF THE CHEST 5/2/2022 11:33 am COMPARISON: 03/15/2022. HISTORY: ORDERING SYSTEM PROVIDED HISTORY: SOB TECHNOLOGIST PROVIDED HISTORY: Reason for exam:->SOB Reason for Exam: sob FINDINGS: The heart size is within normal limits. The pulmonary vasculature is also within normal limits. No acute infiltrates are seen. The costophrenic angles are sharp bilaterally. No pneumothoraces are noted. 1. No active pulmonary disease. CTA PULMONARY W CONTRAST    Result Date: 5/2/2022  EXAMINATION: CTA OF THE CHEST 5/2/2022 3:58 pm TECHNIQUE: CTA of the chest was performed after the administration of intravenous contrast.  Multiplanar reformatted images are provided for review. MIP images are provided for review. Dose modulation, iterative reconstruction, and/or weight based adjustment of the mA/kV was utilized to reduce the radiation dose to as low as reasonably achievable. COMPARISON: None. HISTORY: ORDERING SYSTEM PROVIDED HISTORY: known DVT; ?non adherent to xarelto TECHNOLOGIST PROVIDED HISTORY: Reason for exam:->known DVT; ?non adherent to xarelto Decision Support Exception - unselect if not a suspected or confirmed emergency medical condition->Emergency Medical Condition (MA) Reason for Exam: known DVT; ?non adherent to xarelto Relevant Medical/Surgical History: 75 ml isovue 370  lot [id number] FINDINGS: Pulmonary Arteries: Pulmonary arteries are adequately opacified for evaluation. No evidence of intraluminal filling defect to suggest pulmonary embolism. Main pulmonary artery is normal in caliber. Mediastinum: No evidence of mediastinal lymphadenopathy.   The heart and pericardium demonstrate no acute abnormality. There is no acute abnormality of the thoracic aorta. There is no pericardial effusion. Mild enlargement the cardiac chambers. Lungs/pleura: There is to moderate size bilateral pleural effusions. There is some atelectatic changes in lung bases with patchy ground-glass opacity seen in the upper lobes bilaterally suggesting possible edema or infiltrate. Upper Abdomen: Limited images of the upper abdomen are unremarkable. Soft Tissues/Bones: No acute bone or soft tissue abnormality. No evidence of pulmonary embolism. Small to moderate size bilateral pleural effusions with ground-glass opacity identified in the upper lung fields left greater than right suggesting either edema or infiltrate. CBC: No results for input(s): WBC, HGB, PLT in the last 72 hours. BMP:    Recent Labs     05/04/22  0341 05/05/22  0320 05/06/22  0302   * 131* 140   K 4.6 4.0 4.1   CL 94* 93* 103   CO2 28 25 26   BUN 17 21 21   CREATININE 1.3* 1.5* 1.4*   GLUCOSE 192* 197* 157*     Hepatic: No results for input(s): AST, ALT, ALB, BILITOT, ALKPHOS in the last 72 hours. Lipids:   Lab Results   Component Value Date    CHOL 161 03/12/2021    HDL 72 03/12/2021    TRIG 163 03/12/2021     Hemoglobin A1C:   Lab Results   Component Value Date    LABA1C 6.0 05/03/2022     TSH: No results found for: TSH  Troponin:   Lab Results   Component Value Date    TROPONINT <0.010 05/02/2022    TROPONINT <0.010 03/16/2022    TROPONINT <0.010 03/15/2022     Lactic Acid: No results for input(s): LACTA in the last 72 hours. BNP: No results for input(s): PROBNP in the last 72 hours.   UA:  Lab Results   Component Value Date    NITRU NEGATIVE 05/03/2022    COLORU YELLOW 05/03/2022    WBCUA 240 05/03/2022    RBCUA 59 05/03/2022    MUCUS RARE 03/15/2022    TRICHOMONAS NONE SEEN 05/03/2022    YEAST RARE 03/15/2022    BACTERIA NEGATIVE 05/03/2022    CLARITYU CLOUDY 05/03/2022    SPECGRAV 1.015 05/03/2022    LEUKOCYTESUR SMALL 05/03/2022 UROBILINOGEN 0.2 05/03/2022    BILIRUBINUR NEGATIVE 05/03/2022    BLOODU SMALL 05/03/2022    KETUA NEGATIVE 05/03/2022     Urine Cultures: No results found for: LABURIN  Blood Cultures: No results found for: BC  No results found for: BLOODCULT2  Organism: No results found for: ORG    Time Spent Discharging patient 35 minutes    Electronically signed by YNES Perdomo CNP on 5/6/2022 at 10:00 AM

## 2022-05-19 ENCOUNTER — OFFICE VISIT (OUTPATIENT)
Dept: CARDIOLOGY CLINIC | Age: 57
End: 2022-05-19
Payer: COMMERCIAL

## 2022-05-19 VITALS
HEIGHT: 62 IN | DIASTOLIC BLOOD PRESSURE: 76 MMHG | SYSTOLIC BLOOD PRESSURE: 138 MMHG | HEART RATE: 70 BPM | WEIGHT: 122.4 LBS | OXYGEN SATURATION: 97 % | BODY MASS INDEX: 22.52 KG/M2

## 2022-05-19 DIAGNOSIS — I42.8 NICM (NONISCHEMIC CARDIOMYOPATHY) (HCC): ICD-10-CM

## 2022-05-19 DIAGNOSIS — I10 PRIMARY HYPERTENSION: ICD-10-CM

## 2022-05-19 DIAGNOSIS — I34.0 NONRHEUMATIC MITRAL VALVE REGURGITATION: ICD-10-CM

## 2022-05-19 DIAGNOSIS — I50.23 ACUTE ON CHRONIC SYSTOLIC CONGESTIVE HEART FAILURE (HCC): ICD-10-CM

## 2022-05-19 DIAGNOSIS — I26.99 OTHER PULMONARY EMBOLISM WITHOUT ACUTE COR PULMONALE, UNSPECIFIED CHRONICITY (HCC): ICD-10-CM

## 2022-05-19 DIAGNOSIS — I82.413 ACUTE BILATERAL DEEP VEIN THROMBOSIS (DVT) OF FEMORAL VEINS (HCC): Primary | ICD-10-CM

## 2022-05-19 PROCEDURE — G8420 CALC BMI NORM PARAMETERS: HCPCS | Performed by: NURSE PRACTITIONER

## 2022-05-19 PROCEDURE — G8427 DOCREV CUR MEDS BY ELIG CLIN: HCPCS | Performed by: NURSE PRACTITIONER

## 2022-05-19 PROCEDURE — 3017F COLORECTAL CA SCREEN DOC REV: CPT | Performed by: NURSE PRACTITIONER

## 2022-05-19 PROCEDURE — 4004F PT TOBACCO SCREEN RCVD TLK: CPT | Performed by: NURSE PRACTITIONER

## 2022-05-19 PROCEDURE — 99214 OFFICE O/P EST MOD 30 MIN: CPT | Performed by: NURSE PRACTITIONER

## 2022-05-19 RX ORDER — BLOOD PRESSURE TEST KIT
1 KIT MISCELLANEOUS DAILY
Qty: 1 KIT | Refills: 0 | Status: SHIPPED | OUTPATIENT
Start: 2022-05-19 | End: 2022-09-19 | Stop reason: SDUPTHER

## 2022-05-19 RX ORDER — METOPROLOL SUCCINATE 50 MG/1
50 TABLET, EXTENDED RELEASE ORAL DAILY
Qty: 30 TABLET | Refills: 3 | Status: SHIPPED | OUTPATIENT
Start: 2022-05-19 | End: 2022-09-12

## 2022-05-19 RX ORDER — LISINOPRIL 10 MG/1
10 TABLET ORAL DAILY
Qty: 30 TABLET | Refills: 5 | Status: SHIPPED | OUTPATIENT
Start: 2022-05-19 | End: 2022-09-19

## 2022-05-19 ASSESSMENT — ENCOUNTER SYMPTOMS
SHORTNESS OF BREATH: 0
COUGH: 0

## 2022-05-19 NOTE — PROGRESS NOTES
Miriam Montes [address]  Phone: [phone]    Fax [phone]    Felipa Bravo MD, Saranya Pepe MD, [address] [facility], MD, MD Mounika Sawyer MD Nelva Bramble, MD Micheal Lunger, MD Jolynn Mendez, APRNICO Mosley, APRNICO Stone, APRN Fredy Cabot, APRN  Elisa Noonan PA-C     CARDIOLOGY  NOTE    2022    Neel Jordan (:  1965) is a 61 y.o. female,an established patient with Dr. Skye Jauregui, here for evaluation of the following chief complaint(s):  1 Month Follow-Up (Pt denies cardiac symptoms)        SUBJECTIVE/OBJECTIVE:    Geetha Patel is a 61y.o. year old who has Past medical history as noted below. She was in the hospital recently with fluid on er lungs. She states that she was so bad that there was no way she could not go to the hospital. She was swelling in the ankles and she was very short of breath. She just started weighing herself daily. Geetha Patel was admitted in early 2022 with severe shortness of breath she has history of schizophrenia and hepatitis C and was found to have bilateral lower extremity swelling work-up revealed bilateral extensive DVT of both lower extremity she was evaluated by hematology oncology and currently hypercoagulable work-up is in progress. Her echo shows severe cardiomyopathy  With EF of 3035% and hypokinesis of the anteroseptal wall with eccentric severe  mitral regurgitation. Cardiac cath revealed no significant obstructive coronary artery disease she was started on guideline recommended medical therapy but because of low blood pressures she had difficulty tolerating them. She is feeling much better now. Currently taking 20 mg of Lasix and Xarelto 15 mg twice a day  She says she smokes several cigars a day but is trying to cut down. She is down to 2-3 cigaretts per day and 2 cigars per day. She is trying.        Review of Systems 57F PMHx Morbid Obesity, DM, COPD, PUSHPA, Provoked RLL PE,  R kidney staghorn calculus, Hydronephrosis / Hydroureter, ?unstageable R sacral ulcer, UTI presnted with subjective fever, dizziness, diarrhea and weakness at NH while on Meropenem for UTI. Admitted for sepsis 2/2 UTI likely complicated given R Staghorn calculus. c/b persistent leukocytosis despite appropriate abx.     #Sepsis 2/2 UTI, complicated given hx of R Staghorn Calculus. Presumptively Sepsis 2/2 UTI. ED T max of 102.9, tachycardiac 112, WBC of 15.9, Lactate 1.6, UA 6-10 WBC ( positive). // -UCx -ve, BCx -ve, however was on treatment in NH. // Repeat UA +ve, rpt BCx UCx -ve  -WBC improving 11<13<18, afebrile.  -C/w IV Meropenem 1gm Q8H and IV Vancomycin 1gm Q12H, last day today.  -ID (Dr. Wright)    #R Eye Cloudy Vision w/ hx of glaucoma: New onset on 05/21, associated with subjective tightness. Not painful. +ve mild redness of eye. Pupils appear sluggish but equal bilaterally. +mild swelling of upper eyelid. Suspect underlying open angle glaucoma with superimposed conjunctivitis. Given pressure and lack of strong symptoms, not suspecting acute angle closure glaucoma. Tonometry pressures R 26, L 13. No loss of vision.  -Resolved / back to baseline, likely has chronically  -Patient previously prescribed latanoprost, restarting  -C/w tobradex  -Will need close opthalmology follow-up at nursing facility    #R Staghorn Calculus / Urinary Retention: Hx of R Staghorn last admission at [facility]. CT here "Bilateral hydroureter as well as left hydronephrosis. No obstructing abnormality visualized. Urinary bladder appears distended. Multiple non-obstructing right intrarenal stones."  Renal ultrasound only shows R renal calculus (did not indicate staghorn) without hydronephrosis... hydronephrosis likely resolving s/p hsu catheter placement. Hsu catheter placed because urinary retention. //  removed but failed trial void, so replaced.  -C/w hsu: hematuria appears improving  -F/up urology (Dr. Rayo): outpatient urodynamic studies  -C/w flomax    #Anemia: Hx Iron deficiency anemia , guaiac negative in April 2017 , given 1 U PRBC at that time. Here, with H/H downtrend to 6.6. s/p 1UPRBC with incomplete improvement to 7.2.  + hematuria noted. + FOBT +ve though no gross blood (melena or bright red blood) on rectal exam. Additional unit given. // Hgb stable s/p 2U PRBC  - F/up GI Eval (Dr. Jamison): not a candidate for endoscopy given risk/benfit    #Acute Provoked RLL PE: Patient has been bedridden. Diagnosed at [facility] in April. TTE (April 2017, [facility]) shows normal EF 55%, moderate biatrial enlargement, mild Mitral and tricuspid regurgitation, mild calcific degenerative valve disease.  -C/w Xarelto 15mg BID  (switch to 20mg QD on 5/25/17), will likely need indefinitely given immobility  -Pulm = Dr. Chung    #Metabolic Alkalosis: Likely chronic compensation for CO2 retention, however likely over compensated. // Bicarb improved s/p diamox. diamox dc'd.    #COPD + Likely underling PUSHPA: ABG indicates patient is retaining CO2  -C/w NC O2 supplement (O2 dependent at baseline)  -C/w duonebs.    #Diabetes: c/w home dose of Lantus  10 Units and SSI.     #Initial CHRISTINA resolved s/p IVF.#    #Pulmonary Edema (resolved): Seen on recent CXR, likely accounts for increased dyspnea. s/p lasix 20mg PO x1. // resolved on repeat CXR#    #PPx: on full dose AC.    #Dispo: AM to facility, notice given Syringe-Needle U-100 ([employer] INS SYR .3CC/29GX0.5\") 29G X 1/2\" 0.3 ML MISC 1 each by Does not apply route daily 8/5/20  Yes Yamilet Pop MD   aspirin 81 MG chewable tablet Take 81 mg by mouth daily    Yes Historical Provider, MD       Physical Exam  Vitals reviewed. Constitutional:       General: She is not in acute distress. Appearance: Normal appearance. She is not ill-appearing. HENT:      Head: Atraumatic. Neck:      Vascular: No carotid bruit. Cardiovascular:      Rate and Rhythm: Normal rate and regular rhythm. Pulses: Normal pulses. Heart sounds: Normal heart sounds. No murmur heard. Pulmonary:      Effort: Pulmonary effort is normal. No respiratory distress. Breath sounds: Normal breath sounds. Musculoskeletal:         General: No swelling or deformity. Cervical back: Neck supple. No muscular tenderness. Neurological:      Mental Status: She is alert.          Health Maintenance   Topic Date Due    Annual Wellness Visit (AWV)  Never done    COVID-19 Vaccine (1) Never done    Pneumococcal 0-64 years Vaccine (1 - PCV) Never done    Diabetic foot exam  Never done    Depression Screen  Never done    HIV screen  Never done    Diabetic microalbuminuria test  Never done    Hepatitis B vaccine (1 of 3 - Risk 3-dose series) Never done    DTaP/Tdap/Td vaccine (1 - Tdap) Never done    Breast cancer screen  Never done    Shingles vaccine (1 of 2) Never done    Colorectal Cancer Screen  08/04/2021    Diabetic retinal exam  10/28/2021    Lipids  03/12/2022    Flu vaccine (Season Ended) 09/01/2022    A1C test (Diabetic or Prediabetic)  05/03/2023    Hepatitis C screen  Completed    Hepatitis A vaccine  Aged Out    Hib vaccine  Aged Out    Meningococcal (ACWY) vaccine  Aged Out       Lab Review   Lab Results   Component Value Date    CHOL 161 03/12/2021    TRIG 163 03/12/2021    HDL 72 03/12/2021    LDLDIRECT 70 03/12/2021          Echo 3/022      Summary   Left ventricular systolic function is abnormal.   Ejection fraction is visually estimated at 30-35 %   Slight hypokinesis of the anteroseptal wall segment.   Mild left ventricular hypertrophy.   Severe eccentric mitral regurgitation (ERO: 0.40 cm sq, regurgitant volume:   62 ml).   Essentially normal right ventricle; no evidence of right heart strain.   Moderate tricuspid regurgitation; RVSP: 44 mmHg.   Mild pulmonic regurgitation present.   No evidence of any pericardial effusion.   Bilateral pleural effusion.   Inferior vena cava is dilated, measuring at 2.3 cm, but does collapse with      Cath 3/18/22  Cardiac Arteries and Lesion Findings     LMCA: Normal.     LAD: Normal.     LCx: Normal.     RCA: Mild Lumen Irregularity. 10% prox RCA      5/3/2022  Summary   This is a limited echocardiogram.   Left ventricular systolic function is abnormal.   Ejection fraction is visually estimated at 30-35%. Global hypokinesis noted. Moderate bilateral atrial enlargement. Severe mitral regurgitation; (ERO 0.42 cm sq, Regurgitant volume 71 ml). Severe tricuspid regurgitation; RVSP: 61 mmHg. Mild to moderate pulmonic regurgitation present. No evidence of any pericardial effusion. Bilateral pleural effusion. ASSESSMENT/PLAN:    Acute on chronic systolic congestive heart failure ([address]  Cath in March 2022 did not reveal any obstructive coronary artery disease. Non Ischemic cardiomyopathy with ejection fraction of 30-35% will uptitrate lisinopril to 10 mg daily and continue Toprol-XL to 50 mg daily, aldactone. Start Jardinance. 5/3/2022 EF is 30 to 35%.  Repeat echo by end June 2022 to evaluate further plan will uptitrate meds monthly will evaluate every month.      Acute bilateral deep vein thrombosis (DVT) of femoral veins (HCC)   Bilateral extensive DVT started on Xarelto continue as per protocol she is seeing hematology for hypercoagulable work-up     Nonrheumatic mitral valve regurgitation  Severe  mitral regurgitation will try to evaluate in non fluid overloaded state.      Primary hypertension  Not well controlled. Recommend goal BP < 120/80  Gradually titrate up medication. Check labs in 2 weeks     Smoking  Encouraged to quit smoking     Type 2 diabetes mellitus without complication, with long-term current use of insulin ([facility] [address]   Needs to establish care with PCP currently on insulin     Dyslipidemia   All available lab work was reviewed. Patient was advised to repeat lab work before next visit. Necessary orders were placed , instructions given by myself   Counseled extensively and medication compliance urged. We discussed that for the  prevention of ASCVD our  goal is aggressive risk modification. Patient is encouraged to exercise if they can , educated about  brisk walk for 30 minutes  at least 3 to 4 times a week if there are no physical limitations          Various goals were discussed and questions answered. Continue current medications. Appropriate prescriptions are addressed and refills ordered. Questions answered and patient verbalizes understanding. Call for any problems, questions, or concerns. Greater than 60 % of time spent counseling besides reviewing data and images    Return in about 3 weeks (around 6/9/2022). An electronic signature was used to authenticate this note.     Electronically signed by YNES Beaulieu CNP on 5/19/2022 at 2:36 PM

## 2022-05-19 NOTE — PATIENT INSTRUCTIONS
Please be informed that if you contact our office outside of normal business hours the physician on call cannot help with any scheduling or rescheduling issues, procedure instruction questions or any type of medication issue. We advise you for any urgent/emergency that you go to the nearest emergency room! PLEASE CALL OUR OFFICE DURING NORMAL BUSINESS HOURS    Monday - Friday   8 am to 5 pm    [place]: [address]: [phone]    [place]:  [phone]    **It is YOUR responsibilty to bring medication bottles and/or updated medication list to [address] [place] [address].  This will allow us to better serve you and all your healthcare needs**

## 2022-06-14 NOTE — CARE COORDINATION
Reviewed chart and discussed in IDR, plan remains to home with no needs. Spoke with pt and she confirmed plan remains to home, ok with doing Meds to beds. CM available if any needs arise. patient

## 2022-06-16 ENCOUNTER — OFFICE VISIT (OUTPATIENT)
Dept: CARDIOLOGY CLINIC | Age: 57
End: 2022-06-16
Payer: COMMERCIAL

## 2022-06-16 VITALS
SYSTOLIC BLOOD PRESSURE: 136 MMHG | BODY MASS INDEX: 23.15 KG/M2 | HEART RATE: 66 BPM | DIASTOLIC BLOOD PRESSURE: 84 MMHG | WEIGHT: 125.8 LBS | HEIGHT: 62 IN

## 2022-06-16 DIAGNOSIS — E11.9 TYPE 2 DIABETES MELLITUS WITHOUT COMPLICATION, WITH LONG-TERM CURRENT USE OF INSULIN (HCC): ICD-10-CM

## 2022-06-16 DIAGNOSIS — I26.99 OTHER PULMONARY EMBOLISM WITHOUT ACUTE COR PULMONALE, UNSPECIFIED CHRONICITY (HCC): ICD-10-CM

## 2022-06-16 DIAGNOSIS — I34.0 NONRHEUMATIC MITRAL VALVE REGURGITATION: ICD-10-CM

## 2022-06-16 DIAGNOSIS — Z79.4 TYPE 2 DIABETES MELLITUS WITHOUT COMPLICATION, WITH LONG-TERM CURRENT USE OF INSULIN (HCC): ICD-10-CM

## 2022-06-16 DIAGNOSIS — I50.23 ACUTE ON CHRONIC SYSTOLIC CONGESTIVE HEART FAILURE (HCC): Primary | ICD-10-CM

## 2022-06-16 DIAGNOSIS — I10 PRIMARY HYPERTENSION: ICD-10-CM

## 2022-06-16 DIAGNOSIS — I42.8 NICM (NONISCHEMIC CARDIOMYOPATHY) (HCC): ICD-10-CM

## 2022-06-16 DIAGNOSIS — Z86.718 HISTORY OF DVT (DEEP VEIN THROMBOSIS): ICD-10-CM

## 2022-06-16 PROCEDURE — G8420 CALC BMI NORM PARAMETERS: HCPCS | Performed by: NURSE PRACTITIONER

## 2022-06-16 PROCEDURE — 3044F HG A1C LEVEL LT 7.0%: CPT | Performed by: NURSE PRACTITIONER

## 2022-06-16 PROCEDURE — 2022F DILAT RTA XM EVC RTNOPTHY: CPT | Performed by: NURSE PRACTITIONER

## 2022-06-16 PROCEDURE — 4004F PT TOBACCO SCREEN RCVD TLK: CPT | Performed by: NURSE PRACTITIONER

## 2022-06-16 PROCEDURE — 3017F COLORECTAL CA SCREEN DOC REV: CPT | Performed by: NURSE PRACTITIONER

## 2022-06-16 PROCEDURE — 99214 OFFICE O/P EST MOD 30 MIN: CPT | Performed by: NURSE PRACTITIONER

## 2022-06-16 PROCEDURE — G8427 DOCREV CUR MEDS BY ELIG CLIN: HCPCS | Performed by: NURSE PRACTITIONER

## 2022-06-16 ASSESSMENT — ENCOUNTER SYMPTOMS
COUGH: 0
SHORTNESS OF BREATH: 0

## 2022-06-16 NOTE — PROGRESS NOTES
[facility] [place]  [address], [address] [facility],Third [address]  Phone: [phone]    Fax [phone]    Brett Sandhu MD, Geovanna Fernandez MD, [address] Cumm[address] MD, MD Nicolas Yeh MD Starr Pon, MD Wilhemena Condon, MD Belma Gent, YNES Berg, YNES Donovan, YNES Cleary, YNES Stringer PA-C     CARDIOLOGY  NOTE    2022    Fidelia Leonardo (:  1965) is a 61 y.o. female,an established patient with Dr. Prince Fritz, here for evaluation of the following chief complaint(s):  Follow-up (1 mo pt denies any cardiac sx. pt smokes under a half pack a day no future procedures. )        SUBJECTIVE/OBJECTIVE:    Ruth Bal is a 61y.o. year old who has Past medical history as noted below. She was in the hospital recently with fluid on er lungs. She states that she was so bad that there was no way she could not go to the hospital. She was swelling in the ankles and she was very short of breath. She just started weighing herself daily. Ruth Bal was admitted in early 2022 with severe shortness of breath she has history of schizophrenia and hepatitis C and was found to have bilateral lower extremity swelling work-up revealed bilateral extensive DVT of both lower extremity she was evaluated by hematology oncology and currently hypercoagulable work-up is in progress. Her echo shows severe cardiomyopathy  With EF of 3035% and hypokinesis of the anteroseptal wall with eccentric severe  mitral regurgitation. Cardiac cath revealed no significant obstructive coronary artery disease she was started on guideline recommended medical therapy but because of low blood pressures she had difficulty tolerating them. She is feeling much better now. Currently taking 20 mg of Lasix and Xarelto 15 mg twice a day  She says she smokes several cigars a day but is trying to cut down.  She is down to 2-3 cigaretts per day and 2 cigars per day. She is trying. Review of Systems   Constitutional: Negative for fatigue and fever. Respiratory: Negative for cough and shortness of breath. Cardiovascular: Negative for chest pain, palpitations and leg swelling. Musculoskeletal: Negative for arthralgias and gait problem. Neurological: Negative for dizziness, syncope, weakness, light-headedness and headaches. Vitals:    06/16/22 1424   BP: 136/84   Pulse: 66   Weight: 125 lb 12.8 oz (57.1 kg)   Height: 5' 2\" (1.575 m)       Wt Readings from Last 3 Encounters:   06/16/22 125 lb 12.8 oz (57.1 kg)   05/19/22 122 lb 6.4 oz (55.5 kg)   05/06/22 125 lb (56.7 kg)       BP Readings from Last 3 Encounters:   06/16/22 136/84   05/19/22 138/76   05/06/22 125/82       Prior to Admission medications    Medication Sig Start Date End Date Taking? Authorizing Provider   metoprolol succinate (TOPROL XL) 50 MG extended release tablet Take 1 tablet by mouth daily 5/19/22 6/18/22 Yes YNES Curiel CNP   Blood Pressure KIT 1 Device by Does not apply route daily 5/19/22  Yes YNES Healy CNP   lisinopril (PRINIVIL;ZESTRIL) 10 MG tablet Take 1 tablet by mouth daily 5/19/22  Yes YNES Healy CNP   empagliflozin (JARDIANCE) 10 MG tablet Take 1 tablet by mouth daily 5/19/22  Yes YNES Healy CNP   furosemide (LASIX) 20 MG tablet Take 1 tablet by mouth See Admin Instructions Monitor weight daily if your weight increases by 2 pounds in a day take one Lasix.  Only take PRN. 5/6/22  Yes YNES Cao CNP   spironolactone (ALDACTONE) 25 MG tablet Take 1 tablet by mouth daily 5/7/22  Yes YNES Cao CNP   rivaroxaban (XARELTO) 20 MG TABS tablet Take 1 tablet by mouth Daily with supper 4/9/22 6/16/22 Yes Le Ortez MD   dicyclomine (BENTYL) 10 MG capsule Take 1 capsule by mouth 4 times daily as needed (Abdominal cramping, diarrhea) 3/3/22  Yes Brii Figueroa MD   insulin lispro (HUMALOG) 100 UNIT/ML injection vial Inject 10 Units into the skin 3 times daily (with meals) 8/5/20  Yes Kiki Smith MD   Insulin Syringe-Needle U-100 ([employer] INS SYR .3CC/29GX0.5\") 29G X 1/2\" 0.3 ML MISC 1 each by Does not apply route daily 8/5/20  Yes Kiki Smith MD   aspirin 81 MG chewable tablet Take 81 mg by mouth daily    Yes Historical Provider, MD       Physical Exam  Vitals reviewed. Constitutional:       General: She is not in acute distress. Appearance: Normal appearance. She is not ill-appearing. HENT:      Head: Atraumatic. Neck:      Vascular: No carotid bruit. Cardiovascular:      Rate and Rhythm: Normal rate and regular rhythm. Pulses: Normal pulses. Heart sounds: Normal heart sounds. No murmur heard. Pulmonary:      Effort: Pulmonary effort is normal. No respiratory distress. Breath sounds: Normal breath sounds. Musculoskeletal:         General: No swelling or deformity. Cervical back: Neck supple. No muscular tenderness. Neurological:      Mental Status: She is alert.          Health Maintenance   Topic Date Due    Annual Wellness Visit (AWV)  Never done    COVID-19 Vaccine (1) Never done    Pneumococcal 0-64 years Vaccine (1 - PCV) Never done    Diabetic foot exam  Never done    Depression Screen  Never done    HIV screen  Never done    Diabetic microalbuminuria test  Never done    Hepatitis B vaccine (1 of 3 - Risk 3-dose series) Never done    DTaP/Tdap/Td vaccine (1 - Tdap) Never done    Breast cancer screen  Never done    Shingles vaccine (1 of 2) Never done    Colorectal Cancer Screen  08/04/2021    Diabetic retinal exam  10/28/2021    Lipids  03/12/2022    Flu vaccine (Season Ended) 09/01/2022    A1C test (Diabetic or Prediabetic)  05/03/2023    Hepatitis C screen  Completed    Hepatitis A vaccine  Aged Out    Hib vaccine  Aged Out    Meningococcal (ACWY) vaccine  Aged Out       Lab Review   Lab Results   Component Value Date    CHOL 161 03/12/2021    TRIG 163 03/12/2021    HDL 72 03/12/2021    LDLDIRECT 70 03/12/2021          Echo 3/022      Summary   Left ventricular systolic function is abnormal.   Ejection fraction is visually estimated at 30-35 %   Slight hypokinesis of the anteroseptal wall segment.   Mild left ventricular hypertrophy.   Severe eccentric mitral regurgitation (ERO: 0.40 cm sq, regurgitant volume:   62 ml).   Essentially normal right ventricle; no evidence of right heart strain.   Moderate tricuspid regurgitation; RVSP: 44 mmHg.   Mild pulmonic regurgitation present.   No evidence of any pericardial effusion.   Bilateral pleural effusion.   Inferior vena cava is dilated, measuring at 2.3 cm, but does collapse with      Cath 3/18/22  Cardiac Arteries and Lesion Findings     LMCA: Normal.     LAD: Normal.     LCx: Normal.     RCA: Mild Lumen Irregularity. 10% prox RCA      5/3/2022 echo  Summary   This is a limited echocardiogram.   Left ventricular systolic function is abnormal.   Ejection fraction is visually estimated at 30-35%. Global hypokinesis noted. Moderate bilateral atrial enlargement. Severe mitral regurgitation; (ERO 0.42 cm sq, Regurgitant volume 71 ml). Severe tricuspid regurgitation; RVSP: 61 mmHg. Mild to moderate pulmonic regurgitation present. No evidence of any pericardial effusion. Bilateral pleural effusion. ASSESSMENT/PLAN:    Acute on chronic systolic congestive heart failure ([address]  Cath in March 2022 did not reveal any obstructive coronary artery disease. Non Ischemic cardiomyopathy with ejection fraction of 30-35% will uptitrate lisinopril to 10 mg daily and continue Toprol-XL to 50 mg daily, aldactone. Start Jardinance. 5/3/2022 EF is 30 to 35%.  Repeat echo by end June 2022 to evaluate further plan will uptitrate meds monthly will evaluate every month.      Acute bilateral deep vein thrombosis (DVT) of femoral veins (HCC)   Bilateral extensive DVT started on Xarelto continue as per protocol she is seeing hematology for hypercoagulable work-up     Nonrheumatic mitral valve regurgitation  Severe  mitral regurgitation will try to evaluate in non fluid overloaded state.      Primary hypertension  Not well controlled. Recommend goal BP < 120/80  Gradually titrate up medication. Check labs and up titrate lisinopril if able.      Smoking  Encouraged to quit smoking. She is down to 0.5 ppd.      Type 2 diabetes mellitus without complication, with long-term current use of insulin ([address]   Needs to establish care with PCP currently on insulin     Dyslipidemia   All available lab work was reviewed. Patient was advised to repeat lab work before next visit. Necessary orders were placed , instructions given by myself   Counseled extensively and medication compliance urged. We discussed that for the  prevention of ASCVD our  goal is aggressive risk modification. Patient is encouraged to exercise if they can , educated about  brisk walk for 30 minutes  at least 3 to 4 times a week if there are no physical limitations          Various goals were discussed and questions answered. Continue current medications. Appropriate prescriptions are addressed and refills ordered. Questions answered and patient verbalizes understanding. Call for any problems, questions, or concerns. Greater than 60 % of time spent counseling besides reviewing data and images    Return in about 1 month (around 7/16/2022). An electronic signature was used to authenticate this note.     Electronically signed by YNES Shaffer CNP on 6/16/2022 at 2:46 PM

## 2022-09-13 RX ORDER — METOPROLOL SUCCINATE 50 MG/1
50 TABLET, EXTENDED RELEASE ORAL DAILY
Qty: 30 TABLET | Refills: 5 | Status: SHIPPED | OUTPATIENT
Start: 2022-09-13

## 2022-09-19 ENCOUNTER — OFFICE VISIT (OUTPATIENT)
Dept: CARDIOLOGY CLINIC | Age: 57
End: 2022-09-19
Payer: COMMERCIAL

## 2022-09-19 VITALS
DIASTOLIC BLOOD PRESSURE: 94 MMHG | BODY MASS INDEX: 23.55 KG/M2 | HEART RATE: 94 BPM | SYSTOLIC BLOOD PRESSURE: 146 MMHG | OXYGEN SATURATION: 97 % | HEIGHT: 62 IN | WEIGHT: 128 LBS

## 2022-09-19 DIAGNOSIS — I42.8 NICM (NONISCHEMIC CARDIOMYOPATHY) (HCC): ICD-10-CM

## 2022-09-19 DIAGNOSIS — I34.0 NONRHEUMATIC MITRAL VALVE REGURGITATION: ICD-10-CM

## 2022-09-19 DIAGNOSIS — I50.23 ACUTE ON CHRONIC SYSTOLIC CONGESTIVE HEART FAILURE (HCC): Primary | ICD-10-CM

## 2022-09-19 DIAGNOSIS — I10 PRIMARY HYPERTENSION: ICD-10-CM

## 2022-09-19 DIAGNOSIS — I26.99 OTHER PULMONARY EMBOLISM WITHOUT ACUTE COR PULMONALE, UNSPECIFIED CHRONICITY (HCC): ICD-10-CM

## 2022-09-19 PROCEDURE — 4004F PT TOBACCO SCREEN RCVD TLK: CPT | Performed by: NURSE PRACTITIONER

## 2022-09-19 PROCEDURE — 99214 OFFICE O/P EST MOD 30 MIN: CPT | Performed by: NURSE PRACTITIONER

## 2022-09-19 PROCEDURE — 3017F COLORECTAL CA SCREEN DOC REV: CPT | Performed by: NURSE PRACTITIONER

## 2022-09-19 PROCEDURE — G8427 DOCREV CUR MEDS BY ELIG CLIN: HCPCS | Performed by: NURSE PRACTITIONER

## 2022-09-19 PROCEDURE — G8420 CALC BMI NORM PARAMETERS: HCPCS | Performed by: NURSE PRACTITIONER

## 2022-09-19 RX ORDER — LISINOPRIL 20 MG/1
20 TABLET ORAL DAILY
Qty: 30 TABLET | Refills: 1 | Status: SHIPPED | OUTPATIENT
Start: 2022-09-19

## 2022-09-19 RX ORDER — LORATADINE 10 MG/1
TABLET ORAL
COMMUNITY
Start: 2022-09-08

## 2022-09-19 RX ORDER — BLOOD PRESSURE TEST KIT
1 KIT MISCELLANEOUS DAILY
Qty: 1 KIT | Refills: 0 | Status: SHIPPED | OUTPATIENT
Start: 2022-09-19

## 2022-09-19 ASSESSMENT — ENCOUNTER SYMPTOMS
SHORTNESS OF BREATH: 0
COUGH: 0

## 2022-09-19 NOTE — PROGRESS NOTES
[facility] PHYSICAL REHABILITATION [place]  [address], [address] [facility],[address]  Phone: [phone]    Fax [phone]    Marvin Plasencia MD, Isaac Khoury MD, [address] Cummins MD, MD Trenton Bowden MD Hershal Grief, MD Lydia Rao, YNES Rivers, YNES Lechuga, YNES Kwan, YNES Blackburn PA-C     CARDIOLOGY  NOTE    2022    Aleshia Callahan (:  1965) is a 61 y.o. female,an established patient with Dr. Telma Sidhu, here for evaluation of the following chief complaint(s):  Follow-up (Pt deny any cardiac symptoms.)        SUBJECTIVE/OBJECTIVE:    HPI Sherryle Naval is a 61 y.o. who has Past medical history as noted below. She had an allergic reaction to bug spray and was placed on steroid cream that she is supposed to complete in the next month. She states that she was so bad that there was no way she could not go to the hospital. She was swelling in the ankles and she was very short of breath. She just started weighing herself daily. Sherryle Naval was admitted in early 2022 with severe shortness of breath she has history of schizophrenia and hepatitis C and was found to have bilateral lower extremity swelling work-up revealed bilateral extensive DVT of both lower extremity she was evaluated by hematology oncology and currently hypercoagulable work-up is in progress. Her echo shows severe cardiomyopathy  With EF of 30-35% and hypokinesis of the anteroseptal wall with eccentric severe  mitral regurgitation. Cardiac cath revealed no significant obstructive coronary artery disease she was started on guideline recommended medical therapy but because of low blood pressures she had difficulty tolerating them. She is feeling much better now. Currently taking 20 mg of Lasix and Xarelto 20 mg Daily  She says she smokes several cigars a day but is trying to cut down.  She is down to 2-3 cigaretts per day and 2 cigars per day. She is trying. Review of Systems   Constitutional:  Negative for fatigue and fever. Respiratory:  Negative for cough and shortness of breath. Cardiovascular:  Negative for chest pain, palpitations and leg swelling. Musculoskeletal:  Negative for arthralgias and gait problem. Skin:  Positive for rash (improving). Neurological:  Negative for dizziness, syncope, weakness, light-headedness and headaches. Vitals:    09/19/22 1557 09/19/22 1602   BP: (!) 140/96 (!) 146/94   Site: Left Upper Arm Left Upper Arm   Position: Sitting Sitting   Cuff Size: Medium Adult Medium Adult   Pulse: (!) 101 94   SpO2: 97%    Weight: 128 lb (58.1 kg)    Height: 5' 2\" (1.575 m)        Wt Readings from Last 3 Encounters:   09/19/22 128 lb (58.1 kg)   06/16/22 125 lb 12.8 oz (57.1 kg)   05/19/22 122 lb 6.4 oz (55.5 kg)       BP Readings from Last 3 Encounters:   09/19/22 (!) 146/94   06/16/22 136/84   05/19/22 138/76       Prior to Admission medications    Medication Sig Start Date End Date Taking? Authorizing Provider   loratadine (CLARITIN) 10 MG tablet TAKE 1 TABLET BY MOUTH THREE TIMES DAILY 9/8/22  Yes Historical Provider, MD   metoprolol succinate (TOPROL XL) 50 MG extended release tablet Take 1 tablet by mouth daily 9/13/22  Yes YNES Dick CNP   rivaroxaban (XARELTO) 20 MG TABS tablet Take 1 tablet by mouth Daily with supper 8/16/22  Yes YNES Dick CNP   Blood Pressure KIT 1 Device by Does not apply route daily 5/19/22  Yes YNES Dick CNP   lisinopril (PRINIVIL;ZESTRIL) 10 MG tablet Take 1 tablet by mouth daily 5/19/22  Yes YNES Dick CNP   empagliflozin (JARDIANCE) 10 MG tablet Take 1 tablet by mouth daily 5/19/22  Yes YNES Dick CNP   furosemide (LASIX) 20 MG tablet Take 1 tablet by mouth See Admin Instructions Monitor weight daily if your weight increases by 2 pounds in a day take one Lasix.  Only take PRN. 5/6/22  Yes Lora Bingham YNES Faria - CNP   spironolactone (ALDACTONE) 25 MG tablet Take 1 tablet by mouth daily 5/7/22  Yes YNES Deleon CNP   dicyclomine (BENTYL) 10 MG capsule Take 1 capsule by mouth 4 times daily as needed (Abdominal cramping, diarrhea) 3/3/22  Yes Margaret Cee MD   insulin lispro (HUMALOG) 100 UNIT/ML injection vial Inject 10 Units into the skin 3 times daily (with meals) 8/5/20  Yes Sherif Buck MD   Insulin Syringe-Needle U-100 (AIMSC[employer] INS SYR .3CC/29GX0.5\") 29G X 1/2\" 0.3 ML MISC 1 each by Does not apply route daily 8/5/20  Yes Sherif Buck MD   aspirin 81 MG chewable tablet Take 81 mg by mouth daily   Patient not taking: Reported on 9/19/2022    Historical Provider, MD       Physical Exam  Vitals reviewed. Constitutional:       General: She is not in acute distress. Appearance: Normal appearance. She is not ill-appearing. HENT:      Head: Atraumatic. Neck:      Vascular: No carotid bruit. Cardiovascular:      Rate and Rhythm: Normal rate and regular rhythm. Pulses: Normal pulses. Heart sounds: Normal heart sounds. No murmur heard. Pulmonary:      Effort: Pulmonary effort is normal. No respiratory distress. Breath sounds: Normal breath sounds. Musculoskeletal:         General: No swelling or deformity. Cervical back: Neck supple. No muscular tenderness. Neurological:      Mental Status: She is alert.        Health Maintenance   Topic Date Due    COVID-19 Vaccine (1) Never done    Pneumococcal 0-64 years Vaccine (1 - PCV) Never done    Diabetic foot exam  Never done    Depression Screen  Never done    HIV screen  Never done    Diabetic microalbuminuria test  Never done    Hepatitis B vaccine (1 of 3 - Risk 3-dose series) Never done    DTaP/Tdap/Td vaccine (1 - Tdap) Never done    Breast cancer screen  Never done    Shingles vaccine (1 of 2) Never done    Annual Wellness Visit (AWV)  Never done    Colorectal Cancer Screen  08/04/2021    Diabetic retinal exam 10/28/2021    Lipids  03/12/2022    Flu vaccine (1) Never done    A1C test (Diabetic or Prediabetic)  05/03/2023    Hepatitis C screen  Completed    Hepatitis A vaccine  Aged Out    Hib vaccine  Aged Out    Meningococcal (ACWY) vaccine  Aged Out       Lab Review   Lab Results   Component Value Date/Time    CHOL 161 03/12/2021 06:50 PM    TRIG 163 03/12/2021 06:50 PM    HDL 72 03/12/2021 06:50 PM    LDLDIRECT 70 03/12/2021 06:50 PM          Echo 3/022      Summary   Left ventricular systolic function is abnormal.   Ejection fraction is visually estimated at 30-35 %   Slight hypokinesis of the anteroseptal wall segment. Mild left ventricular hypertrophy. Severe eccentric mitral regurgitation (ERO: 0.40 cm sq, regurgitant volume:   62 ml). Essentially normal right ventricle; no evidence of right heart strain. Moderate tricuspid regurgitation; RVSP: 44 mmHg. Mild pulmonic regurgitation present. No evidence of any pericardial effusion. Bilateral pleural effusion. Inferior vena cava is dilated, measuring at 2.3 cm, but does collapse with      Cath 3/18/22  Cardiac Arteries and Lesion Findings     LMCA: Normal.     LAD: Normal.     LCx: Normal.     RCA: Mild Lumen Irregularity. 10% prox RCA      5/3/2022 echo  Summary   This is a limited echocardiogram.   Left ventricular systolic function is abnormal.   Ejection fraction is visually estimated at 30-35%. Global hypokinesis noted. Moderate bilateral atrial enlargement. Severe mitral regurgitation; (ERO 0.42 cm sq, Regurgitant volume 71 ml). Severe tricuspid regurgitation; RVSP: 61 mmHg. Mild to moderate pulmonic regurgitation present. No evidence of any pericardial effusion. Bilateral pleural effusion. ASSESSMENT/PLAN:    Acute on chronic systolic congestive heart failure ([address]  Cath in March 2022 did not reveal any obstructive coronary artery disease.    Non Ischemic cardiomyopathy with ejection fraction of 30-35% will uptitrate lisinopril to 20 mg daily and continue Toprol-XL to 50 mg daily, aldactone,  Jardinance. 5/3/2022 EF is 30 to 35%. recheck echo. Acute bilateral deep vein thrombosis (DVT) of femoral veins (HCC)   Bilateral extensive DVT started on Xarelto continue as per protocol she is seeing hematology for hypercoagulable work-up     Nonrheumatic mitral valve regurgitation  Severe  mitral regurgitation will try to evaluate in non fluid overloaded state. Primary hypertension  Not well controlled. Recommend goal BP < 120/80  Gradually titrate up medication. Check labs and up titrate lisinopril if able. Smoking  Encouraged to quit smoking. She is down to 2-3 cigars daily. .      Type 2 diabetes mellitus without complication, with long-term current use of insulin ([facility] [address]   Needs to establish care with PCP currently on insulin     Dyslipidemia   All available lab work was reviewed. Patient was advised to repeat lab work before next visit. Necessary orders were placed , instructions given by myself   Counseled extensively and medication compliance urged. We discussed that for the  prevention of ASCVD our  goal is aggressive risk modification. Patient is encouraged to exercise if they can , educated about  brisk walk for 30 minutes  at least 3 to 4 times a week if there are no physical limitations          Various goals were discussed and questions answered. Continue current medications. Appropriate prescriptions are addressed and refills ordered. Questions answered and patient verbalizes understanding. Call for any problems, questions, or concerns. Greater than 60 % of time spent counseling besides reviewing data and images    No follow-ups on file. An electronic signature was used to authenticate this note.     Electronically signed by YNES Davis CNP on 9/19/2022 at 4:06 PM

## 2022-09-19 NOTE — PATIENT INSTRUCTIONS
Please be informed that if you contact our office outside of normal business hours the physician on call cannot help with any scheduling or rescheduling issues, procedure instruction questions or any type of medication issue. We advise you for any urgent/emergency that you go to the nearest emergency room! PLEASE CALL OUR OFFICE DURING NORMAL BUSINESS HOURS    Monday - Friday   8 am to 5 pm    [address]: [phone]    [place]:  [phone]    **It is YOUR responsibilty to bring medication bottles and/or updated medication list to [address] [place] [address]. This will allow us to better serve you and all your healthcare needs**  Please hold on to these instructions the  will call you within 1-9 business days when we receive authorization from your insurance. Resting MUGA    WHAT TO EXPECT:   This test will take approximately 1 hour. You will be given 2 injections in the arm approximately ½ hour apart. Our specialized staff will be taking pictures of your heart while lying on a table under a camera. You must be able to lie still during the imaging. If lying still is difficult for you, please notify the technologists prior to the test.   These pictures will allow your physician to access the pumping ability of your heart. PREPARATION FOR TEST:  May eat a light meal 2 hours prior to the test.   Wear comfortable clothing that has no metal buttons or zippers across the chest area. There are no dietary or medication restrictions for this test.   [employer] card and Picture ID with you. POST-TREATMENT CARE:   For national security purposes, please notify the technologists if you are planning to fly within the next 24 hours. Although unpleasant symptoms are extremely rare as a result of this test, you may reach us at [phone] with any questions or dial 911. If you are unable to keep this appointment, please notify us 24 hours prior to test at [phone].

## 2022-09-20 RX ORDER — LISINOPRIL 20 MG/1
20 TABLET ORAL DAILY
Qty: 90 TABLET | Refills: 3 | OUTPATIENT
Start: 2022-09-20

## 2022-09-30 ENCOUNTER — TELEPHONE (OUTPATIENT)
Dept: CARDIOLOGY CLINIC | Age: 57
End: 2022-09-30

## 2022-09-30 ENCOUNTER — PROCEDURE VISIT (OUTPATIENT)
Dept: CARDIOLOGY CLINIC | Age: 57
End: 2022-09-30
Payer: COMMERCIAL

## 2022-09-30 DIAGNOSIS — I50.23 ACUTE ON CHRONIC SYSTOLIC CONGESTIVE HEART FAILURE (HCC): ICD-10-CM

## 2022-09-30 DIAGNOSIS — I42.8 NICM (NONISCHEMIC CARDIOMYOPATHY) (HCC): ICD-10-CM

## 2022-09-30 LAB
LV EF: 39 %
LVEF MODALITY: NORMAL

## 2022-09-30 PROCEDURE — 78472 GATED HEART PLANAR SINGLE: CPT | Performed by: INTERNAL MEDICINE

## 2022-09-30 PROCEDURE — A9560 TC99M LABELED RBC: HCPCS | Performed by: INTERNAL MEDICINE

## 2022-09-30 NOTE — TELEPHONE ENCOUNTER
Spoke to patient about results of echo, patient voiced understanding and was instructed to follow up as scheduled

## 2022-10-14 LAB
AMBIGUOUS ABBREVIATION: NORMAL
BUN / CREAT RATIO: NORMAL
BUN BLDV-MCNC: NORMAL MG/DL
CALCIUM SERPL-MCNC: NORMAL MG/DL
CHLORIDE BLD-SCNC: NORMAL MMOL/L
CO2: NORMAL
CREAT SERPL-MCNC: NORMAL MG/DL
ESTIMATED GLOMERULAR FILTRATION RATE CREATININE EQUATION: NORMAL
GLUCOSE BLD-MCNC: NORMAL MG/DL
POTASSIUM SERPL-SCNC: NORMAL MMOL/L
REQUEST PROBLEM: NORMAL
SODIUM BLD-SCNC: NORMAL MMOL/L

## 2022-11-07 ENCOUNTER — APPOINTMENT (OUTPATIENT)
Dept: GENERAL RADIOLOGY | Age: 57
DRG: 291 | End: 2022-11-07
Payer: COMMERCIAL

## 2022-11-07 ENCOUNTER — HOSPITAL ENCOUNTER (INPATIENT)
Age: 57
LOS: 3 days | Discharge: HOME | DRG: 291 | End: 2022-11-10
Attending: EMERGENCY MEDICINE | Admitting: STUDENT IN AN ORGANIZED HEALTH CARE EDUCATION/TRAINING PROGRAM
Payer: COMMERCIAL

## 2022-11-07 ENCOUNTER — TELEPHONE (OUTPATIENT)
Dept: CARDIOLOGY CLINIC | Age: 57
End: 2022-11-07

## 2022-11-07 DIAGNOSIS — R09.02 HYPOXIA: ICD-10-CM

## 2022-11-07 DIAGNOSIS — I50.9 ACUTE ON CHRONIC CONGESTIVE HEART FAILURE, UNSPECIFIED HEART FAILURE TYPE (HCC): Primary | ICD-10-CM

## 2022-11-07 PROBLEM — I50.23 ACUTE ON CHRONIC SYSTOLIC HEART FAILURE (HCC): Status: ACTIVE | Noted: 2022-11-07

## 2022-11-07 LAB
ALBUMIN SERPL-MCNC: 3.4 GM/DL (ref 3.4–5)
ALP BLD-CCNC: 184 IU/L (ref 40–129)
ALT SERPL-CCNC: 28 U/L (ref 10–40)
ANION GAP SERPL CALCULATED.3IONS-SCNC: 10 MMOL/L (ref 4–16)
AST SERPL-CCNC: 31 IU/L (ref 15–37)
BACTERIA: NEGATIVE /HPF
BILIRUB SERPL-MCNC: 0.7 MG/DL (ref 0–1)
BILIRUBIN URINE: NEGATIVE MG/DL
BLOOD, URINE: ABNORMAL
BUN BLDV-MCNC: 27 MG/DL (ref 6–23)
CALCIUM SERPL-MCNC: 9.4 MG/DL (ref 8.3–10.6)
CHLORIDE BLD-SCNC: 94 MMOL/L (ref 99–110)
CLARITY: CLEAR
CO2: 28 MMOL/L (ref 21–32)
COLOR: YELLOW
CREAT SERPL-MCNC: 1.6 MG/DL (ref 0.6–1.1)
GFR SERPL CREATININE-BSD FRML MDRD: 37 ML/MIN/1.73M2
GLUCOSE BLD-MCNC: 144 MG/DL (ref 70–99)
GLUCOSE BLD-MCNC: 180 MG/DL (ref 70–99)
GLUCOSE, URINE: 250 MG/DL
KETONES, URINE: NEGATIVE MG/DL
LEUKOCYTE ESTERASE, URINE: ABNORMAL
MUCUS: ABNORMAL HPF
NITRITE URINE, QUANTITATIVE: NEGATIVE
PH, URINE: 7.5 (ref 5–8)
POTASSIUM SERPL-SCNC: 4.1 MMOL/L (ref 3.5–5.1)
PRO-BNP: ABNORMAL PG/ML
PROTEIN UA: >300 MG/DL
RAPID INFLUENZA  B AGN: NEGATIVE
RAPID INFLUENZA A AGN: NEGATIVE
RBC URINE: 6 /HPF (ref 0–6)
REASON FOR REJECTION: NORMAL
REJECTED TEST: NORMAL
SODIUM BLD-SCNC: 132 MMOL/L (ref 135–145)
SPECIFIC GRAVITY UA: 1.02 (ref 1–1.03)
SQUAMOUS EPITHELIAL: 2 /HPF
TOTAL PROTEIN: 8.4 GM/DL (ref 6.4–8.2)
TRICHOMONAS: ABNORMAL /HPF
UROBILINOGEN, URINE: 1 MG/DL (ref 0.2–1)
WBC UA: 10 /HPF (ref 0–5)

## 2022-11-07 PROCEDURE — 6360000002 HC RX W HCPCS: Performed by: EMERGENCY MEDICINE

## 2022-11-07 PROCEDURE — 6360000002 HC RX W HCPCS: Performed by: STUDENT IN AN ORGANIZED HEALTH CARE EDUCATION/TRAINING PROGRAM

## 2022-11-07 PROCEDURE — 84484 ASSAY OF TROPONIN QUANT: CPT

## 2022-11-07 PROCEDURE — 6370000000 HC RX 637 (ALT 250 FOR IP)

## 2022-11-07 PROCEDURE — 1200000000 HC SEMI PRIVATE

## 2022-11-07 PROCEDURE — 81001 URINALYSIS AUTO W/SCOPE: CPT

## 2022-11-07 PROCEDURE — 83880 ASSAY OF NATRIURETIC PEPTIDE: CPT

## 2022-11-07 PROCEDURE — 6370000000 HC RX 637 (ALT 250 FOR IP): Performed by: STUDENT IN AN ORGANIZED HEALTH CARE EDUCATION/TRAINING PROGRAM

## 2022-11-07 PROCEDURE — 71045 X-RAY EXAM CHEST 1 VIEW: CPT

## 2022-11-07 PROCEDURE — 76937 US GUIDE VASCULAR ACCESS: CPT

## 2022-11-07 PROCEDURE — 87804 INFLUENZA ASSAY W/OPTIC: CPT

## 2022-11-07 PROCEDURE — 82962 GLUCOSE BLOOD TEST: CPT

## 2022-11-07 PROCEDURE — 93005 ELECTROCARDIOGRAM TRACING: CPT | Performed by: EMERGENCY MEDICINE

## 2022-11-07 PROCEDURE — 84443 ASSAY THYROID STIM HORMONE: CPT

## 2022-11-07 PROCEDURE — 80053 COMPREHEN METABOLIC PANEL: CPT

## 2022-11-07 PROCEDURE — 2580000003 HC RX 258: Performed by: STUDENT IN AN ORGANIZED HEALTH CARE EDUCATION/TRAINING PROGRAM

## 2022-11-07 PROCEDURE — 85025 COMPLETE CBC W/AUTO DIFF WBC: CPT

## 2022-11-07 PROCEDURE — 99285 EMERGENCY DEPT VISIT HI MDM: CPT

## 2022-11-07 PROCEDURE — 96374 THER/PROPH/DIAG INJ IV PUSH: CPT

## 2022-11-07 RX ORDER — ACETAMINOPHEN 650 MG/1
650 SUPPOSITORY RECTAL EVERY 6 HOURS PRN
Status: DISCONTINUED | OUTPATIENT
Start: 2022-11-07 | End: 2022-11-10 | Stop reason: HOSPADM

## 2022-11-07 RX ORDER — SODIUM CHLORIDE 0.9 % (FLUSH) 0.9 %
5-40 SYRINGE (ML) INJECTION EVERY 12 HOURS SCHEDULED
Status: DISCONTINUED | OUTPATIENT
Start: 2022-11-07 | End: 2022-11-10 | Stop reason: HOSPADM

## 2022-11-07 RX ORDER — ACETAMINOPHEN 325 MG/1
650 TABLET ORAL EVERY 6 HOURS PRN
Status: DISCONTINUED | OUTPATIENT
Start: 2022-11-07 | End: 2022-11-10 | Stop reason: HOSPADM

## 2022-11-07 RX ORDER — ONDANSETRON 4 MG/1
4 TABLET, ORALLY DISINTEGRATING ORAL EVERY 8 HOURS PRN
Status: DISCONTINUED | OUTPATIENT
Start: 2022-11-07 | End: 2022-11-10 | Stop reason: HOSPADM

## 2022-11-07 RX ORDER — POLYETHYLENE GLYCOL 3350 17 G/17G
17 POWDER, FOR SOLUTION ORAL DAILY PRN
Status: DISCONTINUED | OUTPATIENT
Start: 2022-11-07 | End: 2022-11-10 | Stop reason: HOSPADM

## 2022-11-07 RX ORDER — FUROSEMIDE 10 MG/ML
40 INJECTION INTRAMUSCULAR; INTRAVENOUS 2 TIMES DAILY
Status: DISCONTINUED | OUTPATIENT
Start: 2022-11-07 | End: 2022-11-08

## 2022-11-07 RX ORDER — DIPHENHYDRAMINE HCL 25 MG
50 TABLET ORAL NIGHTLY PRN
Status: DISCONTINUED | OUTPATIENT
Start: 2022-11-07 | End: 2022-11-10 | Stop reason: HOSPADM

## 2022-11-07 RX ORDER — METOPROLOL SUCCINATE 50 MG/1
50 TABLET, EXTENDED RELEASE ORAL DAILY
Status: DISCONTINUED | OUTPATIENT
Start: 2022-11-08 | End: 2022-11-10 | Stop reason: HOSPADM

## 2022-11-07 RX ORDER — ONDANSETRON 2 MG/ML
4 INJECTION INTRAMUSCULAR; INTRAVENOUS EVERY 6 HOURS PRN
Status: DISCONTINUED | OUTPATIENT
Start: 2022-11-07 | End: 2022-11-10 | Stop reason: HOSPADM

## 2022-11-07 RX ORDER — SPIRONOLACTONE 50 MG/1
25 TABLET, FILM COATED ORAL DAILY
Status: DISCONTINUED | OUTPATIENT
Start: 2022-11-07 | End: 2022-11-10 | Stop reason: HOSPADM

## 2022-11-07 RX ORDER — HYDRALAZINE HYDROCHLORIDE 25 MG/1
25 TABLET, FILM COATED ORAL EVERY 6 HOURS SCHEDULED
Status: DISCONTINUED | OUTPATIENT
Start: 2022-11-07 | End: 2022-11-10 | Stop reason: HOSPADM

## 2022-11-07 RX ORDER — FUROSEMIDE 10 MG/ML
60 INJECTION INTRAMUSCULAR; INTRAVENOUS ONCE
Status: COMPLETED | OUTPATIENT
Start: 2022-11-07 | End: 2022-11-07

## 2022-11-07 RX ORDER — ASPIRIN 81 MG/1
81 TABLET ORAL DAILY
Status: DISCONTINUED | OUTPATIENT
Start: 2022-11-08 | End: 2022-11-10 | Stop reason: HOSPADM

## 2022-11-07 RX ORDER — LISINOPRIL 20 MG/1
20 TABLET ORAL DAILY
Status: DISCONTINUED | OUTPATIENT
Start: 2022-11-07 | End: 2022-11-08

## 2022-11-07 RX ORDER — SODIUM CHLORIDE 0.9 % (FLUSH) 0.9 %
5-40 SYRINGE (ML) INJECTION PRN
Status: DISCONTINUED | OUTPATIENT
Start: 2022-11-07 | End: 2022-11-10 | Stop reason: HOSPADM

## 2022-11-07 RX ORDER — SODIUM CHLORIDE 9 MG/ML
500 INJECTION, SOLUTION INTRAVENOUS PRN
Status: DISCONTINUED | OUTPATIENT
Start: 2022-11-07 | End: 2022-11-10 | Stop reason: HOSPADM

## 2022-11-07 RX ADMIN — DIPHENHYDRAMINE HYDROCHLORIDE 50 MG: 25 TABLET ORAL at 19:11

## 2022-11-07 RX ADMIN — FUROSEMIDE 60 MG: 10 INJECTION, SOLUTION INTRAVENOUS at 18:09

## 2022-11-07 RX ADMIN — DIPHENHYDRAMINE HYDROCHLORIDE 50 MG: 25 TABLET ORAL at 22:16

## 2022-11-07 RX ADMIN — HYDRALAZINE HYDROCHLORIDE 25 MG: 25 TABLET, FILM COATED ORAL at 23:55

## 2022-11-07 RX ADMIN — FUROSEMIDE 40 MG: 10 INJECTION, SOLUTION INTRAMUSCULAR; INTRAVENOUS at 19:06

## 2022-11-07 RX ADMIN — RIVAROXABAN 15 MG: 15 TABLET, FILM COATED ORAL at 22:16

## 2022-11-07 RX ADMIN — SODIUM CHLORIDE, PRESERVATIVE FREE 10 ML: 5 INJECTION INTRAVENOUS at 22:11

## 2022-11-07 RX ADMIN — LISINOPRIL 20 MG: 20 TABLET ORAL at 22:10

## 2022-11-07 RX ADMIN — SPIRONOLACTONE 25 MG: 50 TABLET ORAL at 22:11

## 2022-11-07 RX ADMIN — HYDRALAZINE HYDROCHLORIDE 25 MG: 25 TABLET, FILM COATED ORAL at 19:11

## 2022-11-07 ASSESSMENT — PAIN DESCRIPTION - LOCATION
LOCATION: ABDOMEN
LOCATION: ABDOMEN

## 2022-11-07 ASSESSMENT — PAIN SCALES - GENERAL
PAINLEVEL_OUTOF10: 4
PAINLEVEL_OUTOF10: 3
PAINLEVEL_OUTOF10: 0

## 2022-11-07 ASSESSMENT — PAIN - FUNCTIONAL ASSESSMENT: PAIN_FUNCTIONAL_ASSESSMENT: 0-10

## 2022-11-07 NOTE — ED NOTES
Pt placed on oxygen 2L via NC at this time. O2 was ranging from 89-91%.      Sylvia Diana RN  11/07/22 6426

## 2022-11-07 NOTE — TELEPHONE ENCOUNTER
PCP Chivo Fonseca called patient hs gained 10 lbs in past 2 weeks , feeling smothered when she lays down .  Legs are swollen she feels she is not taking her lasix Charlene patient

## 2022-11-07 NOTE — ED NOTES
ED TO INPATIENT SBAR HANDOFF    Patient Name: Mona Gan   :  1965  61 y.o. MRN:  [id number]  Preferred Name  [facility]  ED Room #:  ED33/ED-33  Family/Caregiver Present no   Restraints no   Sitter no   Sepsis Risk Score Sepsis Risk Score: 3.49    Situation  Code Status: Prior No additional code details. Allergies: Haldol [haloperidol lactate] and Penicillins  Weight: Patient Vitals for the past 96 hrs (Last 3 readings):   Weight   22 1552 140 lb (63.5 kg)   22 1408 140 lb 12.8 oz (63.9 kg)     Arrived from: home  Chief Complaint:   Chief Complaint   Patient presents with    Shortness of Breath    Hypertension    Hyperglycemia     Pts sugar 190s     Hospital Problem/Diagnosis:  Active Problems:    * No active hospital problems. *  Resolved Problems:    * No resolved hospital problems. *    Imaging:   XR CHEST PORTABLE   Final Result   Findings consistent with congestive heart failure and/or central   pneumonitis/atypical pneumonia. Findings may be accentuated by underlying   chronic lung disease. Very small amount of right basilar pleural fluid   suggested.            Abnormal labs:   Abnormal Labs Reviewed   COMPREHENSIVE METABOLIC PANEL - Abnormal; Notable for the following components:       Result Value    Sodium 132 (*)     Chloride 94 (*)     BUN 27 (*)     Creatinine 1.6 (*)     Est, Glom Filt Rate 37 (*)     Glucose 144 (*)     Total Protein 8.4 (*)     Alkaline Phosphatase 184 (*)     All other components within normal limits   BRAIN NATRIURETIC PEPTIDE - Abnormal; Notable for the following components:    Pro-BNP 30,533 (*)     All other components within normal limits   POCT GLUCOSE - Abnormal; Notable for the following components:    POC Glucose 180 (*)     All other components within normal limits     Critical values: yes     Abnormal Assessment Findings: BNP 30,533    Background  History:   Past Medical History:   Diagnosis Date    CAD (coronary artery disease) Diabetes mellitus ([facility] [address]     Hepatitis C     Schizophrenia ([facility]        Assessment    Vitals/MEWS: MEWS Score: 2  Level of Consciousness: Alert (0)   Vitals:    11/07/22 1408 11/07/22 1552 11/07/22 1553   BP: (!) 157/110  (!) 129/108   Pulse: (!) 108  (!) 107   Resp: 16  17   Temp: 97.7 °F (36.5 °C)  98.3 °F (36.8 °C)   TempSrc:   Oral   SpO2: 100%  97%   Weight: 140 lb 12.8 oz (63.9 kg) 140 lb (63.5 kg)    Height:  5' 2\" (1.575 m)      FiO2 (%): nasal cannula for respiratory distress  O2 Flow Rate: Nasal Cannula @ 2L    Cardiac Rhythm:    Pain Assessment: 5 [x] Verbal [] Reyne Boxer Scale  Pain Scale: Pain Assessment  Pain Assessment: 0-10  Pain Level: 3  Pain Location: Abdomen  Last documented pain score (0-10 scale) Pain Level: 3  Last documented pain medication administered: Pain meds not given in ED  Mental Status: oriented, alert, coherent, logical, thought processes intact, and able to concentrate and follow conversation  NIH Score:    C-SSRS: Risk of Suicide: No Risk  Bedside swallow:    [place] Coma Scale (GCS): Naima Coma Scale  Eye Opening: Spontaneous  Best Verbal Response: Oriented  Best Motor Response: Obeys commands  [place] Coma Scale Score: 15  Active LDA's:    PO Status: Regular  Pertinent or High Risk Medications/Drips: no   If Yes, please provide details:   Pending Blood Product Administration: no     You may also review the ED PT Care Timeline found under the Summary Nursing Index tab. Recommendation    Pending orders   Plan for Discharge (if known):    Additional Comments:    If any further questions, please call Sending RN at [phone]    Electronically signed by: Electronically signed by Lucie Vela RN on 11/7/2022 at 5:49 PM       Lucie Vela RN  11/07/22 1135

## 2022-11-07 NOTE — H&P
History and Physical      Name:  Margie Ganser /Age/Sex: 1965  (61 y.o. female)   MRN & CSN:  [id number] & [id number] Encounter Date/Time: 2022 6:58 PM EST   Location:  ED33/ED-33 PCP: Karoline Thrasher Day: 1    Assessment and Plan:     Patient is a 48-year-old female who presented with worsening SOB and 25 lbs weight gain in x2 weeks. # Acute on chronic sytolic heart failure / NICMP  -Presented with worsening SOB, PATTERSON, orthopnea, abdominal/LE swelling and 25 lbs weight gain in x2 weeks. Reported noncompliance with salt diet or prn Lasix, and does not complete daily weights. Rapid COVID negative earlier today.  - TTE in 2022 with LVEF of 30-35%, moderate FRANSISCO, severe MR/TR, mild-to-moderate MO and RVSP of 61 mmHg. - Clinical evidence of volume overload. BNP >30k. CXR consistent with pulmonary congestion. Tn pending. ECG without acute ischemic changes. - Received Lasix in the ED, will continue 40 mg BID. Continue Lisinopril and Aldactone. Also started Hydralazine 25 mg q6h for additional afterload reduction. - Cardiology consulted, follow-up. - Telemetry. Strict I/O's. Follow-up repeat Tn. # Acute hypoxemic respiratory failure  - Not on O2 at baseline, requiring 2L NC in the ED.  - Due to above. - Wean O2 as tolerated. # Essential HTN  - Continue home Lisinopril and Aldactone. Started on Hydralazine. # [id number]  - Avoid nephrotoxic medications. # Hx of DVT and PE  - Continue Xarelto. # Tobacco abuse  - Quit smoking x3 weeks ago. Checklist:  Advanced directive: full  Catheter: none  DVT ppx: Xarelto   Nutrition/Diet: cardiac    Disposition: patient requires continued admission due to acute on chronic systolic heart failure. MDM: moderate.     History of Present Illness:     Chief Complaint: Shortness of breath    Patient is a 48-year-old female with a PMHx of NICMP, HFrEF, Hx of DVT and PE, HTN and tobacco abuse who presented to the ED with worsening SOB, PATTERSON, orthopnea, abdominal/LE swelling and 25 lbs weight gain in x2 weeks. Reported noncompliance with salt diet or prn Lasix, and does not complete daily weights. Rapid COVID negative earlier today. Denied any fevers, chills, presyncope, syncope, CP, nausea, vomiting, abdominal pain, constipation, diarrhea or urinary changes. She quit smoking about x3 3 weeks ago, and denied any alcohol or illicit drug use. ROS:     Ten point ROS reviewed negative, unless as noted above. Objective:     No intake or output data in the 24 hours ending 11/07/22 1858     Vitals:   Vitals:    11/07/22 1408 11/07/22 1552 11/07/22 1553 11/07/22 1812   BP: (!) 157/110  (!) 129/108 (!) 164/115   Pulse: (!) 108  (!) 107 (!) 108   Resp: 16  17 24   Temp: 97.7 °F (36.5 °C)  98.3 °F (36.8 °C) 98 °F (36.7 °C)   TempSrc:   Oral Oral   SpO2: 100%  97% 98%   Weight: 140 lb 12.8 oz (63.9 kg) 140 lb (63.5 kg)     Height:  5' 2\" (1.575 m)       BMI: Body mass index is 25.61 kg/m². General: Uncomfortable. AAOx3. HEENT: PERRLA. Vision grossly intact. Hearing grossly intact. Oropharynx clear. Neck: Supple. JVD to mid neck. CV: Tachycardic. NL S1/S2. 2/6 SM at LLSB. CR <2 secs. 3+ BLE pitting edema to lower thighs. Pulm: Increased effort effort on 4L NC. Moderate bibasilar rales that do not improve with coughing. CW NTTP. GI: +BS x4. Soft, mildly distended. No rebound, guarding or rigidity. : No CVA or suprapubic tenderness. No Laurent catheter. Skin: Intact. Normal coloration, warm, dry. MSK: No gross joint deformities. Full ROM. Neuro: CNs grossly intact. Normal speech. No focal deficit. Psych: Good judgement and reason. Past History:      PMHx:   Past Medical History:   Diagnosis Date    CAD (coronary artery disease)     Diabetes mellitus ([facility] [address]     Hepatitis C     [facility]        PSHx:   Past Surgical History:   Procedure Laterality Date    CHOLECYSTECTOMY      HYSTERECTOMY (CERVIX STATUS UNKNOWN)         Allergies: Allergies   Allergen Reactions    Haldol [Haloperidol Lactate] Other (See Comments)     Unknown, severe reaction per mother    Penicillins        FHx: family history includes Cancer in her father; No Known Problems in her mother. SHx:   Social History     Socioeconomic History    Marital status: Legally      Spouse name: None    Number of children: None    Years of education: None    Highest education level: None   Tobacco Use    Smoking status: Every Day     Packs/day: 0.25     Years: 30.00     Pack years: 7.50     Types: Cigars, Cigarettes     Last attempt to quit: 10/17/2022     Years since quittin.0    Smokeless tobacco: Never    Tobacco comments:     Couple cigars and 3 cigarettes a day 22   Vaping Use    Vaping Use: Never used   Substance and Sexual Activity    Alcohol use: Yes     Comment: once in a while. caffeine: 2-3cups coffee and 2 cans soda daily. Drug use: Not Currently     Types: Marijuana Danial Radon)       Medications Prior to Admission     Prior to Admission medications    Medication Sig Start Date End Date Taking? Authorizing Provider   empagliflozin (JARDIANCE) 10 MG tablet Take 1 tablet by mouth daily 10/14/22   YNES Barksdale CNP   loratadine (CLARITIN) 10 MG tablet TAKE 1 TABLET BY MOUTH THREE TIMES DAILY 22   Historical Provider, MD   lisinopril (PRINIVIL;ZESTRIL) 20 MG tablet Take 1 tablet by mouth daily 22   YNES Barksdale CNP   Blood Pressure KIT 1 Device by Does not apply route daily 22   YNES Barksdale CNP   metoprolol succinate (TOPROL XL) 50 MG extended release tablet Take 1 tablet by mouth daily 22   YNES Barksdale CNP   rivaroxaban (XARELTO) 20 MG TABS tablet Take 1 tablet by mouth Daily with supper 22   YNES Barksdale CNP   furosemide (LASIX) 20 MG tablet Take 1 tablet by mouth See Admin Instructions Monitor weight daily if your weight increases by 2 pounds in a day take one Lasix.  Only take PRN. 5/6/22   YNES Murcia CNP   spironolactone (ALDACTONE) 25 MG tablet Take 1 tablet by mouth daily 5/7/22   YNES Murcia CNP   dicyclomine (BENTYL) 10 MG capsule Take 1 capsule by mouth 4 times daily as needed (Abdominal cramping, diarrhea) 3/3/22   Miguel Dasilva MD   insulin lispro (HUMALOG) 100 UNIT/ML injection vial Inject 10 Units into the skin 3 times daily (with meals) 8/5/20   Dudley Gonsalez MD   Insulin Syringe-Needle U-100 (AIMSC[employer] INS SYR .3CC/29GX0.5\") 29G X 1/2\" 0.3 ML MISC 1 each by Does not apply route daily 8/5/20   Dudley Gonsalez MD   aspirin 81 MG chewable tablet Take 81 mg by mouth daily   Patient not taking: Reported on 9/19/2022    Historical Provider, MD       Data:     CBC: No results for input(s): WBC, HGB, PLT, MCV, RDW, BANDSPCT, BLASTSPCT, METASPCT, LYMPHOPCT, PROMYELOPCT, MONOPCT, MYELOPCT, EOSPCT, BASOPCT, MONOSABS, LYMPHSABS, EOSABS, BASOSABS in the last 72 hours. Invalid input(s): MALIKA FERRIS  CMP:    Recent Labs     11/07/22  1559   *   K 4.1   CL 94*   CO2 28   BUN 27*   CREATININE 1.6*   GLUCOSE 144*   LABALBU 3.4   CALCIUM 9.4   BILITOT 0.7   ALKPHOS 184*   AST 31   ALT 28     Lipids:   Lab Results   Component Value Date/Time    CHOL 161 03/12/2021 06:50 PM    HDL 72 03/12/2021 06:50 PM    TRIG 163 03/12/2021 06:50 PM     Hemoglobin A1C:   Lab Results   Component Value Date/Time    LABA1C 6.0 05/03/2022 02:13 AM     TSH: No results found for: TSH  Troponin:   Lab Results   Component Value Date/Time    TROPONINT <0.010 05/02/2022 02:35 PM    TROPONINT <0.010 03/16/2022 06:01 AM    TROPONINT <0.010 03/15/2022 11:59 PM     BNP:   Recent Labs     11/07/22  1559   PROBNP 30,533*     Lactic Acid: No results for input(s): LACTA in the last 72 hours.   UA:  Lab Results   Component Value Date/Time    NITRU NEGATIVE 05/03/2022 10:30 AM    COLORU YELLOW 05/03/2022 10:30 AM    WBCUA 240 05/03/2022 10:30 AM    RBCUA 59 05/03/2022 10:30 AM    MUCUS RARE 03/15/2022 12:08 PM    TRICHOMONAS NONE SEEN 05/03/2022 10:30 AM    YEAST RARE 03/15/2022 12:08 PM    BACTERIA NEGATIVE 05/03/2022 10:30 AM    CLARITYU CLOUDY 05/03/2022 10:30 AM    SPECGRAV 1.015 05/03/2022 10:30 AM    LEUKOCYTESUR SMALL 05/03/2022 10:30 AM    UROBILINOGEN 0.2 05/03/2022 10:30 AM    BILIRUBINUR NEGATIVE 05/03/2022 10:30 AM    BLOODU SMALL 05/03/2022 10:30 AM    KETUA NEGATIVE 05/03/2022 10:30 AM     Urine Cultures: No results found for: LABURIN  Blood Cultures: No results found for: BC  No results found for: BLOODCULT2  Organism: No results found for: [facility]    Radiology results:  XR CHEST PORTABLE   Final Result   Findings consistent with congestive heart failure and/or central   pneumonitis/atypical pneumonia. Findings may be accentuated by underlying   chronic lung disease. Very small amount of right basilar pleural fluid   suggested.              Medications:     Medications:    furosemide  40 mg IntraVENous BID    hydrALAZINE  25 mg Oral 4 times per day        Infusions:       PRN Meds:        Marilyn Pantoja MD  11/07/22 6:58 PM

## 2022-11-07 NOTE — CONSULTS
Consult completed. Verbal consent obtained by patient. Nexiva 20g 1.75 inch peripheral IV initiated into left anterior forearm x 1 attempt with ultrasound guidance. Brisk blood return, flushes without resistance. Patient tolerated well. Primary nurse Sukhi Segal notified. Please consult IV/PICC team if patient's needs change, questions, or concerns.

## 2022-11-07 NOTE — ED PROVIDER NOTES
EMERGENCY DEPARTMENT ENCOUNTER      CHIEF COMPLAINT:   Shortness of breath  Leg swelling    HPI: Isis Schofield is a 61 y.o. female with a past medical history of CHF who presents to the Emergency Department complaining of shortness of breath, increased leg swelling and a 10 pound weight gain in the past 2 weeks. The patient states that the symptoms started 2 weeks ago. It is becoming progressively worse. Her last echo in May 2022 showed an ejection fraction of 30 to 35%. Patient states that she takes Lasix as needed. She has not been taking it over the past 2 weeks. Symptoms are constant. The shortness of breath is worse with exertion and better with rest. There is no known history of DVT or PE. The patient states that both of her legs feel tight, but denies unilateral leg pain. The patient denies fevers, chills, neck pain, chest pain, hemoptysis, abdominal pain, nausea, vomiting, numbness, tingling, weakness, or any other complaints. REVIEW OF SYSTEMS:  CONSTITUTIONAL:  Denies fever, chills, weight loss or weakness  EYES:  Denies photophobia or discharge  ENT:  Denies sore throat or ear pain  CARDIOVASCULAR: Denies chest pain or palpitations  RESPIRATORY: See HPI  GI:  Denies abdominal pain, nausea, vomiting, or diarrhea  MUSCULOSKELETAL:  Denies back pain  SKIN:  No rash  NEUROLOGIC:  Denies headache, focal weakness or sensory changes  All systems negative except as marked. \"Remaining review of systems reviewed and negative. I have reviewed the nursing triage documentation and agree unless otherwise noted below. \"      PAST MEDICAL HISTORY:   Past Medical History:   Diagnosis Date    CAD (coronary artery disease)     Diabetes mellitus ([facility] [address]     Hepatitis C     Schizophrenia ([facility] [address]        CURRENT MEDICATIONS:   Home medications reviewed.     SURGICAL HISTORY:   Past Surgical History:   Procedure Laterality Date    CHOLECYSTECTOMY      HYSTERECTOMY (CERVIX STATUS UNKNOWN)         FAMILY HISTORY:   Family History   Problem Relation Age of Onset    No Known Problems Mother     Cancer Father        SOCIAL HISTORY:   Social History     Socioeconomic History    Marital status: Legally      Spouse name: Not on file    Number of children: Not on file    Years of education: Not on file    Highest education level: Not on file   Occupational History    Not on file   Tobacco Use    Smoking status: Every Day     Packs/day: 0.25     Years: 30.00     Pack years: 7.50     Types: Cigars, Cigarettes     Last attempt to quit: 10/17/2022     Years since quittin.0    Smokeless tobacco: Never    Tobacco comments:     Couple cigars and 3 cigarettes a day 22   Vaping Use    Vaping Use: Never used   Substance and Sexual Activity    Alcohol use: Yes     Comment: once in a while. caffeine: 2-3cups coffee and 2 cans soda daily. Drug use: Not Currently     Types: Marijuana Lalito Spina)    Sexual activity: Not on file   Other Topics Concern    Not on file   Social History Narrative    Not on file     Social Determinants of Health     Financial Resource Strain: Not on file   Food Insecurity: Not on file   Transportation Needs: Not on file   Physical Activity: Not on file   Stress: Not on file   Social Connections: Not on file   Intimate Partner Violence: Not on file   Housing Stability: Not on file       ALLERGIES: Haldol [haloperidol lactate] and Penicillins    PHYSICAL EXAM:  VITAL SIGNS:   ED Triage Vitals   Enc Vitals Group      BP 22 1408 (!) 157/110      Heart Rate 22 1408 (!) 108      Resp 22 1408 16      Temp 22 1408 97.7 °F (36.5 °C)      Temp Source 22 1553 Oral      SpO2 22 1408 100 %      Weight 22 1408 140 lb 12.8 oz (63.9 kg)      Height 22 1552 5' 2\" (1.575 m)      Head Circumference --       Peak Flow --       Pain Score --       Pain Loc --       Pain Edu? --       Excl.  in [address]? --      Constitutional:  Non-toxic appearance  HENT: Normocephalic, Atraumatic, Bilateral external ears normal, Oropharynx moist, No oral exudates, Nose normal.  Eyes:  PERRL, Conjunctiva normal, No discharge. Neck: Normal range of motion, No tenderness, Supple, No stridor, No lymphadenopathy. Cardiovascular: Tachycardic, rhythm  Pulmonary/Chest: Diminished breath sounds to auscultation bilaterally, no respiratory distress, no wheezing  Abdomen: Bowel sounds normal, Soft, No tenderness, No masses, No pulsatile masses  Extremities:  Normal range of motion, Intact distal pulses, 2+ pitting edema to bilateral lower extremities, no tenderness  Skin:  Warm, Dry, No erythema, No rash    EKG Interpretation  Interpreted by me  Compared to 5/2/2022  Rhythm: sinus tachycardia  Rate:  tachycardic 111  Axis: normal  Ectopy: none  Conduction: normal  ST Segments: no acute change  T Waves: inverted t-waves seen in V5 and V6 on prior EKG are now upright  Clinical Impression: sinus tachycardia    Cardiac Monitor Strip Interpretation  Interpreted by me  Monitor strip interpreted for greater than 10 seconds  Rhythm: sinus tachycardia  Rate: tachycardic  Ectopy: none  ST Segments: normal      Radiology / Procedures:  XR CHEST PORTABLE (Final result)  Result time 11/07/22 16:09:32  Final result by Veena Mcgraw DO (11/07/22 16:09:32)                Impression:    Findings consistent with congestive heart failure and/or central   pneumonitis/atypical pneumonia. Findings may be accentuated by underlying   chronic lung disease. Very small amount of right basilar pleural fluid   suggested.              Narrative:    EXAMINATION:   ONE XRAY VIEW OF THE CHEST     11/7/2022 3:42 pm     COMPARISON:   05/02/2022     HISTORY:   ORDERING SYSTEM PROVIDED HISTORY: chest pain   TECHNOLOGIST PROVIDED HISTORY:   Reason for exam:->chest pain   Reason for Exam: chest pain   Additional signs and symptoms: none   Relevant Medical/Surgical History: CAD, diabetes     FINDINGS:   Cardiomegaly, diffuse bronchovascular marking prominence CHF given the patient's clinical picture. She was treated with 60 mg of IV Lasix. I suspect that the patient has acute on chronic CHF with hypoxia. I have a low suspicion for PTX, PE, STEMI, pneumonia, flash pulmonary edema, respiratory distress or sepsis. I recommended admission to the hospital and the patient was agreeable. I discussed the case with the hospitalist who will admit the patient for further treatment and care. The patient is currently in stable condition awaiting admission. Clinical Impression:  1. Acute on chronic congestive heart failure, unspecified heart failure type ([address]    2. Hypoxia        Comment: Please note this report has been produced using speech recognition software and may contain errors related to that system including errors in grammar, punctuation, and spelling, as well as words and phrases that may be inappropriate. If there are any questions or concerns please feel free to contact the dictating provider for clarification.         Eri Barnard MD  11/13/22 2022 Fall with Harm Risk

## 2022-11-07 NOTE — ED PROVIDER NOTES
ECG:  Sinus tachycardia. Ventricular rate of 111. IN is 122. QRS is 82. QTc is 500. There are no abnormal ST elevations or depressions.   There is no ventricular ectopy     Anders Bernard MD  11/07/22 9919

## 2022-11-07 NOTE — ED TRIAGE NOTES
Pt was at the [facility] for an appt and the staff wanted her to come here because she has experienced weight gain of 10lbs an is feeling SOB for over 1 week, her BP is elevated, andpt states her sugar is elevated at 190s. Pt has many chronic health problems.

## 2022-11-08 LAB
ANION GAP SERPL CALCULATED.3IONS-SCNC: 10 MMOL/L (ref 4–16)
BACTERIA: NEGATIVE /HPF
BASOPHILS ABSOLUTE: 0.1 K/CU MM
BASOPHILS RELATIVE PERCENT: 0.6 % (ref 0–1)
BILIRUBIN URINE: NEGATIVE MG/DL
BLOOD, URINE: ABNORMAL
BUN BLDV-MCNC: 29 MG/DL (ref 6–23)
CALCIUM SERPL-MCNC: 8.4 MG/DL (ref 8.3–10.6)
CHLORIDE BLD-SCNC: 99 MMOL/L (ref 99–110)
CHLORIDE URINE RANDOM: 10 MMOL/L (ref 43–210)
CLARITY: CLEAR
CO2: 26 MMOL/L (ref 21–32)
COLOR: YELLOW
CREAT SERPL-MCNC: 1.9 MG/DL (ref 0.6–1.1)
CREATININE URINE: 4.2 MG/DL (ref 28–217)
DIFFERENTIAL TYPE: ABNORMAL
EKG ATRIAL RATE: 111 BPM
EKG DIAGNOSIS: NORMAL
EKG P AXIS: 63 DEGREES
EKG P-R INTERVAL: 122 MS
EKG Q-T INTERVAL: 368 MS
EKG QRS DURATION: 82 MS
EKG QTC CALCULATION (BAZETT): 500 MS
EKG R AXIS: -28 DEGREES
EKG T AXIS: 80 DEGREES
EKG VENTRICULAR RATE: 111 BPM
EOSINOPHILS ABSOLUTE: 0.1 K/CU MM
EOSINOPHILS RELATIVE PERCENT: 1.2 % (ref 0–3)
GFR SERPL CREATININE-BSD FRML MDRD: 30 ML/MIN/1.73M2
GLUCOSE BLD-MCNC: 137 MG/DL (ref 70–99)
GLUCOSE, URINE: >1000 MG/DL
HCT VFR BLD CALC: 40.3 % (ref 37–47)
HEMOGLOBIN: 13.6 GM/DL (ref 12.5–16)
IMMATURE NEUTROPHIL %: 0.3 % (ref 0–0.43)
INR BLD: 1.76 INDEX
KETONES, URINE: NEGATIVE MG/DL
LEUKOCYTE ESTERASE, URINE: ABNORMAL
LV EF: 28 %
LVEF MODALITY: NORMAL
LYMPHOCYTES ABSOLUTE: 2.4 K/CU MM
LYMPHOCYTES RELATIVE PERCENT: 27 % (ref 24–44)
MCH RBC QN AUTO: 33.1 PG (ref 27–31)
MCHC RBC AUTO-ENTMCNC: 33.7 % (ref 32–36)
MCV RBC AUTO: 98.1 FL (ref 78–100)
MONOCYTES ABSOLUTE: 0.7 K/CU MM
MONOCYTES RELATIVE PERCENT: 7.6 % (ref 0–4)
NITRITE URINE, QUANTITATIVE: NEGATIVE
NUCLEATED RBC %: 0 %
PDW BLD-RTO: 14.2 % (ref 11.7–14.9)
PH, URINE: 7.5 (ref 5–8)
PLATELET # BLD: 155 K/CU MM (ref 140–440)
PMV BLD AUTO: 11.1 FL (ref 7.5–11.1)
POTASSIUM SERPL-SCNC: 4 MMOL/L (ref 3.5–5.1)
POTASSIUM, UR: 1 MMOL/L (ref 22–119)
PROT/CREAT RATIO, UR: 1
PROTEIN UA: 100 MG/DL
PROTHROMBIN TIME: 22.8 SECONDS (ref 11.7–14.5)
RBC # BLD: 4.11 M/CU MM (ref 4.2–5.4)
RBC URINE: 37 /HPF (ref 0–6)
SEGMENTED NEUTROPHILS ABSOLUTE COUNT: 5.6 K/CU MM
SEGMENTED NEUTROPHILS RELATIVE PERCENT: 63.3 % (ref 36–66)
SODIUM BLD-SCNC: 135 MMOL/L (ref 135–145)
SODIUM URINE: 10 MMOL/L (ref 35–167)
SPECIFIC GRAVITY UA: 1.01 (ref 1–1.03)
SQUAMOUS EPITHELIAL: 5 /HPF
TOTAL IMMATURE NEUTOROPHIL: 0.03 K/CU MM
TOTAL NUCLEATED RBC: 0 K/CU MM
TRICHOMONAS: ABNORMAL /HPF
TROPONIN T: 0.01 NG/ML
TROPONIN T: 0.02 NG/ML
TSH HIGH SENSITIVITY: 5.06 UIU/ML (ref 0.27–4.2)
URINE TOTAL PROTEIN: 4 MG/DL
UROBILINOGEN, URINE: 0.2 MG/DL (ref 0.2–1)
WBC # BLD: 8.8 K/CU MM (ref 4–10.5)
WBC CLUMP: ABNORMAL /HPF
WBC UA: 259 /HPF (ref 0–5)

## 2022-11-08 PROCEDURE — 2580000003 HC RX 258: Performed by: STUDENT IN AN ORGANIZED HEALTH CARE EDUCATION/TRAINING PROGRAM

## 2022-11-08 PROCEDURE — 84166 PROTEIN E-PHORESIS/URINE/CSF: CPT

## 2022-11-08 PROCEDURE — 6360000002 HC RX W HCPCS: Performed by: STUDENT IN AN ORGANIZED HEALTH CARE EDUCATION/TRAINING PROGRAM

## 2022-11-08 PROCEDURE — 6370000000 HC RX 637 (ALT 250 FOR IP)

## 2022-11-08 PROCEDURE — 80048 BASIC METABOLIC PNL TOTAL CA: CPT

## 2022-11-08 PROCEDURE — 1200000000 HC SEMI PRIVATE

## 2022-11-08 PROCEDURE — 82570 ASSAY OF URINE CREATININE: CPT

## 2022-11-08 PROCEDURE — 84145 PROCALCITONIN (PCT): CPT

## 2022-11-08 PROCEDURE — 36415 COLL VENOUS BLD VENIPUNCTURE: CPT

## 2022-11-08 PROCEDURE — 93010 ELECTROCARDIOGRAM REPORT: CPT | Performed by: INTERNAL MEDICINE

## 2022-11-08 PROCEDURE — 84300 ASSAY OF URINE SODIUM: CPT

## 2022-11-08 PROCEDURE — 94761 N-INVAS EAR/PLS OXIMETRY MLT: CPT

## 2022-11-08 PROCEDURE — 6370000000 HC RX 637 (ALT 250 FOR IP): Performed by: STUDENT IN AN ORGANIZED HEALTH CARE EDUCATION/TRAINING PROGRAM

## 2022-11-08 PROCEDURE — 84156 ASSAY OF PROTEIN URINE: CPT

## 2022-11-08 PROCEDURE — 86140 C-REACTIVE PROTEIN: CPT

## 2022-11-08 PROCEDURE — 6370000000 HC RX 637 (ALT 250 FOR IP): Performed by: NURSE PRACTITIONER

## 2022-11-08 PROCEDURE — 6360000002 HC RX W HCPCS: Performed by: INTERNAL MEDICINE

## 2022-11-08 PROCEDURE — 81001 URINALYSIS AUTO W/SCOPE: CPT

## 2022-11-08 PROCEDURE — 82436 ASSAY OF URINE CHLORIDE: CPT

## 2022-11-08 PROCEDURE — 84133 ASSAY OF URINE POTASSIUM: CPT

## 2022-11-08 PROCEDURE — 2700000000 HC OXYGEN THERAPY PER DAY

## 2022-11-08 PROCEDURE — 85025 COMPLETE CBC W/AUTO DIFF WBC: CPT

## 2022-11-08 PROCEDURE — 99223 1ST HOSP IP/OBS HIGH 75: CPT | Performed by: INTERNAL MEDICINE

## 2022-11-08 PROCEDURE — 85610 PROTHROMBIN TIME: CPT

## 2022-11-08 PROCEDURE — 87086 URINE CULTURE/COLONY COUNT: CPT

## 2022-11-08 PROCEDURE — 93308 TTE F-UP OR LMTD: CPT

## 2022-11-08 RX ORDER — FUROSEMIDE 10 MG/ML
20 INJECTION INTRAMUSCULAR; INTRAVENOUS 2 TIMES DAILY
Status: DISCONTINUED | OUTPATIENT
Start: 2022-11-08 | End: 2022-11-09

## 2022-11-08 RX ORDER — LANOLIN ALCOHOL/MO/W.PET/CERES
3 CREAM (GRAM) TOPICAL ONCE
Status: COMPLETED | OUTPATIENT
Start: 2022-11-08 | End: 2022-11-08

## 2022-11-08 RX ORDER — LISINOPRIL 5 MG/1
5 TABLET ORAL DAILY
Status: DISCONTINUED | OUTPATIENT
Start: 2022-11-09 | End: 2022-11-10

## 2022-11-08 RX ADMIN — DIPHENHYDRAMINE HYDROCHLORIDE 50 MG: 25 TABLET ORAL at 21:45

## 2022-11-08 RX ADMIN — SODIUM CHLORIDE, PRESERVATIVE FREE 10 ML: 5 INJECTION INTRAVENOUS at 17:49

## 2022-11-08 RX ADMIN — EMPAGLIFLOZIN 10 MG: 10 TABLET, FILM COATED ORAL at 10:01

## 2022-11-08 RX ADMIN — ASPIRIN 81 MG: 81 TABLET, COATED ORAL at 10:01

## 2022-11-08 RX ADMIN — FUROSEMIDE 20 MG: 10 INJECTION, SOLUTION INTRAVENOUS at 17:48

## 2022-11-08 RX ADMIN — RIVAROXABAN 15 MG: 15 TABLET, FILM COATED ORAL at 17:47

## 2022-11-08 RX ADMIN — METOPROLOL SUCCINATE 50 MG: 50 TABLET, EXTENDED RELEASE ORAL at 10:02

## 2022-11-08 RX ADMIN — HYDRALAZINE HYDROCHLORIDE 25 MG: 25 TABLET, FILM COATED ORAL at 12:16

## 2022-11-08 RX ADMIN — FUROSEMIDE 40 MG: 10 INJECTION, SOLUTION INTRAMUSCULAR; INTRAVENOUS at 10:01

## 2022-11-08 RX ADMIN — HYDRALAZINE HYDROCHLORIDE 25 MG: 25 TABLET, FILM COATED ORAL at 06:17

## 2022-11-08 RX ADMIN — LISINOPRIL 20 MG: 20 TABLET ORAL at 10:02

## 2022-11-08 RX ADMIN — SODIUM CHLORIDE, PRESERVATIVE FREE 10 ML: 5 INJECTION INTRAVENOUS at 10:03

## 2022-11-08 RX ADMIN — HYDRALAZINE HYDROCHLORIDE 25 MG: 25 TABLET, FILM COATED ORAL at 17:48

## 2022-11-08 RX ADMIN — Medication 3 MG: at 21:45

## 2022-11-08 RX ADMIN — SODIUM CHLORIDE, PRESERVATIVE FREE 10 ML: 5 INJECTION INTRAVENOUS at 21:45

## 2022-11-08 RX ADMIN — SPIRONOLACTONE 25 MG: 50 TABLET ORAL at 10:02

## 2022-11-08 ASSESSMENT — PAIN SCALES - GENERAL: PAINLEVEL_OUTOF10: 0

## 2022-11-08 NOTE — CONSULTS
Nephrology Service Consultation    Patient:  Anika Ortez  MRN: [id number]  Consulting physician:  Mike Azevedo MD  Reason for Consult: Weak short of breath    History Obtained From:  patient, electronic medical record  PCP: Andrew Bernal    HISTORY OF PRESENT ILLNESS:   The patient is a 61 y.o. female who presents with weakness) ischemic cardiomyopathy EF decreased history of DVT and PE hypertension smoker having quit about 2 weeks ago try to gain weight with dyspnea on exertion noncompliant with medication still trying to liters oxygen despite diuresis patient shortness of breath improved but renal function increased patient also was on lisinopril as well as Aldactone. Slightly short of breath in setting of CHF with EF 30 to 35%. Creatinine has been around 1-1.2 has been as high as 1.4-1.6 now increased to 1.9 in setting of diuresis with diabetes hypertension history of schizophrenia with prior follow-up nephrology apparently with seen in the past but not followed outpatient.             Diagnosis Date    CAD (coronary artery disease)     [facility]     Hepatitis C     Schizophrenia ([facility]        Past Surgical History:        Procedure Laterality Date    CHOLECYSTECTOMY      HYSTERECTOMY (CERVIX STATUS UNKNOWN)         Medications:   Scheduled Meds:   [START ON 11/9/2022] lisinopril  5 mg Oral Daily    furosemide  20 mg IntraVENous BID    aspirin  81 mg Oral Daily    empagliflozin  10 mg Oral Daily    metoprolol succinate  50 mg Oral Daily    sodium chloride flush  5-40 mL IntraVENous 2 times per day    hydrALAZINE  25 mg Oral 4 times per day    rivaroxaban  15 mg Oral Dinner    spironolactone  25 mg Oral Daily     Continuous Infusions:   sodium chloride       PRN Meds:.sodium chloride flush, sodium chloride, ondansetron **OR** ondansetron, polyethylene glycol, acetaminophen **OR** acetaminophen, diphenhydrAMINE    Allergies:  Haldol [haloperidol lactate] and Penicillins    Social History:   TOBACCO:   reports that she has been smoking cigars and cigarettes. She has a 7.50 pack-year smoking history. She has never used smokeless tobacco.  ETOH:   reports current alcohol use. OCCUPATION:      Family History:       Problem Relation Age of Onset    No Known Problems Mother     Cancer Father        REVIEW OF SYSTEMS:  Negative except for weak tired short of breath. Physical Exam:    I/O: 11/07 0701 - 11/08 0700  In: 10 [I.V.:10]  Out: -     Vitals: BP (!) 133/97   Pulse 92   Temp 97.8 °F (36.6 °C) (Oral)   Resp 19   Ht 5' 2\" (1.575 m)   Wt 135 lb 12.8 oz (61.6 kg)   SpO2 100%   BMI 24.84 kg/m²   General appearance: awake weak  HEENT: Head: Normal, normocephalic, atraumatic. Neck: supple, symmetrical, trachea midline  Lungs: diminished breath sounds bilaterally  Heart: S1, S2 normal  Abdomen: abnormal findings:  soft NT  Extremities: edema trace  Neurologic: Mental status: alertness: Awake weak anxious      CBC:   Recent Labs     11/08/22  0255   WBC 8.8   HGB 13.6        BMP:    Recent Labs     11/07/22  1559 11/08/22  0255   * 135   K 4.1 4.0   CL 94* 99   CO2 28 26   BUN 27* 29*   CREATININE 1.6* 1.9*   GLUCOSE 144* 137*     Hepatic:   Recent Labs     11/07/22  1559   AST 31   ALT 28   BILITOT 0.7   ALKPHOS 184*     Troponin: No results for input(s): TROPONINI in the last 72 hours. BNP: No results for input(s): BNP in the last 72 hours. Lipids: No results for input(s): CHOL, HDL in the last 72 hours.     Invalid input(s): LDLCALCU  ABGs:   Lab Results   Component Value Date/Time    PO2ART 28.7 08/02/2020 05:41 AM    DIV5WAC 48.5 08/02/2020 05:41 AM     INR:   Recent Labs     11/08/22 0255   INR 1.76     Renal Labs  Albumin:    Lab Results   Component Value Date/Time    LABALBU 3.4 11/07/2022 03:59 PM     Calcium:    Lab Results   Component Value Date/Time    CALCIUM 8.4 11/08/2022 02:55 AM     Phosphorus:    Lab Results   Component Value Date/Time    PHOS 1.8 08/04/2020 06:20 AM     U/A:    Lab Results   Component Value Date/Time    NITRU NEGATIVE 11/07/2022 04:51 PM    COLORU YELLOW 11/07/2022 04:51 PM    WBCUA 10 11/07/2022 04:51 PM    RBCUA 6 11/07/2022 04:51 PM    MUCUS RARE 11/07/2022 04:51 PM    TRICHOMONAS NONE SEEN 11/07/2022 04:51 PM    YEAST RARE 03/15/2022 12:08 PM    BACTERIA NEGATIVE 11/07/2022 04:51 PM    CLARITYU CLEAR 11/07/2022 04:51 PM    SPECGRAV 1.020 11/07/2022 04:51 PM    UROBILINOGEN 1.0 11/07/2022 04:51 PM    BILIRUBINUR NEGATIVE 11/07/2022 04:51 PM    BLOODU MODERATE NUMBER OR AMOUNT OBSERVED 11/07/2022 04:51 PM    [place]man [place]les NEGATIVE 11/07/2022 04:51 PM     ABG:    Lab Results   Component Value Date/Time    LNE7HLM 48.5 08/02/2020 05:41 AM    PO2ART 28.7 08/02/2020 05:41 AM    BTG4ESN 31.0 08/02/2020 05:41 AM     HgBA1c:    Lab Results   Component Value Date/Time    LABA1C 6.0 05/03/2022 02:13 AM     Microalbumen/Creatinine ratio:  No components found for: RUCREAT  TSH:  No results found for: TSH  IRON:    Lab Results   Component Value Date/Time    IRON 89 08/01/2020 03:37 PM     Iron Saturation:  No components found for: PERCENTFE  TIBC:    Lab Results   Component Value Date/Time    TIBC 230 08/01/2020 03:37 PM     FERRITIN:  No results found for: FERRITIN  RPR:  No results found for: RPR  WALT:  No results found for: ANATITER, WALT  24 Hour Urine for Creatinine Clearance:  No components found for: CREAT4, UHRS10, UTV10  -----------------------------------------------------------------      Assessment and Recommendations     Patient Active Problem List   Diagnosis Code    Hyperglycemia R73.9    Precordial pain R07.2    Type 2 diabetes mellitus without complication, with long-term current use of insulin ([facility]) E11.9, Z79.4    Hyperglycemic crisis in diabetes mellitus ([facility] E11.65    Hyperosmolar hyponatremia E87.0, E87.1    Hypokalemia E87.6    Hypotensive episode I95.9    Acute bilateral deep vein thrombosis (DVT) of femoral veins ([facility]) I82.413 Pulmonary embolism (HCC) I26.99    Acute on chronic systolic congestive heart failure (HCC) I50.23    Acute congestive heart failure (HCC) I50.9    Primary hypertension I10    NICM (nonischemic cardiomyopathy) ([facility]) I42.8    MELANY (acute kidney injury) ([facility] N17.9    Smoking F17.200    Nonrheumatic mitral valve regurgitation I34.0    Acute on chronic systolic heart failure (HCC) I50.23     Impression plan  1 acute renal failure from ATN/volume depletion  #2 hypothyroidism  #3 type 2 diabetes  #4 shortness of breath and prior smoker with CHF as above  #5 history of PE    Plan  #1 renal function monitor in setting of diuresis. Weight down to 135 pounds.   Doing better will monitor controlled but worsening renal function in setting of diuresis with cardiorenal but does have significant proteinuria but above setting concern for possible UTI  #2 treated for hypothyroid  #3 monitor glucose and treat  #4 monitor shortness of breath in the setting of current smoking nondistressed anxiety and schizophrenia history  #5 maintain anticoagulation    Electronically signed by Susu Landry MD on 11/8/2022 at 4:08 PM

## 2022-11-08 NOTE — CONSULTS
INPATIENT CARDIOLOGY CONSULT NOTE         Reason for consultation:  CHF     Referring physician:  Marilyn Pantoja MD     Primary care physician: King Nguyen      Dear Marilyn Pantoja MD Thank you for the consult    Chief Complaint   Patient presents with    Shortness of Breath    Hypertension    Hyperglycemia     Pts sugar 190s       History of present illness:Deb is a 61 y. o.year old who  presents with   Chief Complaint   Patient presents with    Shortness of Breath    Hypertension    Hyperglycemia     Pts sugar 190s     Patient is a pleasant 58-year-old female with prior medical history significant for COPD, nonischemic cardiomyopathy, hypertension, DVT and PE presents to the hospital with chief complaint of shortness of breath. Patient mentions that she has been short of breath for the past 1 week lower extremity edema. She is been compliant with her medications as per history. Denies any chest pain or palpitations     Lab work-up shows sodium 136 5  Creatinine of 1.9, which is significantly elevated from her baseline of 1.3.  proBNP is 30,000  Cardiac troponin 0.017  Left heart cardiac catheterization earlier this year which revealed normal coronary angiogram, nonischemic cardiomyopathy    ECHO 5/3/2022     This is a limited echocardiogram.   Left ventricular systolic function is abnormal.   Ejection fraction is visually estimated at 30-35%. Global hypokinesis noted. Moderate bilateral atrial enlargement. Severe mitral regurgitation; (ERO 0.42 cm sq, Regurgitant volume 71 ml). Severe tricuspid regurgitation; RVSP: 61 mmHg. Mild to moderate pulmonic regurgitation present. No evidence of any pericardial effusion. Bilateral pleural effusion. Past medical history:    has a past medical history of CAD (coronary artery disease), Diabetes mellitus ([address] Hepatitis C, and Schizophrenia ([facility] [address]   Past surgical history:   has a past surgical history that includes Hysterectomy and Cholecystectomy. Social History:   reports that she has been smoking cigars and cigarettes. She has a 7.50 pack-year smoking history. She has never used smokeless tobacco. She reports current alcohol use. She reports that she does not currently use drugs after having used the following drugs: Marijuana Velasquez Ariao).   Family history:   no family history of CAD, STROKE of DM    Allergies   Allergen Reactions    Haldol [Haloperidol Lactate] Other (See Comments)     Unknown, severe reaction per mother    Penicillins        furosemide (LASIX) injection 40 mg, BID  aspirin EC tablet 81 mg, Daily  empagliflozin (JARDIANCE) tablet 10 mg, Daily  metoprolol succinate (TOPROL XL) extended release tablet 50 mg, Daily  sodium chloride flush 0.9 % injection 5-40 mL, 2 times per day  sodium chloride flush 0.9 % injection 5-40 mL, PRN  0.9 % sodium chloride infusion, PRN  ondansetron (ZOFRAN-ODT) disintegrating tablet 4 mg, Q8H PRN   Or  ondansetron (ZOFRAN) injection 4 mg, Q6H PRN  polyethylene glycol (GLYCOLAX) packet 17 g, Daily PRN  acetaminophen (TYLENOL) tablet 650 mg, Q6H PRN   Or  acetaminophen (TYLENOL) suppository 650 mg, Q6H PRN  hydrALAZINE (APRESOLINE) tablet 25 mg, 4 times per day  diphenhydrAMINE (BENADRYL) tablet 50 mg, Nightly PRN  rivaroxaban (XARELTO) tablet 15 mg, Dinner  lisinopril (PRINIVIL;ZESTRIL) tablet 20 mg, Daily  spironolactone (ALDACTONE) tablet 25 mg, Daily      Current Facility-Administered Medications   Medication Dose Route Frequency Provider Last Rate Last Admin    furosemide (LASIX) injection 40 mg  40 mg IntraVENous BID Kevin Nesbitt MD   40 mg at 11/08/22 1001    aspirin EC tablet 81 mg  81 mg Oral Daily Kevin Nesbitt MD   81 mg at 11/08/22 1001    empagliflozin (JARDIANCE) tablet 10 mg  10 mg Oral Daily Kevin Nesbitt MD   10 mg at 11/08/22 1001    metoprolol succinate (TOPROL XL) extended release tablet 50 mg  50 mg Oral Daily Kevin Nesbitt MD   50 mg at 11/08/22 1002    sodium chloride flush 0.9 % injection 5-40 mL  5-40 mL IntraVENous 2 times per day Yaw Pérez MD   10 mL at 11/08/22 1003    sodium chloride flush 0.9 % injection 5-40 mL  5-40 mL IntraVENous PRN Yaw Pérez MD        0.9 % sodium chloride infusion  500 mL IntraVENous PRN Yaw Pérez MD        ondansetron (ZOFRAN-ODT) disintegrating tablet 4 mg  4 mg Oral Q8H PRN Yaw Pérez MD        Or    ondansetron (ZOFRAN) injection 4 mg  4 mg IntraVENous Q6H PRN Yaw Pérez MD        polyethylene glycol (GLYCOLAX) packet 17 g  17 g Oral Daily PRN Yaw Pérez MD        acetaminophen (TYLENOL) tablet 650 mg  650 mg Oral Q6H PRN Yaw Pérez MD        Or    acetaminophen (TYLENOL) suppository 650 mg  650 mg Rectal Q6H PRN Yaw Pérez MD        hydrALAZINE (APRESOLINE) tablet 25 mg  25 mg Oral 4 times per day Yaw Pérez MD   25 mg at 11/08/22 0617    diphenhydrAMINE (BENADRYL) tablet 50 mg  50 mg Oral Nightly PRN Yaw Pérez MD   50 mg at 11/07/22 2216    rivaroxaban (XARELTO) tablet 15 mg  15 mg Oral Dinner YNES Hurley CNP   15 mg at 11/07/22 2216    lisinopril (PRINIVIL;ZESTRIL) tablet 20 mg  20 mg Oral Daily Yaw Pérez MD   20 mg at 11/08/22 1002    spironolactone (ALDACTONE) tablet 25 mg  25 mg Oral Daily Yaw Pérez MD   25 mg at 11/08/22 1002         Review of Systems:     Constitutional: No Fever or Weight Loss   Eyes: No Decreased Vision  ENT: No Headaches, Hearing Loss or Vertigo  Cardiovascular:  no chest pain, ++ dyspnea on exertion,  no palpitations or loss of consciousness  Respiratory: No cough or wheezing    Gastrointestinal: No abdominal pain, appetite loss, blood in stools, constipation, diarrhea or heartburn  Genitourinary: No dysuria, trouble voiding, or hematuria  Musculoskeletal:  No gait disturbance, weakness or joint complaints  Integumentary: No rash or pruritis  Neurological: No TIA or stroke symptoms  Psychiatric: No anxiety or depression  Endocrine: No malaise, fatigue or temperature intolerance  Hematologic/Lymphatic: No bleeding problems, blood clots or swollen lymph nodes  Allergic/Immunologic: No nasal congestion or hives    All other systems were reviewed and were negative otherwise. Physical Examination:      Vitals:    11/08/22 1002   BP: (!) 141/91   Pulse: 92   Resp:    Temp:    SpO2:       Wt Readings from Last 3 Encounters:   11/08/22 135 lb 12.8 oz (61.6 kg)   09/19/22 128 lb (58.1 kg)   06/16/22 125 lb 12.8 oz (57.1 kg)     Body mass index is 24.84 kg/m². General Appearance:  No distress, conversant  Constitutional:  Well developed, Well nourished  HEENT:  Normocephalic, Atraumatic, Oropharynx moist   Nose normal. Neck Supple Carotid: no carotid bruit  Eyes:  Conjunctiva normal, No discharge. Respiratory:    Normal breath sounds, No respiratory distress, No wheezing, no use of accessory muscles, diaphragm movement is normal  No chest Tenderness  Cardiovascular: S1-S2 Nomurmurs auscultated. No rubs, thrills or gallops. Normal  rhythm. Pedal pulses are normal. No pedal edema  GI:  Soft Non tender, non distended. Musculoskeletal:   No tenderness, No cyanosis, No clubbing. Integument:  Warm, Dry, No erythema, No rash. Lymphatic:  No lymphadenopathy noted. Neurologic:  Alert & oriented x 3  No focal deficits noted. Psychiatric:  Affect normal, Judgment normal, Mood normal.       Lab Review     Recent Labs     11/08/22  0255   WBC 8.8   HGB 13.6   HCT 40.3         Recent Labs     11/08/22  0255      K 4.0   CL 99   CO2 26   BUN 29*   CREATININE 1.9*     Recent Labs     11/07/22  1559   AST 31   ALT 28   BILITOT 0.7   ALKPHOS 184*     No results for input(s): TROPONINI in the last 72 hours. No results found for: BNP  Lab Results   Component Value Date    INR 1.76 11/08/2022    PROTIME 22.8 (H) 11/08/2022         All labs, images, EKGs were personally reviewed      Assessment: 61 y. o.year old with PMH of  has a past medical history of CAD (coronary artery disease), Diabetes mellitus ([facility] [address] Hepatitis C, and Schizophrenia ([facility] [address] Medical Decision Making :         Acute on Ch.systolic heart Failure: Last Echo shows low LVEF. Cont IV Lasix, Strick I/O Monitoring, Daily weights and Low salt diet. History of severe mitral regurgitation  Nonischemic cardiomyopathy    With Lasix 40 twice daily  Continue Toprol-XL 50 daily  Continue lisinopril 20 daily  Continue with Jardiance 10 daily  Continue with Aldactone 25 daily  Continue with hydralazine 25 mg p.o. 4 times daily    Follow-up echocardiogram to see progress of medication, assess mitral valve    Patient is fairly close to euvolemia    History of DVT/PE: On Xarelto. Continue  Essential hypertension: Blood pressure stable. Continue with hydralazine/lisinopril/Toprol-XL/Aldactone.     Does outpatient follow-up with cardiology/Dr. Sarbjit Burdick    Thank you for the consult    Dr. Shawn Rodas  11/8/2022 11:13 AM

## 2022-11-08 NOTE — CARE COORDINATION
Pt lives alone in an apt. Pt has family support. Pt is independent of his ADLs. Pt has a PCP and has insurance to help with healthcare cost.  No needs at this time. CM available if needed.

## 2022-11-08 NOTE — PROGRESS NOTES
Patient seen and examined full note to follow patient admitted initially with CHF exacerbation known EF 30 to 35% in setting of diabetes and hypertension noncompliant with medications seen by renal in the past baseline creatinine has been normal developed acute renal failure in the past 1.3-1.5 this admission increased up to 1.9 after diuresis renal asked evaluate above setting discussed with patient risk and benefits in the setting of diuresis with diabetes heart failure and risk of renal insufficiency. Agree with current treatment plan still requiring oxygen but overall improving. Patient states stop smoking about 2 weeks ago.   Full note to follow

## 2022-11-08 NOTE — PROGRESS NOTES
Pt seen and examined. Full note to follow with arf on ckd known to me with low calcium prior cancer radiation. Fu renal. Agree plan. Fu labs.  Full note to follow

## 2022-11-08 NOTE — PROGRESS NOTES
Met with patient and introduced myself as the Heart failure education R.NICO. Reports living alone in [facility] apartments. Her uncle takes her to buy groceries. States she likes to cook  and has good understanding of heart health diet. Admitting diagnosis- Acute oh Chronic Systolic heart failure  Cardiologist-  Charlene  Heart Failure Education Nurse consulted- yes   Ejection fraction 30-35% as of 5/3/22  Pro BNP- 30,533 on 11/17  Hospital follow up appt-  11/21 at the [facility]   Patient informed of appointment and appointment added to S    Cardiac rehabilitation referral- N/A - unable to make appointments - used exercise equipment at [facility]. ICD information- not discussed   Smoking Cessation information and referral- Quit smoking three weeks ago. Put off notifying doctor of weight gain because she thought it was due to smoking cessation. Pneumonia vaccine-  refused   PCP- HUBER Pardo    Patient has a digital scale? Yes   Transportation- R , [place], provides transportation to appointments     Able to obtain medications without difficulty? - Yes- Patient recently changed to [facility] to get medications delivered. She reports having had trouble receiving prescriptions from [place]. Advised not to stop taking a medication without notifying provider. Heart failure specific Medications-  Lisinopril, Apresoline,  Jardiance, Metoprolol, Aldactone, Lasix, Xarelto    Reviewed Heart failure patient education book with patient. Questions answered. Patient's heart failure medications reviewed and information given on each. Reviewed the Stop Light Handout with patient and instructed when to call her provider. Patient was engaged and attentive during education session.     The following handouts were reviewed with patient and patient was given a copy of the following : Heart Failure education booklet, the [employer] handout, the 'Stop Light' Handout, Rach Motivated- Finding Your Why, a link to the American Heart Association's Healthier Living with Heart Failure Interactive workbook and a list of heart failure related education available on the hospital TV and how to access it. 1 hour of education provided.

## 2022-11-08 NOTE — CARE COORDINATION
[facility] criteria for Heart failure reviewed at this time, criteria supports Inpatient admission on initial review.  SHARON,RN/CM

## 2022-11-08 NOTE — PROGRESS NOTES
V2.0  [facility]      Name:  Harper Church /Age/Sex: 1965  (61 y.o. female)   MRN & CSN:  [id number] & [id number] Encounter Date/Time: 2022 11:39 AM EST    Location:  [address] [place] [address]-A PCP: Jose Calderón Day: 2    Assessment and Plan:   Harper Church is a 61 y.o. female with pmh of N ICM P, HFrEF, history of DVT and PE, HTN and tobacco abuse who presents with Acute on chronic systolic heart failure (HCC)    Interval events:  -Patient seen and examined at bedside, vital signs stable, currently on 2 L/min oxygen via nasal cannula, tachycardia on admission seems to be improving, HR 92/min  -Briefly patient admitted  for SOB, PATTERSON, orthopnea, abdominal/LE swelling and 25 pound weight gain in 2 weeks, patient having history of heart failure and noncompliant with medications. Vital signs with tachycardia, patient stable on 2 L/min oxygen via nasal cannula, labs with creatinine 1.6, proBNP 30 533, Trope 0.017, UA with moderate blood, proteinuria, small leukocyte esterase and 10 WBCs with no irritative voiding symptoms/fever/dysuria. CXR with congestion. Patient received Lasix 60 mg IV and started on Lasix 40 mg IV twice daily, hydralazine, lisinopril 20 mg, metoprolol 50 mg and Aldactone 25 mg. Cardiology is on board pending further recs for acute decompensated heart failure  -Patient pending transthoracic echo  -BUN/creatinine uptrending, BUN 29<27/creatinine 1.9<1.6, nephrology consulted  -CXR with possible atypical pneumonia/pneumonitis, vital signs stable, will send inflammatory markers and consider CT and antibiotics if remarkable  -UA positive for large leukocyte esterase/WBC, however patient denies any irritative voiding symptoms, no fever/chills/vital sign changes noticed on chart review.   We will follow-up urine culture collected   -UA with RBCs, awaiting urology consult  -Cardiology signed off  -Patient declined PT  -Patient reporting no discomfort/respiratory distress while she took off oxygen at night. We will try to wean oxygen as apparently she is on 2 L/min oxygen via nasal cannula at the time of examination    Plan:  Acute on chronic systolic heart failure/NICMP:  -Patient presenting with shortness of breath/orthopnea/weight gain in setting of noncompliance  -Patient status post Lasix 60 mg IV in the ED  -Patient started on Lasix 40 mg IV twice daily  -Strict intake/output record  -Daily weights  -TTE 5/22 with LVEF 30-35%, moderate FRANSISCO, severe MR/TR, mild to moderate TR and RVSP 61 mmHg  -TTE from today pending  -Continue Lasix/Aldactone/hydralazine  -Cardiology on board, will appreciate recs  Acute hypoxemic respiratory failure:  Possible COPD  -Not on oxygen at baseline, requiring 2 L currently  -We will try to wean as tolerated  -DuoNebs every 4  Essential hypertension:  -Continue home medications lisinopril/Aldactone/added hydralazine  MELANY on CKD:  -Nephrology consulted  History of DVT and PE:  -Continue Xarelto  Tobacco abuse:  -We will provide counseling/offer nicotine patch  Miscellaneous:  -Continue home medication except as contraindicated    Diet ADULT DIET;  Regular; Low Fat/Low Chol/High Fiber/2 gm Na; 1500 ml   DVT Prophylaxis [] Lovenox, []  Heparin, [] SCDs, [] Ambulation,  [] Eliquis, [x] Xarelto  [] Coumadin   Code Status Full Code   Disposition From: Home  Expected Disposition: Likely home  Estimated Date of Discharge: 1-2 days  Patient requires continued admission due to GDMT optimization and cardiology clearance before discharge   Surrogate Decision Maker/ POA Self     Subjective:     Chief Complaint: Shortness of Breath, Hypertension, and Hyperglycemia (Pts sugar 190s)       Tanya Adams is a 61 y.o. female who presents with shortness of breath, currently reports no significant shortness of breath at the time of examination         Review of Systems:    Review of Systems    Review of Systems:   Constitutional: Negative. Negative for activity change, appetite change, chills, diaphoresis, fatigue, fever and unexpected weight change. HENT: Negative. Negative for congestion, dental problem, drooling, ear discharge, ear pain, facial swelling, hearing loss, mouth sores, nosebleeds, postnasal drip, rhinorrhea, sinus pressure, sinus pain, sneezing, sore throat, tinnitus, trouble swallowing and voice change. Eyes: Negative. Negative for photophobia, pain, discharge, redness, itching and visual disturbance. Respiratory: Patient reporting shortness of breath  Cardiovascular: Negative. Negative for chest pain and palpitations. Gastrointestinal: Negative. Negative for abdominal distention, abdominal pain, anal bleeding, blood in stool, constipation, diarrhea, nausea, rectal pain and vomiting. Endocrine: Negative. Negative for cold intolerance, heat intolerance, polydipsia, polyphagia and polyuria. Genitourinary: Negative. Negative for decreased urine volume, difficulty urinating, dysuria, enuresis, flank pain, frequency, genital sores, hematuria, penile discharge, penile pain, penile swelling, scrotal swelling, testicular pain and urgency. Musculoskeletal: Negative. Negative for arthralgias, back pain, gait problem, joint swelling, myalgias, neck pain and neck stiffness. Skin: Negative. Negative for color change, pallor, rash and wound. Allergic/Immunologic: Negative for environmental allergies, food allergies and immunocompromised state. Neurological: Negative. Negative for dizziness, tremors, seizures, syncope, facial asymmetry, speech difficulty, weakness, light-headedness, numbness and headaches. Hematological: Negative. Negative for adenopathy. Does not bruise/bleed easily. Psychiatric/Behavioral: Negative. Negative for agitation, behavioral problems, confusion, decreased concentration, dysphoric mood, hallucinations, self-injury, sleep disturbance and suicidal ideas.  The patient is not inversion no longer evident in Lateral leads     Comprehensive Metabolic Panel    Collection Time: 11/07/22  3:59 PM   Result Value Ref Range    Sodium 132 (L) 135 - 145 MMOL/L    Potassium 4.1 3.5 - 5.1 MMOL/L    Chloride 94 (L) 99 - 110 mMol/L    CO2 28 21 - 32 MMOL/L    BUN 27 (H) 6 - 23 MG/DL    Creatinine 1.6 (H) 0.6 - 1.1 MG/DL    Est, Glom Filt Rate 37 (L) >60 mL/min/1.73m2    Glucose 144 (H) 70 - 99 MG/DL    Calcium 9.4 8.3 - 10.6 MG/DL    Albumin 3.4 3.4 - 5.0 GM/DL    Total Protein 8.4 (H) 6.4 - 8.2 GM/DL    Total Bilirubin 0.7 0.0 - 1.0 MG/DL    ALT 28 10 - 40 U/L    AST 31 15 - 37 IU/L    Alkaline Phosphatase 184 (H) 40 - 129 IU/L    Anion Gap 10 4 - 16   Brain Natriuretic Peptide    Collection Time: 11/07/22  3:59 PM   Result Value Ref Range    Pro-BNP 30,533 (H) <300 PG/ML   SPECIMEN REJECTION    Collection Time: 11/07/22  3:59 PM   Result Value Ref Range    Rejected Test CBC     Reason for Rejection UNABLE TO PERFORM TESTING:    Urinalysis    Collection Time: 11/07/22  4:51 PM   Result Value Ref Range    Color, UA YELLOW YELLOW    Clarity, UA CLEAR CLEAR    Glucose, Urine 250 (A) NEGATIVE MG/DL    Bilirubin Urine NEGATIVE NEGATIVE MG/DL    Ketones, Urine NEGATIVE NEGATIVE MG/DL    Specific Gravity, UA 1.020 1.001 - 1.035    Blood, Urine MODERATE NUMBER OR AMOUNT OBSERVED (A) NEGATIVE    pH, Urine 7.5 5.0 - 8.0    Protein, UA >300 (HH) NEGATIVE MG/DL    Urobilinogen, Urine 1.0 0.2 - 1.0 MG/DL    Nitrite Urine, Quantitative NEGATIVE NEGATIVE    Leukocyte Esterase, Urine SMALL NUMBER OR AMOUNT OBSERVED (A) NEGATIVE   Microscopic Urinalysis    Collection Time: 11/07/22  4:51 PM   Result Value Ref Range    RBC, UA 6 0 - 6 /HPF    WBC, UA 10 (H) 0 - 5 /HPF    Bacteria, UA NEGATIVE NEGATIVE /HPF    Squam Epithel, UA 2 /HPF    Mucus, UA RARE (A) NEGATIVE HPF    Trichomonas, UA NONE SEEN NONE SEEN /HPF   Rapid influenza A/B antigens    Collection Time: 11/07/22  7:00 PM    Specimen: Nasopharyngeal   Result Value Ref Range    Rapid Influenza A Ag NEGATIVE NEGATIVE    Rapid Influenza B Ag NEGATIVE NEGATIVE   Troponin    Collection Time: 11/08/22  2:55 AM   Result Value Ref Range    Troponin T 0.017 (H) <0.01 NG/ML   Basic Metabolic Panel w/ Reflex to MG    Collection Time: 11/08/22  2:55 AM   Result Value Ref Range    Sodium 135 135 - 145 MMOL/L    Potassium 4.0 3.5 - 5.1 MMOL/L    Chloride 99 99 - 110 mMol/L    CO2 26 21 - 32 MMOL/L    Anion Gap 10 4 - 16    BUN 29 (H) 6 - 23 MG/DL    Creatinine 1.9 (H) 0.6 - 1.1 MG/DL    Est, Glom Filt Rate 30 (L) >60 mL/min/1.73m2    Glucose 137 (H) 70 - 99 MG/DL    Calcium 8.4 8.3 - 10.6 MG/DL   CBC with Auto Differential    Collection Time: 11/08/22  2:55 AM   Result Value Ref Range    WBC 8.8 4.0 - 10.5 K/CU MM    RBC 4.11 (L) 4.2 - 5.4 M/CU MM    Hemoglobin 13.6 12.5 - 16.0 GM/DL    Hematocrit 40.3 37 - 47 %    MCV 98.1 78 - 100 FL    MCH 33.1 (H) 27 - 31 PG    MCHC 33.7 32.0 - 36.0 %    RDW 14.2 11.7 - 14.9 %    Platelets 366 271 - 690 K/CU MM    MPV 11.1 7.5 - 11.1 FL    Differential Type AUTOMATED DIFFERENTIAL     Segs Relative 63.3 36 - 66 %    Lymphocytes % 27.0 24 - 44 %    Monocytes % 7.6 (H) 0 - 4 %    Eosinophils % 1.2 0 - 3 %    Basophils % 0.6 0 - 1 %    Segs Absolute 5.6 K/CU MM    Lymphocytes Absolute 2.4 K/CU MM    Monocytes Absolute 0.7 K/CU MM    Eosinophils Absolute 0.1 K/CU MM    Basophils Absolute 0.1 K/CU MM    Nucleated RBC % 0.0 %    Total Nucleated RBC 0.0 K/CU MM    Total Immature Neutrophil 0.03 K/CU MM    Immature Neutrophil % 0.3 0 - 0.43 %   Protime-INR    Collection Time: 11/08/22  2:55 AM   Result Value Ref Range    Protime 22.8 (H) 11.7 - 14.5 SECONDS    INR 1.76 INDEX        Imaging/Diagnostics Last 24 Hours   XR CHEST PORTABLE    Result Date: 11/7/2022  EXAMINATION: ONE XRAY VIEW OF THE CHEST 11/7/2022 3:42 pm COMPARISON: 05/02/2022 HISTORY: ORDERING SYSTEM PROVIDED HISTORY: chest pain TECHNOLOGIST PROVIDED HISTORY: Reason for exam:->chest pain

## 2022-11-09 ENCOUNTER — APPOINTMENT (OUTPATIENT)
Dept: GENERAL RADIOLOGY | Age: 57
DRG: 291 | End: 2022-11-09
Payer: COMMERCIAL

## 2022-11-09 LAB
ALBUMIN SERPL-MCNC: 2.6 GM/DL (ref 3.4–5)
ANION GAP SERPL CALCULATED.3IONS-SCNC: 10 MMOL/L (ref 4–16)
BUN BLDV-MCNC: 30 MG/DL (ref 6–23)
CALCIUM SERPL-MCNC: 7.6 MG/DL (ref 8.3–10.6)
CHLORIDE BLD-SCNC: 99 MMOL/L (ref 99–110)
CO2: 27 MMOL/L (ref 21–32)
CREAT SERPL-MCNC: 2 MG/DL (ref 0.6–1.1)
CULTURE: NORMAL
GFR SERPL CREATININE-BSD FRML MDRD: 29 ML/MIN/1.73M2
GLUCOSE BLD-MCNC: 141 MG/DL (ref 70–99)
Lab: NORMAL
PHOSPHORUS: 4.4 MG/DL (ref 2.5–4.9)
POTASSIUM SERPL-SCNC: 4 MMOL/L (ref 3.5–5.1)
SODIUM BLD-SCNC: 136 MMOL/L (ref 135–145)
SPECIMEN: NORMAL

## 2022-11-09 PROCEDURE — 6370000000 HC RX 637 (ALT 250 FOR IP): Performed by: STUDENT IN AN ORGANIZED HEALTH CARE EDUCATION/TRAINING PROGRAM

## 2022-11-09 PROCEDURE — 2580000003 HC RX 258: Performed by: STUDENT IN AN ORGANIZED HEALTH CARE EDUCATION/TRAINING PROGRAM

## 2022-11-09 PROCEDURE — 94640 AIRWAY INHALATION TREATMENT: CPT

## 2022-11-09 PROCEDURE — 6370000000 HC RX 637 (ALT 250 FOR IP): Performed by: INTERNAL MEDICINE

## 2022-11-09 PROCEDURE — 84155 ASSAY OF PROTEIN SERUM: CPT

## 2022-11-09 PROCEDURE — 36415 COLL VENOUS BLD VENIPUNCTURE: CPT

## 2022-11-09 PROCEDURE — 80074 ACUTE HEPATITIS PANEL: CPT

## 2022-11-09 PROCEDURE — 99233 SBSQ HOSP IP/OBS HIGH 50: CPT | Performed by: INTERNAL MEDICINE

## 2022-11-09 PROCEDURE — 6370000000 HC RX 637 (ALT 250 FOR IP)

## 2022-11-09 PROCEDURE — 84165 PROTEIN E-PHORESIS SERUM: CPT

## 2022-11-09 PROCEDURE — 71045 X-RAY EXAM CHEST 1 VIEW: CPT

## 2022-11-09 PROCEDURE — 1200000000 HC SEMI PRIVATE

## 2022-11-09 PROCEDURE — APPSS60 APP SPLIT SHARED TIME 46-60 MINUTES: Performed by: NURSE PRACTITIONER

## 2022-11-09 PROCEDURE — 86160 COMPLEMENT ANTIGEN: CPT

## 2022-11-09 PROCEDURE — 80069 RENAL FUNCTION PANEL: CPT

## 2022-11-09 PROCEDURE — 6370000000 HC RX 637 (ALT 250 FOR IP): Performed by: NURSE PRACTITIONER

## 2022-11-09 RX ORDER — IPRATROPIUM BROMIDE AND ALBUTEROL SULFATE 2.5; .5 MG/3ML; MG/3ML
1 SOLUTION RESPIRATORY (INHALATION)
Status: DISCONTINUED | OUTPATIENT
Start: 2022-11-09 | End: 2022-11-09

## 2022-11-09 RX ORDER — FUROSEMIDE 20 MG/1
20 TABLET ORAL DAILY
Status: DISCONTINUED | OUTPATIENT
Start: 2022-11-09 | End: 2022-11-10 | Stop reason: HOSPADM

## 2022-11-09 RX ORDER — ALBUTEROL SULFATE 90 UG/1
2 AEROSOL, METERED RESPIRATORY (INHALATION)
Status: DISCONTINUED | OUTPATIENT
Start: 2022-11-10 | End: 2022-11-10

## 2022-11-09 RX ORDER — ALBUTEROL SULFATE 90 UG/1
2 AEROSOL, METERED RESPIRATORY (INHALATION) EVERY 6 HOURS PRN
Status: DISCONTINUED | OUTPATIENT
Start: 2022-11-09 | End: 2022-11-10

## 2022-11-09 RX ORDER — IPRATROPIUM BROMIDE AND ALBUTEROL SULFATE 2.5; .5 MG/3ML; MG/3ML
1 SOLUTION RESPIRATORY (INHALATION) EVERY 4 HOURS PRN
Status: DISCONTINUED | OUTPATIENT
Start: 2022-11-09 | End: 2022-11-10

## 2022-11-09 RX ADMIN — SODIUM CHLORIDE, PRESERVATIVE FREE 10 ML: 5 INJECTION INTRAVENOUS at 19:40

## 2022-11-09 RX ADMIN — SPIRONOLACTONE 25 MG: 50 TABLET ORAL at 08:48

## 2022-11-09 RX ADMIN — RIVAROXABAN 15 MG: 15 TABLET, FILM COATED ORAL at 17:26

## 2022-11-09 RX ADMIN — ASPIRIN 81 MG: 81 TABLET, COATED ORAL at 08:48

## 2022-11-09 RX ADMIN — METOPROLOL SUCCINATE 50 MG: 50 TABLET, EXTENDED RELEASE ORAL at 08:49

## 2022-11-09 RX ADMIN — SODIUM CHLORIDE, PRESERVATIVE FREE 10 ML: 5 INJECTION INTRAVENOUS at 08:47

## 2022-11-09 RX ADMIN — LISINOPRIL 5 MG: 5 TABLET ORAL at 08:48

## 2022-11-09 RX ADMIN — FUROSEMIDE 20 MG: 20 TABLET ORAL at 08:49

## 2022-11-09 RX ADMIN — EMPAGLIFLOZIN 10 MG: 10 TABLET, FILM COATED ORAL at 08:52

## 2022-11-09 RX ADMIN — HYDRALAZINE HYDROCHLORIDE 25 MG: 25 TABLET, FILM COATED ORAL at 14:12

## 2022-11-09 RX ADMIN — HYDRALAZINE HYDROCHLORIDE 25 MG: 25 TABLET, FILM COATED ORAL at 17:26

## 2022-11-09 RX ADMIN — ALBUTEROL SULFATE 2 PUFF: 90 AEROSOL, METERED RESPIRATORY (INHALATION) at 20:18

## 2022-11-09 RX ADMIN — HYDRALAZINE HYDROCHLORIDE 25 MG: 25 TABLET, FILM COATED ORAL at 08:52

## 2022-11-09 NOTE — PROGRESS NOTES
Nephrology Progress Note  11/9/2022 4:18 PM  Subjective: Interval History: Evelia De Santiago is a 61 y.o. female with weak tired less anxious short of breath        Data:   Scheduled Meds:   furosemide  20 mg Oral Daily    ipratropium-albuterol  1 ampule Inhalation Q4H WA    lisinopril  5 mg Oral Daily    aspirin  81 mg Oral Daily    empagliflozin  10 mg Oral Daily    metoprolol succinate  50 mg Oral Daily    sodium chloride flush  5-40 mL IntraVENous 2 times per day    hydrALAZINE  25 mg Oral 4 times per day    rivaroxaban  15 mg Oral Dinner    spironolactone  25 mg Oral Daily     Continuous Infusions:   sodium chloride           CBC   Recent Labs     11/08/22  0255   WBC 8.8   HGB 13.6   HCT 40.3         BMP   Recent Labs     11/07/22  1559 11/08/22  0255 11/09/22  0348   * 135 136   K 4.1 4.0 4.0   CL 94* 99 99   CO2 28 26 27   PHOS  --   --  4.4   BUN 27* 29* 30*   CREATININE 1.6* 1.9* 2.0*     Hepatic:   Recent Labs     11/07/22  1559   AST 31   ALT 28   BILITOT 0.7   ALKPHOS 184*     Troponin: No results for input(s): TROPONINI in the last 72 hours. BNP: No results for input(s): BNP in the last 72 hours. Lipids: No results for input(s): CHOL, HDL in the last 72 hours.     Invalid input(s): LDLCALCU  ABGs:   Lab Results   Component Value Date/Time    PO2ART 28.7 08/02/2020 05:41 AM    QTL1WAM 48.5 08/02/2020 05:41 AM     INR:   Recent Labs     11/08/22  0255   INR 1.76     Renal Labs  Albumin:    Lab Results   Component Value Date/Time    LABALBU 2.6 11/09/2022 03:48 AM     Calcium:    Lab Results   Component Value Date/Time    CALCIUM 7.6 11/09/2022 03:48 AM     Phosphorus:    Lab Results   Component Value Date/Time    PHOS 4.4 11/09/2022 03:48 AM     U/A:    Lab Results   Component Value Date/Time    NITRU NEGATIVE 11/08/2022 06:25 PM    COLORU YELLOW 11/08/2022 06:25 PM    WBCUA 259 11/08/2022 06:25 PM    RBCUA 37 11/08/2022 06:25 PM    MUCUS RARE 11/07/2022 04:51 PM    TRICHOMONAS NONE SEEN 11/08/2022 06:25 PM    YEAST RARE 03/15/2022 12:08 PM    BACTERIA NEGATIVE 11/08/2022 06:25 PM    CLARITYU CLEAR 11/08/2022 06:25 PM    SPECGRAV 1.015 11/08/2022 06:25 PM    UROBILINOGEN 0.2 11/08/2022 06:25 PM    BILIRUBINUR NEGATIVE 11/08/2022 06:25 PM    BLOODU SMALL NUMBER OR AMOUNT OBSERVED 11/08/2022 06:25 PM    Linward Corns NEGATIVE 11/08/2022 06:25 PM     ABG:    Lab Results   Component Value Date/Time    FIU0BRZ 48.5 08/02/2020 05:41 AM    PO2ART 28.7 08/02/2020 05:41 AM    YSE7HXB 31.0 08/02/2020 05:41 AM     HgBA1c:    Lab Results   Component Value Date/Time    LABA1C 6.0 05/03/2022 02:13 AM     Microalbumen/Creatinine ratio:  No components found for: RUCREAT  TSH:  No results found for: TSH  IRON:    Lab Results   Component Value Date/Time    IRON 89 08/01/2020 03:37 PM     Iron Saturation:  No components found for: PERCENTFE  TIBC:    Lab Results   Component Value Date/Time    TIBC 230 08/01/2020 03:37 PM     FERRITIN:  No results found for: FERRITIN  RPR:  No results found for: RPR  WALT:  No results found for: ANATITER, WALT  24 Hour Urine for Creatinine Clearance:  No components found for: CREAT4, UHRS10, UTV10      Objective:   I/O: 11/08 0701 - 11/09 0700  In: 1500 [P.O.:1500]  Out: 1900 [Urine:1900]  I/O last 3 completed shifts: In: 12 [P.O.:1500; I.V.:10]  Out: 1900 [Urine:1900]  I/O this shift:  In: 360 [P.O.:360]  Out: -   Vitals: /84   Pulse 74   Temp 98.1 °F (36.7 °C) (Oral)   Resp 19   Ht 5' 2\" (1.575 m)   Wt 135 lb 12.8 oz (61.6 kg)   SpO2 91%   BMI 24.84 kg/m²  {  General appearance: awake weak  HEENT: Head: Normal, normocephalic, atraumatic.   Neck: supple, symmetrical, trachea midline  Lungs: diminished breath sounds bilaterally  Heart: S1, S2 normal  Abdomen: abnormal findings:  soft nt  Extremities: edema trace  Neurologic: Mental status: alertness: Awake anxious        Assessment and Plan:      IMP:  1 acute renal failure from ATN/volume depletion  #2 hypothyroidism  #3 type 2 diabetes  #4 shortness of breath and prior smoker with CHF as above  #5 history of PE    Plan     #1 renal function monitor creatinine 2.0  #2 maintain Synthroid  #3 glucose stable  #4 oxygenation holding stable discussed the possibility of cardiorenal syndrome and try to maintain diuresis to monitor oxygenation and breathing setting renal sufficiency  Will maintain current treatment and supportive care will follow work on improving protein           Don Magaña MD, MD

## 2022-11-09 NOTE — PROGRESS NOTES
Occupational Therapy ATTEMPT Treatment Note  Name: Pratik Morales MRN: [id number] :   1965   Date:  2022   Admission Date: 2022 Room:  [address] [place] [address]-A     Primary Problem:  The primary encounter diagnosis was Acute on chronic congestive heart failure, unspecified heart failure type ([facility] A diagnosis of Hypoxia was also pertinent to this visit. Past Medical History:   Diagnosis Date    CAD (coronary artery disease)     Diabetes mellitus ([facility]     Hepatitis C     Schizophrenia ([facility]              Per flowsheets, pt is independent of ADLs and mobility. OT came to discuss with pt and determine if pt at baseline or has skilled needs. Pt politely declined need for services. Pt reported to be operating at typical baseline and that all she needs to feel better is the O2 supp. Pt reported good support from 2 uncles and reported to have all DME she needs at home. Pt educated on our purpose and educated on the ability for our services to be re-ordered if skilled needs arise. Thank you for referral, pt will be removed from caseload at this time. Please re-refer if skilled needs arise.            Electronically signed by:    TORRIE Aguilar/JOSE FRANCISCO  License: [id number]  72/3/5820, 2:54 PM

## 2022-11-09 NOTE — PROGRESS NOTES
Patient walked in the hallway on room air with continuous pulse oxygen on finger no change noted to oxygen saturation maintained at 96%.

## 2022-11-09 NOTE — PROGRESS NOTES
Cardiology Progress Note     Today's Plan: change lasix to PO. Admit Date:  11/7/2022    Consult reason/ Seen today for: CHF    Subjective and  Overnight Events:  patient is feeling a little better. She is wearing oxygen because she feels better on it. We discussed weaning it off, she is agreeable. Chest pain no  Shortness of breath yes - improved  Palpitations no  Dizziness no  Swelling no          Telemetry Reviewed:   sinus rhythm with PAC's    ECHO :   Echocardiogram 11/8/2022   Summary   This is a limited echocardiogram.   Left ventricular systolic function is abnormal.   Ejection fraction is visually estimated at 25-30%. Global severe hypokinesis noted. Bilateral atrial enlargement. Severe mitral regurgitation consistent with previous echo. Severe tricuspid regurgitation; RVSP: 63 mmHg. No evidence of any pericardial effusion. History of Presenting Illness:    Chief complain on admission : 61 y. o.year old who is admitted for  Chief Complaint   Patient presents with    Shortness of Breath    Hypertension    Hyperglycemia     Pts sugar 190s        Past medical history:    has a past medical history of CAD (coronary artery disease), Diabetes mellitus ([facility] Hepatitis C, and Schizophrenia ([facility] Past surgical history:   has a past surgical history that includes Hysterectomy and Cholecystectomy. Social History:   reports that she has been smoking cigars and cigarettes. She has a 7.50 pack-year smoking history. She has never used smokeless tobacco. She reports current alcohol use. She reports that she does not currently use drugs after having used the following drugs: Marijuana Melissa Pleas). Family history:  family history includes Cancer in her father; No Known Problems in her mother.     Allergies   Allergen Reactions    Haldol [Haloperidol Lactate] Other (See Comments)     Unknown, severe reaction per mother    Penicillins        Review of Systems   All 14 systems were reviewed and are negative Except for the positive findings  which as documented     /71   Pulse 74   Temp 98.1 °F (36.7 °C) (Oral)   Resp 19   Ht 5' 2\" (1.575 m)   Wt 135 lb 12.8 oz (61.6 kg)   SpO2 91%   BMI 24.84 kg/m²     Intake/Output Summary (Last 24 hours) at 11/9/2022 1331  Last data filed at 11/8/2022 2127  Gross per 24 hour   Intake 660 ml   Output 700 ml   Net -40 ml       Physical Exam  Vitals reviewed. Constitutional:       General: She is not in acute distress. Appearance: Normal appearance. She is not ill-appearing. HENT:      Head: Atraumatic. Neck:      Vascular: No carotid bruit. Cardiovascular:      Rate and Rhythm: Normal rate and regular rhythm. Pulses: Normal pulses. Heart sounds: Murmur heard. Pulmonary:      Effort: Pulmonary effort is normal. No respiratory distress. Breath sounds: Normal breath sounds. Musculoskeletal:         General: No deformity. Cervical back: Neck supple. No muscular tenderness. Right lower leg: No edema. Left lower leg: No edema. Neurological:      Mental Status: She is alert.            Medications:    furosemide  20 mg Oral Daily    ipratropium-albuterol  1 ampule Inhalation Q4H WA    lisinopril  5 mg Oral Daily    aspirin  81 mg Oral Daily    empagliflozin  10 mg Oral Daily    metoprolol succinate  50 mg Oral Daily    sodium chloride flush  5-40 mL IntraVENous 2 times per day    hydrALAZINE  25 mg Oral 4 times per day    rivaroxaban  15 mg Oral Dinner    spironolactone  25 mg Oral Daily      sodium chloride       sodium chloride flush, sodium chloride, ondansetron **OR** ondansetron, polyethylene glycol, acetaminophen **OR** acetaminophen, diphenhydrAMINE    Lab Data:  CBC:   Recent Labs     11/08/22  0255   WBC 8.8   HGB 13.6   HCT 40.3   MCV 98.1        BMP:   Recent Labs     11/07/22  1559 11/08/22  0255 11/09/22  0348   * 135 136   K 4.1 4.0 4.0   CL 94* 99 99   CO2 28 26 27   PHOS  --   --  4.4   BUN 27* 29* 30* CREATININE 1.6* 1.9* 2.0*     PT/INR:   Recent Labs     11/08/22  0255   PROTIME 22.8*   INR 1.76     BNP:    Recent Labs     11/07/22  1559   PROBNP 30,533*     TROPONIN:   Recent Labs     11/07/22  1559 11/08/22  0255   TROPONINT 0.015* 0.017*               All labs, medications and tests reviewed by myself , continue all other medications of all above medical condition listed as is except for changes mentioned above. Thank you very much for consult , please call with questions. Electronically signed by Beryle Schneider, APRN - CNP on 11/9/2022 at 1:31 PM            [facility]    I have seen, spoken to and examined this patient personally, independent of the NP/PAC. I have reviewed the hospital care given to date and reviewed all pertinent labs and imaging. I have spoken with patient, nursing staff and provided written and verbal instructions . The above note has been reviewed. I have spent substantive amount of time in formulating patient care. Physical Exam:    General:   Awake, alert  Head:normal  Eye:normal  Chest:  Clear to auscultation + Basilar crackles   Cardiovascular:  S1S2   Abdomen: soft   Extremities:  + edema  Pulses; palpable      MEDICAL DECISION MAKING :       Acute on Ch.systolic heart Failure: Echo continues to shows low LVEF. Change lasix to po today, Strick I/O Monitoring, Daily weights and Low salt diet. Wean oxygen as able. History of severe mitral regurgitation  Nonischemic cardiomyopathy. Will evaluate as out patient with Dr. Sarbjit Burdick for ICD placement. Continue lasix PO BID. Continue Toprol-XL 50 daily, lisinopril 20 daily, Jardiance 10 daily, Aldactone 25 daily, hydralazine 25 mg p.o. 4 times daily     History of DVT/PE: Continue Xarelto. Essential hypertension: Blood pressure stable. Continue with hydralazine/lisinopril/Toprol-XL/Aldactone.           Dr. Shawn Rodas MD

## 2022-11-10 VITALS
RESPIRATION RATE: 21 BRPM | WEIGHT: 135.8 LBS | SYSTOLIC BLOOD PRESSURE: 130 MMHG | BODY MASS INDEX: 24.99 KG/M2 | TEMPERATURE: 98.5 F | DIASTOLIC BLOOD PRESSURE: 90 MMHG | HEART RATE: 82 BPM | HEIGHT: 62 IN | OXYGEN SATURATION: 96 %

## 2022-11-10 PROBLEM — I50.23 ACUTE ON CHRONIC SYSTOLIC HEART FAILURE (HCC): Status: RESOLVED | Noted: 2022-11-07 | Resolved: 2022-11-10

## 2022-11-10 LAB
C-REACTIVE PROTEIN, HIGH SENSITIVITY: 3 MG/L
HAV IGM SER IA-ACNC: NON REACTIVE
HEPATITIS B CORE IGM ANTIBODY: NON REACTIVE
HEPATITIS B SURFACE ANTIGEN: NON REACTIVE
HEPATITIS C ANTIBODY: ABNORMAL
PROCALCITONIN: 0.11
T4 FREE: 1.14 NG/DL (ref 0.9–1.8)
TSH HIGH SENSITIVITY: 1.94 UIU/ML (ref 0.27–4.2)

## 2022-11-10 PROCEDURE — 6370000000 HC RX 637 (ALT 250 FOR IP): Performed by: STUDENT IN AN ORGANIZED HEALTH CARE EDUCATION/TRAINING PROGRAM

## 2022-11-10 PROCEDURE — 6370000000 HC RX 637 (ALT 250 FOR IP): Performed by: INTERNAL MEDICINE

## 2022-11-10 PROCEDURE — 86160 COMPLEMENT ANTIGEN: CPT

## 2022-11-10 PROCEDURE — 87522 HEPATITIS C REVRS TRNSCRPJ: CPT

## 2022-11-10 PROCEDURE — 94640 AIRWAY INHALATION TREATMENT: CPT

## 2022-11-10 PROCEDURE — 94664 DEMO&/EVAL PT USE INHALER: CPT

## 2022-11-10 PROCEDURE — 6370000000 HC RX 637 (ALT 250 FOR IP): Performed by: NURSE PRACTITIONER

## 2022-11-10 PROCEDURE — 36415 COLL VENOUS BLD VENIPUNCTURE: CPT

## 2022-11-10 PROCEDURE — 87902 NFCT AGT GNTYP ALYS HEP C: CPT

## 2022-11-10 PROCEDURE — 86376 MICROSOMAL ANTIBODY EACH: CPT

## 2022-11-10 PROCEDURE — 84443 ASSAY THYROID STIM HORMONE: CPT

## 2022-11-10 PROCEDURE — 84439 ASSAY OF FREE THYROXINE: CPT

## 2022-11-10 PROCEDURE — 94761 N-INVAS EAR/PLS OXIMETRY MLT: CPT

## 2022-11-10 PROCEDURE — 86038 ANTINUCLEAR ANTIBODIES: CPT

## 2022-11-10 PROCEDURE — 86800 THYROGLOBULIN ANTIBODY: CPT

## 2022-11-10 PROCEDURE — 2580000003 HC RX 258: Performed by: STUDENT IN AN ORGANIZED HEALTH CARE EDUCATION/TRAINING PROGRAM

## 2022-11-10 RX ORDER — FUROSEMIDE 20 MG/1
20 TABLET ORAL DAILY
Qty: 60 TABLET | Refills: 3 | Status: SHIPPED | OUTPATIENT
Start: 2022-11-11

## 2022-11-10 RX ORDER — ALBUTEROL SULFATE 90 UG/1
2 AEROSOL, METERED RESPIRATORY (INHALATION) EVERY 4 HOURS PRN
Status: DISCONTINUED | OUTPATIENT
Start: 2022-11-10 | End: 2022-11-10 | Stop reason: HOSPADM

## 2022-11-10 RX ORDER — LISINOPRIL 20 MG/1
20 TABLET ORAL DAILY
Qty: 30 TABLET | Refills: 3 | Status: SHIPPED | OUTPATIENT
Start: 2022-11-11

## 2022-11-10 RX ORDER — METOPROLOL SUCCINATE 50 MG/1
50 TABLET, EXTENDED RELEASE ORAL DAILY
Qty: 30 TABLET | Refills: 3 | Status: SHIPPED | OUTPATIENT
Start: 2022-11-11

## 2022-11-10 RX ORDER — ALBUTEROL SULFATE 90 UG/1
2 AEROSOL, METERED RESPIRATORY (INHALATION) EVERY 4 HOURS PRN
Qty: 18 G | Refills: 3 | Status: SHIPPED | OUTPATIENT
Start: 2022-11-10

## 2022-11-10 RX ORDER — LEVOTHYROXINE SODIUM 0.07 MG/1
75 TABLET ORAL DAILY
Qty: 30 TABLET | Refills: 0 | Status: SHIPPED | OUTPATIENT
Start: 2022-11-10 | End: 2022-11-10 | Stop reason: HOSPADM

## 2022-11-10 RX ORDER — LISINOPRIL 20 MG/1
20 TABLET ORAL DAILY
Status: DISCONTINUED | OUTPATIENT
Start: 2022-11-11 | End: 2022-11-10 | Stop reason: HOSPADM

## 2022-11-10 RX ADMIN — ALBUTEROL SULFATE 2 PUFF: 90 AEROSOL, METERED RESPIRATORY (INHALATION) at 07:48

## 2022-11-10 RX ADMIN — SODIUM CHLORIDE, PRESERVATIVE FREE 10 ML: 5 INJECTION INTRAVENOUS at 08:06

## 2022-11-10 RX ADMIN — METOPROLOL SUCCINATE 50 MG: 50 TABLET, EXTENDED RELEASE ORAL at 08:04

## 2022-11-10 RX ADMIN — LISINOPRIL 5 MG: 5 TABLET ORAL at 08:19

## 2022-11-10 RX ADMIN — EMPAGLIFLOZIN 10 MG: 10 TABLET, FILM COATED ORAL at 08:06

## 2022-11-10 RX ADMIN — ASPIRIN 81 MG: 81 TABLET, COATED ORAL at 08:04

## 2022-11-10 RX ADMIN — POLYETHYLENE GLYCOL (3350) 17 G: 17 POWDER, FOR SOLUTION ORAL at 08:03

## 2022-11-10 RX ADMIN — HYDRALAZINE HYDROCHLORIDE 25 MG: 25 TABLET, FILM COATED ORAL at 08:12

## 2022-11-10 RX ADMIN — Medication 2 PUFF: at 07:50

## 2022-11-10 RX ADMIN — HYDRALAZINE HYDROCHLORIDE 25 MG: 25 TABLET, FILM COATED ORAL at 12:04

## 2022-11-10 RX ADMIN — FUROSEMIDE 20 MG: 20 TABLET ORAL at 08:05

## 2022-11-10 RX ADMIN — SPIRONOLACTONE 25 MG: 50 TABLET ORAL at 08:05

## 2022-11-10 ASSESSMENT — PAIN SCALES - GENERAL: PAINLEVEL_OUTOF10: 0

## 2022-11-10 NOTE — RT PROTOCOL NOTE
RT Inhaler-Nebulizer Bronchodilator Protocol Note    There is a bronchodilator order in the chart from a provider indicating to follow the RT Bronchodilator Protocol and there is an Initiate RT Inhaler-Nebulizer Bronchodilator Protocol order as well (see protocol at bottom of note). CXR Findings:  XR CHEST PORTABLE    Result Date: 11/9/2022  1. No acute cardiopulmonary disease. The findings from the last RT Protocol Assessment were as follows:   History Pulmonary Disease: Smoker 15 pack years or more  Respiratory Pattern: Regular pattern and RR 12-20 bpm  Breath Sounds: Slightly diminished and/or crackles  Cough: Strong, spontaneous, non-productive  Indication for Bronchodilator Therapy: Decreased or absent breath sounds  Bronchodilator Assessment Score: 3    Aerosolized bronchodilator medication orders have been revised according to the RT Inhaler-Nebulizer Bronchodilator Protocol below. Respiratory Therapist to perform RT Therapy Protocol Assessment initially then follow the protocol. Repeat RT Therapy Protocol Assessment PRN for score 0-3 or on second treatment, BID, and PRN for scores above 3. No Indications - adjust the frequency to every 6 hours PRN wheezing or bronchospasm, if no treatments needed after 48 hours then discontinue using Per Protocol order mode. If indication present, adjust the RT bronchodilator orders based on the Bronchodilator Assessment Score as indicated below. Use Inhaler orders unless patient has one or more of the following: on home nebulizer, not able to hold breath for 10 seconds, is not alert and oriented, cannot activate and use MDI correctly, or respiratory rate 25 breaths per minute or more, then use the equivalent nebulizer order(s) with same Frequency and PRN reasons based on the score. If a patient is on this medication at home then do not decrease Frequency below that used at home.     0-3 - enter or revise RT bronchodilator order(s) to equivalent RT Bronchodilator order with Frequency of every 4 hours PRN for wheezing or increased work of breathing using Per Protocol order mode. 4-6 - enter or revise RT Bronchodilator order(s) to two equivalent RT bronchodilator orders with one order with BID Frequency and one order with Frequency of every 4 hours PRN wheezing or increased work of breathing using Per Protocol order mode. 7-10 - enter or revise RT Bronchodilator order(s) to two equivalent RT bronchodilator orders with one order with TID Frequency and one order with Frequency of every 4 hours PRN wheezing or increased work of breathing using Per Protocol order mode. 11-13 - enter or revise RT Bronchodilator order(s) to one equivalent RT bronchodilator order with QID Frequency and an Albuterol order with Frequency of every 4 hours PRN wheezing or increased work of breathing using Per Protocol order mode. Greater than 13 - enter or revise RT Bronchodilator order(s) to one equivalent RT bronchodilator order with every 4 hours Frequency and an Albuterol order with Frequency of every 2 hours PRN wheezing or increased work of breathing using Per Protocol order mode. RT to enter RT Home Evaluation for COPD & MDI Assessment order using Per Protocol order mode.     Electronically signed by Jane Strickland RCP on 11/10/2022 at 10:01 AM

## 2022-11-10 NOTE — PROGRESS NOTES
V2.0  [facility]      Name:  Pratik Morales /Age/Sex: 1965  (61 y.o. female)   MRN & CSN:  [id number] & [id number] Encounter Date/Time: 11/10/2022 11:39 AM EST    Location:  [address] [place] [address]-A PCP: Anya Nassar Day: 4    Assessment and Plan:   Pratik Morales is a 61 y.o. female with pmh of N ICM P, HFrEF, history of DVT and PE, HTN and tobacco abuse who presents with Acute on chronic systolic heart failure (HCC)    Interval events:  -Patient seen and examined at bedside, vital signs stable, currently on 2 L/min oxygen via nasal cannula, tachycardia on admission seems to be improving, HR 92/min  -Briefly patient admitted  for SOB, PATTERSON, orthopnea, abdominal/LE swelling and 25 pound weight gain in 2 weeks, patient having history of heart failure and noncompliant with medications. Vital signs with tachycardia, patient stable on 2 L/min oxygen via nasal cannula, labs with creatinine 1.6, proBNP 30 533, Trope 0.017, UA with moderate blood, proteinuria, small leukocyte esterase and 10 WBCs with no irritative voiding symptoms/fever/dysuria. CXR with congestion. Patient received Lasix 60 mg IV and started on Lasix 40 mg IV twice daily, hydralazine, lisinopril 20 mg, metoprolol 50 mg and Aldactone 25 mg. Cardiology is on board pending further recs for acute decompensated heart failure  -Patient pending transthoracic echo  -BUN/creatinine uptrending, BUN 29<27/creatinine 1.9<1.6, nephrology consulted  -CXR with possible atypical pneumonia/pneumonitis, vital signs stable, will send inflammatory markers and consider CT and antibiotics if remarkable  -UA positive for large leukocyte esterase/WBC, however patient denies any irritative voiding symptoms, no fever/chills/vital sign changes noticed on chart review.   We will follow-up urine culture collected   -UA with RBCs, awaiting urology consult  -Cardiology signed off  -Patient declined PT  -Patient reporting no discomfort/respiratory distress while she took off oxygen at night. We will try to wean oxygen as apparently she is on 2 L/min oxygen via nasal cannula at the time of examination    Plan:  Acute on chronic systolic heart failure/NICMP:  -Patient presenting with shortness of breath/orthopnea/weight gain in setting of noncompliance  -Patient status post Lasix 60 mg IV in the ED  -Patient started on Lasix 40 mg IV twice daily  -Strict intake/output record  -Daily weights  -TTE 5/22 with LVEF 30-35%, moderate FRANSISCO, severe MR/TR, mild to moderate TR and RVSP 61 mmHg  -TTE from today pending  -Continue Lasix/Aldactone/hydralazine  -Cardiology on board, will appreciate recs  Acute hypoxemic respiratory failure:  Possible COPD  -Not on oxygen at baseline, requiring 2 L currently  -We will try to wean as tolerated  -DuoNebs every 4  Essential hypertension:  -Continue home medications lisinopril/Aldactone/added hydralazine  MELANY on CKD:  -Nephrology consulted  History of DVT and PE:  -Continue Xarelto  Tobacco abuse:  -We will provide counseling/offer nicotine patch  Miscellaneous:  -Continue home medication except as contraindicated    Diet ADULT DIET;  Regular; Low Fat/Low Chol/High Fiber/2 gm Na; 1500 ml   DVT Prophylaxis [] Lovenox, []  Heparin, [] SCDs, [] Ambulation,  [] Eliquis, [x] Xarelto  [] Coumadin   Code Status Full Code   Disposition From: Home  Expected Disposition: Likely home  Estimated Date of Discharge: 1-2 days  Patient requires continued admission due to GDMT optimization and cardiology clearance before discharge   Surrogate Decision Maker/ POA Self     Subjective:     Chief Complaint: Shortness of Breath, Hypertension, and Hyperglycemia (Pts sugar 190s)       Regan Vanclevealfreda Pozo is a 61 y.o. female who presents with shortness of breath, currently reports no significant shortness of breath at the time of examination         Review of Systems:    Review of Systems    Review of Systems:   Constitutional: Negative. Negative for activity change, appetite change, chills, diaphoresis, fatigue, fever and unexpected weight change. HENT: Negative. Negative for congestion, dental problem, drooling, ear discharge, ear pain, facial swelling, hearing loss, mouth sores, nosebleeds, postnasal drip, rhinorrhea, sinus pressure, sinus pain, sneezing, sore throat, tinnitus, trouble swallowing and voice change. Eyes: Negative. Negative for photophobia, pain, discharge, redness, itching and visual disturbance. Respiratory: Patient reporting shortness of breath  Cardiovascular: Negative. Negative for chest pain and palpitations. Gastrointestinal: Negative. Negative for abdominal distention, abdominal pain, anal bleeding, blood in stool, constipation, diarrhea, nausea, rectal pain and vomiting. Endocrine: Negative. Negative for cold intolerance, heat intolerance, polydipsia, polyphagia and polyuria. Genitourinary: Negative. Negative for decreased urine volume, difficulty urinating, dysuria, enuresis, flank pain, frequency, genital sores, hematuria, penile discharge, penile pain, penile swelling, scrotal swelling, testicular pain and urgency. Musculoskeletal: Negative. Negative for arthralgias, back pain, gait problem, joint swelling, myalgias, neck pain and neck stiffness. Skin: Negative. Negative for color change, pallor, rash and wound. Allergic/Immunologic: Negative for environmental allergies, food allergies and immunocompromised state. Neurological: Negative. Negative for dizziness, tremors, seizures, syncope, facial asymmetry, speech difficulty, weakness, light-headedness, numbness and headaches. Hematological: Negative. Negative for adenopathy. Does not bruise/bleed easily. Psychiatric/Behavioral: Negative. Negative for agitation, behavioral problems, confusion, decreased concentration, dysphoric mood, hallucinations, self-injury, sleep disturbance and suicidal ideas.  The patient is not nervous/anxious and is not hyperactive. Objective: Intake/Output Summary (Last 24 hours) at 11/10/2022 1008  Last data filed at 11/9/2022 1300  Gross per 24 hour   Intake 240 ml   Output --   Net 240 ml          Vitals:   Vitals:    11/10/22 0748   BP:    Pulse: 82   Resp: 21   Temp:    SpO2: 96%       Physical Exam:     General: NAD  Eyes: EOMI  ENT: neck supple  Cardiovascular: Regular rate. Respiratory: Clear to auscultation  Gastrointestinal: Soft, non tender  Genitourinary: no suprapubic tenderness  Musculoskeletal: No edema  Skin: warm, dry  Neuro: Alert. Psych: Mood appropriate. Medications:   Medications:    furosemide  20 mg Oral Daily    lisinopril  5 mg Oral Daily    aspirin  81 mg Oral Daily    empagliflozin  10 mg Oral Daily    metoprolol succinate  50 mg Oral Daily    sodium chloride flush  5-40 mL IntraVENous 2 times per day    hydrALAZINE  25 mg Oral 4 times per day    rivaroxaban  15 mg Oral Dinner    spironolactone  25 mg Oral Daily      Infusions:    sodium chloride       PRN Meds: albuterol sulfate HFA, 2 puff, Q4H PRN  ipratropium, 2 puff, 4x Daily PRN  sodium chloride flush, 5-40 mL, PRN  sodium chloride, 500 mL, PRN  ondansetron, 4 mg, Q8H PRN   Or  ondansetron, 4 mg, Q6H PRN  polyethylene glycol, 17 g, Daily PRN  acetaminophen, 650 mg, Q6H PRN   Or  acetaminophen, 650 mg, Q6H PRN  diphenhydrAMINE, 50 mg, Nightly PRN      Labs      No results found for this or any previous visit (from the past 24 hour(s)).        Imaging/Diagnostics Last 24 Hours   XR CHEST PORTABLE    Result Date: 11/7/2022  EXAMINATION: ONE XRAY VIEW OF THE CHEST 11/7/2022 3:42 pm COMPARISON: 05/02/2022 HISTORY: ORDERING SYSTEM PROVIDED HISTORY: chest pain TECHNOLOGIST PROVIDED HISTORY: Reason for exam:->chest pain Reason for Exam: chest pain Additional signs and symptoms: none Relevant Medical/Surgical History: CAD, diabetes FINDINGS: Cardiomegaly, diffuse bronchovascular marking prominence and prominently interstitial infiltrative changes. Appearance is consistent with congestive heart failure, bilateral mid-basilar pneumonitis or atypical pneumonia. Very small amount of right basilar pleural fluid or thickening suggested. Otherwise, lung fields are clear. No pneumothorax. Findings consistent with congestive heart failure and/or central pneumonitis/atypical pneumonia. Findings may be accentuated by underlying chronic lung disease. Very small amount of right basilar pleural fluid suggested.        Electronically signed by Ajit Merino MD on 11/10/2022 at 10:08 AM

## 2022-11-10 NOTE — CARE COORDINATION
CM reviewed chart and will discuss in IDR. PT/OT stated pt is at baseline functioning and will be removed from there caseload. Pt's plan is to go home when discharged.

## 2022-11-10 NOTE — DISCHARGE SUMMARY
V2.0  Discharge Summary    Name:  Ane Lab /Age/Sex: 1965 (68 y.o. female)   Admit Date: 2022  Discharge Date: 11/10/22    MRN & CSN:  [id number] & [id number] Encounter Date and Time 11/10/22 10:18 AM EST    Attending:  Leda Sandhoff, MD Discharging Provider: Leda Sandhoff, MD       Hospital Course:     Brief HPI: Briefly patient admitted  for SOB, PATTERSON, orthopnea, abdominal/LE swelling and 25 pound weight gain in 2 weeks, patient having history of heart failure and noncompliant with medications. Vital signs with tachycardia, patient stable on 2 L/min oxygen via nasal cannula, labs with creatinine 1.6, proBNP 30 533, Trope 0.017, UA with moderate blood, proteinuria, small leukocyte esterase and 10 WBCs with no irritative voiding symptoms/fever/dysuria. CXR with congestion. Patient received Lasix 60 mg IV and started on Lasix 40 mg IV twice daily, hydralazine, lisinopril 20 mg, metoprolol 50 mg and Aldactone 25 mg. Cardiology is on board pending further recs for acute decompensated heart failure. Cardiology adjusted medications and currently patient is being discharged on metoprolol 50 XL/Lasix 20 p.o./Aldactone 25/Jardiance and lisinopril 20. Transthoracic echo with EF 25-30%, global severe hypokinesis, bilateral atrial enlargement, severe MR as seen on previous echo, severe TR, RVSP 63 mmHg. Cardiology was on board and adjusted patient's medications and cleared the patient for discharge. Patient's TSH was 5.06, patient denies any symptoms of hyperthyroidism. And was consulted further tests are sent and patient will be followed as outpatient with endocrine before restarting Synthyroid. Patient was having wheezes on examination so was started on albuterol as needed. Patient needs to be evaluated for possible further testing for possible underlying COPD.     Brief Problem Based Course:   Acute on chronic systolic heart failure/NICMP:  -Patient presenting with shortness of breath/orthopnea/weight gain in setting of noncompliance  -Patient status post Lasix 60 mg IV in the ED  -Patient initially switched to Lasix 40 mg IV daily and then subsequently changed to Lasix 20 mg p.o. daily which will be continued on discharge  -Strict intake/output record  -Daily weights  -TTE as above  -Continue Lasix/Aldactone/lisinopril/metoprolol  -Cardiology cleared patient for discharge  Acute hypoxemic respiratory failure:  Possible COPD  -Not on oxygen at baseline, requiring 2 L currently  -We will try to wean as tolerated  -DuoNebs every 4  -Patient may need outpatient testing for possible COPD per PCP  Essential hypertension:  -Continue home medications lisinopril/Aldactone/added hydralazine  MELANY on CKD:  -Nephrology consulted  History of DVT and PE:  -Continue Xarelto  Tobacco abuse:  -We will provide counseling/offer nicotine patch  Miscellaneous:  -Continue home medication except as contraindicated      The patient expressed appropriate understanding of, and agreement with the discharge recommendations, medications, and plan. Consults this admission:  IP CONSULT TO IV TEAM  IP CONSULT TO CARDIOLOGY  IP CONSULT TO HEART FAILURE NURSE/COORDINATOR  IP CONSULT TO NEPHROLOGY  IP CONSULT TO UROLOGY  IP CONSULT TO ENDOCRINOLOGY    Note to PCP:  -Patient may need outpatient testings for possible underlying COPD given wheezing on exam  -Patient may need further evaluation for hematuria present chronically on repetitive UA and possible urology eval if persistent hematuria.   Currently urine reported to be clear    Discharge Diagnosis:   Acute on chronic systolic heart failure (HCC)    Status post diuresis and weight loss of more than 4 pounds, shortness of breath resolved, currently off oxygen saturating well on room air and no respiratory distress    Discharge Instruction:   Follow up appointments: PCP/ Nephrology/ Endocrine/ Cardiology outpatient  Primary care physician: Nilay Davis within 2 weeks  Diet: cardiac diet   Activity: activity as tolerated  Disposition: Discharged to:   [x]Home, [][facility], []SNF, []Acute Rehab, []Hospice   Condition on discharge: Stable  Labs and Tests to be Followed up as an outpatient by PCP or Specialist: CBC/CMP/mag/Phos    Discharge Medications:        Medication List        START taking these medications      albuterol sulfate  (90 Base) MCG/ACT inhaler  Commonly known as: PROVENTIL;VENTOLIN;PROAIR  Inhale 2 puffs into the lungs every 4 hours as needed for Wheezing     levothyroxine 75 MCG tablet  Commonly known as: SYNTHROID  Take 1 tablet by mouth daily            CHANGE how you take these medications      furosemide 20 MG tablet  Commonly known as: LASIX  Take 1 tablet by mouth daily  Start taking on: November 11, 2022  What changed:   when to take this  additional instructions            CONTINUE taking these medications      [employer] INS SYR .3CC/29GX0.5\" 29G X 1/2\" 0.3 ML Misc  Generic drug: Insulin Syringe-Needle U-100  1 each by Does not apply route daily     Blood Pressure Kit  1 Device by Does not apply route daily     empagliflozin 10 MG tablet  Commonly known as: Jardiance  Take 1 tablet by mouth daily     lisinopril 20 MG tablet  Commonly known as: PRINIVIL;ZESTRIL  Take 1 tablet by mouth daily  Start taking on: November 11, 2022     metoprolol succinate 50 MG extended release tablet  Commonly known as: TOPROL XL  Take 1 tablet by mouth daily  Start taking on: November 11, 2022     rivaroxaban 20 MG Tabs tablet  Commonly known as: Xarelto  Take 1 tablet by mouth Daily with supper            ASK your doctor about these medications      aspirin 81 MG chewable tablet     dicyclomine 10 MG capsule  Commonly known as: BENTYL  Take 1 capsule by mouth 4 times daily as needed (Abdominal cramping, diarrhea)     insulin lispro 100 UNIT/ML injection vial  Commonly known as: HUMALOG  Inject 10 Units into the skin 3 times daily (with meals)     loratadine 10 MG tablet  Commonly known as: CLARITIN     spironolactone 25 MG tablet  Commonly known as: ALDACTONE  Take 1 tablet by mouth daily               Where to Get Your Medications        These medications were sent to [facility], [address] [place], [address]      Phone: [phone]   albuterol sulfate  (90 Base) MCG/ACT inhaler  furosemide 20 MG tablet  levothyroxine 75 MCG tablet  lisinopril 20 MG tablet  metoprolol succinate 50 MG extended release tablet        Objective Findings at Discharge:   BP (!) 130/90   Pulse 82   Temp 98.5 °F (36.9 °C) (Oral)   Resp 21   Ht 5' 2\" (1.575 m)   Wt 135 lb 12.8 oz (61.6 kg)   SpO2 96%   BMI 24.84 kg/m²       Physical Exam:   General: NAD  Eyes: EOMI  ENT: neck supple  Cardiovascular: Regular rate. Respiratory: Clear to auscultation  Gastrointestinal: Soft, non tender  Genitourinary: no suprapubic tenderness  Musculoskeletal: No edema  Skin: warm, dry  Neuro: Alert. Psych: Mood appropriate. Labs and Imaging   XR CHEST PORTABLE    Result Date: 11/9/2022  EXAMINATION: ONE XRAY VIEW OF THE CHEST 11/9/2022 4:13 am COMPARISON: 11/07/2022. HISTORY: ORDERING SYSTEM PROVIDED HISTORY: SOB TECHNOLOGIST PROVIDED HISTORY: Reason for exam:->SOB Reason for Exam: SOB FINDINGS: The cardiac silhouette is enlarged. Mediastinal contours are normal.  The lungs are clear. No pleural effusion or pneumothorax. The visualized osseous structures are unremarkable. 1. No acute cardiopulmonary disease.      XR CHEST PORTABLE    Result Date: 11/7/2022  EXAMINATION: ONE XRAY VIEW OF THE CHEST 11/7/2022 3:42 pm COMPARISON: 05/02/2022 HISTORY: ORDERING SYSTEM PROVIDED HISTORY: chest pain TECHNOLOGIST PROVIDED HISTORY: Reason for exam:->chest pain Reason for Exam: chest pain Additional signs and symptoms: none Relevant Medical/Surgical History: CAD, diabetes FINDINGS: Cardiomegaly, LARGE NUMBER OR AMOUNT OBSERVED 11/08/2022 06:25 PM    UROBILINOGEN 0.2 11/08/2022 06:25 PM    BILIRUBINUR NEGATIVE 11/08/2022 06:25 PM    BLOODU SMALL NUMBER OR AMOUNT OBSERVED 11/08/2022 06:25 PM    Wilfredo Tito NEGATIVE 11/08/2022 06:25 PM     Urine Cultures: No results found for: LABURIN  Blood Cultures: No results found for: BC  No results found for: BLOODCULT2  Organism: No results found for: ORG    Time Spent Discharging patient 35 minutes    Electronically signed by Juancho Dunbar MD on 11/10/2022 at 10:18 AM

## 2022-11-10 NOTE — CONSULTS
[facility] [address]   Consult Note  [address] 1 2 3 4 5  Date: 11/10/2022   Patient: Dane Fajardo   : 1965   DOA: 2022   MRN: [id number]   ROOM#: [id number]     Reason for Consult:  Hematuria  Requesting Physician:  Dr. Sabino Kaur  Collaborating Urologist on Call at time of admission: Dr. Sandoval Risk: SOB, swelling    History Obtained From:  patient, electronic medical record    HISTORY OF PRESENT ILLNESS:                The patient is a 61 y.o. female with significant past medical history of CAD, chronic systolic heart failure, chronic tobacco abuse, hypertension, chronic kidney disease, prior DVT on Xarelto, who presented with shortness of breath/swelling. Endorses worsening dyspnea on exertion, swelling, orthopnea, and weight gain. Reported medication noncompliance. Patient admitted for further management and diuresis. LVEF 25-30% by TTE. We are consulted for hematuria. Patient notes having light to medium pink urine in the past week. Denies any prior episodes of hematuria or being told about microscopic hematuria. No dysuria or frequency/urgency. UA shows large blood and leuks, however urine culture was negative showing less than 10K CFU/mL mixed radha. Slight MELANY on CKD, likely related to diuresis. Creatinine 2.0 yesterday, recent baseline 1.3-1.5. Reports she does not menstruate. She has a significant smoking history and endorses 20+ years of smoking several packs per day. Discussed possible sources of bleeding up to and including  cancer with recommended work-up CT urogram and office cystoscopy. Patient is agreeable with this plan.     Past Medical History:        Diagnosis Date    CAD (coronary artery disease)     Diabetes mellitus ([address]     Hepatitis C     Schizophrenia ([address]      Past Surgical History:        Procedure Laterality Date    CHOLECYSTECTOMY      HYSTERECTOMY (CERVIX STATUS UNKNOWN)       Current Medications:   Current Facility-Administered Medications: albuterol sulfate HFA (PROVENTIL;VENTOLIN;PROAIR) 108 (90 Base) MCG/ACT inhaler 2 puff, 2 puff, Inhalation, Q4H PRN  ipratropium (ATROVENT HFA) 17 MCG/ACT inhaler 2 puff, 2 puff, Inhalation, 4x Daily PRN  [START ON 11/11/2022] lisinopril (PRINIVIL;ZESTRIL) tablet 20 mg, 20 mg, Oral, Daily  furosemide (LASIX) tablet 20 mg, 20 mg, Oral, Daily  aspirin EC tablet 81 mg, 81 mg, Oral, Daily  empagliflozin (JARDIANCE) tablet 10 mg, 10 mg, Oral, Daily  metoprolol succinate (TOPROL XL) extended release tablet 50 mg, 50 mg, Oral, Daily  sodium chloride flush 0.9 % injection 5-40 mL, 5-40 mL, IntraVENous, 2 times per day  sodium chloride flush 0.9 % injection 5-40 mL, 5-40 mL, IntraVENous, PRN  0.9 % sodium chloride infusion, 500 mL, IntraVENous, PRN  ondansetron (ZOFRAN-ODT) disintegrating tablet 4 mg, 4 mg, Oral, Q8H PRN **OR** ondansetron (ZOFRAN) injection 4 mg, 4 mg, IntraVENous, Q6H PRN  polyethylene glycol (GLYCOLAX) packet 17 g, 17 g, Oral, Daily PRN  acetaminophen (TYLENOL) tablet 650 mg, 650 mg, Oral, Q6H PRN **OR** acetaminophen (TYLENOL) suppository 650 mg, 650 mg, Rectal, Q6H PRN  hydrALAZINE (APRESOLINE) tablet 25 mg, 25 mg, Oral, 4 times per day  diphenhydrAMINE (BENADRYL) tablet 50 mg, 50 mg, Oral, Nightly PRN  rivaroxaban (XARELTO) tablet 15 mg, 15 mg, Oral, Dinner  spironolactone (ALDACTONE) tablet 25 mg, 25 mg, Oral, Daily    Allergies:  Haldol [haloperidol lactate] and Penicillins    Social History:   TOBACCO:   reports that she has been smoking cigars and cigarettes. She has a 7.50 pack-year smoking history. She has never used smokeless tobacco.  ETOH:   reports current alcohol use. DRUGS:   reports that she does not currently use drugs after having used the following drugs: Marijuana Vaishnavi Lewis).     Family History:       Problem Relation Age of Onset    No Known Problems Mother     Cancer Father        REVIEW OF SYSTEMS:     CONSTITUTIONAL:  negative for  fevers and chills  RESPIRATORY:  positive for  dyspnea  CARDIOVASCULAR:  positive for  orthopnea, edema  GASTROINTESTINAL:  negative for nausea and vomiting  GENITOURINARY:  positive for hematuria    PHYSICAL EXAM:      VITALS:  BP (!) 130/90   Pulse 82   Temp 98.5 °F (36.9 °C) (Oral)   Resp 21   Ht 5' 2\" (1.575 m)   Wt 135 lb 12.8 oz (61.6 kg)   SpO2 96%   BMI 24.84 kg/m²      TEMPERATURE:  Current - Temp: 98.5 °F (36.9 °C); Max - Temp  Av.5 °F (36.9 °C)  Min: 98.3 °F (36.8 °C)  Max: 98.6 °F (37 °C)  24HR BLOOD PRESSURE RANGE:  Systolic (50CXQ), APN:111 , Min:116 , DOM:848   ; Diastolic (66FIK), TOK:14, Min:73, Max:92    8HR INTAKE OUTPUT:  No intake/output data recorded. URINARY CATHETER OUTPUT (Laurent):     DRAIN/TUBE OUTPUT:        General appearance: alert, appears stated age, cooperative, and no distress  Head: Normocephalic, without obvious abnormality, atraumatic  Back:  No CVA tenderness  Abdomen: Soft, nontender, nondistended    DATA:    WBC:    Lab Results   Component Value Date/Time    WBC 8.8 2022 02:55 AM     Hemoglobin/Hematocrit:    Lab Results   Component Value Date/Time    HGB 13.6 2022 02:55 AM    HCT 40.3 2022 02:55 AM     BMP:    Lab Results   Component Value Date/Time     2022 03:48 AM    K 4.0 2022 03:48 AM    CL 99 2022 03:48 AM    CO2 27 2022 03:48 AM    BUN 30 2022 03:48 AM    LABALBU 2.6 2022 03:48 AM    CREATININE 2.0 2022 03:48 AM    CALCIUM 7.6 2022 03:48 AM    GFRAA 47 2022 03:02 AM    LABGLOM 29 2022 03:48 AM     PT/INR:    Lab Results   Component Value Date/Time    PROTIME 22.8 2022 02:55 AM    INR 1.76 2022 02:55 AM     Blood Culture: N/A  Urine Culture: Mixed radha <10k cfu/mL    Imaging:  XR CHEST PORTABLE    Result Date: 2022  EXAMINATION: ONE XRAY VIEW OF THE CHEST 2022 4:13 am COMPARISON: 2022.  HISTORY: ORDERING SYSTEM PROVIDED HISTORY: SOB TECHNOLOGIST PROVIDED HISTORY: Reason for exam:->SOB Reason for Exam: SOB FINDINGS: The cardiac silhouette is enlarged. Mediastinal contours are normal.  The lungs are clear. No pleural effusion or pneumothorax. The visualized osseous structures are unremarkable. 1. No acute cardiopulmonary disease. XR CHEST PORTABLE    Result Date: 11/7/2022  EXAMINATION: ONE XRAY VIEW OF THE CHEST 11/7/2022 3:42 pm COMPARISON: 05/02/2022 HISTORY: ORDERING SYSTEM PROVIDED HISTORY: chest pain TECHNOLOGIST PROVIDED HISTORY: Reason for exam:->chest pain Reason for Exam: chest pain Additional signs and symptoms: none Relevant Medical/Surgical History: CAD, diabetes FINDINGS: Cardiomegaly, diffuse bronchovascular marking prominence and prominently interstitial infiltrative changes. Appearance is consistent with congestive heart failure, bilateral mid-basilar pneumonitis or atypical pneumonia. Very small amount of right basilar pleural fluid or thickening suggested. Otherwise, lung fields are clear. No pneumothorax. Findings consistent with congestive heart failure and/or central pneumonitis/atypical pneumonia. Findings may be accentuated by underlying chronic lung disease. Very small amount of right basilar pleural fluid suggested. Assessment & Plan:      Mega Gutierrez is a 61 y.o. female admitted 11/7/2022 for acute on chronic CHF. 1) Gross hematuria: painless, gross hematuria (light-medium pink) for the past week. Significant smoking history. On Xarelto. UA large blood and leuks but urine cx negative (<10k cfu/mL mixed radha)   Urine for cytology prior to dc today   CT urogram as outpatient pending further improvement of kidney function   Set up outpatient [facility] at [facility] the near future    Patient is stable from a  standpoint. Will sign off. Please call with any questions. Outpatient cystoscopy in the next few weeks. Will discuss CT urogram again at that time.   Patient seen and examined, chart reviewed.      Electronically signed by JAYY Giron on 11/10/2022 at 12:25 PM

## 2022-11-10 NOTE — PROGRESS NOTES
Physical Therapy  Per the physical therapy triage process, pt will be discharged from caseload. Per flowsheets and OT speaking with pt, pt is performing ambulation and transfers independently and is at baseline. If any new functional mobility deficits develop, please re-consult.

## 2022-11-10 NOTE — CONSULTS
Endocrinology   Consult Note  11/7/2022  3:07 PM     Primary Care provider: Koko Martin     Referring physician:  Rafa Calvert MD     Dear Doctor Sonali Hadley     Thank You for the Consult     Pt. Was Admitted for : Shortness of breath and rapid weight gain of over 25 pounds in 2 weeks    Reason for Consult: Possible hypothyroidism      History Obtained From:  Patient/ EMR       HISTORY OF PRESENT ILLNESS:                The patient is a 61 y.o. female with significant past medical history of congestive heart failure, history of DVT, history of pulmonary emboli, hypertension presented to ER with worsening shortness of breath dyspnea on exertion orthopnea and abdominal lower extremities swelling with a rapid weight gain of 25 pounds in the last 2 weeks. .  While investigation being done she was found to have a mildly elevated TSH and almost normal serum free T4. Patient in the past has been taking some Synthroid dose of about 2 years ago but this stopped about 2 years ago. She has not been taking any of the thyroid medicine since then. Patient denies any history of obstructive symptoms of due to goiter she is not aware of having any thyroid enlargement either. I was consulted to evaluate patient's thyroid status. .   Patient amount of diabetes mellitus hepatitis C and CAD      ROS:   Pt's ROS done in detail. Abnormal ROS are noted in Medical and Surgical History Section below:      Other Medical History:        Diagnosis Date    CAD (coronary artery disease)     Diabetes mellitus ([address]     Hepatitis C     Schizophrenia ([address]      Surgical History:        Procedure Laterality Date    CHOLECYSTECTOMY      HYSTERECTOMY (CERVIX STATUS UNKNOWN)         Allergies:  Haldol [haloperidol lactate] and Penicillins    Family History:       Problem Relation Age of Onset    No Known Problems Mother     Cancer Father      REVIEW OF SYSTEMS:  Review of System Done as noted above     PHYSICAL EXAM:      Vitals:    BP (!) 130/90 Pulse 82   Temp 98.5 °F (36.9 °C) (Oral)   Resp 21   Ht 5' 2\" (1.575 m)   Wt 135 lb 12.8 oz (61.6 kg)   SpO2 96%   BMI 24.84 kg/m²     CONSTITUTIONAL:  awake, alert, cooperative, appears stated age   EYES:  vision intact Fundoscopic Exam not performed   ENT:Normal  NECK:  Supple, No JVD. Thyroid Exam: Possible thyroid nodular goiter l   LUNGS:  Has Vesicular Breath Sounds,   CARDIOVASCULAR:  Normal apical impulse, regular rate and rhythm, normal S1 and S2, no S3 or S4, and has no  murmur   ABDOMEN:  No scars, normal bowel sounds, soft, non-distended, non-tender, no masses palpated, no hepatolienomegaly  Musculoskeletal: Normal  Extremities: Normal, peripheral pulses normal, , has normal mild edema   NEUROLOGIC:  Awake, alert, oriented to name, place and time. Cranial nerves II-XII are grossly intact. Motor is  intact. Sensory is intact. ,  and gait is normal.    DATA:    CBC:   Recent Labs     11/08/22  0255   WBC 8.8   HGB 13.6       CMP:  Recent Labs     11/07/22  1559 11/08/22  0255 11/09/22  0348   * 135 136   K 4.1 4.0 4.0   CL 94* 99 99   CO2 28 26 27   BUN 27* 29* 30*   CREATININE 1.6* 1.9* 2.0*   CALCIUM 9.4 8.4 7.6*   PROT 8.4*  --  5.7*   LABALBU 3.4  --  2.6*   BILITOT 0.7  --   --    ALKPHOS 184*  --   --    AST 31  --   --    ALT 28  --   --      Lipids:   Lab Results   Component Value Date/Time    CHOL 161 03/12/2021 06:50 PM    HDL 72 03/12/2021 06:50 PM    TRIG 163 03/12/2021 06:50 PM     Glucose:   Recent Labs     11/07/22  1411   POCGLU 180*     Hemoglobin A1C:   Lab Results   Component Value Date/Time    LABA1C 6.0 05/03/2022 02:13 AM     Free T4:   Lab Results   Component Value Date/Time    T4FREE 1.19 08/06/2019 11:00 AM     Free T3: No results found for: FT3  TSH High Sensitivity:   Lab Results   Component Value Date/Time    TSHHS 5.060 11/07/2022 03:59 PM       XR CHEST PORTABLE   Final Result   1. No acute cardiopulmonary disease.          XR CHEST PORTABLE   Final Result   Findings consistent with congestive heart failure and/or central   pneumonitis/atypical pneumonia. Findings may be accentuated by underlying   chronic lung disease. Very small amount of right basilar pleural fluid   suggested. US HEAD NECK SOFT TISSUE THYROID    (Results Pending)          Scheduled Medicines   Medications:    furosemide  20 mg Oral Daily    ipratropium  2 puff Inhalation Q4H WA    albuterol sulfate HFA  2 puff Inhalation Q4H WA    lisinopril  5 mg Oral Daily    aspirin  81 mg Oral Daily    empagliflozin  10 mg Oral Daily    metoprolol succinate  50 mg Oral Daily    sodium chloride flush  5-40 mL IntraVENous 2 times per day    hydrALAZINE  25 mg Oral 4 times per day    rivaroxaban  15 mg Oral Dinner    spironolactone  25 mg Oral Daily      Infusions:    sodium chloride           IMPRESSION    Patient Active Problem List   Diagnosis    Hyperglycemia    Precordial pain    Type 2 diabetes mellitus without complication, with long-term current use of insulin (HCC)    Hyperglycemic crisis in diabetes mellitus (HCC)    Hyperosmolar hyponatremia    Hypokalemia    Hypotensive episode    Acute bilateral deep vein thrombosis (DVT) of femoral veins (HCC)    Pulmonary embolism (HCC)    Acute on chronic systolic congestive heart failure (HCC)    Acute congestive heart failure (HCC)    Primary hypertension    NICM (nonischemic cardiomyopathy) (HCC)    MELANY (acute kidney injury) ([address]    Smoking    Nonrheumatic mitral valve regurgitation    Acute on chronic systolic heart failure ([address]         RECOMMENDATIONS:      Reviewed POC blood glucose . Labs and X ray results   Reviewed Home and Current Medicines  I would prefer to repeat the thyroid function test including TSH level and free T4 and also antithyroid antibodies  Will order an ultrasound of thyroid gland to make sure there is no other thyroid nodules. Further management depends upon the entirely results of the thyroid gland.      Will follow with you  Again thank you for sharing pt's care with me.      Truly yours,       Jacquie Fitzgerald MD

## 2022-11-10 NOTE — PROGRESS NOTES
Nephrology Progress Note  11/10/2022 2:01 PM  Subjective: Interval History: Jeet Farrell is a 61 y.o. female  who appears doing okay discussed with her better hepatitis C positive status        Data:   Scheduled Meds:   [START ON 11/11/2022] lisinopril  20 mg Oral Daily    furosemide  20 mg Oral Daily    aspirin  81 mg Oral Daily    empagliflozin  10 mg Oral Daily    metoprolol succinate  50 mg Oral Daily    sodium chloride flush  5-40 mL IntraVENous 2 times per day    hydrALAZINE  25 mg Oral 4 times per day    rivaroxaban  15 mg Oral Dinner    spironolactone  25 mg Oral Daily     Continuous Infusions:   sodium chloride           CBC   Recent Labs     11/08/22  0255   WBC 8.8   HGB 13.6   HCT 40.3         BMP   Recent Labs     11/07/22  1559 11/08/22  0255 11/09/22  0348   * 135 136   K 4.1 4.0 4.0   CL 94* 99 99   CO2 28 26 27   PHOS  --   --  4.4   BUN 27* 29* 30*   CREATININE 1.6* 1.9* 2.0*     Hepatic:   Recent Labs     11/07/22  1559   AST 31   ALT 28   BILITOT 0.7   ALKPHOS 184*     Troponin: No results for input(s): TROPONINI in the last 72 hours. BNP: No results for input(s): BNP in the last 72 hours. Lipids: No results for input(s): CHOL, HDL in the last 72 hours.     Invalid input(s): LDLCALCU  ABGs:   Lab Results   Component Value Date/Time    PO2ART 28.7 08/02/2020 05:41 AM    IXU7SXC 48.5 08/02/2020 05:41 AM     INR:   Recent Labs     11/08/22 0255   INR 1.76     Renal Labs  Albumin:    Lab Results   Component Value Date/Time    LABALBU 2.6 11/09/2022 03:48 AM     Calcium:    Lab Results   Component Value Date/Time    CALCIUM 7.6 11/09/2022 03:48 AM     Phosphorus:    Lab Results   Component Value Date/Time    PHOS 4.4 11/09/2022 03:48 AM     U/A:    Lab Results   Component Value Date/Time    NITRU NEGATIVE 11/08/2022 06:25 PM    COLORU YELLOW 11/08/2022 06:25 PM    WBCUA 259 11/08/2022 06:25 PM    RBCUA 37 11/08/2022 06:25 PM    MUCUS RARE 11/07/2022 04:51 PM    TRICHOMONAS NONE SEEN 11/08/2022 06:25 PM    YEAST RARE 03/15/2022 12:08 PM    BACTERIA NEGATIVE 11/08/2022 06:25 PM    CLARITYU CLEAR 11/08/2022 06:25 PM    SPECGRAV 1.015 11/08/2022 06:25 PM    UROBILINOGEN 0.2 11/08/2022 06:25 PM    BILIRUBINUR NEGATIVE 11/08/2022 06:25 PM    BLOODU SMALL NUMBER OR AMOUNT OBSERVED 11/08/2022 06:25 PM    [address] NEGATIVE 11/08/2022 06:25 PM     ABG:    Lab Results   Component Value Date/Time    QXZ0DNF 48.5 08/02/2020 05:41 AM    PO2ART 28.7 08/02/2020 05:41 AM    QWH9BXK 31.0 08/02/2020 05:41 AM     HgBA1c:    Lab Results   Component Value Date/Time    LABA1C 6.0 05/03/2022 02:13 AM     Microalbumen/Creatinine ratio:  No components found for: RUCREAT  TSH:  No results found for: TSH  IRON:    Lab Results   Component Value Date/Time    IRON 89 08/01/2020 03:37 PM     Iron Saturation:  No components found for: PERCENTFE  TIBC:    Lab Results   Component Value Date/Time    TIBC 230 08/01/2020 03:37 PM     FERRITIN:  No results found for: FERRITIN  RPR:  No results found for: RPR  WALT:  No results found for: ANATITER, WALT  24 Hour Urine for Creatinine Clearance:  No components found for: CREAT4, UHRS10, UTV10      Objective:   I/O: 11/09 0701 - 11/10 0700  In: 360 [P.O.:360]  Out: -   I/O last 3 completed shifts: In: 660 [P.O.:660]  Out: -   No intake/output data recorded. Vitals: BP (!) 130/90   Pulse 82   Temp 98.5 °F (36.9 °C) (Oral)   Resp 21   Ht 5' 2\" (1.575 m)   Wt 135 lb 12.8 oz (61.6 kg)   SpO2 96%   BMI 24.84 kg/m²  {  General appearance: awake weak  HEENT: Head: Normal, normocephalic, atraumatic.   Neck: supple, symmetrical, trachea midline  Lungs: diminished breath sounds bilaterally  Heart: S1, S2 normal  Abdomen: abnormal findings:  soft nt  Extremities: edema trace  Neurologic: Mental status: alertness: Awake anxious        Assessment and Plan:      IMP:  1 acute renal failure from ATN/volume depletion  #2 hypothyroidism  #3 type 2 diabetes  #4 shortness of breath and prior smoker with CHF as above  #5 history of PE   #6 positive hep C    Plan      #1 creatinine holding at 2 concern with hematuria and proteinuria may be related to hep C we will check a hep C PCR and down the road may need a renal biopsy as well but not during this admission looking for possible MPGN   #2  TSH and T4  stable   #3 maintain glucose control   #4 monitor oxygenation with above appears to be slightly better   #5 monitor supportive care for now we will follow check renal ultrasound           Mitchell Whiteside MD, MD

## 2022-11-11 LAB — ANTITHYROID MICORSOMAL: 1.7 IU/ML (ref 0–9)

## 2022-11-12 LAB
ANTI-NUCLEAR ANTIBODY (ANA): NORMAL
ANTITHYROGLOBULIN AB: <0.9 IU/ML (ref 0–4)
COMPLEMENT C3: 85 MG/DL (ref 90–180)
COMPLEMENT C4: 15 MG/DL (ref 10–40)

## 2022-11-13 LAB
HCV QUANTITATIVE: ABNORMAL IU/ML
HEP CRNA PCR QNT INTERP: DETECTED
HEPATITIS C RNA PCR QUANT: 6.02 LOG IU/ML

## 2022-11-14 LAB
ALBUMIN ELP: 2.4 GM/DL (ref 3.2–5.6)
ALBUMIN, U: 49 %
ALPHA-1-GLOBULIN, U: 10 %
ALPHA-1-GLOBULIN: 0.2 GM/DL (ref 0.1–0.4)
ALPHA-2-GLOBULIN, U: 8 %
ALPHA-2-GLOBULIN: 0.7 GM/DL (ref 0.4–1.2)
BETA GLOBULIN, U: 16 %
BETA GLOBULIN: 0.8 GM/DL (ref 0.5–1.3)
GAMMA GLOBULIN, U: 17 %
GAMMA GLOBULIN: 1.6 GM/DL (ref 0.5–1.6)
SPEP INTERPRETATION: ABNORMAL
SPEP INTERPRETATION: NORMAL
TOTAL PROTEIN: 5.7 GM/DL (ref 6.4–8.2)
URINE TOTAL PROTEIN: 4 MG/DL

## 2022-11-15 PROBLEM — B18.2 CHRONIC HEPATITIS C WITHOUT HEPATIC COMA (HCC): Status: ACTIVE | Noted: 2022-11-15

## 2022-11-15 PROBLEM — R31.21 ASYMPTOMATIC MICROSCOPIC HEMATURIA: Status: ACTIVE | Noted: 2022-11-15

## 2022-11-15 LAB — HEPATITIS C GENOTYPE: NORMAL

## 2022-12-01 ENCOUNTER — APPOINTMENT (OUTPATIENT)
Dept: GENERAL RADIOLOGY | Age: 57
End: 2022-12-01
Payer: COMMERCIAL

## 2022-12-01 ENCOUNTER — HOSPITAL ENCOUNTER (EMERGENCY)
Age: 57
Discharge: HOME | End: 2022-12-01
Attending: EMERGENCY MEDICINE
Payer: COMMERCIAL

## 2022-12-01 ENCOUNTER — TELEPHONE (OUTPATIENT)
Dept: CARDIOLOGY CLINIC | Age: 57
End: 2022-12-01

## 2022-12-01 VITALS
BODY MASS INDEX: 23.55 KG/M2 | OXYGEN SATURATION: 99 % | SYSTOLIC BLOOD PRESSURE: 160 MMHG | HEART RATE: 108 BPM | RESPIRATION RATE: 19 BRPM | TEMPERATURE: 98.1 F | HEIGHT: 62 IN | WEIGHT: 128 LBS | DIASTOLIC BLOOD PRESSURE: 110 MMHG

## 2022-12-01 DIAGNOSIS — J40 BRONCHITIS: Primary | ICD-10-CM

## 2022-12-01 LAB
RAPID INFLUENZA  B AGN: NEGATIVE
RAPID INFLUENZA A AGN: NEGATIVE
SARS-COV-2, NAAT: NOT DETECTED
SOURCE: NORMAL

## 2022-12-01 PROCEDURE — 6370000000 HC RX 637 (ALT 250 FOR IP): Performed by: EMERGENCY MEDICINE

## 2022-12-01 PROCEDURE — 71046 X-RAY EXAM CHEST 2 VIEWS: CPT

## 2022-12-01 PROCEDURE — 94664 DEMO&/EVAL PT USE INHALER: CPT

## 2022-12-01 PROCEDURE — 87804 INFLUENZA ASSAY W/OPTIC: CPT

## 2022-12-01 PROCEDURE — 99284 EMERGENCY DEPT VISIT MOD MDM: CPT

## 2022-12-01 PROCEDURE — 94640 AIRWAY INHALATION TREATMENT: CPT

## 2022-12-01 PROCEDURE — 87635 SARS-COV-2 COVID-19 AMP PRB: CPT

## 2022-12-01 RX ORDER — BENZONATATE 100 MG/1
100 CAPSULE ORAL ONCE
Status: COMPLETED | OUTPATIENT
Start: 2022-12-01 | End: 2022-12-01

## 2022-12-01 RX ORDER — GUAIFENESIN/DEXTROMETHORPHAN 100-10MG/5
5 SYRUP ORAL 4 TIMES DAILY PRN
Qty: 120 ML | Refills: 0 | Status: SHIPPED | OUTPATIENT
Start: 2022-12-01 | End: 2022-12-11

## 2022-12-01 RX ORDER — IPRATROPIUM BROMIDE AND ALBUTEROL SULFATE 2.5; .5 MG/3ML; MG/3ML
1 SOLUTION RESPIRATORY (INHALATION)
Status: DISCONTINUED | OUTPATIENT
Start: 2022-12-01 | End: 2022-12-01

## 2022-12-01 RX ORDER — ONDANSETRON 4 MG/1
4 TABLET, ORALLY DISINTEGRATING ORAL ONCE
Status: COMPLETED | OUTPATIENT
Start: 2022-12-01 | End: 2022-12-01

## 2022-12-01 RX ORDER — BENZONATATE 200 MG/1
200 CAPSULE ORAL 3 TIMES DAILY PRN
Qty: 30 CAPSULE | Refills: 0 | Status: SHIPPED | OUTPATIENT
Start: 2022-12-01 | End: 2022-12-08

## 2022-12-01 RX ADMIN — IPRATROPIUM BROMIDE AND ALBUTEROL SULFATE 1 AMPULE: .5; 2.5 SOLUTION RESPIRATORY (INHALATION) at 15:30

## 2022-12-01 RX ADMIN — BENZONATATE 100 MG: 100 CAPSULE ORAL at 13:54

## 2022-12-01 RX ADMIN — GUAIFENESIN 200 MG: 200 SOLUTION ORAL at 13:55

## 2022-12-01 RX ADMIN — ONDANSETRON 4 MG: 4 TABLET, ORALLY DISINTEGRATING ORAL at 13:54

## 2022-12-01 ASSESSMENT — PAIN - FUNCTIONAL ASSESSMENT: PAIN_FUNCTIONAL_ASSESSMENT: 0-10

## 2022-12-01 ASSESSMENT — PAIN DESCRIPTION - ORIENTATION: ORIENTATION: LEFT;RIGHT

## 2022-12-01 ASSESSMENT — PAIN SCALES - GENERAL: PAINLEVEL_OUTOF10: 9

## 2022-12-01 ASSESSMENT — PAIN DESCRIPTION - PAIN TYPE: TYPE: ACUTE PAIN

## 2022-12-01 ASSESSMENT — PAIN DESCRIPTION - LOCATION: LOCATION: ABDOMEN

## 2022-12-01 NOTE — TELEPHONE ENCOUNTER
Patient is currently in ER . PCP called to ask if we have considered Pulminary hypertension . Please call her at [phone] as she is concerned .  (PCP was driving so was very hard to understand/hear)

## 2022-12-01 NOTE — ED PROVIDER NOTES
Emergency Department Encounter    Patient: Jeet Farrell  MRN: [id number]  : 1965  Date of Evaluation: 2022  ED Provider:  Rachel Richards MD    Triage Chief Complaint:   Other (Cough, had xray yesterday, fluid on lungs, concerned for pneumonia)    [place]:  Jeet Farrell is a 61 y.o. female that presents with concern for cough, congestion, nasal drainage, sneezing, generally not feeling well for 3 to 4 days. Reports she had an x-ray at [facility] yesterday with her PCP, thinks she has fluid on her lungs and is concerned she has pneumonia. No fevers. Occasionally getting up some sputum. She has a history of CAD and diabetes as well as hepatitis C and schizophrenia. She does use inhalers at home as needed at times. No leg swelling or pain. No chest pain. No vomiting.     ROS - see HPI, below listed is current ROS at time of my eval:  10 Systems reviewed and negative except as above    Past Medical History:   Diagnosis Date    CAD (coronary artery disease)     Diabetes mellitus ([facility]     Hepatitis C     Schizophrenia ([facility]      Past Surgical History:   Procedure Laterality Date    CHOLECYSTECTOMY      HYSTERECTOMY (CERVIX STATUS UNKNOWN)       Family History   Problem Relation Age of Onset    No Known Problems Mother     Cancer Father      Social History     Socioeconomic History    Marital status: Legally      Spouse name: Not on file    Number of children: Not on file    Years of education: Not on file    Highest education level: Not on file   Occupational History    Not on file   Tobacco Use    Smoking status: Every Day     Packs/day: 0.25     Years: 30.00     Pack years: 7.50     Types: Cigars, Cigarettes     Last attempt to quit: 10/17/2022     Years since quittin.1    Smokeless tobacco: Never    Tobacco comments:     Couple cigars and 3 cigarettes a day 22   Vaping Use    Vaping Use: Never used   Substance and Sexual Activity    Alcohol use: Yes     Comment: once in a while. caffeine: 2-3cups coffee and 2 cans soda daily.     Drug use: Not Currently     Types: Marijuana [employer])    Sexual activity: Not on file   Other Topics Concern    Not on file   Social History Narrative    Not on file     Social Determinants of Health     Financial Resource Strain: Not on file   Food Insecurity: Not on file   Transportation Needs: Not on file   Physical Activity: Not on file   Stress: Not on file   Social Connections: Not on file   Intimate Partner Violence: Not on file   Housing Stability: Not on file     Current Facility-Administered Medications   Medication Dose Route Frequency Provider Last Rate Last Admin    ipratropium-albuterol (DUONEB) nebulizer solution 1 ampule  1 ampule Inhalation Q4H Maicol Teran MD         Current Outpatient Medications   Medication Sig Dispense Refill    guaiFENesin-dextromethorphan (ROBITUSSIN DM) 100-10 MG/5ML syrup Take 5 mLs by mouth 4 times daily as needed for Cough 120 mL 0    benzonatate (TESSALON) 200 MG capsule Take 1 capsule by mouth 3 times daily as needed for Cough 30 capsule 0    metoprolol succinate (TOPROL XL) 50 MG extended release tablet Take 1 tablet by mouth daily 30 tablet 3    lisinopril (PRINIVIL;ZESTRIL) 20 MG tablet Take 1 tablet by mouth daily 30 tablet 3    furosemide (LASIX) 20 MG tablet Take 1 tablet by mouth daily 60 tablet 3    albuterol sulfate HFA (PROVENTIL;VENTOLIN;PROAIR) 108 (90 Base) MCG/ACT inhaler Inhale 2 puffs into the lungs every 4 hours as needed for Wheezing 18 g 3    empagliflozin (JARDIANCE) 10 MG tablet Take 1 tablet by mouth daily 90 tablet 1    loratadine (CLARITIN) 10 MG tablet       Blood Pressure KIT 1 Device by Does not apply route daily 1 kit 0    rivaroxaban (XARELTO) 20 MG TABS tablet Take 1 tablet by mouth Daily with supper 30 tablet 5    spironolactone (ALDACTONE) 25 MG tablet Take 1 tablet by mouth daily (Patient not taking: Reported on 11/7/2022) 30 tablet 3    dicyclomine (BENTYL) 10 MG capsule Take 1 capsule by mouth 4 times daily as needed (Abdominal cramping, diarrhea) (Patient not taking: Reported on 11/15/2022) 20 capsule 1    insulin lispro (HUMALOG) 100 UNIT/ML injection vial Inject 10 Units into the skin 3 times daily (with meals) (Patient not taking: No sig reported) 1 vial 0    Insulin Syringe-Needle U-100 ([employer] INS SYR .3CC/29GX0.5\") 29G X 1/2\" 0.3 ML MISC 1 each by Does not apply route daily (Patient not taking: No sig reported) 100 each 3    aspirin 81 MG chewable tablet Take 81 mg by mouth daily       Allergies   Allergen Reactions    Haldol [Haloperidol Lactate] Other (See Comments)     Unknown, severe reaction per mother    Penicillins        Nursing Notes Reviewed    Physical Exam:  Triage VS:    ED Triage Vitals [12/01/22 1311]   Enc Vitals Group      BP (!) 160/110      Heart Rate (!) 108      Resp 19      Temp 98.1 °F (36.7 °C)      Temp Source Oral      SpO2 99 %      Weight 128 lb (58.1 kg)      Height 5' 2\" (1.575 m)      Head Circumference       Peak Flow       Pain Score       Pain Loc       Pain Edu? Excl. in [address]? My pulse ox interpretation is - normal    General appearance:  No acute distress. Skin:  Warm. Dry. Eye:  Extraocular movements intact. Ears, nose, mouth and throat:  Oral mucosa moist   Neck:  Trachea midline. Extremity:  No swelling. Normal ROM     Heart:  Regular rate and rhythm, normal S1 & S2, no extra heart sounds. Perfusion:  intact  Respiratory: No crackles noted, occasional expiratory wheeze but with coughing this clears. No intercostal muscle use, no tachypnea, espirations nonlabored. Abdominal:  Soft. Nontender. Non distended.   Neurological:  Alert and oriented            Psychiatric:  Appropriate    I have reviewed and interpreted all of the currently available lab results from this visit (if applicable):  Results for orders placed or performed during the hospital encounter of 12/01/22   Rapid Flu Swab    Specimen: Nasopharyngeal   Result Value Ref Range    Rapid Influenza A Ag NEGATIVE NEGATIVE    Rapid Influenza B Ag NEGATIVE NEGATIVE   COVID-19, Rapid    Specimen: Nasopharyngeal   Result Value Ref Range    Source NARES     SARS-CoV-2, NAAT NOT DETECTED NOT DETECTED      Radiographs (if obtained):  Radiologist's Report Reviewed:  No results found. EKG (if obtained): (All EKG's are interpreted by myself in the absence of a cardiologist)        MDM:  50-year-old female with history as above presents with cough and congestion nasal drainage. She was mildly tachycardic on initial arrival, she has some faint expiratory wheeze, COVID and flu testing is negative so I will give her a DuoNeb but she is not using any increased work of breathing at this time. Chest x-ray does not show any evidence of pneumonia. She has not had any fevers. I did offer her steroids at home as she does use inhalers chronically, I do wonder if she had a history of COPD though she did not relay this to me, and she states she has done steroids in the past and does not want to take those. She is comfortable with plan for Tessalon Perles and guaifenesin for home and see how she does over the next couple of days. We did discuss if symptoms worsen to return, but at this time I do not believe she would require hospitalization. Plan will be for discharge home    Clinical Impression:  1.  Bronchitis      Disposition referral (if applicable):  [address]  [phone]        Disposition medications (if applicable):  New Prescriptions    BENZONATATE (TESSALON) 200 MG CAPSULE    Take 1 capsule by mouth 3 times daily as needed for Cough    GUAIFENESIN-DEXTROMETHORPHAN (ROBITUSSIN DM) 100-10 MG/5ML SYRUP    Take 5 mLs by mouth 4 times daily as needed for Cough     ED Provider Disposition Time  DISPOSITION Discharge - Pending Orders Complete 12/01/2022 03:06:45 PM      Comment: Please note this report has been produced using speech recognition software and may contain errors related to that system including errors in grammar, punctuation, and spelling, as well as words and phrases that may be inappropriate. Efforts were made to edit the dictations.        Elena White MD  12/01/22 [id number]

## 2022-12-01 NOTE — TELEPHONE ENCOUNTER
Reviewed patient case with PCP Dr. Susi Rojo. She is requesting patient be seen earlier as she is in fluid overload again, she is concerned for [facility]. Patient has  who will bring her sooner than the 12/20/2022 appointment. She did dno show for her appointment on 11/21/2022 with me. Will reschedule for sooner appointment with Dr. Martha Palacios to discuss MV regurgitation and fluid overload.

## 2022-12-01 NOTE — CARE COORDINATION
Pt noted for possible readmission. Pt was recently admitted 11/7-11/10 for SOB. Pt lives alone in apartment, has family support and independent in ADL's. Pt has insurance and PCP. No current DME. Pt returns today with complaints of a cough. Pt was evaluated and d/c home.

## 2022-12-06 ENCOUNTER — OFFICE VISIT (OUTPATIENT)
Dept: CARDIOLOGY CLINIC | Age: 57
End: 2022-12-06
Payer: COMMERCIAL

## 2022-12-06 VITALS
SYSTOLIC BLOOD PRESSURE: 128 MMHG | WEIGHT: 133 LBS | HEART RATE: 90 BPM | BODY MASS INDEX: 24.48 KG/M2 | HEIGHT: 62 IN | OXYGEN SATURATION: 91 % | DIASTOLIC BLOOD PRESSURE: 88 MMHG

## 2022-12-06 DIAGNOSIS — I26.99 OTHER PULMONARY EMBOLISM WITHOUT ACUTE COR PULMONALE, UNSPECIFIED CHRONICITY (HCC): Primary | ICD-10-CM

## 2022-12-06 DIAGNOSIS — E87.6 HYPOKALEMIA: ICD-10-CM

## 2022-12-06 DIAGNOSIS — F17.200 SMOKING: ICD-10-CM

## 2022-12-06 DIAGNOSIS — I50.41 ACUTE COMBINED SYSTOLIC AND DIASTOLIC CONGESTIVE HEART FAILURE (HCC): ICD-10-CM

## 2022-12-06 DIAGNOSIS — I34.0 NONRHEUMATIC MITRAL VALVE REGURGITATION: ICD-10-CM

## 2022-12-06 DIAGNOSIS — I42.8 NICM (NONISCHEMIC CARDIOMYOPATHY) (HCC): ICD-10-CM

## 2022-12-06 DIAGNOSIS — Z79.4 TYPE 2 DIABETES MELLITUS WITHOUT COMPLICATION, WITH LONG-TERM CURRENT USE OF INSULIN (HCC): ICD-10-CM

## 2022-12-06 DIAGNOSIS — I82.413 ACUTE BILATERAL DEEP VEIN THROMBOSIS (DVT) OF FEMORAL VEINS (HCC): ICD-10-CM

## 2022-12-06 DIAGNOSIS — E11.9 TYPE 2 DIABETES MELLITUS WITHOUT COMPLICATION, WITH LONG-TERM CURRENT USE OF INSULIN (HCC): ICD-10-CM

## 2022-12-06 PROCEDURE — 3074F SYST BP LT 130 MM HG: CPT | Performed by: INTERNAL MEDICINE

## 2022-12-06 PROCEDURE — 3017F COLORECTAL CA SCREEN DOC REV: CPT | Performed by: INTERNAL MEDICINE

## 2022-12-06 PROCEDURE — 99214 OFFICE O/P EST MOD 30 MIN: CPT | Performed by: INTERNAL MEDICINE

## 2022-12-06 PROCEDURE — 2022F DILAT RTA XM EVC RTNOPTHY: CPT | Performed by: INTERNAL MEDICINE

## 2022-12-06 PROCEDURE — 4004F PT TOBACCO SCREEN RCVD TLK: CPT | Performed by: INTERNAL MEDICINE

## 2022-12-06 PROCEDURE — G8427 DOCREV CUR MEDS BY ELIG CLIN: HCPCS | Performed by: INTERNAL MEDICINE

## 2022-12-06 PROCEDURE — 1111F DSCHRG MED/CURRENT MED MERGE: CPT | Performed by: INTERNAL MEDICINE

## 2022-12-06 PROCEDURE — 3078F DIAST BP <80 MM HG: CPT | Performed by: INTERNAL MEDICINE

## 2022-12-06 PROCEDURE — 3044F HG A1C LEVEL LT 7.0%: CPT | Performed by: INTERNAL MEDICINE

## 2022-12-06 PROCEDURE — G8420 CALC BMI NORM PARAMETERS: HCPCS | Performed by: INTERNAL MEDICINE

## 2022-12-06 PROCEDURE — G8484 FLU IMMUNIZE NO ADMIN: HCPCS | Performed by: INTERNAL MEDICINE

## 2022-12-06 RX ORDER — LISINOPRIL 40 MG/1
40 TABLET ORAL DAILY
Qty: 90 TABLET | Refills: 4 | Status: SHIPPED | OUTPATIENT
Start: 2022-12-06

## 2022-12-06 RX ORDER — ARIPIPRAZOLE 400 MG
400 KIT INTRAMUSCULAR ONCE
COMMUNITY

## 2022-12-06 RX ORDER — METOPROLOL SUCCINATE 100 MG/1
100 TABLET, EXTENDED RELEASE ORAL DAILY
Qty: 90 TABLET | Refills: 4 | Status: SHIPPED | OUTPATIENT
Start: 2022-12-06

## 2022-12-06 RX ORDER — FUROSEMIDE 20 MG/1
20 TABLET ORAL 2 TIMES DAILY
Qty: 60 TABLET | Refills: 3 | Status: SHIPPED | OUTPATIENT
Start: 2022-12-06

## 2022-12-06 NOTE — PATIENT INSTRUCTIONS
Thank you for allowing us to care for you today! We want to ensure we can follow your treatment plan and we strive to give you the best outcomes and experience possible. If you ever have a life threatening emergency and call 911 - for an ambulance (EMS)   Our providers can only care for you at:   [facility] or [facility]. Even if you have someone take you or you drive yourself we can only care for you in a [facility]. Our providers are not setup at the other healthcare locations! Please be informed that if you contact our office outside of normal business hours the physician on call cannot help with any scheduling or rescheduling issues, procedure instruction questions or any type of medication issue. We advise you for any urgent/emergency that you go to the nearest emergency room! PLEASE CALL OUR OFFICE DURING NORMAL BUSINESS HOURS    Monday - Friday   8 am to 5 pm    [place] [address]: [phone]    [place]:  [phone]  **It is YOUR responsibilty to bring medication bottles and/or updated medication list to [address] [place] [address]. This will allow us to better serve you and all your healthcare needs**  [facility] Locations - No appointment necessary. Sites open Monday to Friday. Call your preferred location for test preparation, business hours and other information you need. [employer] accepts [employer]. [facility]   [facility] [address] [facility]. [facility], [address] [facility]  Phone: [phone] [facility] [address] [facility]  [address].   [facility], [address]  Phone: [phone]

## 2022-12-06 NOTE — PROGRESS NOTES
CARDIOLOGY  NOTE    Chief Complaint: DVT /cardiomyopathy    HPI:   Nusrat Cowan is a 61y.o. year old who has Past medical history as noted below. Nusrat Cowan comes for urgent visit after ED visit  due to sob and concerns for volume overload /CHF due to severe non ischemic cardiomyopathy   she is taking her meds but aldactone was stopped due to hyperkalemia  Sees nephrology for CKD ( Dr Elizabeth Fu)  She is here with her  she has history of schizophrenia and hepatitis C and was found to have bilateral lower extremity swelling work-up revealed bilateral extensive DVT of both lower extremity she was evaluated by hematology oncology and currently hypercoagulable work-up , she is on xarelto 20 mg daily . Her echo shows severe cardiomyopathy  With EF of 30- 35% and hypokinesis of the anteroseptal wall with eccentric severe  mitral regurgitation. Cardiac cath revealed no significant obstructive coronary artery disease she was started on guideline recommended medical therapy but because of low blood pressures she had difficulty tolerating them. She is feeling much better now.   Currently taking 20 mg of Lasix and Xarelto 20 mg daily   She says she smokes several cigars a day but is trying to cut down    Current Outpatient Medications   Medication Sig Dispense Refill    ARIPiprazole ER (ABILIFY MAINTENA) 400 MG PRSY Inject 400 mg into the muscle once      lisinopril (PRINIVIL;ZESTRIL) 40 MG tablet Take 1 tablet by mouth daily 90 tablet 4    furosemide (LASIX) 20 MG tablet Take 1 tablet by mouth 2 times daily 60 tablet 3    metoprolol succinate (TOPROL XL) 100 MG extended release tablet Take 1 tablet by mouth daily 90 tablet 4    guaiFENesin-dextromethorphan (ROBITUSSIN DM) 100-10 MG/5ML syrup Take 5 mLs by mouth 4 times daily as needed for Cough 120 mL 0    benzonatate (TESSALON) 200 MG capsule Take 1 capsule by mouth 3 times daily as needed for Cough 30 capsule 0 albuterol sulfate HFA (PROVENTIL;VENTOLIN;PROAIR) 108 (90 Base) MCG/ACT inhaler Inhale 2 puffs into the lungs every 4 hours as needed for Wheezing 18 g 3    empagliflozin (JARDIANCE) 10 MG tablet Take 1 tablet by mouth daily 90 tablet 1    rivaroxaban (XARELTO) 20 MG TABS tablet Take 1 tablet by mouth Daily with supper 30 tablet 5    dicyclomine (BENTYL) 10 MG capsule Take 1 capsule by mouth 4 times daily as needed (Abdominal cramping, diarrhea) 20 capsule 1    aspirin 81 MG chewable tablet Take 81 mg by mouth daily      loratadine (CLARITIN) 10 MG tablet  (Patient not taking: Reported on 2022)      Blood Pressure KIT 1 Device by Does not apply route daily (Patient not taking: Reported on 2022) 1 kit 0    spironolactone (ALDACTONE) 25 MG tablet Take 1 tablet by mouth daily (Patient not taking: No sig reported) 30 tablet 3    insulin lispro (HUMALOG) 100 UNIT/ML injection vial Inject 10 Units into the skin 3 times daily (with meals) (Patient not taking: No sig reported) 1 vial 0    Insulin Syringe-Needle U-100 ([employer] INS SYR .3CC/29GX0.5\") 29G X 1/2\" 0.3 ML MISC 1 each by Does not apply route daily (Patient not taking: No sig reported) 100 each 3     No current facility-administered medications for this visit.        Allergies:   Haldol [haloperidol lactate] and Penicillins    Patient History:  Past Medical History:   Diagnosis Date    CAD (coronary artery disease)     Diabetes mellitus ([facility] [address]     Hepatitis C     Schizophrenia ([facility]      Past Surgical History:   Procedure Laterality Date    CHOLECYSTECTOMY      HYSTERECTOMY (CERVIX STATUS UNKNOWN)       Family History   Problem Relation Age of Onset    No Known Problems Mother     Cancer Father      Social History     Tobacco Use    Smoking status: Every Day     Packs/day: 0.25     Years: 30.00     Pack years: 7.50     Types: Cigars, Cigarettes     Last attempt to quit: 10/17/2022     Years since quittin.1    Smokeless tobacco: Never    Tobacco comments: Couple cigars and 3 cigarettes a day 9/19/22   Substance Use Topics    Alcohol use: Yes     Comment: once in a while. caffeine: 2-3cups coffee and 2 cans soda daily. Review of Systems:   Constitutional: No Fever or Weight Loss   Eyes: No Decreased Vision  ENT: No Headaches, Hearing Loss or Vertigo  Cardiovascular: as per note above   Respiratory: No cough or wheezing and as per note above. Gastrointestinal: No abdominal pain, appetite loss, blood in stools, constipation, diarrhea or heartburn  Genitourinary: No dysuria, trouble voiding, or hematuria  Musculoskeletal:  denies any new  joint aches , swelling  or pain   Integumentary: No rash or pruritis  Neurological: No TIA or stroke symptoms  Psychiatric: No anxiety or depression  Endocrine: No malaise, fatigue or temperature intolerance  Hematologic/Lymphatic: No bleeding problems, blood clots or swollen lymph nodes  Allergic/Immunologic: No nasal congestion or hives    Objective:      Physical Exam:  /88 (Site: Right Upper Arm, Position: Sitting, Cuff Size: Medium Adult)   Pulse 90   Ht 5' 2\" (1.575 m)   Wt 133 lb (60.3 kg)   SpO2 91%   BMI 24.33 kg/m²   Wt Readings from Last 3 Encounters:   12/06/22 133 lb (60.3 kg)   12/01/22 128 lb (58.1 kg)   11/15/22 134 lb 9.6 oz (61.1 kg)     Body mass index is 24.33 kg/m². Vitals:    12/06/22 0828   BP: 128/88   Pulse:    SpO2:         General Appearance:  No distress, conversant  Constitutional:  Well developed, Well nourished, No acute distress, Non-toxic appearance. HENT:  Normocephalic, Atraumatic, Bilateral external ears normal, Oropharynx moist, No oral exudates, Nose normal. Neck- Normal range of motion, No tenderness, Supple, No stridor,no apical-carotid delay  Eyes:  PERRL, EOMI, Conjunctiva normal, No discharge. Respiratory:  Normal breath sounds, No respiratory distress, No wheezing, No chest tenderness. ,no use of accessory muscles, NO crackles  Cardiovascular: (PMI) apex non displaced,no lifts no thrills,S1 and S2 audible, No added heart sounds, No signs of ankle edema, or volume overload, No evidence of JVD, No crackles  GI:  Bowel sounds normal, Soft, No tenderness, No masses, No gross visceromegaly   :  No costovertebral angle tenderness   Musculoskeletal:  No edema, no tenderness, no deformities. Back- no tenderness  Integument:  Well hydrated, no rash   Lymphatic:  No lymphadenopathy noted   Neurologic:  Alert & oriented x 3, CN 2-12 normal, normal motor function, normal sensory function, no focal deficits noted   Psychiatric:  Speech and behavior appropriate       Medical decision making and Data review:  DATA:  Lab Results   Component Value Date    TROPONINT 0.017 (H) 11/08/2022     BNP:    Lab Results   Component Value Date    PROBNP 30,533 (H) 11/07/2022     PT/INR:  No results found for: PTINR  Lab Results   Component Value Date    LABA1C 6.0 05/03/2022    LABA1C 5.9 03/16/2022     Lab Results   Component Value Date    CHOL 161 03/12/2021    TRIG 163 (H) 03/12/2021    HDL 72 03/12/2021    LDLDIRECT 70 03/12/2021     Lab Results   Component Value Date    ALT 28 11/07/2022    AST 31 11/07/2022     No results for input(s): WBC, HGB, HCT, MCV, PLT in the last 72 hours.   TSH: No results found for: TSH  Lab Results   Component Value Date    AST 31 11/07/2022    ALT 28 11/07/2022    BILIDIR 0.2 03/16/2022    BILITOT 0.7 11/07/2022    ALKPHOS 184 (H) 11/07/2022     Lab Results   Component Value Date    PROBNP 30,533 (H) 11/07/2022    PROBNP 16,222 (H) 05/02/2022    PROBNP 15,695 (H) 03/16/2022     Lab Results   Component Value Date    LABA1C 6.0 05/03/2022    LABA1C 5.9 03/16/2022     Lab Results   Component Value Date    WBC 8.8 11/08/2022    HGB 13.6 11/08/2022    HCT 40.3 11/08/2022     11/08/2022       Echo 3/022     Summary   Left ventricular systolic function is abnormal.   Ejection fraction is visually estimated at 30-35 %   Slight hypokinesis of the anteroseptal wall segment. Mild left ventricular hypertrophy. Severe eccentric mitral regurgitation (ERO: 0.40 cm sq, regurgitant volume:   62 ml). Essentially normal right ventricle; no evidence of right heart strain. Moderate tricuspid regurgitation; RVSP: 44 mmHg. Mild pulmonic regurgitation present. No evidence of any pericardial effusion. Bilateral pleural effusion. Inferior vena cava is dilated, measuring at 2.3 cm, but does collapse with  Cath 3/18/22  Cardiac Arteries and Lesion Findings     LMCA: Normal.     LAD: Normal.     LCx: Normal.     RCA: Mild Lumen Irregularity. 10% prox RCA    Echo  Summary   This is a limited echocardiogram.   Left ventricular systolic function is abnormal.   Ejection fraction is visually estimated at 25-30%. Global severe hypokinesis noted. Bilateral atrial enlargement. Severe mitral regurgitation consistent with previous echo. Severe tricuspid regurgitation; RVSP: 63 mmHg. No evidence of any pericardial effusion. All labs, medications and tests reviewed by myself including data and history from outside source , patient and available family . Assessment & Plan:      1. Other pulmonary embolism without acute cor pulmonale, unspecified chronicity ([address]    2. Acute combined systolic and diastolic congestive heart failure ([address]    3. Acute bilateral deep vein thrombosis (DVT) of femoral veins (HCC)    4. Hypokalemia    5. NICM (nonischemic cardiomyopathy) ([address]    6. Nonrheumatic mitral valve regurgitation    7. Smoking    8. Type 2 diabetes mellitus without complication, with long-term current use of insulin (HCC)           Acute on chronic systolic congestive heart failure ([address]  Cath in March 2022 did not reveal any obstructive coronary artery disease.     Non Ischemic cardiomyopathy with ejection fraction of 20-25 % will uptitrate lisinopril to 40 mg daily and increase Toprol-XL to 100 mg daily  At this stage refusing icd but will think about it , she may benefit Mitral valve intervention as well plan will uptitrate meds monthly will evaluate every month  Could nto tolerate aldactone due to hyperkalemia     Acute bilateral deep vein thrombosis (DVT) of femoral veins (HCC)   Bilateral extensive DVT started on Xarelto continue as per protocol she is seeing hematology for hypercoagulable work-up    Nonrheumatic mitral valve regurgitation  Severe  mitral regurgitation will repeat echo in 3 months once she has been uptitrated on cardiac meds , will need RAYA and may benefit from intervention she is refusing any cardiac surgery, mitral clip may be an inferior /palliative  option? She wants to wait for RAYA till after holidays will schedule in Jan     Primary hypertension   Gradually titrate up medication but blood pressures well controlled today continue Lasix 20 mg BId     Smoking  Encouraged to quit smoking    CKD  Seeing Dr Slade Hummel       Type 2 diabetes mellitus without complication, with long-term current use of insulin ([facility] [address] [facility]   Needs to establish care with PCP currently on insulin     Dyslipidemia :  All available lab work was reviewed. Patient was advised to repeat lab work before next visit. Necessary orders were placed , instructions given by myself       Counseled extensively and medication compliance urged. We discussed that for the  prevention of ASCVD our  goal is aggressive risk modification. Patient is encouraged to exercise if they can , educated about  brisk walk for 30 minutes  at least 3 to 4 times a week if there are no physical limitations  Various goals were discussed and questions answered. Continue current medications. Appropriate prescriptions are addressed and refills ordered. Questions answered and patient verbalizes understanding. Call for any problems, questions, or concerns. Greater than 60 % of time spent counseling besides reviewing data and images     Continue all other medications of all above medical condition listed as is.     Return in about 1 month (around 1/6/2023). Please note this report has been partially produced using speech recognition software and may contain errors related to that system including errors in grammar, punctuation, and spelling, as well as words and phrases that may be inappropriate. If there are any questions or concerns please feel free to contact the dictating provider for clarification.

## 2023-01-10 ENCOUNTER — OFFICE VISIT (OUTPATIENT)
Dept: CARDIOLOGY CLINIC | Age: 58
End: 2023-01-10
Payer: COMMERCIAL

## 2023-01-10 VITALS
WEIGHT: 137 LBS | DIASTOLIC BLOOD PRESSURE: 96 MMHG | SYSTOLIC BLOOD PRESSURE: 126 MMHG | BODY MASS INDEX: 25.21 KG/M2 | HEIGHT: 62 IN | HEART RATE: 72 BPM

## 2023-01-10 DIAGNOSIS — E11.9 TYPE 2 DIABETES MELLITUS WITHOUT COMPLICATION, WITH LONG-TERM CURRENT USE OF INSULIN (HCC): ICD-10-CM

## 2023-01-10 DIAGNOSIS — I26.99 OTHER PULMONARY EMBOLISM WITHOUT ACUTE COR PULMONALE, UNSPECIFIED CHRONICITY (HCC): ICD-10-CM

## 2023-01-10 DIAGNOSIS — I50.23 ACUTE ON CHRONIC SYSTOLIC CONGESTIVE HEART FAILURE (HCC): ICD-10-CM

## 2023-01-10 DIAGNOSIS — R07.9 CHEST PAIN, UNSPECIFIED TYPE: Primary | ICD-10-CM

## 2023-01-10 DIAGNOSIS — I34.0 NONRHEUMATIC MITRAL VALVE REGURGITATION: ICD-10-CM

## 2023-01-10 DIAGNOSIS — I42.8 NICM (NONISCHEMIC CARDIOMYOPATHY) (HCC): ICD-10-CM

## 2023-01-10 DIAGNOSIS — I82.413 ACUTE BILATERAL DEEP VEIN THROMBOSIS (DVT) OF FEMORAL VEINS (HCC): ICD-10-CM

## 2023-01-10 DIAGNOSIS — Z79.4 TYPE 2 DIABETES MELLITUS WITHOUT COMPLICATION, WITH LONG-TERM CURRENT USE OF INSULIN (HCC): ICD-10-CM

## 2023-01-10 DIAGNOSIS — I10 PRIMARY HYPERTENSION: ICD-10-CM

## 2023-01-10 DIAGNOSIS — B18.2 CHRONIC HEPATITIS C WITHOUT HEPATIC COMA (HCC): ICD-10-CM

## 2023-01-10 PROCEDURE — 3080F DIAST BP >= 90 MM HG: CPT | Performed by: INTERNAL MEDICINE

## 2023-01-10 PROCEDURE — 3017F COLORECTAL CA SCREEN DOC REV: CPT | Performed by: INTERNAL MEDICINE

## 2023-01-10 PROCEDURE — G8427 DOCREV CUR MEDS BY ELIG CLIN: HCPCS | Performed by: INTERNAL MEDICINE

## 2023-01-10 PROCEDURE — G8419 CALC BMI OUT NRM PARAM NOF/U: HCPCS | Performed by: INTERNAL MEDICINE

## 2023-01-10 PROCEDURE — 3074F SYST BP LT 130 MM HG: CPT | Performed by: INTERNAL MEDICINE

## 2023-01-10 PROCEDURE — G8484 FLU IMMUNIZE NO ADMIN: HCPCS | Performed by: INTERNAL MEDICINE

## 2023-01-10 PROCEDURE — 2022F DILAT RTA XM EVC RTNOPTHY: CPT | Performed by: INTERNAL MEDICINE

## 2023-01-10 PROCEDURE — 99214 OFFICE O/P EST MOD 30 MIN: CPT | Performed by: INTERNAL MEDICINE

## 2023-01-10 PROCEDURE — 93000 ELECTROCARDIOGRAM COMPLETE: CPT | Performed by: INTERNAL MEDICINE

## 2023-01-10 PROCEDURE — 3046F HEMOGLOBIN A1C LEVEL >9.0%: CPT | Performed by: INTERNAL MEDICINE

## 2023-01-10 PROCEDURE — 4004F PT TOBACCO SCREEN RCVD TLK: CPT | Performed by: INTERNAL MEDICINE

## 2023-01-10 RX ORDER — FUROSEMIDE 20 MG/1
20 TABLET ORAL 2 TIMES DAILY
Qty: 60 TABLET | Refills: 3 | Status: SHIPPED | OUTPATIENT
Start: 2023-01-10

## 2023-01-10 RX ORDER — LISINOPRIL 40 MG/1
40 TABLET ORAL DAILY
Qty: 90 TABLET | Refills: 4 | Status: CANCELLED | OUTPATIENT
Start: 2023-01-10

## 2023-01-10 RX ORDER — METOPROLOL SUCCINATE 100 MG/1
100 TABLET, EXTENDED RELEASE ORAL DAILY
Qty: 90 TABLET | Refills: 4 | Status: SHIPPED | OUTPATIENT
Start: 2023-01-10

## 2023-01-10 RX ORDER — LISINOPRIL 40 MG/1
40 TABLET ORAL DAILY
Qty: 90 TABLET | Refills: 4 | Status: SHIPPED | OUTPATIENT
Start: 2023-01-10

## 2023-01-10 RX ORDER — SPIRONOLACTONE 25 MG/1
25 TABLET ORAL DAILY
Qty: 30 TABLET | Refills: 3 | Status: SHIPPED | OUTPATIENT
Start: 2023-01-10 | End: 2023-01-10 | Stop reason: ALTCHOICE

## 2023-01-10 RX ORDER — FUROSEMIDE 20 MG/1
20 TABLET ORAL 2 TIMES DAILY
Qty: 60 TABLET | Refills: 3 | Status: CANCELLED | OUTPATIENT
Start: 2023-01-10

## 2023-01-10 NOTE — PATIENT INSTRUCTIONS
Please be informed that if you contact our office outside of normal business hours the physician on call cannot help with any scheduling or rescheduling issues, procedure instruction questions or any type of medication issue. We advise you for any urgent/emergency that you go to the nearest emergency room! PLEASE CALL OUR OFFICE DURING NORMAL BUSINESS HOURS    Monday - Friday   8 am to 5 pm    [address]: [phone]    [place]:  [phone]    **It is YOUR responsibilty to bring medication bottles and/or updated medication list to [address] [place] [address]. This will allow us to better serve you and all your healthcare needs**    [facility] Locations - No appointment necessary. Sites open Monday to Friday. Call your preferred location for test preparation, business hours and other information you need. [employer] accepts [employer]. [facility] [address] Gh[place]al [place], [address] [facility]  Phone: [phone] [facility] [address] [facility]  [address]. Bridget velez, [address] [facility]  Phone: [phone]     Thank you for allowing us to care for you today! We want to ensure we can follow your treatment plan and we strive to give you the best outcomes and experience possible. If you ever have a life threatening emergency and call 911 - for an ambulance (EMS)   Our providers can only care for you at:   [facility] or [facility]. Even if you have someone take you or you drive yourself we can only care for you in a [facility] facility. Our providers are not setup at the other healthcare locations!

## 2023-01-10 NOTE — PROGRESS NOTES
CARDIOLOGY  NOTE    Chief Complaint: DVT /cardiomyopathy    HPI:   Saint Heron is a 61y.o. year old who has Past medical history as noted below. Saint Heron says her breathing is not good and she hears hissing noise from her chest at night  She has CHF due to severe non ischemic cardiomyopathy  with EF of 30 -35 %  she is taking her meds but aldactone was stopped due to hyperkalemia  Sees nephrology for CKD ( Dr Ness Mullen)  She is here with her  she has history of schizophrenia and hepatitis C and was found to have bilateral lower extremity swelling work-up revealed bilateral extensive DVT of both lower extremity she was evaluated by hematology oncology and currently hypercoagulable work-up , she is on xarelto 20 mg daily . Her echo shows severe cardiomyopathy  With EF of 30- 35% and hypokinesis of the anteroseptal wall with eccentric severe  mitral regurgitation. Cardiac cath revealed no significant obstructive coronary artery disease she was started on guideline recommended medical therapy but because of low blood pressures she had difficulty tolerating them. She is feeling much better now.   Currently taking 20 mg of Lasix and Xarelto 20 mg daily   She says she smokes several cigars a day but is trying to cut down    Current Outpatient Medications   Medication Sig Dispense Refill    furosemide (LASIX) 20 MG tablet Take 1 tablet by mouth 2 times daily 60 tablet 3    lisinopril (PRINIVIL;ZESTRIL) 40 MG tablet Take 1 tablet by mouth daily 90 tablet 4    rivaroxaban (XARELTO) 20 MG TABS tablet Take 1 tablet by mouth Daily with supper 30 tablet 5    metoprolol succinate (TOPROL XL) 100 MG extended release tablet Take 1 tablet by mouth daily 90 tablet 4    ARIPiprazole ER (ABILIFY MAINTENA) 400 MG PRSY Inject 400 mg into the muscle once      albuterol sulfate HFA (PROVENTIL;VENTOLIN;PROAIR) 108 (90 Base) MCG/ACT inhaler Inhale 2 puffs into the lungs every 4 hours as needed for Wheezing 18 g 3    empagliflozin (JARDIANCE) 10 MG tablet Take 1 tablet by mouth daily 90 tablet 1    loratadine (CLARITIN) 10 MG tablet  (Patient not taking: No sig reported)      Blood Pressure KIT 1 Device by Does not apply route daily (Patient not taking: No sig reported) 1 kit 0    dicyclomine (BENTYL) 10 MG capsule Take 1 capsule by mouth 4 times daily as needed (Abdominal cramping, diarrhea) (Patient not taking: Reported on 1/10/2023) 20 capsule 1    insulin lispro (HUMALOG) 100 UNIT/ML injection vial Inject 10 Units into the skin 3 times daily (with meals) (Patient not taking: No sig reported) 1 vial 0    Insulin Syringe-Needle U-100 (RIVSCO INS SYR .3CC/29GX0.5\") 29G X 1/2\" 0.3 ML MISC 1 each by Does not apply route daily (Patient not taking: No sig reported) 100 each 3    aspirin 81 MG chewable tablet Take 81 mg by mouth daily (Patient not taking: Reported on 1/10/2023)       No current facility-administered medications for this visit. Allergies:   Haldol [haloperidol lactate] and Penicillins    Patient History:  Past Medical History:   Diagnosis Date    CAD (coronary artery disease)     Diabetes mellitus ([facility] [address]     Hepatitis C     Schizophrenia ([facility] [address]      Past Surgical History:   Procedure Laterality Date    CHOLECYSTECTOMY      HYSTERECTOMY (CERVIX STATUS UNKNOWN)       Family History   Problem Relation Age of Onset    No Known Problems Mother     Cancer Father      Social History     Tobacco Use    Smoking status: Every Day     Packs/day: 0.25     Years: 30.00     Pack years: 7.50     Types: Cigarettes     Last attempt to quit: 10/17/2022     Years since quittin.2    Smokeless tobacco: Never    Tobacco comments:     Couple cigars and 3 cigarettes a day 22   Substance Use Topics    Alcohol use: Yes     Comment: Alcohol: 1 drink every 2 weeks.  Caffeine: 1 cup of coffee occasionally, 2 cans of Coke or Pepsi daily        Review of Systems:   Constitutional: No Fever or Weight Loss Eyes: No Decreased Vision  ENT: No Headaches, Hearing Loss or Vertigo  Cardiovascular: as per note above   Respiratory: No cough or wheezing and as per note above. Gastrointestinal: No abdominal pain, appetite loss, blood in stools, constipation, diarrhea or heartburn  Genitourinary: No dysuria, trouble voiding, or hematuria  Musculoskeletal:  denies any new  joint aches , swelling  or pain   Integumentary: No rash or pruritis  Neurological: No TIA or stroke symptoms  Psychiatric: No anxiety or depression  Endocrine: No malaise, fatigue or temperature intolerance  Hematologic/Lymphatic: No bleeding problems, blood clots or swollen lymph nodes  Allergic/Immunologic: No nasal congestion or hives    Objective:      Physical Exam:  BP (!) 126/96 (Site: Right Upper Arm, Position: Sitting, Cuff Size: Medium Adult)   Pulse 72   Ht 5' 2\" (1.575 m)   Wt 137 lb (62.1 kg)   BMI 25.06 kg/m²   Wt Readings from Last 3 Encounters:   01/10/23 137 lb (62.1 kg)   12/06/22 133 lb (60.3 kg)   12/01/22 128 lb (58.1 kg)     Body mass index is 25.06 kg/m². Vitals:    01/10/23 1138   BP: (!) 126/96   Pulse:         General Appearance:  No distress, conversant  Constitutional:  Well developed, Well nourished, No acute distress, Non-toxic appearance. HENT:  Normocephalic, Atraumatic, Bilateral external ears normal, Oropharynx moist, No oral exudates, Nose normal. Neck- Normal range of motion, No tenderness, Supple, No stridor,no apical-carotid delay  Eyes:  PERRL, EOMI, Conjunctiva normal, No discharge. Respiratory:  Normal breath sounds, No respiratory distress, No wheezing, No chest tenderness. ,no use of accessory muscles, NO crackles  Cardiovascular: (PMI) apex non displaced,no lifts no thrills,S1 and S2 audible, No added heart sounds, No signs of ankle edema, or volume overload, No evidence of JVD, No crackles  GI:  Bowel sounds normal, Soft, No tenderness, No masses, No gross visceromegaly   :  No costovertebral angle tenderness   Musculoskeletal:  No edema, no tenderness, no deformities. Back- no tenderness  Integument:  Well hydrated, no rash   Lymphatic:  No lymphadenopathy noted   Neurologic:  Alert & oriented x 3, CN 2-12 normal, normal motor function, normal sensory function, no focal deficits noted   Psychiatric:  Speech and behavior appropriate       Medical decision making and Data review:  DATA:  Lab Results   Component Value Date    TROPONINT 0.017 (H) 11/08/2022     BNP:    Lab Results   Component Value Date    PROBNP 30,533 (H) 11/07/2022     PT/INR:  No results found for: PTINR  Lab Results   Component Value Date    LABA1C 6.0 05/03/2022    LABA1C 5.9 03/16/2022     Lab Results   Component Value Date    CHOL 161 03/12/2021    TRIG 163 (H) 03/12/2021    HDL 72 03/12/2021    LDLDIRECT 70 03/12/2021     Lab Results   Component Value Date    ALT 28 11/07/2022    AST 31 11/07/2022     No results for input(s): WBC, HGB, HCT, MCV, PLT in the last 72 hours. TSH: No results found for: TSH  Lab Results   Component Value Date    AST 31 11/07/2022    ALT 28 11/07/2022    BILIDIR 0.2 03/16/2022    BILITOT 0.7 11/07/2022    ALKPHOS 184 (H) 11/07/2022     Lab Results   Component Value Date    PROBNP 30,533 (H) 11/07/2022    PROBNP 16,222 (H) 05/02/2022    PROBNP 15,695 (H) 03/16/2022     Lab Results   Component Value Date    LABA1C 6.0 05/03/2022    LABA1C 5.9 03/16/2022     Lab Results   Component Value Date    WBC 8.8 11/08/2022    HGB 13.6 11/08/2022    HCT 40.3 11/08/2022     11/08/2022       Echo 3/022     Summary   Left ventricular systolic function is abnormal.   Ejection fraction is visually estimated at 30-35 %   Slight hypokinesis of the anteroseptal wall segment. Mild left ventricular hypertrophy. Severe eccentric mitral regurgitation (ERO: 0.40 cm sq, regurgitant volume:   62 ml). Essentially normal right ventricle; no evidence of right heart strain. Moderate tricuspid regurgitation; RVSP: 44 mmHg. Mild pulmonic regurgitation present. No evidence of any pericardial effusion. Bilateral pleural effusion. Inferior vena cava is dilated, measuring at 2.3 cm, but does collapse with  Cath 3/18/22  Cardiac Arteries and Lesion Findings     LMCA: Normal.     LAD: Normal.     LCx: Normal.     RCA: Mild Lumen Irregularity. 10% prox RCA    Echo  Summary   This is a limited echocardiogram.   Left ventricular systolic function is abnormal.   Ejection fraction is visually estimated at 25-30%. Global severe hypokinesis noted. Bilateral atrial enlargement. Severe mitral regurgitation consistent with previous echo. Severe tricuspid regurgitation; RVSP: 63 mmHg. No evidence of any pericardial effusion. All labs, medications and tests reviewed by myself including data and history from outside source , patient and available family . Assessment & Plan:      1. Chest pain, unspecified type    2. Acute on chronic systolic congestive heart failure ([address]    3. Chronic hepatitis C without hepatic coma ([address]    4. Acute bilateral deep vein thrombosis (DVT) of femoral veins (HCC)    5. NICM (nonischemic cardiomyopathy) ([address]    6. Nonrheumatic mitral valve regurgitation    7. Primary hypertension    8. Other pulmonary embolism without acute cor pulmonale, unspecified chronicity ([address]    9. Type 2 diabetes mellitus without complication, with long-term current use of insulin (HCC)           Acute on chronic systolic congestive heart failure ([address]  Cath in March 2022 did not reveal any obstructive coronary artery disease.     Non Ischemic cardiomyopathy with ejection fraction of 20-25 % will uptitrate lisinopril to 40 mg daily and  Toprol-XL to 100 mg daily  At this stage refusing icd but will think about it , she may benefit Mitral valve intervention as well plan will uptitrate meds monthly will evaluate every month  Could nto tolerate aldactone due to hyperkalemia     Acute bilateral deep vein thrombosis (DVT) of femoral veins (HCC)   Bilateral extensive DVT started on Xarelto continue as per protocol she is seeing hematology for hypercoagulable work-up    Nonrheumatic mitral valve regurgitation  Severe  mitral regurgitation will repeat echo , will need MAT and may benefit from intervention she is refusing any cardiac surgery, mitral clip may be an inferior /palliative  option? She wants to wait for MTA she is scared, will get echo and then plan mat based on echo  Will need anesthesia as she is very anxious and scared     Primary hypertension   Gradually titrate up medication but blood pressures well controlled today continue Lasix 20 mg BId     Smoking  Encouraged to quit smoking    CKD  Seeing Dr Elizabeth Fu       Type 2 diabetes mellitus without complication, with long-term current use of insulin ([facility] [address]   Needs to establish care with PCP currently on insulin     Dyslipidemia :  All available lab work was reviewed. Patient was advised to repeat lab work before next visit. Necessary orders were placed , instructions given by myself       Counseled extensively and medication compliance urged. We discussed that for the  prevention of ASCVD our  goal is aggressive risk modification. Patient is encouraged to exercise if they can , educated about  brisk walk for 30 minutes  at least 3 to 4 times a week if there are no physical limitations  Various goals were discussed and questions answered. Continue current medications. Appropriate prescriptions are addressed and refills ordered. Questions answered and patient verbalizes understanding. Call for any problems, questions, or concerns. Greater than 60 % of time spent counseling besides reviewing data and images     Continue all other medications of all above medical condition listed as is. No follow-ups on file.     Please note this report has been partially produced using speech recognition software and may contain errors related to that system including errors in grammar, punctuation, and spelling, as well as words and phrases that may be inappropriate. If there are any questions or concerns please feel free to contact the dictating provider for clarification.

## 2023-01-13 ENCOUNTER — TELEPHONE (OUTPATIENT)
Dept: CARDIOLOGY CLINIC | Age: 58
End: 2023-01-13

## 2023-01-16 NOTE — TELEPHONE ENCOUNTER
I spoke with Theresa,  and advised that per Dr. Kun Rodriguez notes, when results are received from Echo, RAYA will be scheduled. She acknowledged understanding.

## 2023-01-20 ENCOUNTER — PROCEDURE VISIT (OUTPATIENT)
Dept: CARDIOLOGY CLINIC | Age: 58
End: 2023-01-20

## 2023-01-20 DIAGNOSIS — I50.23 ACUTE ON CHRONIC SYSTOLIC CONGESTIVE HEART FAILURE (HCC): ICD-10-CM

## 2023-01-20 DIAGNOSIS — I26.99 OTHER PULMONARY EMBOLISM WITHOUT ACUTE COR PULMONALE, UNSPECIFIED CHRONICITY (HCC): ICD-10-CM

## 2023-01-20 DIAGNOSIS — R07.9 CHEST PAIN, UNSPECIFIED TYPE: ICD-10-CM

## 2023-01-20 LAB
LV EF: 33 %
LVEF MODALITY: NORMAL

## 2023-01-23 ENCOUNTER — TELEPHONE (OUTPATIENT)
Dept: CARDIOLOGY CLINIC | Age: 58
End: 2023-01-23

## 2023-01-23 NOTE — TELEPHONE ENCOUNTER
Summary   Left ventricular function is severely abnormal , EF is estimated at 30-35%. Slight hypokineses of the anteroseptal wall. Mild left ventricular hypertrophy. Moderately dilated left atrium. Severe eccentric mitral regurgitation(ERO:0.45cmsq, regurgitant volume 62ml)   Moderate pulmonic and tricuspid regurgitation present. Moderate Pulmonary hypertension with RVSP of 58mmHg. Inferior vena cava is dilated, measuring at 2.2 cm, and does not collapse   with respiration. No evidence of pericardial effusion. Compared to previous echo on3/2022, no significant changes noted. Spoke to pt and Dianna Edmondson regarding results. Patient advised and voices understanding.

## 2023-02-03 ENCOUNTER — HOSPITAL ENCOUNTER (OUTPATIENT)
Age: 58
Discharge: HOME | End: 2023-02-03
Payer: COMMERCIAL

## 2023-02-03 ENCOUNTER — HOSPITAL ENCOUNTER (OUTPATIENT)
Dept: ULTRASOUND IMAGING | Age: 58
Discharge: HOME | End: 2023-02-03
Payer: COMMERCIAL

## 2023-02-03 DIAGNOSIS — Z79.4 TYPE 2 DIABETES MELLITUS WITHOUT COMPLICATION, WITH LONG-TERM CURRENT USE OF INSULIN (HCC): ICD-10-CM

## 2023-02-03 DIAGNOSIS — R31.21 ASYMPTOMATIC MICROSCOPIC HEMATURIA: ICD-10-CM

## 2023-02-03 DIAGNOSIS — N18.32 STAGE 3B CHRONIC KIDNEY DISEASE (HCC): ICD-10-CM

## 2023-02-03 DIAGNOSIS — E11.9 TYPE 2 DIABETES MELLITUS WITHOUT COMPLICATION, WITH LONG-TERM CURRENT USE OF INSULIN (HCC): ICD-10-CM

## 2023-02-03 DIAGNOSIS — I26.99 OTHER PULMONARY EMBOLISM WITHOUT ACUTE COR PULMONALE, UNSPECIFIED CHRONICITY (HCC): ICD-10-CM

## 2023-02-03 DIAGNOSIS — I42.8 NICM (NONISCHEMIC CARDIOMYOPATHY) (HCC): ICD-10-CM

## 2023-02-03 DIAGNOSIS — B19.20 HEPATITIS C VIRUS INFECTION WITHOUT HEPATIC COMA, UNSPECIFIED CHRONICITY: ICD-10-CM

## 2023-02-03 DIAGNOSIS — B18.2 CHRONIC HEPATITIS C WITHOUT HEPATIC COMA (HCC): ICD-10-CM

## 2023-02-03 LAB
ALBUMIN SERPL-MCNC: 3 GM/DL (ref 3.4–5)
ANION GAP SERPL CALCULATED.3IONS-SCNC: 10 MMOL/L (ref 4–16)
BACTERIA: NEGATIVE /HPF
BASOPHILS ABSOLUTE: 0.1 K/CU MM
BASOPHILS RELATIVE PERCENT: 0.8 % (ref 0–1)
BILIRUBIN URINE: ABNORMAL MG/DL
BLOOD, URINE: ABNORMAL
BUN SERPL-MCNC: 19 MG/DL (ref 6–23)
CALCIUM SERPL-MCNC: 9.3 MG/DL (ref 8.3–10.6)
CHLORIDE BLD-SCNC: 105 MMOL/L (ref 99–110)
CLARITY: CLEAR
CO2: 23 MMOL/L (ref 21–32)
COLOR: YELLOW
CREAT SERPL-MCNC: 1.6 MG/DL (ref 0.6–1.1)
CREATININE URINE: 191.5 MG/DL (ref 28–217)
DIFFERENTIAL TYPE: ABNORMAL
EOSINOPHILS ABSOLUTE: 0.1 K/CU MM
EOSINOPHILS RELATIVE PERCENT: 0.8 % (ref 0–3)
GFR SERPL CREATININE-BSD FRML MDRD: 37 ML/MIN/1.73M2
GLUCOSE SERPL-MCNC: 183 MG/DL (ref 70–99)
GLUCOSE, URINE: NEGATIVE MG/DL
HCT VFR BLD CALC: 42.5 % (ref 37–47)
HEMOGLOBIN: 13.9 GM/DL (ref 12.5–16)
HYALINE CASTS: 2 /LPF
IMMATURE NEUTROPHIL %: 0.5 % (ref 0–0.43)
INR BLD: 0.85 INDEX
KETONES, URINE: NEGATIVE MG/DL
LEUKOCYTE ESTERASE, URINE: NEGATIVE
LYMPHOCYTES ABSOLUTE: 1.6 K/CU MM
LYMPHOCYTES RELATIVE PERCENT: 24.1 % (ref 24–44)
MCH RBC QN AUTO: 31.3 PG (ref 27–31)
MCHC RBC AUTO-ENTMCNC: 32.7 % (ref 32–36)
MCV RBC AUTO: 95.7 FL (ref 78–100)
MONOCYTES ABSOLUTE: 0.5 K/CU MM
MONOCYTES RELATIVE PERCENT: 7.9 % (ref 0–4)
NITRITE URINE, QUANTITATIVE: NEGATIVE
NUCLEATED RBC %: 0 %
PDW BLD-RTO: 14.3 % (ref 11.7–14.9)
PH, URINE: 6.5 (ref 5–8)
PHOSPHORUS: 3.6 MG/DL (ref 2.5–4.9)
PLATELET # BLD: 174 K/CU MM (ref 140–440)
PMV BLD AUTO: 11.6 FL (ref 7.5–11.1)
POTASSIUM SERPL-SCNC: 4.9 MMOL/L (ref 3.5–5.1)
PROT/CREAT RATIO, UR: 3.1
PROTEIN UA: >300 MG/DL
PROTHROMBIN TIME: 11 SECONDS (ref 11.7–14.5)
RBC # BLD: 4.44 M/CU MM (ref 4.2–5.4)
RBC URINE: 4 /HPF (ref 0–6)
SEGMENTED NEUTROPHILS ABSOLUTE COUNT: 4.3 K/CU MM
SEGMENTED NEUTROPHILS RELATIVE PERCENT: 65.9 % (ref 36–66)
SODIUM BLD-SCNC: 138 MMOL/L (ref 135–145)
SPECIFIC GRAVITY UA: 1.02 (ref 1–1.03)
SQUAMOUS EPITHELIAL: 1 /HPF
TOTAL IMMATURE NEUTOROPHIL: 0.03 K/CU MM
TOTAL NUCLEATED RBC: 0 K/CU MM
TRICHOMONAS: NORMAL /HPF
URINE TOTAL PROTEIN: >600 MG/DL
UROBILINOGEN, URINE: 1 MG/DL (ref 0.2–1)
WBC # BLD: 6.5 K/CU MM (ref 4–10.5)
WBC UA: NORMAL /HPF (ref 0–5)

## 2023-02-03 PROCEDURE — 80048 BASIC METABOLIC PNL TOTAL CA: CPT

## 2023-02-03 PROCEDURE — 84100 ASSAY OF PHOSPHORUS: CPT

## 2023-02-03 PROCEDURE — 85610 PROTHROMBIN TIME: CPT

## 2023-02-03 PROCEDURE — 84156 ASSAY OF PROTEIN URINE: CPT

## 2023-02-03 PROCEDURE — 81001 URINALYSIS AUTO W/SCOPE: CPT

## 2023-02-03 PROCEDURE — 76705 ECHO EXAM OF ABDOMEN: CPT

## 2023-02-03 PROCEDURE — 87086 URINE CULTURE/COLONY COUNT: CPT

## 2023-02-03 PROCEDURE — 85025 COMPLETE CBC W/AUTO DIFF WBC: CPT

## 2023-02-03 PROCEDURE — 36415 COLL VENOUS BLD VENIPUNCTURE: CPT

## 2023-02-03 PROCEDURE — 82570 ASSAY OF URINE CREATININE: CPT

## 2023-02-03 PROCEDURE — 82040 ASSAY OF SERUM ALBUMIN: CPT

## 2023-02-04 LAB
CULTURE: NORMAL
Lab: NORMAL
SPECIMEN: NORMAL

## 2023-02-23 ENCOUNTER — HOSPITAL ENCOUNTER (INPATIENT)
Age: 58
LOS: 4 days | Discharge: HOME | DRG: 291 | End: 2023-02-27
Attending: STUDENT IN AN ORGANIZED HEALTH CARE EDUCATION/TRAINING PROGRAM | Admitting: STUDENT IN AN ORGANIZED HEALTH CARE EDUCATION/TRAINING PROGRAM
Payer: COMMERCIAL

## 2023-02-23 ENCOUNTER — APPOINTMENT (OUTPATIENT)
Dept: GENERAL RADIOLOGY | Age: 58
DRG: 291 | End: 2023-02-23
Attending: STUDENT IN AN ORGANIZED HEALTH CARE EDUCATION/TRAINING PROGRAM
Payer: COMMERCIAL

## 2023-02-23 DIAGNOSIS — R31.21 ASYMPTOMATIC MICROSCOPIC HEMATURIA: ICD-10-CM

## 2023-02-23 DIAGNOSIS — I50.9 CONGESTIVE HEART FAILURE, UNSPECIFIED HF CHRONICITY, UNSPECIFIED HEART FAILURE TYPE (HCC): Primary | ICD-10-CM

## 2023-02-23 DIAGNOSIS — I50.31 ACUTE DIASTOLIC CHF (CONGESTIVE HEART FAILURE) (HCC): ICD-10-CM

## 2023-02-23 LAB
ALBUMIN SERPL-MCNC: 3 GM/DL (ref 3.4–5)
ALP BLD-CCNC: 159 IU/L (ref 40–129)
ALT SERPL-CCNC: 16 U/L (ref 10–40)
ANION GAP SERPL CALCULATED.3IONS-SCNC: 12 MMOL/L (ref 4–16)
AST SERPL-CCNC: 21 IU/L (ref 15–37)
BASOPHILS ABSOLUTE: 0.1 K/CU MM
BASOPHILS RELATIVE PERCENT: 0.8 % (ref 0–1)
BILIRUB SERPL-MCNC: 0.5 MG/DL (ref 0–1)
BUN SERPL-MCNC: 19 MG/DL (ref 6–23)
CALCIUM SERPL-MCNC: 8.7 MG/DL (ref 8.3–10.6)
CHLORIDE BLD-SCNC: 102 MMOL/L (ref 99–110)
CO2: 18 MMOL/L (ref 21–32)
CREAT SERPL-MCNC: 1.5 MG/DL (ref 0.6–1.1)
DIFFERENTIAL TYPE: ABNORMAL
EOSINOPHILS ABSOLUTE: 0.1 K/CU MM
EOSINOPHILS RELATIVE PERCENT: 0.8 % (ref 0–3)
GFR SERPL CREATININE-BSD FRML MDRD: 40 ML/MIN/1.73M2
GLUCOSE BLD-MCNC: 178 MG/DL (ref 70–99)
GLUCOSE SERPL-MCNC: 232 MG/DL (ref 70–99)
HCT VFR BLD CALC: 42 % (ref 37–47)
HEMOGLOBIN: 13.7 GM/DL (ref 12.5–16)
IMMATURE NEUTROPHIL %: 0.3 % (ref 0–0.43)
LYMPHOCYTES ABSOLUTE: 2.5 K/CU MM
LYMPHOCYTES RELATIVE PERCENT: 27.4 % (ref 24–44)
MAGNESIUM: 1.9 MG/DL (ref 1.8–2.4)
MCH RBC QN AUTO: 31.2 PG (ref 27–31)
MCHC RBC AUTO-ENTMCNC: 32.6 % (ref 32–36)
MCV RBC AUTO: 95.7 FL (ref 78–100)
MONOCYTES ABSOLUTE: 0.6 K/CU MM
MONOCYTES RELATIVE PERCENT: 6.3 % (ref 0–4)
NUCLEATED RBC %: 0 %
PDW BLD-RTO: 14.4 % (ref 11.7–14.9)
PHOSPHORUS: 4.4 MG/DL (ref 2.5–4.9)
PLATELET # BLD: 161 K/CU MM (ref 140–440)
PMV BLD AUTO: 11.1 FL (ref 7.5–11.1)
POTASSIUM SERPL-SCNC: 5.2 MMOL/L (ref 3.5–5.1)
RBC # BLD: 4.39 M/CU MM (ref 4.2–5.4)
SEGMENTED NEUTROPHILS ABSOLUTE COUNT: 5.9 K/CU MM
SEGMENTED NEUTROPHILS RELATIVE PERCENT: 64.4 % (ref 36–66)
SODIUM BLD-SCNC: 132 MMOL/L (ref 135–145)
TOTAL IMMATURE NEUTOROPHIL: 0.03 K/CU MM
TOTAL NUCLEATED RBC: 0 K/CU MM
TOTAL PROTEIN: 6.8 GM/DL (ref 6.4–8.2)
TSH SERPL DL<=0.005 MIU/L-ACNC: 3.2 UIU/ML (ref 0.27–4.2)
WBC # BLD: 9.2 K/CU MM (ref 4–10.5)

## 2023-02-23 PROCEDURE — 82962 GLUCOSE BLOOD TEST: CPT

## 2023-02-23 PROCEDURE — 93005 ELECTROCARDIOGRAM TRACING: CPT | Performed by: STUDENT IN AN ORGANIZED HEALTH CARE EDUCATION/TRAINING PROGRAM

## 2023-02-23 PROCEDURE — 83036 HEMOGLOBIN GLYCOSYLATED A1C: CPT

## 2023-02-23 PROCEDURE — 83735 ASSAY OF MAGNESIUM: CPT

## 2023-02-23 PROCEDURE — 36415 COLL VENOUS BLD VENIPUNCTURE: CPT

## 2023-02-23 PROCEDURE — C9113 INJ PANTOPRAZOLE SODIUM, VIA: HCPCS | Performed by: STUDENT IN AN ORGANIZED HEALTH CARE EDUCATION/TRAINING PROGRAM

## 2023-02-23 PROCEDURE — 71045 X-RAY EXAM CHEST 1 VIEW: CPT

## 2023-02-23 PROCEDURE — 2500000003 HC RX 250 WO HCPCS: Performed by: FAMILY MEDICINE

## 2023-02-23 PROCEDURE — 84443 ASSAY THYROID STIM HORMONE: CPT

## 2023-02-23 PROCEDURE — 85025 COMPLETE CBC W/AUTO DIFF WBC: CPT

## 2023-02-23 PROCEDURE — 6370000000 HC RX 637 (ALT 250 FOR IP): Performed by: STUDENT IN AN ORGANIZED HEALTH CARE EDUCATION/TRAINING PROGRAM

## 2023-02-23 PROCEDURE — 2580000003 HC RX 258: Performed by: STUDENT IN AN ORGANIZED HEALTH CARE EDUCATION/TRAINING PROGRAM

## 2023-02-23 PROCEDURE — 80053 COMPREHEN METABOLIC PANEL: CPT

## 2023-02-23 PROCEDURE — 2140000000 HC CCU INTERMEDIATE R&B

## 2023-02-23 PROCEDURE — 6360000002 HC RX W HCPCS: Performed by: STUDENT IN AN ORGANIZED HEALTH CARE EDUCATION/TRAINING PROGRAM

## 2023-02-23 PROCEDURE — 84100 ASSAY OF PHOSPHORUS: CPT

## 2023-02-23 RX ORDER — MAGNESIUM SULFATE IN WATER 40 MG/ML
2000 INJECTION, SOLUTION INTRAVENOUS PRN
Status: DISCONTINUED | OUTPATIENT
Start: 2023-02-23 | End: 2023-02-27

## 2023-02-23 RX ORDER — SODIUM CHLORIDE 0.9 % (FLUSH) 0.9 %
5-40 SYRINGE (ML) INJECTION PRN
Status: DISCONTINUED | OUTPATIENT
Start: 2023-02-23 | End: 2023-02-27 | Stop reason: HOSPADM

## 2023-02-23 RX ORDER — PANTOPRAZOLE SODIUM 40 MG/10ML
40 INJECTION, POWDER, LYOPHILIZED, FOR SOLUTION INTRAVENOUS DAILY
Status: DISCONTINUED | OUTPATIENT
Start: 2023-02-23 | End: 2023-02-27 | Stop reason: HOSPADM

## 2023-02-23 RX ORDER — DEXTROSE MONOHYDRATE 100 MG/ML
INJECTION, SOLUTION INTRAVENOUS CONTINUOUS PRN
Status: DISCONTINUED | OUTPATIENT
Start: 2023-02-23 | End: 2023-02-24 | Stop reason: SDUPTHER

## 2023-02-23 RX ORDER — FUROSEMIDE 10 MG/ML
40 INJECTION INTRAMUSCULAR; INTRAVENOUS 2 TIMES DAILY
Status: DISCONTINUED | OUTPATIENT
Start: 2023-02-24 | End: 2023-02-27

## 2023-02-23 RX ORDER — ACETAMINOPHEN 325 MG/1
650 TABLET ORAL EVERY 6 HOURS PRN
Status: DISCONTINUED | OUTPATIENT
Start: 2023-02-23 | End: 2023-02-27 | Stop reason: HOSPADM

## 2023-02-23 RX ORDER — INSULIN LISPRO 100 [IU]/ML
0-4 INJECTION, SOLUTION INTRAVENOUS; SUBCUTANEOUS NIGHTLY
Status: DISCONTINUED | OUTPATIENT
Start: 2023-02-23 | End: 2023-02-27 | Stop reason: HOSPADM

## 2023-02-23 RX ORDER — SODIUM CHLORIDE 0.9 % (FLUSH) 0.9 %
5-40 SYRINGE (ML) INJECTION EVERY 12 HOURS SCHEDULED
Status: DISCONTINUED | OUTPATIENT
Start: 2023-02-23 | End: 2023-02-27 | Stop reason: HOSPADM

## 2023-02-23 RX ORDER — FUROSEMIDE 10 MG/ML
40 INJECTION INTRAMUSCULAR; INTRAVENOUS ONCE
Status: COMPLETED | OUTPATIENT
Start: 2023-02-23 | End: 2023-02-23

## 2023-02-23 RX ORDER — POTASSIUM CHLORIDE 20 MEQ/1
40 TABLET, EXTENDED RELEASE ORAL PRN
Status: DISCONTINUED | OUTPATIENT
Start: 2023-02-23 | End: 2023-02-27

## 2023-02-23 RX ORDER — IPRATROPIUM BROMIDE AND ALBUTEROL SULFATE 2.5; .5 MG/3ML; MG/3ML
1 SOLUTION RESPIRATORY (INHALATION)
Status: DISCONTINUED | OUTPATIENT
Start: 2023-02-23 | End: 2023-02-26

## 2023-02-23 RX ORDER — ASPIRIN 81 MG/1
81 TABLET, CHEWABLE ORAL DAILY
Status: DISCONTINUED | OUTPATIENT
Start: 2023-02-23 | End: 2023-02-27 | Stop reason: HOSPADM

## 2023-02-23 RX ORDER — MAGNESIUM HYDROXIDE/ALUMINUM HYDROXICE/SIMETHICONE 120; 1200; 1200 MG/30ML; MG/30ML; MG/30ML
30 SUSPENSION ORAL EVERY 6 HOURS PRN
Status: DISCONTINUED | OUTPATIENT
Start: 2023-02-23 | End: 2023-02-27 | Stop reason: HOSPADM

## 2023-02-23 RX ORDER — INSULIN GLARGINE 100 [IU]/ML
10 INJECTION, SOLUTION SUBCUTANEOUS NIGHTLY
Status: DISCONTINUED | OUTPATIENT
Start: 2023-02-23 | End: 2023-02-27 | Stop reason: HOSPADM

## 2023-02-23 RX ORDER — METOPROLOL SUCCINATE 50 MG/1
100 TABLET, EXTENDED RELEASE ORAL DAILY
Status: DISCONTINUED | OUTPATIENT
Start: 2023-02-23 | End: 2023-02-27 | Stop reason: HOSPADM

## 2023-02-23 RX ORDER — ONDANSETRON 4 MG/1
4 TABLET, ORALLY DISINTEGRATING ORAL EVERY 8 HOURS PRN
Status: DISCONTINUED | OUTPATIENT
Start: 2023-02-23 | End: 2023-02-27 | Stop reason: HOSPADM

## 2023-02-23 RX ORDER — ACETAMINOPHEN 650 MG/1
650 SUPPOSITORY RECTAL EVERY 6 HOURS PRN
Status: DISCONTINUED | OUTPATIENT
Start: 2023-02-23 | End: 2023-02-27 | Stop reason: HOSPADM

## 2023-02-23 RX ORDER — SODIUM CHLORIDE 9 MG/ML
INJECTION, SOLUTION INTRAVENOUS PRN
Status: DISCONTINUED | OUTPATIENT
Start: 2023-02-23 | End: 2023-02-27 | Stop reason: HOSPADM

## 2023-02-23 RX ORDER — POTASSIUM CHLORIDE 7.45 MG/ML
10 INJECTION INTRAVENOUS PRN
Status: DISCONTINUED | OUTPATIENT
Start: 2023-02-23 | End: 2023-02-27

## 2023-02-23 RX ORDER — ONDANSETRON 2 MG/ML
4 INJECTION INTRAMUSCULAR; INTRAVENOUS EVERY 6 HOURS PRN
Status: DISCONTINUED | OUTPATIENT
Start: 2023-02-23 | End: 2023-02-27 | Stop reason: HOSPADM

## 2023-02-23 RX ORDER — INSULIN LISPRO 100 [IU]/ML
0-4 INJECTION, SOLUTION INTRAVENOUS; SUBCUTANEOUS
Status: DISCONTINUED | OUTPATIENT
Start: 2023-02-24 | End: 2023-02-27 | Stop reason: HOSPADM

## 2023-02-23 RX ORDER — GUAIFENESIN 200 MG/10ML
200 LIQUID ORAL EVERY 4 HOURS PRN
Status: DISCONTINUED | OUTPATIENT
Start: 2023-02-23 | End: 2023-02-27 | Stop reason: HOSPADM

## 2023-02-23 RX ORDER — ENOXAPARIN SODIUM 100 MG/ML
40 INJECTION SUBCUTANEOUS DAILY
Status: DISCONTINUED | OUTPATIENT
Start: 2023-02-23 | End: 2023-02-23

## 2023-02-23 RX ORDER — POLYETHYLENE GLYCOL 3350 17 G/17G
17 POWDER, FOR SOLUTION ORAL DAILY PRN
Status: DISCONTINUED | OUTPATIENT
Start: 2023-02-23 | End: 2023-02-27 | Stop reason: HOSPADM

## 2023-02-23 RX ADMIN — GUAIFENESIN 200 MG: 100 SOLUTION ORAL at 22:35

## 2023-02-23 RX ADMIN — PANTOPRAZOLE SODIUM 40 MG: 40 INJECTION, POWDER, FOR SOLUTION INTRAVENOUS at 21:39

## 2023-02-23 RX ADMIN — ASPIRIN 81 MG 81 MG: 81 TABLET ORAL at 21:38

## 2023-02-23 RX ADMIN — SODIUM CHLORIDE, PRESERVATIVE FREE 10 ML: 5 INJECTION INTRAVENOUS at 21:39

## 2023-02-23 RX ADMIN — RIVAROXABAN 20 MG: 20 TABLET, FILM COATED ORAL at 21:38

## 2023-02-23 RX ADMIN — INSULIN GLARGINE 10 UNITS: 100 INJECTION, SOLUTION SUBCUTANEOUS at 21:39

## 2023-02-23 RX ADMIN — METOPROLOL SUCCINATE 100 MG: 50 TABLET, EXTENDED RELEASE ORAL at 21:38

## 2023-02-23 RX ADMIN — FUROSEMIDE 40 MG: 10 INJECTION, SOLUTION INTRAMUSCULAR; INTRAVENOUS at 21:38

## 2023-02-23 ASSESSMENT — PAIN SCALES - GENERAL: PAINLEVEL_OUTOF10: 8

## 2023-02-24 ENCOUNTER — APPOINTMENT (OUTPATIENT)
Dept: CT IMAGING | Age: 58
DRG: 291 | End: 2023-02-24
Attending: STUDENT IN AN ORGANIZED HEALTH CARE EDUCATION/TRAINING PROGRAM
Payer: COMMERCIAL

## 2023-02-24 LAB
AMMONIA: 63 UMOL/L (ref 11–51)
ANION GAP SERPL CALCULATED.3IONS-SCNC: 10 MMOL/L (ref 4–16)
ANION GAP SERPL CALCULATED.3IONS-SCNC: 12 MMOL/L (ref 4–16)
ANION GAP SERPL CALCULATED.3IONS-SCNC: 13 MMOL/L (ref 4–16)
BACTERIA: NEGATIVE /HPF
BASOPHILS ABSOLUTE: 0 K/CU MM
BASOPHILS RELATIVE PERCENT: 0.4 % (ref 0–1)
BILIRUBIN URINE: NEGATIVE MG/DL
BLOOD, URINE: ABNORMAL
BUN SERPL-MCNC: 20 MG/DL (ref 6–23)
BUN SERPL-MCNC: 27 MG/DL (ref 6–23)
BUN SERPL-MCNC: 28 MG/DL (ref 6–23)
CALCIUM SERPL-MCNC: 8.6 MG/DL (ref 8.3–10.6)
CALCIUM SERPL-MCNC: 8.7 MG/DL (ref 8.3–10.6)
CALCIUM SERPL-MCNC: 9.3 MG/DL (ref 8.3–10.6)
CHLORIDE BLD-SCNC: 102 MMOL/L (ref 99–110)
CHLORIDE BLD-SCNC: 102 MMOL/L (ref 99–110)
CHLORIDE BLD-SCNC: 97 MMOL/L (ref 99–110)
CHLORIDE URINE RANDOM: 96 MMOL/L (ref 43–210)
CHOLEST SERPL-MCNC: 235 MG/DL
CLARITY: CLEAR
CO2: 19 MMOL/L (ref 21–32)
CO2: 23 MMOL/L (ref 21–32)
CO2: 24 MMOL/L (ref 21–32)
COLOR: YELLOW
CREAT SERPL-MCNC: 1.9 MG/DL (ref 0.6–1.1)
CREAT SERPL-MCNC: 2.1 MG/DL (ref 0.6–1.1)
CREAT SERPL-MCNC: 2.3 MG/DL (ref 0.6–1.1)
CREATININE URINE: 43 MG/DL (ref 28–217)
DIFFERENTIAL TYPE: ABNORMAL
EKG ATRIAL RATE: 100 BPM
EKG ATRIAL RATE: 101 BPM
EKG DIAGNOSIS: NORMAL
EKG DIAGNOSIS: NORMAL
EKG P AXIS: 62 DEGREES
EKG P AXIS: 74 DEGREES
EKG P-R INTERVAL: 128 MS
EKG P-R INTERVAL: 134 MS
EKG Q-T INTERVAL: 374 MS
EKG Q-T INTERVAL: 386 MS
EKG QRS DURATION: 84 MS
EKG QRS DURATION: 86 MS
EKG QTC CALCULATION (BAZETT): 482 MS
EKG QTC CALCULATION (BAZETT): 500 MS
EKG R AXIS: -9 DEGREES
EKG R AXIS: 1 DEGREES
EKG T AXIS: 116 DEGREES
EKG T AXIS: 65 DEGREES
EKG VENTRICULAR RATE: 100 BPM
EKG VENTRICULAR RATE: 101 BPM
EOSINOPHILS ABSOLUTE: 0 K/CU MM
EOSINOPHILS RELATIVE PERCENT: 0.3 % (ref 0–3)
ESTIMATED AVERAGE GLUCOSE: 143 MG/DL
GAMMA GLUTAMYL TRANSFERASE: 48 IU/L (ref 5–36)
GFR SERPL CREATININE-BSD FRML MDRD: 24 ML/MIN/1.73M2
GFR SERPL CREATININE-BSD FRML MDRD: 27 ML/MIN/1.73M2
GFR SERPL CREATININE-BSD FRML MDRD: 30 ML/MIN/1.73M2
GLUCOSE BLD-MCNC: 132 MG/DL (ref 70–99)
GLUCOSE BLD-MCNC: 194 MG/DL (ref 70–99)
GLUCOSE BLD-MCNC: 213 MG/DL (ref 70–99)
GLUCOSE BLD-MCNC: 319 MG/DL (ref 70–99)
GLUCOSE SERPL-MCNC: 150 MG/DL (ref 70–99)
GLUCOSE SERPL-MCNC: 241 MG/DL (ref 70–99)
GLUCOSE SERPL-MCNC: 265 MG/DL (ref 70–99)
GLUCOSE, URINE: 100 MG/DL
HBA1C MFR BLD: 6.6 % (ref 4.2–6.3)
HCT VFR BLD CALC: 46.1 % (ref 37–47)
HDLC SERPL-MCNC: 90 MG/DL
HEMOGLOBIN: 15.5 GM/DL (ref 12.5–16)
HYALINE CASTS: 10 /LPF
IMMATURE NEUTROPHIL %: 0.6 % (ref 0–0.43)
KETONES, URINE: NEGATIVE MG/DL
LDLC SERPL CALC-MCNC: 116 MG/DL
LEUKOCYTE ESTERASE, URINE: NEGATIVE
LIPASE: 32 IU/L (ref 13–60)
LYMPHOCYTES ABSOLUTE: 1.9 K/CU MM
LYMPHOCYTES RELATIVE PERCENT: 20.8 % (ref 24–44)
MAGNESIUM: 2 MG/DL (ref 1.8–2.4)
MCH RBC QN AUTO: 31.3 PG (ref 27–31)
MCHC RBC AUTO-ENTMCNC: 33.6 % (ref 32–36)
MCV RBC AUTO: 92.9 FL (ref 78–100)
MONOCYTES ABSOLUTE: 0.5 K/CU MM
MONOCYTES RELATIVE PERCENT: 5.1 % (ref 0–4)
MUCUS: ABNORMAL HPF
NITRITE URINE, QUANTITATIVE: NEGATIVE
NUCLEATED RBC %: 0 %
PDW BLD-RTO: 14.2 % (ref 11.7–14.9)
PH, URINE: 6 (ref 5–8)
PLATELET # BLD: 176 K/CU MM (ref 140–440)
PMV BLD AUTO: 10.6 FL (ref 7.5–11.1)
POTASSIUM SERPL-SCNC: 4.2 MMOL/L (ref 3.5–5.1)
POTASSIUM SERPL-SCNC: 4.3 MMOL/L (ref 3.5–5.1)
POTASSIUM SERPL-SCNC: 5.6 MMOL/L (ref 3.5–5.1)
POTASSIUM SERPL-SCNC: 6 MMOL/L (ref 3.5–5.1)
POTASSIUM, UR: 35 MMOL/L (ref 22–119)
PROT/CREAT RATIO, UR: 6.7
PROTEIN UA: >300 MG/DL
RBC # BLD: 4.96 M/CU MM (ref 4.2–5.4)
RBC URINE: 2 /HPF (ref 0–6)
REASON FOR REJECTION: NORMAL
REASON FOR REJECTION: NORMAL
REJECTED TEST: NORMAL
REJECTED TEST: NORMAL
SEGMENTED NEUTROPHILS ABSOLUTE COUNT: 6.6 K/CU MM
SEGMENTED NEUTROPHILS RELATIVE PERCENT: 72.8 % (ref 36–66)
SODIUM BLD-SCNC: 133 MMOL/L (ref 135–145)
SODIUM BLD-SCNC: 134 MMOL/L (ref 135–145)
SODIUM BLD-SCNC: 135 MMOL/L (ref 135–145)
SODIUM URINE: 95 MMOL/L (ref 35–167)
SPECIFIC GRAVITY UA: 1.02 (ref 1–1.03)
SQUAMOUS EPITHELIAL: 2 /HPF
TOTAL IMMATURE NEUTOROPHIL: 0.05 K/CU MM
TOTAL NUCLEATED RBC: 0 K/CU MM
TRICHOMONAS: ABNORMAL /HPF
TRIGL SERPL-MCNC: 145 MG/DL
TSH SERPL DL<=0.005 MIU/L-ACNC: 3.88 UIU/ML (ref 0.27–4.2)
URINE TOTAL PROTEIN: 287.6 MG/DL
UROBILINOGEN, URINE: 1 MG/DL (ref 0.2–1)
WBC # BLD: 9 K/CU MM (ref 4–10.5)
WBC UA: ABNORMAL /HPF (ref 0–5)

## 2023-02-24 PROCEDURE — 6370000000 HC RX 637 (ALT 250 FOR IP): Performed by: STUDENT IN AN ORGANIZED HEALTH CARE EDUCATION/TRAINING PROGRAM

## 2023-02-24 PROCEDURE — 80061 LIPID PANEL: CPT

## 2023-02-24 PROCEDURE — 82962 GLUCOSE BLOOD TEST: CPT

## 2023-02-24 PROCEDURE — 87522 HEPATITIS C REVRS TRNSCRPJ: CPT

## 2023-02-24 PROCEDURE — 94761 N-INVAS EAR/PLS OXIMETRY MLT: CPT

## 2023-02-24 PROCEDURE — 2140000000 HC CCU INTERMEDIATE R&B

## 2023-02-24 PROCEDURE — 6370000000 HC RX 637 (ALT 250 FOR IP): Performed by: INTERNAL MEDICINE

## 2023-02-24 PROCEDURE — 84132 ASSAY OF SERUM POTASSIUM: CPT

## 2023-02-24 PROCEDURE — 2500000003 HC RX 250 WO HCPCS: Performed by: FAMILY MEDICINE

## 2023-02-24 PROCEDURE — 93005 ELECTROCARDIOGRAM TRACING: CPT | Performed by: INTERNAL MEDICINE

## 2023-02-24 PROCEDURE — C9113 INJ PANTOPRAZOLE SODIUM, VIA: HCPCS | Performed by: STUDENT IN AN ORGANIZED HEALTH CARE EDUCATION/TRAINING PROGRAM

## 2023-02-24 PROCEDURE — 83735 ASSAY OF MAGNESIUM: CPT

## 2023-02-24 PROCEDURE — 2500000003 HC RX 250 WO HCPCS: Performed by: INTERNAL MEDICINE

## 2023-02-24 PROCEDURE — 6370000000 HC RX 637 (ALT 250 FOR IP): Performed by: FAMILY MEDICINE

## 2023-02-24 PROCEDURE — 81001 URINALYSIS AUTO W/SCOPE: CPT

## 2023-02-24 PROCEDURE — 6360000002 HC RX W HCPCS: Performed by: STUDENT IN AN ORGANIZED HEALTH CARE EDUCATION/TRAINING PROGRAM

## 2023-02-24 PROCEDURE — 83690 ASSAY OF LIPASE: CPT

## 2023-02-24 PROCEDURE — 2700000000 HC OXYGEN THERAPY PER DAY

## 2023-02-24 PROCEDURE — 82977 ASSAY OF GGT: CPT

## 2023-02-24 PROCEDURE — 36415 COLL VENOUS BLD VENIPUNCTURE: CPT

## 2023-02-24 PROCEDURE — 84156 ASSAY OF PROTEIN URINE: CPT

## 2023-02-24 PROCEDURE — 2580000003 HC RX 258: Performed by: INTERNAL MEDICINE

## 2023-02-24 PROCEDURE — 2580000003 HC RX 258: Performed by: STUDENT IN AN ORGANIZED HEALTH CARE EDUCATION/TRAINING PROGRAM

## 2023-02-24 PROCEDURE — 84443 ASSAY THYROID STIM HORMONE: CPT

## 2023-02-24 PROCEDURE — 82436 ASSAY OF URINE CHLORIDE: CPT

## 2023-02-24 PROCEDURE — 99223 1ST HOSP IP/OBS HIGH 75: CPT | Performed by: INTERNAL MEDICINE

## 2023-02-24 PROCEDURE — 6360000002 HC RX W HCPCS

## 2023-02-24 PROCEDURE — 84300 ASSAY OF URINE SODIUM: CPT

## 2023-02-24 PROCEDURE — 82140 ASSAY OF AMMONIA: CPT

## 2023-02-24 PROCEDURE — 94640 AIRWAY INHALATION TREATMENT: CPT

## 2023-02-24 PROCEDURE — 93010 ELECTROCARDIOGRAM REPORT: CPT | Performed by: INTERNAL MEDICINE

## 2023-02-24 PROCEDURE — 82570 ASSAY OF URINE CREATININE: CPT

## 2023-02-24 PROCEDURE — 85025 COMPLETE CBC W/AUTO DIFF WBC: CPT

## 2023-02-24 PROCEDURE — 80048 BASIC METABOLIC PNL TOTAL CA: CPT

## 2023-02-24 PROCEDURE — 84133 ASSAY OF URINE POTASSIUM: CPT

## 2023-02-24 RX ORDER — OXYCODONE HYDROCHLORIDE AND ACETAMINOPHEN 5; 325 MG/1; MG/1
1 TABLET ORAL EVERY 8 HOURS PRN
Status: DISCONTINUED | OUTPATIENT
Start: 2023-02-24 | End: 2023-02-27 | Stop reason: HOSPADM

## 2023-02-24 RX ORDER — DOPAMINE HYDROCHLORIDE 160 MG/100ML
1-20 INJECTION, SOLUTION INTRAVENOUS CONTINUOUS
Status: DISCONTINUED | OUTPATIENT
Start: 2023-02-24 | End: 2023-02-25

## 2023-02-24 RX ORDER — DEXTROSE MONOHYDRATE 100 MG/ML
INJECTION, SOLUTION INTRAVENOUS CONTINUOUS PRN
Status: DISCONTINUED | OUTPATIENT
Start: 2023-02-24 | End: 2023-02-27 | Stop reason: HOSPADM

## 2023-02-24 RX ORDER — OXYCODONE HYDROCHLORIDE AND ACETAMINOPHEN 5; 325 MG/1; MG/1
1 TABLET ORAL EVERY 4 HOURS PRN
Status: DISCONTINUED | OUTPATIENT
Start: 2023-02-24 | End: 2023-02-24

## 2023-02-24 RX ORDER — LANOLIN ALCOHOL/MO/W.PET/CERES
6 CREAM (GRAM) TOPICAL ONCE
Status: COMPLETED | OUTPATIENT
Start: 2023-02-24 | End: 2023-02-24

## 2023-02-24 RX ADMIN — IPRATROPIUM BROMIDE AND ALBUTEROL SULFATE 1 AMPULE: 2.5; .5 SOLUTION RESPIRATORY (INHALATION) at 14:46

## 2023-02-24 RX ADMIN — FUROSEMIDE 40 MG: 10 INJECTION, SOLUTION INTRAMUSCULAR; INTRAVENOUS at 08:30

## 2023-02-24 RX ADMIN — SODIUM CHLORIDE, PRESERVATIVE FREE 10 ML: 5 INJECTION INTRAVENOUS at 08:30

## 2023-02-24 RX ADMIN — METOPROLOL SUCCINATE 100 MG: 50 TABLET, EXTENDED RELEASE ORAL at 08:30

## 2023-02-24 RX ADMIN — ALUMINUM HYDROXIDE, MAGNESIUM HYDROXIDE, AND SIMETHICONE 30 ML: 200; 200; 20 SUSPENSION ORAL at 03:08

## 2023-02-24 RX ADMIN — SODIUM ZIRCONIUM CYCLOSILICATE 10 G: 10 POWDER, FOR SUSPENSION ORAL at 20:01

## 2023-02-24 RX ADMIN — PANTOPRAZOLE SODIUM 40 MG: 40 INJECTION, POWDER, FOR SOLUTION INTRAVENOUS at 08:29

## 2023-02-24 RX ADMIN — INSULIN LISPRO 1 UNITS: 100 INJECTION, SOLUTION INTRAVENOUS; SUBCUTANEOUS at 12:26

## 2023-02-24 RX ADMIN — INSULIN HUMAN 10 UNITS: 100 INJECTION, SOLUTION PARENTERAL at 13:12

## 2023-02-24 RX ADMIN — GUAIFENESIN 200 MG: 100 SOLUTION ORAL at 08:34

## 2023-02-24 RX ADMIN — DEXTROSE MONOHYDRATE 250 ML: 100 INJECTION, SOLUTION INTRAVENOUS at 12:40

## 2023-02-24 RX ADMIN — SODIUM ZIRCONIUM CYCLOSILICATE 10 G: 10 POWDER, FOR SUSPENSION ORAL at 05:08

## 2023-02-24 RX ADMIN — DEXTROSE MONOHYDRATE 250 ML: 100 INJECTION, SOLUTION INTRAVENOUS at 18:54

## 2023-02-24 RX ADMIN — Medication 6 MG: at 03:46

## 2023-02-24 RX ADMIN — INSULIN LISPRO 3 UNITS: 100 INJECTION, SOLUTION INTRAVENOUS; SUBCUTANEOUS at 08:29

## 2023-02-24 RX ADMIN — RIVAROXABAN 20 MG: 20 TABLET, FILM COATED ORAL at 16:50

## 2023-02-24 RX ADMIN — OXYCODONE AND ACETAMINOPHEN 1 TABLET: 5; 325 TABLET ORAL at 23:56

## 2023-02-24 RX ADMIN — ASPIRIN 81 MG 81 MG: 81 TABLET ORAL at 08:30

## 2023-02-24 RX ADMIN — FUROSEMIDE 40 MG: 10 INJECTION, SOLUTION INTRAMUSCULAR; INTRAVENOUS at 16:49

## 2023-02-24 RX ADMIN — IPRATROPIUM BROMIDE AND ALBUTEROL SULFATE 1 AMPULE: 2.5; .5 SOLUTION RESPIRATORY (INHALATION) at 20:31

## 2023-02-24 RX ADMIN — IPRATROPIUM BROMIDE AND ALBUTEROL SULFATE 1 AMPULE: 2.5; .5 SOLUTION RESPIRATORY (INHALATION) at 11:00

## 2023-02-24 RX ADMIN — SODIUM ZIRCONIUM CYCLOSILICATE 10 G: 10 POWDER, FOR SUSPENSION ORAL at 14:55

## 2023-02-24 RX ADMIN — OXYCODONE AND ACETAMINOPHEN 1 TABLET: 5; 325 TABLET ORAL at 14:51

## 2023-02-24 RX ADMIN — ACETAMINOPHEN 650 MG: 325 TABLET ORAL at 08:30

## 2023-02-24 RX ADMIN — INSULIN GLARGINE 10 UNITS: 100 INJECTION, SOLUTION SUBCUTANEOUS at 20:01

## 2023-02-24 RX ADMIN — SODIUM BICARBONATE 50 MEQ: 84 INJECTION, SOLUTION INTRAVENOUS at 18:36

## 2023-02-24 RX ADMIN — DOPAMINE HYDROCHLORIDE 5 MCG/KG/MIN: 160 INJECTION, SOLUTION INTRAVENOUS at 16:47

## 2023-02-24 RX ADMIN — INSULIN HUMAN 10 UNITS: 100 INJECTION, SOLUTION PARENTERAL at 18:35

## 2023-02-24 RX ADMIN — IPRATROPIUM BROMIDE AND ALBUTEROL SULFATE 1 AMPULE: 2.5; .5 SOLUTION RESPIRATORY (INHALATION) at 07:30

## 2023-02-24 ASSESSMENT — PAIN SCALES - GENERAL
PAINLEVEL_OUTOF10: 3
PAINLEVEL_OUTOF10: 8
PAINLEVEL_OUTOF10: 6
PAINLEVEL_OUTOF10: 5
PAINLEVEL_OUTOF10: 3
PAINLEVEL_OUTOF10: 6
PAINLEVEL_OUTOF10: 8

## 2023-02-24 ASSESSMENT — PAIN DESCRIPTION - LOCATION: LOCATION: ABDOMEN

## 2023-02-24 ASSESSMENT — PAIN DESCRIPTION - FREQUENCY: FREQUENCY: INTERMITTENT

## 2023-02-24 ASSESSMENT — PAIN DESCRIPTION - DIRECTION: RADIATING_TOWARDS: DENIES

## 2023-02-24 ASSESSMENT — PAIN DESCRIPTION - ORIENTATION: ORIENTATION: MID;ANTERIOR

## 2023-02-24 ASSESSMENT — PAIN DESCRIPTION - DESCRIPTORS: DESCRIPTORS: ACHING;SHARP

## 2023-02-24 ASSESSMENT — PAIN DESCRIPTION - ONSET: ONSET: ON-GOING

## 2023-02-24 ASSESSMENT — PAIN - FUNCTIONAL ASSESSMENT: PAIN_FUNCTIONAL_ASSESSMENT: ACTIVITIES ARE NOT PREVENTED

## 2023-02-24 ASSESSMENT — PAIN DESCRIPTION - PAIN TYPE: TYPE: ACUTE PAIN

## 2023-02-24 NOTE — CONSULTS
INPATIENT CARDIOLOGY CONSULT NOTE         Reason for consultation:  CHF    Referring physician:  Grace Ferrell MD     Primary care physician: Andrea Penny      Dear Grace Ferrell MD Thank you for the consult           History of present illness:Deb is a 61 y. o.year old who  presents with shortness of breath    Patient has by medical history significant for nonischemic cardiomyopathy, COPD, hypertension, DVT, pulm embolism    Patient mentioned that she has been short of breath with exertion at rest with coughing for the past 1 week. Patient also has noticed lower extremity swelling. Cardiology consulted to evaluate patient for CHF exacerbation. Later today on telemetry patient was noted to have several episodes of sinus bradycardia in which patient was more short of breath. Past medical history:    has a past medical history of CAD (coronary artery disease), Diabetes mellitus ([facility] [address] Hepatitis C, and Schizophrenia ([facility] [address] Past surgical history:   has a past surgical history that includes Hysterectomy and Cholecystectomy. Social History:   reports that she has been smoking cigarettes. She has a 7.50 pack-year smoking history. She has never used smokeless tobacco. She reports current alcohol use. She reports that she does not currently use drugs after having used the following drugs: Marijuana Pearly Vaishnavi).   Family history:   no family history of CAD, STROKE of DM    Allergies   Allergen Reactions    Haldol [Haloperidol Lactate] Other (See Comments)     Unknown, severe reaction per mother    Penicillins        insulin regular (HUMULIN R;NOVOLIN R) injection 10 Units, Once  glucose chewable tablet 16 g, PRN  dextrose bolus 10% 125 mL, PRN   Or  dextrose bolus 10% 250 mL, PRN  glucagon (rDNA) injection 1 mg, PRN  dextrose 10 % infusion, Continuous PRN  sodium chloride flush 0.9 % injection 5-40 mL, 2 times per day  sodium chloride flush 0.9 % injection 5-40 mL, PRN  0.9 % sodium chloride infusion, PRN  ondansetron (ZOFRAN-ODT) disintegrating tablet 4 mg, Q8H PRN   Or  ondansetron (ZOFRAN) injection 4 mg, Q6H PRN  polyethylene glycol (GLYCOLAX) packet 17 g, Daily PRN  acetaminophen (TYLENOL) tablet 650 mg, Q6H PRN   Or  acetaminophen (TYLENOL) suppository 650 mg, Q6H PRN  potassium chloride (KLOR-CON M) extended release tablet 40 mEq, PRN   Or  potassium bicarb-citric acid (EFFER-K) effervescent tablet 40 mEq, PRN   Or  potassium chloride 10 mEq/100 mL IVPB (Peripheral Line), PRN  magnesium sulfate 2000 mg in 50 mL IVPB premix, PRN  ipratropium-albuterol (DUONEB) nebulizer solution 1 ampule, Q4H WA  aspirin chewable tablet 81 mg, Daily  metoprolol succinate (TOPROL XL) extended release tablet 100 mg, Daily  rivaroxaban (XARELTO) tablet 20 mg, Dinner  furosemide (LASIX) injection 40 mg, BID  insulin glargine (LANTUS) injection vial 10 Units, Nightly  insulin lispro (HUMALOG) injection vial 0-4 Units, TID WC  insulin lispro (HUMALOG) injection vial 0-4 Units, Nightly  pantoprazole (PROTONIX) injection 40 mg, Daily  aluminum & magnesium hydroxide-simethicone (MAALOX) 200-200-20 MG/5ML suspension 30 mL, Q6H PRN  guaiFENesin (ROBITUSSIN) 100 MG/5ML liquid 200 mg, Q4H PRN      Current Facility-Administered Medications   Medication Dose Route Frequency Provider Last Rate Last Admin    insulin regular (HUMULIN R;NOVOLIN R) injection 10 Units  10 Units IntraVENous Once Kranthi Choudhury MD        glucose chewable tablet 16 g  4 tablet Oral PRN Kranthi Choudhury MD        dextrose bolus 10% 125 mL  125 mL IntraVENous PRN Kranthi Choudhury MD        Or    dextrose bolus 10% 250 mL  250 mL IntraVENous PRN Kranthi Choudhury MD        glucagon (rDNA) injection 1 mg  1 mg SubCUTAneous PRN Kranthi Choudhury MD        dextrose 10 % infusion   IntraVENous Continuous PRN Kranthi Choudhury MD        sodium chloride flush 0.9 % injection 5-40 mL  5-40 mL IntraVENous 2 times per day Lizette Timmons Yvette Sanford MD   10 mL at 02/24/23 0830    sodium chloride flush 0.9 % injection 5-40 mL  5-40 mL IntraVENous PRN Mynor Samano MD        0.9 % sodium chloride infusion   IntraVENous PRN Mynor Samano MD        ondansetron (ZOFRAN-ODT) disintegrating tablet 4 mg  4 mg Oral Q8H PRN Mynor Samano MD        Or    ondansetron (ZOFRAN) injection 4 mg  4 mg IntraVENous Q6H PRN Mynor Samano MD        polyethylene glycol (GLYCOLAX) packet 17 g  17 g Oral Daily PRN Mynor Samano MD        acetaminophen (TYLENOL) tablet 650 mg  650 mg Oral Q6H PRN Mynor Samano MD   650 mg at 02/24/23 0830    Or    acetaminophen (TYLENOL) suppository 650 mg  650 mg Rectal Q6H PRN Mynor Samano MD        potassium chloride (KLOR-CON M) extended release tablet 40 mEq  40 mEq Oral PRN Mynor Samano MD        Or    potassium bicarb-citric acid (EFFER-K) effervescent tablet 40 mEq  40 mEq Oral PRN Mynor Samano MD        Or    potassium chloride 10 mEq/100 mL IVPB (Peripheral Line)  10 mEq IntraVENous PRN Mynor Samano MD        magnesium sulfate 2000 mg in 50 mL IVPB premix  2,000 mg IntraVENous PRN Mynor Samano MD        ipratropium-albuterol (DUONEB) nebulizer solution 1 ampule  1 ampule Inhalation Q4H WA Anna Baca MD   1 ampule at 02/24/23 1100    aspirin chewable tablet 81 mg  81 mg Oral Daily Triyug BRANDEE Baca MD   81 mg at 02/24/23 0830    metoprolol succinate (TOPROL XL) extended release tablet 100 mg  100 mg Oral Daily Mynor Samano MD   100 mg at 02/24/23 0830    rivaroxaban (XARELTO) tablet 20 mg  20 mg Oral Dinner Mynor Samano MD   20 mg at 02/23/23 2138    furosemide (LASIX) injection 40 mg  40 mg IntraVENous BID Mynor Samano MD   40 mg at 02/24/23 0830    insulin glargine (LANTUS) injection vial 10 Units  10 Units SubCUTAneous Nightly Triyug BRANDEE Baca MD   10 Units at 02/23/23 2139    insulin lispro (HUMALOG) injection vial 0-4 Units  0-4 Units SubCUTAneous TID WC Grace Ferrell MD   1 Units at 02/24/23 1226    insulin lispro (HUMALOG) injection vial 0-4 Units  0-4 Units SubCUTAneous Nightly Ashleyyug BRANDEE Salazar MD        pantoprazole (PROTONIX) injection 40 mg  40 mg IntraVENous Daily Grace Ferrell MD   40 mg at 02/24/23 0829    aluminum & magnesium hydroxide-simethicone (MAALOX) 200-200-20 MG/5ML suspension 30 mL  30 mL Oral Q6H PRN Grace Ferrell MD   30 mL at 02/24/23 0308    guaiFENesin (ROBITUSSIN) 100 MG/5ML liquid 200 mg  200 mg Oral Q4H PRN YNES Ratliff - CNP   200 mg at 02/24/23 2085         Review of Systems:     Constitutional: No Fever or Weight Loss   Eyes: No Decreased Vision  ENT: No Headaches, Hearing Loss or Vertigo  Cardiovascular:   no chest pain,  no dyspnea on exertion,  no palpitations or loss of consciousness  Respiratory: No cough or wheezing    Gastrointestinal: No abdominal pain, appetite loss, blood in stools, constipation, diarrhea or heartburn  Genitourinary: No dysuria, trouble voiding, or hematuria  Musculoskeletal:  No gait disturbance, weakness or joint complaints  Integumentary: No rash or pruritis  Neurological: No TIA or stroke symptoms  Psychiatric: No anxiety or depression  Endocrine: No malaise, fatigue or temperature intolerance  Hematologic/Lymphatic: No bleeding problems, blood clots or swollen lymph nodes  Allergic/Immunologic: No nasal congestion or hives    All other systems were reviewed and were negative otherwise. Physical Examination:      Vitals:    02/24/23 1245   BP: 113/86   Pulse: 86   Resp: 26   Temp: 96.9 °F (36.1 °C)   SpO2: 98%      Wt Readings from Last 3 Encounters:   02/24/23 143 lb 3.2 oz (65 kg)   01/10/23 137 lb (62.1 kg)   12/06/22 133 lb (60.3 kg)     Body mass index is 26.19 kg/m².     General Appearance:  No distress, conversant  Constitutional:  Well developed, Well nourished  HEENT:  Normocephalic, Atraumatic, Oropharynx moist   Nose normal. Neck Supple Carotid: no carotid bruit  Eyes:  Conjunctiva normal, No discharge. Respiratory:    Normal breath sounds, No respiratory distress, No wheezing, no use of accessory muscles, diaphragm movement is normal  No chest Tenderness  Cardiovascular: S1-S2 Nomurmurs auscultated. No rubs, thrills or gallops. Normal  rhythm. Pedal pulses are normal. + pedal edema  GI:  Soft Non tender, non distended. Musculoskeletal:   No tenderness, No cyanosis, No clubbing. Integument:  Warm, Dry, No erythema, No rash. Lymphatic:  No lymphadenopathy noted. Neurologic:   Alert & oriented x 3  No focal deficits noted. Psychiatric:  Affect normal, Judgment normal, Mood normal.       Lab Review     Recent Labs     02/24/23  0352   WBC 9.0   HGB 15.5   HCT 46.1         Recent Labs     02/23/23 2032 02/24/23  0352   * 135   K 5.2* 6.0*    102   CO2 18* 23   PHOS 4.4  --    BUN 19 20   CREATININE 1.5* 1.9*     Recent Labs     02/23/23 2032   AST 21   ALT 16   BILITOT 0.5   ALKPHOS 159*     No results for input(s): TROPONINI in the last 72 hours. No results found for: BNP  Lab Results   Component Value Date    INR 0.85 02/03/2023    PROTIME 11.0 (L) 02/03/2023         All labs, images, EKGs were personally reviewed      Assessment: 61 y. o.year old with PMH of  has a past medical history of CAD (coronary artery disease), Diabetes mellitus ([facility] [address] Hepatitis C, and Schizophrenia ([facility] [address] Medical Decision Making :       Acute on Ch.systolic Heart Failure: Last Echo shows preserved LVEF. Cont IV  Lasix, Strick I/O Monitoring, Daily weights and Low salt diet.    History of severe mitral regurgitation  Nonischemic cardiomyopathy      Cont With Lasix 40 twice daily  Hold BB due to bradycardia today   Hold lisinopril due to renal failure and HYPERKALEMIA K of over 6   Continue with Jardiance 10 daily  HOLD Aldactone 25 daily due to HYPERKALEMIA       Sinus bradycardia: Hold off on beta-blocker, continue to observe on telemetry. Beta-blocker will be reinstituted at a lower dose    Elevated Troponins: Non ACS trend. Type II MI with demand ischemia. Secondary to CHF exacerbation  CKD,  Anemia. Medical management. No Ischemic workup planned      History of DVT and PE: On Xarelto. Resume.     Thank you for the consult    Dr. Christo Martinez  2/24/2023 1:06 PM

## 2023-02-24 NOTE — H&P
V2.0  History and Physical      Name:  Naldo Beltran /Age/Sex: 1965  (61 y.o. female)   MRN & CSN:  [id number] & [id number] Encounter Date/Time: 2023 8:00 PM EST   Location:  [address] [place] [address]-A PCP: Diana David Day: 1    Assessment and Plan:   Naldo Beltran is a 61 y.o. female with pmh of NICMP, HFrEF, history of DVT and PE, HTN and tobacco abuse who presents with Acute on chronic systolic heart failure     Hospital Problems             Last Modified POA    * (Principal) Acute diastolic CHF (congestive heart failure) ([facility] 2023 Yes    Congestive heart failure, unspecified HF chronicity, unspecified heart failure type [facility] 2023 Yes     1. Acute on chronic systolic heart failure/NICMP:  -Patient presenting with shortness of breath/orthopnea/weight gain in setting of noncompliance  - Elevated BNP and trop likely form CHF. No chest pain now. EKG ordered. Trop was trended down from 184 to 162 as per careevery where. - Chest xray showed pulm edema  -continue Lasix 40 mg IV BID  -Strict intake/output record  -Daily weights  -Continue Lasix/metoprolol. Hold lisinopril for now  -Cardiology consulted. Continue tele monitoring. EKG ordered. Recent echo EF 30-35%. RVSP 58 mmg Hg. Currently has 2 L with 99 %. I believe that can be weaned off.     2. MELANY on CKD - Base line around 0.5 - Was 1.57 in ED. Likely from renal congestion. Will continue to monitor with diuresis. 3. Suspicion for COPD on last admission - needs PFT as OP    4. Essential hypertension:  -will hold lisinopril. Continue lasix and metoproloo    5. GERD - PPI     6. History of DVT and PE:  -Continue Xarelto    7. Tobacco abuse:  -We will provide counseling/offer nicotine patch    8. Miscellaneous:  -Continue home medication except as contraindicated    9. DM II - placed on SSI. Will repeat a1c. Last one was 6 9 months back. She reports not taking any meds now.      10. Hep  C - She reported being diagnosed with Hep C and says she follow with  as OP. Has not been treated. 11. She also takes abilify IM. As per pharmacy we don't keep Abilify here. Disposition:   Current Living situation: home  Expected Disposition: home  Estimated D/C: 1-2 Days    Diet ADULT DIET; Regular; No Added Salt (3-4 gm)   DVT Prophylaxis [] Lovenox, []  Heparin, [] SCDs, [] Ambulation,  [] Eliquis, [x] Xarelto, [] Coumadin   Code Status Full Code   Surrogate Decision Maker/ POA      History from:     patient    History of Present Illness:     Chief Complaint: SOB  Montse Guevara is a 61 y.o. female with pmh of as mentioned above who presents with SOB worsened with exertion associated with cough and worsening b/l leg swelling x 1 week. She said she had some chest pain in the ED but has resolved now. Trop was initially high which trended down. EKG was not suggestive for ischemic changes as per the report. New order for EKG has been placed. Labs, EKG, chest xray was personally reviewed from care every where     Review of Systems: Need 10 Elements   Review of Systems    Denies fever, chills, Cough, dizziness, chest pain, abd pain, n/v, dysuria. Bowel and bladder habits normal     Objective:   No intake or output data in the 24 hours ending 02/23/23 2000   Vitals: There were no vitals filed for this visit. Medications Prior to Admission     Prior to Admission medications    Medication Sig Start Date End Date Taking?  Authorizing Provider   furosemide (LASIX) 20 MG tablet Take 1 tablet by mouth 2 times daily 1/10/23   Lizbeth Rios MD   lisinopril (PRINIVIL;ZESTRIL) 40 MG tablet Take 1 tablet by mouth daily 1/10/23   Lizbeth Rios MD   rivaroxaban (XARELTO) 20 MG TABS tablet Take 1 tablet by mouth Daily with supper 1/10/23   Lizbeth Rios MD   metoprolol succinate (TOPROL XL) 100 MG extended release tablet Take 1 tablet by mouth daily 1/10/23   Lizbeth Rios MD   ARIPiprazole ER (ABILIFY MAINTENA) 400 MG PRSY Inject 400 mg into the muscle once    Historical Provider, MD   albuterol sulfate HFA (PROVENTIL;VENTOLIN;PROAIR) 108 (90 Base) MCG/ACT inhaler Inhale 2 puffs into the lungs every 4 hours as needed for Wheezing 11/10/22   Loi Ross MD   empagliflozin (JARDIANCE) 10 MG tablet Take 1 tablet by mouth daily 10/14/22   Allyson Route, APRN - CNP   loratadine (CLARITIN) 10 MG tablet  9/8/22   Historical Provider, MD   Blood Pressure KIT 1 Device by Does not apply route daily  Patient not taking: No sig reported 9/19/22   Allyson Route, APRN - CNP   insulin lispro (HUMALOG) 100 UNIT/ML injection vial Inject 10 Units into the skin 3 times daily (with meals)  Patient not taking: No sig reported 8/5/20   Mj Giraldo MD   Insulin Syringe-Needle U-100 ([employer] INS SYR .3CC/29GX0.5\") 29G X 1/2\" 0.3 ML MISC 1 each by Does not apply route daily  Patient not taking: No sig reported 8/5/20   Mj Giraldo MD   aspirin 81 MG chewable tablet Take 81 mg by mouth daily  Patient not taking: Reported on 1/10/2023    Historical Provider, MD       Physical Exam: Need 8 Elements   Physical Exam     General: NAD  Eyes: EOMI  ENT: neck supple  Cardiovascular: Regular rate. Respiratory: B/L basilar crackles. No wheeze  Gastrointestinal: Soft, non tender  Genitourinary: no suprapubic tenderness  Musculoskeletal: B/l leg edema  Skin: warm, dry  Neuro: Alert. Psych: Mood appropriate. Past Medical History:   PMHx   Past Medical History:   Diagnosis Date    CAD (coronary artery disease)     Diabetes mellitus ([facility]     Hepatitis C     Schizophrenia ([facility]      PSHX:  has a past surgical history that includes Hysterectomy and Cholecystectomy. Allergies: Allergies   Allergen Reactions    Haldol [Haloperidol Lactate] Other (See Comments)     Unknown, severe reaction per mother    Penicillins      Fam HX:  family history includes Cancer in her father; No Known Problems in her mother.   Soc HX: smoker  Social History     Socioeconomic History    Marital status: Legally    Tobacco Use    Smoking status: Every Day     Packs/day: 0.25     Years: 30.00     Pack years: 7.50     Types: Cigarettes     Last attempt to quit: 10/17/2022     Years since quittin.3    Smokeless tobacco: Never    Tobacco comments:     Couple cigars and 3 cigarettes a day 22   Vaping Use    Vaping Use: Never used   Substance and Sexual Activity    Alcohol use: Yes     Comment: Alcohol: 1 drink every 2 weeks. Caffeine: 1 cup of coffee occasionally, 2 cans of Coke or Pepsi daily    Drug use: Not Currently     Types: Marijuana [place])    Sexual activity: Not Currently       Medications:   Medications:    sodium chloride flush  5-40 mL IntraVENous 2 times per day    ipratropium-albuterol  1 ampule Inhalation Q4H WA    ARIPiprazole lauroxil  400 mg IntraMUSCular Once    aspirin  81 mg Oral Daily    metoprolol succinate  100 mg Oral Daily    rivaroxaban  20 mg Oral Dinner    [START ON 2023] furosemide  40 mg IntraVENous BID    insulin glargine  10 Units SubCUTAneous Nightly    [START ON 2023] insulin lispro  0-4 Units SubCUTAneous TID WC    insulin lispro  0-4 Units SubCUTAneous Nightly      Infusions:    sodium chloride      dextrose       PRN Meds: sodium chloride flush, 5-40 mL, PRN  sodium chloride, , PRN  ondansetron, 4 mg, Q8H PRN   Or  ondansetron, 4 mg, Q6H PRN  polyethylene glycol, 17 g, Daily PRN  acetaminophen, 650 mg, Q6H PRN   Or  acetaminophen, 650 mg, Q6H PRN  potassium chloride, 40 mEq, PRN   Or  potassium alternative oral replacement, 40 mEq, PRN   Or  potassium chloride, 10 mEq, PRN  magnesium sulfate, 2,000 mg, PRN  glucose, 4 tablet, PRN  dextrose bolus, 125 mL, PRN   Or  dextrose bolus, 250 mL, PRN  glucagon (rDNA), 1 mg, PRN  dextrose, , Continuous PRN        Labs      CBC: No results for input(s): WBC, HGB, PLT in the last 72 hours.   BMP:  No results for input(s): NA, K, CL, CO2, BUN, CREATININE, GLUCOSE in the last 72 hours. Hepatic: No results for input(s): AST, ALT, ALB, BILITOT, ALKPHOS in the last 72 hours. Lipids:   Lab Results   Component Value Date/Time    CHOL 161 03/12/2021 06:50 PM    HDL 72 03/12/2021 06:50 PM    TRIG 163 03/12/2021 06:50 PM     Hemoglobin A1C:   Lab Results   Component Value Date/Time    LABA1C 6.0 05/03/2022 02:13 AM     TSH: No results found for: TSH  Troponin:   Lab Results   Component Value Date/Time    TROPONINT 0.017 11/08/2022 02:55 AM    TROPONINT 0.015 11/07/2022 03:59 PM    TROPONINT <0.010 05/02/2022 02:35 PM     Lactic Acid: No results for input(s): LACTA in the last 72 hours. BNP: No results for input(s): PROBNP in the last 72 hours. UA:  Lab Results   Component Value Date/Time    NITRU NEGATIVE 02/03/2023 08:49 AM    COLORU YELLOW 02/03/2023 08:49 AM    WBCUA NONE SEEN 02/03/2023 08:49 AM    RBCUA 4 02/03/2023 08:49 AM    MUCUS RARE 11/07/2022 04:51 PM    TRICHOMONAS NONE SEEN 02/03/2023 08:49 AM    YEAST RARE 03/15/2022 12:08 PM    BACTERIA NEGATIVE 02/03/2023 08:49 AM    CLARITYU CLEAR 02/03/2023 08:49 AM    SPECGRAV 1.020 02/03/2023 08:49 AM    LEUKOCYTESUR NEGATIVE 02/03/2023 08:49 AM    UROBILINOGEN 1.0 02/03/2023 08:49 AM    BILIRUBINUR SMALL NUMBER OR AMOUNT OBSERVED 02/03/2023 08:49 AM    BLOODU MODERATE NUMBER OR AMOUNT OBSERVED 02/03/2023 08:49 AM    KETUA NEGATIVE 02/03/2023 08:49 AM     Urine Cultures: No results found for: LABURIN  Blood Cultures: No results found for: BC  No results found for: BLOODCULT2  Organism: No results found for: ORG    Imaging/Diagnostics Last 24 Hours   No results found.     Personally reviewed Lab Studies, Imaging, and discussed case with patient    Electronically signed by Maranda Armstrong MD on 2/23/2023 at 8:00 PM

## 2023-02-24 NOTE — CONSULTS
Nutrition Education    Educated on Heart failure nutrition therapy   Learners: Patient  Readiness: Acceptance  Method: Explanation and Handout  Response: Verbalizes Understanding  Contact name and number provided.     Nohelia Zamudio, [address]  Contact Number: [phone]

## 2023-02-24 NOTE — PROGRESS NOTES
Spoke with Sarah Jaquez concerning pt's BP being elevated. Lisinopril is being held by attending at this time. Continue to monitor BP for now.

## 2023-02-24 NOTE — CARE COORDINATION
JIMY has reviewed pt's chart for needs. JIMY screening shows that pt has PCP, insurance and is independent PTA. If any d/c needs arise please contact JIMY.  TE

## 2023-02-24 NOTE — CONSULTS
Nephrology Service Consultation    Patient:  Montse Guevara  MRN: [id number]  Consulting physician:  Bashir Murphy MD  Reason for Consult: CHF with edema acute renal failure and hyperkalemia    History Obtained From:  patient, electronic medical record  PCP: Edward Valentine    HISTORY OF PRESENT ILLNESS:   The patient is a 61 y.o. female who presents with weakness shortness breath dyspnea on exertion history nonischemic cardiomyopathy low EF history of DVT with PE hypertension smoker known to me with chronic kidney disease and history of hepatitis C unsure if was treated in the past.  Patient presents to the above setting with fluid overload shortness of breath congestive heart failure requiring IV diuresis noted to have worsening renal function with hyperkalemia. I discussed with patient options and therapy history of schizophrenia as well in the above setting renal was asked to evaluate his in case renal function was to worsen and potassium increased water the options for her.   I discussed with her in detail we will try to manage with medical therapy for now and see how she does    Past Medical History:        Diagnosis Date    CAD (coronary artery disease)     Diabetes mellitus ([facility]     Hepatitis C     Schizophrenia ([facility]        Past Surgical History:        Procedure Laterality Date    CHOLECYSTECTOMY      HYSTERECTOMY (CERVIX STATUS UNKNOWN)         Medications:   Scheduled Meds:   sodium chloride flush  5-40 mL IntraVENous 2 times per day    ipratropium-albuterol  1 ampule Inhalation Q4H WA    aspirin  81 mg Oral Daily    [Held by provider] metoprolol succinate  100 mg Oral Daily    rivaroxaban  20 mg Oral Dinner    furosemide  40 mg IntraVENous BID    insulin glargine  10 Units SubCUTAneous Nightly    insulin lispro  0-4 Units SubCUTAneous TID     insulin lispro  0-4 Units SubCUTAneous Nightly    pantoprazole  40 mg IntraVENous Daily     Continuous Infusions:   dextrose      sodium chloride       PRN Meds:.glucose, dextrose bolus **OR** dextrose bolus, glucagon (rDNA), dextrose, oxyCODONE-acetaminophen, sodium chloride flush, sodium chloride, ondansetron **OR** ondansetron, polyethylene glycol, acetaminophen **OR** acetaminophen, potassium chloride **OR** potassium alternative oral replacement **OR** potassium chloride, magnesium sulfate, aluminum & magnesium hydroxide-simethicone, guaiFENesin    Allergies:  Haldol [haloperidol lactate] and Penicillins    Social History:   TOBACCO:   reports that she has been smoking cigarettes. She has a 7.50 pack-year smoking history. She has never used smokeless tobacco.  ETOH:   reports current alcohol use. OCCUPATION:      Family History:       Problem Relation Age of Onset    No Known Problems Mother     Cancer Father        REVIEW OF SYSTEMS:  Negative except for weak tired shortness of breath fatigue dyspnea on exertion. Physical Exam:    I/O: 02/23 0701 - 02/24 0700  In: 120 [P.O.:120]  Out: 500 [Urine:500]    Vitals: /86   Pulse 86   Temp 96.9 °F (36.1 °C) (Oral)   Resp 26   Ht 5' 2\" (1.575 m)   Wt 143 lb 3.2 oz (65 kg)   SpO2 98%   BMI 26.19 kg/m²   General appearance: awake weak  HEENT: Head: Normal, normocephalic, atraumatic. Neck: supple, symmetrical, trachea midline  Lungs: diminished breath sounds bilaterally  Heart: S1, S2 normal  Abdomen: abnormal findings:  soft NT  Extremities: edema positive  Neurologic: Mental status: alertness: alert      CBC:   Recent Labs     02/23/23 2032 02/24/23  0352   WBC 9.2 9.0   HGB 13.7 15.5    176     BMP:    Recent Labs     02/23/23 2032 02/24/23  0352   * 135   K 5.2* 6.0*    102   CO2 18* 23   BUN 19 20   CREATININE 1.5* 1.9*   GLUCOSE 232* 241*     Hepatic:   Recent Labs     02/23/23 2032   AST 21   ALT 16   BILITOT 0.5   ALKPHOS 159*     Troponin: No results for input(s): TROPONINI in the last 72 hours.   BNP: No results for input(s): BNP in the last 72 hours. Lipids:   Recent Labs     02/24/23  0352   CHOL 235*   HDL 90     ABGs:   Lab Results   Component Value Date/Time    PO2ART 28.7 08/02/2020 05:41 AM    [id number] 48.5 08/02/2020 05:41 AM     INR: No results for input(s): INR in the last 72 hours.   Renal Labs  Albumin:    Lab Results   Component Value Date/Time    LABALBU 3.0 02/23/2023 08:32 PM    LABALBU 49 11/08/2022 06:25 PM     Calcium:    Lab Results   Component Value Date/Time    CALCIUM 9.3 02/24/2023 03:52 AM     Phosphorus:    Lab Results   Component Value Date/Time    PHOS 4.4 02/23/2023 08:32 PM     U/A:    Lab Results   Component Value Date/Time    NITRU NEGATIVE 02/03/2023 08:49 AM    COLORU YELLOW 02/03/2023 08:49 AM    WBCUA NONE SEEN 02/03/2023 08:49 AM    RBCUA 4 02/03/2023 08:49 AM    MUCUS RARE 11/07/2022 04:51 PM    TRICHOMONAS NONE SEEN 02/03/2023 08:49 AM    YEAST RARE 03/15/2022 12:08 PM    BACTERIA NEGATIVE 02/03/2023 08:49 AM    CLARITYU CLEAR 02/03/2023 08:49 AM    SPECGRAV 1.020 02/03/2023 08:49 AM    UROBILINOGEN 1.0 02/03/2023 08:49 AM    BILIRUBINUR SMALL NUMBER OR AMOUNT OBSERVED 02/03/2023 08:49 AM    BLOODU MODERATE NUMBER OR AMOUNT OBSERVED 02/03/2023 08:49 AM    KETUA NEGATIVE 02/03/2023 08:49 AM     ABG:    Lab Results   Component Value Date/Time    [id number] 48.5 08/02/2020 05:41 AM    PO2ART 28.7 08/02/2020 05:41 AM    [id number] 31.0 08/02/2020 05:41 AM     HgBA1c:    Lab Results   Component Value Date/Time    LABA1C 6.6 02/23/2023 08:32 PM     Microalbumen/Creatinine ratio:  No components found for: RUCREAT  TSH:  No results found for: TSH  IRON:    Lab Results   Component Value Date/Time    IRON 89 08/01/2020 03:37 PM     Iron Saturation:  No components found for: PERCENTFE  TIBC:    Lab Results   Component Value Date/Time    TIBC 230 08/01/2020 03:37 PM     FERRITIN:  No results found for: FERRITIN  RPR:  No results found for: RPR  WALT:    Lab Results   Component Value Date/Time    WALT None Detected 11/10/2022 06:31 AM     24 Hour Urine for Creatinine Clearance:  No components found for: CREAT4, UHRS10, UTV10  -----------------------------------------------------------------      Assessment and Recommendations     Patient Active Problem List   Diagnosis Code    Hyperglycemia R73.9    Precordial pain R07.2    Type 2 diabetes mellitus without complication, with long-term current use of insulin ([facility]) E11.9, Z79.4    Hyperglycemic crisis in diabetes mellitus ([facility]) E11.65    Hyperosmolar hyponatremia E87.0, E87.1    Hypokalemia E87.6    Hypotensive episode I95.9    Acute bilateral deep vein thrombosis (DVT) of femoral veins ([facility]) I82.413    Pulmonary embolism ([facility]) I26.99    Acute on chronic systolic congestive heart failure ([facility]) I50.23    Acute congestive heart failure ([facility]) I50.9    Primary hypertension I10    NICM (nonischemic cardiomyopathy) ([facility]) I42.8    MELANY (acute kidney injury) ([facility]) N17.9    Smoking F17.200    Nonrheumatic mitral valve regurgitation I34.0    Chronic hepatitis C without hepatic coma ([facility]) B18.2    Asymptomatic microscopic hematuria R31.21    Acute diastolic CHF (congestive heart failure) ([facility]) I50.31    Congestive heart failure, unspecified HF chronicity, unspecified heart failure type ([facility]) I50.9     Impression plan  #1 CHF exacerbation  #2 history of PE/DVT  #3 type 2 diabetes  #4 acute renal failure from cardiorenal syndrome  #5 hyperkalemia  #6 anxiety with schizophrenia  #7 hyponatremia  #8 history of hep C    Plan  #1 maintain diuresis per cardiology monitor  #2 maintain anticoagulation  #3 monitor glucose and adjust with insulin  #4 monitor renal function check urine sodium maintain diuresis supportive care no acute dialysis needs yet but need to consider if not respond to diuresis  #5 given Lokelma maintain diuresis monitor potassium hold ACE inhibitor and ARB  #6 monitor affect with above she is somewhat anxious nature of dialysis and she would agree to  #7 sodium low stable monitor for now  #8 with  history of hep C check GGT and ammonia level and recheck hepatitis C status  We will follow  Electronically signed by Dipesh Alan MD on 2/24/2023 at 2:23 PM

## 2023-02-24 NOTE — CARE COORDINATION
.Case Management Assessment  Initial Evaluation    Date/Time of Evaluation: 2/24/2023 9:57 AM  Assessment Completed by: Alexus Arceo RN    If patient is discharged prior to next notation, then this note serves as note for discharge by case management. Patient Name: Libertad Ha                   YOB: 1965  Diagnosis: Acute diastolic CHF (congestive heart failure) ([facility]) [I50.31]  Congestive heart failure, unspecified HF chronicity, unspecified heart failure type [facility]) [I50.9]                   Date / Time: 2/23/2023  7:27 PM    Patient Admission Status: Inpatient   Readmission Risk (Low < 19, Mod (19-27), High > 27): Readmission Risk Score: 15.3    Current PCP: Renaldo Brittle  PCP verified by CM? Chart Reviewed: Yes      History Provided by: Medical Record  Patient Orientation: Alert and Oriented    Patient Cognition: Alert    Hospitalization in the last 30 days (Readmission):  No    If yes, Readmission Assessment in CM Navigator will be completed. Advance Directives:      Code Status: Full Code   Patient's Primary Decision Maker is:  (SELF)      Discharge Planning:    Patient lives with: Alone Type of Home: Apartment  Primary Care Giver: Self  Patient Support Systems include: Family Members   Current Financial resources: Medicare, Medicaid  Current community resources: None  Current services prior to admission: None            Current DME:              Type of Home Care services:  None    ADLS  Prior functional level: Independent in ADLs/IADLs  Current functional level: Independent in ADLs/IADLs    PT AM-PAC:   /24  OT AM-PAC:   /24    Family can provide assistance at DC: Would you like Case Management to discuss the discharge plan with any other family members/significant others, and if so, who?     Plans to Return to Present Housing: Yes  Other Identified Issues/Barriers to RETURNING to current housing: NONE  Potential Assistance needed at discharge: N/A            Potential DME:    Patient expects to discharge to: [address] [place] [address] for transportation at discharge:      Financial    Payor: Sue Angry / Plan: Wenceslao Breed / Product Type: *No Product type* /     Does insurance require precert for SNF: Yes    Potential assistance Purchasing Medications:    Meds-to-Beds request: Yes      [address] [id number] [phone] Deuce Relic [phone] [address] [facility] [address]  Phone: [phone] Fax: [address] [place] [address] & [facility] [address], [address] [place] [address] [place] [phone] Deuce Relic [address] [facility] [address]  Phone: [phone] Fax: [phone]    [address] [place] [address], [address] [place] [address] [address] [address]  Phone: [phone] Fax: [phone]      Notes:    Factors facilitating achievement of predicted outcomes: Family support and Cooperative    Barriers to discharge: Medical complications    Additional Case Management Notes: . CM has reviewed pt's chart for needs. CM screening shows that pt has PCP, insurance and is independent PTA. If any d/c needs arise please contact CM. TE    The Plan for Transition of Care is related to the following treatment goals of Acute diastolic CHF (congestive heart failure) (HCC) [I50.31]  Congestive heart failure, unspecified HF chronicity, unspecified heart failure type ([facility] [F67.4]    IF APPLICABLE: The Patient and/or patient representative Mitch Luna and her family were provided with a choice of provider and agrees with the discharge plan. Freedom of choice list with basic dialogue that supports the patient's individualized plan of care/goals and shares the quality data associated with the providers was provided to:     Patient Representative Name:       The Patient and/or Patient Representative Agree with the Discharge Plan? Bartley Mohs, RN  Case Management Department

## 2023-02-24 NOTE — PROGRESS NOTES
Hospitalist    Notified that potassium level is now 5.6. She had received IV insulin with D50, Lokelma, and IV Lasix earlier today. We will give another dose of IV insulin and D50, will add 1 dose of IV bicarb, and will add another dose of Lokelma at 9 PM tonight-that will be her third dose. Monitor potassium level every 6 hours x 3.

## 2023-02-24 NOTE — PROGRESS NOTES
Hospitalist      Received message at 12:10 pm:    Patient has had 5-6 episodes of bradycardia dropping down to 39 bpm, the last episode patient became symptomatic, dizzy and light-headed. K+ was 6.0 at 0350 this am    Lokelma given at 0810 this am    Metoprolol succinate 100 mg given at 0830 thi am    Will recheck K+ now. Preliminary orders for insulin and D50 placed to potentially treat hyperkalemia.

## 2023-02-25 ENCOUNTER — APPOINTMENT (OUTPATIENT)
Dept: CT IMAGING | Age: 58
DRG: 291 | End: 2023-02-25
Attending: STUDENT IN AN ORGANIZED HEALTH CARE EDUCATION/TRAINING PROGRAM
Payer: COMMERCIAL

## 2023-02-25 LAB
ALBUMIN SERPL-MCNC: 2.7 GM/DL (ref 3.4–5)
ANION GAP SERPL CALCULATED.3IONS-SCNC: 12 MMOL/L (ref 4–16)
BUN SERPL-MCNC: 25 MG/DL (ref 6–23)
CALCIUM SERPL-MCNC: 8.2 MG/DL (ref 8.3–10.6)
CHLORIDE BLD-SCNC: 98 MMOL/L (ref 99–110)
CO2: 22 MMOL/L (ref 21–32)
CREAT SERPL-MCNC: 1.8 MG/DL (ref 0.6–1.1)
GFR SERPL CREATININE-BSD FRML MDRD: 32 ML/MIN/1.73M2
GLUCOSE BLD-MCNC: 137 MG/DL (ref 70–99)
GLUCOSE BLD-MCNC: 144 MG/DL (ref 70–99)
GLUCOSE BLD-MCNC: 162 MG/DL (ref 70–99)
GLUCOSE BLD-MCNC: 208 MG/DL (ref 70–99)
GLUCOSE SERPL-MCNC: 189 MG/DL (ref 70–99)
HCT VFR BLD CALC: 41.1 % (ref 37–47)
HEMOGLOBIN: 14 GM/DL (ref 12.5–16)
LIPASE: 31 IU/L (ref 13–60)
MAGNESIUM: 1.9 MG/DL (ref 1.8–2.4)
MAGNESIUM: 1.9 MG/DL (ref 1.8–2.4)
MCH RBC QN AUTO: 31.5 PG (ref 27–31)
MCHC RBC AUTO-ENTMCNC: 34.1 % (ref 32–36)
MCV RBC AUTO: 92.6 FL (ref 78–100)
PDW BLD-RTO: 14.1 % (ref 11.7–14.9)
PHOSPHORUS: 4 MG/DL (ref 2.5–4.9)
PLATELET # BLD: 160 K/CU MM (ref 140–440)
PMV BLD AUTO: 11 FL (ref 7.5–11.1)
POTASSIUM SERPL-SCNC: 3.3 MMOL/L (ref 3.5–5.1)
POTASSIUM SERPL-SCNC: 4 MMOL/L (ref 3.5–5.1)
RBC # BLD: 4.44 M/CU MM (ref 4.2–5.4)
SODIUM BLD-SCNC: 132 MMOL/L (ref 135–145)
WBC # BLD: 9.1 K/CU MM (ref 4–10.5)

## 2023-02-25 PROCEDURE — 94761 N-INVAS EAR/PLS OXIMETRY MLT: CPT

## 2023-02-25 PROCEDURE — 2580000003 HC RX 258: Performed by: STUDENT IN AN ORGANIZED HEALTH CARE EDUCATION/TRAINING PROGRAM

## 2023-02-25 PROCEDURE — 2140000000 HC CCU INTERMEDIATE R&B

## 2023-02-25 PROCEDURE — 85027 COMPLETE CBC AUTOMATED: CPT

## 2023-02-25 PROCEDURE — 6370000000 HC RX 637 (ALT 250 FOR IP): Performed by: INTERNAL MEDICINE

## 2023-02-25 PROCEDURE — 2580000003 HC RX 258

## 2023-02-25 PROCEDURE — 82962 GLUCOSE BLOOD TEST: CPT

## 2023-02-25 PROCEDURE — 83690 ASSAY OF LIPASE: CPT

## 2023-02-25 PROCEDURE — 6360000002 HC RX W HCPCS: Performed by: STUDENT IN AN ORGANIZED HEALTH CARE EDUCATION/TRAINING PROGRAM

## 2023-02-25 PROCEDURE — 36415 COLL VENOUS BLD VENIPUNCTURE: CPT

## 2023-02-25 PROCEDURE — 94664 DEMO&/EVAL PT USE INHALER: CPT

## 2023-02-25 PROCEDURE — 94640 AIRWAY INHALATION TREATMENT: CPT

## 2023-02-25 PROCEDURE — APPSS30 APP SPLIT SHARED TIME 16-30 MINUTES

## 2023-02-25 PROCEDURE — 6370000000 HC RX 637 (ALT 250 FOR IP): Performed by: STUDENT IN AN ORGANIZED HEALTH CARE EDUCATION/TRAINING PROGRAM

## 2023-02-25 PROCEDURE — C9113 INJ PANTOPRAZOLE SODIUM, VIA: HCPCS | Performed by: STUDENT IN AN ORGANIZED HEALTH CARE EDUCATION/TRAINING PROGRAM

## 2023-02-25 PROCEDURE — 80069 RENAL FUNCTION PANEL: CPT

## 2023-02-25 PROCEDURE — 84132 ASSAY OF SERUM POTASSIUM: CPT

## 2023-02-25 PROCEDURE — 99233 SBSQ HOSP IP/OBS HIGH 50: CPT | Performed by: INTERNAL MEDICINE

## 2023-02-25 PROCEDURE — 2500000003 HC RX 250 WO HCPCS: Performed by: FAMILY MEDICINE

## 2023-02-25 PROCEDURE — 83735 ASSAY OF MAGNESIUM: CPT

## 2023-02-25 PROCEDURE — 74176 CT ABD & PELVIS W/O CONTRAST: CPT

## 2023-02-25 RX ORDER — POLYETHYLENE GLYCOL 3350 17 G/17G
17 POWDER, FOR SOLUTION ORAL DAILY
Status: DISCONTINUED | OUTPATIENT
Start: 2023-02-25 | End: 2023-02-27 | Stop reason: HOSPADM

## 2023-02-25 RX ORDER — WATER 1000 ML/1000ML
INJECTION, SOLUTION INTRAVENOUS
Status: COMPLETED
Start: 2023-02-25 | End: 2023-02-25

## 2023-02-25 RX ORDER — LACTULOSE 10 G/15ML
20 SOLUTION ORAL 2 TIMES DAILY
Status: DISCONTINUED | OUTPATIENT
Start: 2023-02-25 | End: 2023-02-27 | Stop reason: HOSPADM

## 2023-02-25 RX ADMIN — POLYETHYLENE GLYCOL (3350) 17 G: 17 POWDER, FOR SOLUTION ORAL at 15:08

## 2023-02-25 RX ADMIN — IPRATROPIUM BROMIDE AND ALBUTEROL SULFATE 1 AMPULE: 2.5; .5 SOLUTION RESPIRATORY (INHALATION) at 19:42

## 2023-02-25 RX ADMIN — INSULIN GLARGINE 10 UNITS: 100 INJECTION, SOLUTION SUBCUTANEOUS at 20:50

## 2023-02-25 RX ADMIN — LACTULOSE 20 G: 10 SOLUTION ORAL at 20:49

## 2023-02-25 RX ADMIN — GUAIFENESIN 200 MG: 100 SOLUTION ORAL at 20:49

## 2023-02-25 RX ADMIN — FUROSEMIDE 40 MG: 10 INJECTION, SOLUTION INTRAMUSCULAR; INTRAVENOUS at 08:59

## 2023-02-25 RX ADMIN — IPRATROPIUM BROMIDE AND ALBUTEROL SULFATE 1 AMPULE: 2.5; .5 SOLUTION RESPIRATORY (INHALATION) at 11:42

## 2023-02-25 RX ADMIN — OXYCODONE AND ACETAMINOPHEN 1 TABLET: 5; 325 TABLET ORAL at 20:49

## 2023-02-25 RX ADMIN — POTASSIUM BICARBONATE 40 MEQ: 782 TABLET, EFFERVESCENT ORAL at 22:59

## 2023-02-25 RX ADMIN — PANTOPRAZOLE SODIUM 40 MG: 40 INJECTION, POWDER, FOR SOLUTION INTRAVENOUS at 08:59

## 2023-02-25 RX ADMIN — GUAIFENESIN 200 MG: 100 SOLUTION ORAL at 09:09

## 2023-02-25 RX ADMIN — ASPIRIN 81 MG 81 MG: 81 TABLET ORAL at 08:59

## 2023-02-25 RX ADMIN — SODIUM ZIRCONIUM CYCLOSILICATE 10 G: 10 POWDER, FOR SUSPENSION ORAL at 08:59

## 2023-02-25 RX ADMIN — OXYCODONE AND ACETAMINOPHEN 1 TABLET: 5; 325 TABLET ORAL at 09:08

## 2023-02-25 RX ADMIN — RIVAROXABAN 20 MG: 20 TABLET, FILM COATED ORAL at 16:40

## 2023-02-25 RX ADMIN — SODIUM CHLORIDE, PRESERVATIVE FREE 10 ML: 5 INJECTION INTRAVENOUS at 08:59

## 2023-02-25 RX ADMIN — IPRATROPIUM BROMIDE AND ALBUTEROL SULFATE 1 AMPULE: 2.5; .5 SOLUTION RESPIRATORY (INHALATION) at 15:22

## 2023-02-25 RX ADMIN — LACTULOSE 20 G: 10 SOLUTION ORAL at 15:08

## 2023-02-25 RX ADMIN — INSULIN LISPRO 1 UNITS: 100 INJECTION, SOLUTION INTRAVENOUS; SUBCUTANEOUS at 16:41

## 2023-02-25 RX ADMIN — WATER 10 ML: 1 INJECTION INTRAMUSCULAR; INTRAVENOUS; SUBCUTANEOUS at 08:59

## 2023-02-25 RX ADMIN — FUROSEMIDE 40 MG: 10 INJECTION, SOLUTION INTRAMUSCULAR; INTRAVENOUS at 22:59

## 2023-02-25 ASSESSMENT — PAIN DESCRIPTION - FREQUENCY: FREQUENCY: INTERMITTENT

## 2023-02-25 ASSESSMENT — PAIN SCALES - GENERAL
PAINLEVEL_OUTOF10: 8
PAINLEVEL_OUTOF10: 4
PAINLEVEL_OUTOF10: 0
PAINLEVEL_OUTOF10: 8
PAINLEVEL_OUTOF10: 7
PAINLEVEL_OUTOF10: 8
PAINLEVEL_OUTOF10: 0
PAINLEVEL_OUTOF10: 0

## 2023-02-25 ASSESSMENT — PAIN DESCRIPTION - DESCRIPTORS
DESCRIPTORS: CRAMPING
DESCRIPTORS: ACHING

## 2023-02-25 ASSESSMENT — PAIN DESCRIPTION - ONSET: ONSET: ON-GOING

## 2023-02-25 ASSESSMENT — PAIN SCALES - WONG BAKER: WONGBAKER_NUMERICALRESPONSE: 0

## 2023-02-25 ASSESSMENT — PAIN - FUNCTIONAL ASSESSMENT
PAIN_FUNCTIONAL_ASSESSMENT: PREVENTS OR INTERFERES SOME ACTIVE ACTIVITIES AND ADLS
PAIN_FUNCTIONAL_ASSESSMENT: ACTIVITIES ARE NOT PREVENTED

## 2023-02-25 ASSESSMENT — PAIN DESCRIPTION - PAIN TYPE: TYPE: ACUTE PAIN

## 2023-02-25 ASSESSMENT — PAIN DESCRIPTION - ORIENTATION: ORIENTATION: MID

## 2023-02-25 ASSESSMENT — PAIN DESCRIPTION - LOCATION
LOCATION: ABDOMEN
LOCATION: ABDOMEN

## 2023-02-25 ASSESSMENT — PAIN DESCRIPTION - DIRECTION: RADIATING_TOWARDS: DENIES

## 2023-02-25 NOTE — PROGRESS NOTES
Nephrology Progress Note  2/25/2023 10:20 AM  Subjective: Interval History: Yolande Varma is a 61 y.o. female with generalized weakness but more awake and still on some dopamine        Data:   Scheduled Meds:   sodium zirconium cyclosilicate  10 g Oral Daily    sodium chloride flush  5-40 mL IntraVENous 2 times per day    ipratropium-albuterol  1 ampule Inhalation Q4H WA    aspirin  81 mg Oral Daily    [Held by provider] metoprolol succinate  100 mg Oral Daily    rivaroxaban  20 mg Oral Dinner    furosemide  40 mg IntraVENous BID    insulin glargine  10 Units SubCUTAneous Nightly    insulin lispro  0-4 Units SubCUTAneous TID     insulin lispro  0-4 Units SubCUTAneous Nightly    pantoprazole  40 mg IntraVENous Daily     Continuous Infusions:   dextrose      DOPamine 5 mcg/kg/min (02/24/23 1647)    sodium chloride           CBC   Recent Labs     02/23/23 2032 02/24/23  0352   WBC 9.2 9.0   HGB 13.7 15.5   HCT 42.0 46.1    176      BMP   Recent Labs     02/23/23 2032 02/24/23  0352 02/24/23  1554 02/24/23  2106   * 135 134* 133*   K 5.2* 6.0* 5.6* 4.2  4.3    102 102 97*   CO2 18* 23 19* 24   PHOS 4.4  --   --   --    BUN 19 20 27* 28*   CREATININE 1.5* 1.9* 2.3* 2.1*     Hepatic:   Recent Labs     02/23/23 2032   AST 21   ALT 16   BILITOT 0.5   ALKPHOS 159*     Troponin: No results for input(s): TROPONINI in the last 72 hours. BNP: No results for input(s): BNP in the last 72 hours. Lipids:   Recent Labs     02/24/23  0352   CHOL 235*   HDL 90     ABGs:   Lab Results   Component Value Date/Time    PO2ART 28.7 08/02/2020 05:41 AM    IMH2XQY 48.5 08/02/2020 05:41 AM     INR: No results for input(s): INR in the last 72 hours.   Renal Labs  Albumin:    Lab Results   Component Value Date/Time    LABALBU 3.0 02/23/2023 08:32 PM    LABALBU 49 11/08/2022 06:25 PM     Calcium:    Lab Results   Component Value Date/Time    CALCIUM 8.7 02/24/2023 09:06 PM     Phosphorus:    Lab Results Component Value Date/Time    PHOS 4.4 02/23/2023 08:32 PM     U/A:    Lab Results   Component Value Date/Time    NITRU NEGATIVE 02/24/2023 03:05 PM    COLORU YELLOW 02/24/2023 03:05 PM    WBCUA NONE SEEN 02/24/2023 03:05 PM    RBCUA 2 02/24/2023 03:05 PM    MUCUS RARE 02/24/2023 03:05 PM    TRICHOMONAS NONE SEEN 02/24/2023 03:05 PM    YEAST RARE 03/15/2022 12:08 PM    BACTERIA NEGATIVE 02/24/2023 03:05 PM    CLARITYU CLEAR 02/24/2023 03:05 PM    SPECGRAV 1.020 02/24/2023 03:05 PM    UROBILINOGEN 1.0 02/24/2023 03:05 PM    BILIRUBINUR NEGATIVE 02/24/2023 03:05 PM    BLOODU SMALL NUMBER OR AMOUNT OBSERVED 02/24/2023 03:05 PM    KETUA NEGATIVE 02/24/2023 03:05 PM     ABG:    Lab Results   Component Value Date/Time    YRI8EKQ 48.5 08/02/2020 05:41 AM    PO2ART 28.7 08/02/2020 05:41 AM    FTM7GOG 31.0 08/02/2020 05:41 AM     HgBA1c:    Lab Results   Component Value Date/Time    LABA1C 6.6 02/23/2023 08:32 PM     Microalbumen/Creatinine ratio:  No components found for: RUCREAT  TSH:  No results found for: TSH  IRON:    Lab Results   Component Value Date/Time    IRON 89 08/01/2020 03:37 PM     Iron Saturation:  No components found for: PERCENTFE  TIBC:    Lab Results   Component Value Date/Time    TIBC 230 08/01/2020 03:37 PM     FERRITIN:  No results found for: FERRITIN  RPR:  No results found for: RPR  WALT:    Lab Results   Component Value Date/Time    WALT None Detected 11/10/2022 06:31 AM     24 Hour Urine for Creatinine Clearance:  No components found for: CREAT4, UHRS10, UTV10      Objective:   I/O: 02/24 0701 - 02/25 0700  In: 240 [P.O.:240]  Out: 6162 [Urine:1650]  I/O last 3 completed shifts: In: 360 [P.O.:360]  Out: 2150 [Urine:2150]  No intake/output data recorded. Vitals: /82   Pulse 65   Temp 97.9 °F (36.6 °C) (Oral)   Resp 17   Ht 5' 2\" (1.575 m)   Wt 135 lb 12.8 oz (61.6 kg)   SpO2 96%   BMI 24.84 kg/m²  {  General appearance: awake weak  HEENT: Head: Normal, normocephalic, atraumatic.   Neck: supple, symmetrical, trachea midline  Lungs: diminished breath sounds bilaterally  Heart: S1, S2 normal  Abdomen: abnormal findings:  soft nt distended  Extremities: edema trace  Neurologic: Mental status: alertness: alert anxious        Assessment and Plan:      IMP:  #1 CHF exacerbation  #2 history of PE/DVT  #3 type 2 diabetes  #4 acute renal failure from cardiorenal syndrome  #5 hyperkalemia  #6 anxiety with schizophrenia  #7 hyponatremia  #8 history of hep C    Plan     #1 maintain diuresis keep negative balance over 2 L urine output  #2 maintain anticoagulation monitor  #3 glucose overall stable  #4 creatinine improved down to 2.1 medical management in the above setting  #5 potassium improved with Cynthia [place]  #6 affect improved today  #7 sodium was stable  #8 follow-up hep C PCR and ammonia level slightly increased to 63 and placed on low-dose lactulose and monitor affect and need to follow-up GI for treatment    We will follow supportive care           Roxanne Llamas MD, MD

## 2023-02-25 NOTE — PROGRESS NOTES
[address] [facility],Third Floor  Phone: [phone]    Fax [phone]                  Cassandra Baugh MD, Babita Campoverde MD, Ju Cadet MD, MD Amy Ramos MD Rachel Shutter, MD Cindia Braver, MD Dr. Kandace Denier MD Derwin Darting, YNES Michel, YNES Singh, APRNICO Viveros, APRNICO Hargrove PA-C    CARDIOLOGY  NOTE      Name:  Fabio Feliciano /Age/Sex: 1965  (61 y.o. female)   MRN & CSN:  [id number] & [id number] Admission Date/Time: 2023  7:27 PM   Location:  [address] [place] [address]-A PCP: Kiara Cheek Day: 3    - Cardiology consult is for: Bradycardia      ASSESSMENT/ PLAN:  Short episodes of sinus bradycardia  -No episodes of bradycardia overnight. Significantly improved following improvement of hyperkalemia  Hyperkalemia  -Patient is actually now hypokalemic at 3.3. Acute on chronic heart failure with reduced ejection fraction  Nonischemic cardiomyopathy  -Most recent EF of 30 to 35%  -Guideline directed medical therapy currently on hold. Patient does take Toprol- mg, lisinopril 40 mg, and Jardiance 10 mg at home. Holding lisinopril due to hyper-K. Toprol on hold due to bradycardia. Do not add Aldactone at this time due to hyper-K  -Continue Lasix 40 mg IV twice daily  Valvular heart disease  -Severe mitral regurgitation, moderate pulmonic and tricuspid regurgitation  Elevated troponin with history of nonobstructive coronary artery disease  -No chest pain at this time. Non-ACS trend  -Continue aspirin  History of venous thromboembolism  -On Xarelto 20 mg daily  Acute kidney injury: Creatinine 1.8  Diabetes mellitus: A1c of 6.6  Hepatitis C            Subjective:  Idania Barnes is a 61 y. o.year old     Patient said that she is feeling much better today and comes to tachypnea and bradycardia.   She has not had any episodes this morning. Not complaining of any chest pain, lightheadedness, dizziness, shortness of breath at this time. Objective: Temperature:  Current - Temp: (!) 96.3 °F (35.7 °C); Max - Temp  Av.3 °F (36.3 °C)  Min: 96.3 °F (35.7 °C)  Max: 97.9 °F (36.6 °C)    Respiratory Rate : Resp  Av.8  Min: 17  Max: 31    Pulse Range: Pulse  Av.8  Min: 65  Max: 84    Blood Presuure Range:  Systolic (07PHO), AHJ:952 , Min:123 , ZVO:224   ; Diastolic (05DDY), PHY:86, Min:82, Max:105      Pulse ox Range: SpO2  Av.6 %  Min: 93 %  Max: 100 %    24hr I & O:    Intake/Output Summary (Last 24 hours) at 2023 1601  Last data filed at 2023 1525  Gross per 24 hour   Intake 720 ml   Output 1650 ml   Net -930 ml         BP (!) 159/99   Pulse 76   Temp (!) 96.3 °F (35.7 °C) (Oral)   Resp 18   Ht 5' 2\" (1.575 m)   Wt 135 lb 12.8 oz (61.6 kg)   SpO2 93%   BMI 24.84 kg/m²         TELEMETRY: Juan      has a past medical history of CAD (coronary artery disease), Diabetes mellitus ([facility] [address] Hepatitis C, and Schizophrenia ([facility] [address] has a past surgical history that includes Hysterectomy and Cholecystectomy. Physical Exam  Constitutional:       General: She is not in acute distress. Appearance: She is not diaphoretic. HENT:      Head: Normocephalic and atraumatic. Right Ear: External ear normal.      Left Ear: External ear normal.      Nose: Nose normal.      Mouth/Throat:      Mouth: Mucous membranes are moist.   Eyes:      Extraocular Movements: Extraocular movements intact. Comments: Pupils equal and round   Neck:      Vascular: No carotid bruit. Cardiovascular:      Rate and Rhythm: Normal rate and regular rhythm. Pulses: Normal pulses. Heart sounds: S1 normal and S2 normal. No murmur heard. No friction rub. No gallop. Pulmonary:      Effort: Pulmonary effort is normal. No respiratory distress. Breath sounds: Normal breath sounds. No rales.    Chest:      Chest wall: No tenderness. Abdominal:      Palpations: Abdomen is soft. Tenderness: There is no abdominal tenderness. Musculoskeletal:      Right lower leg: No edema. Left lower leg: No edema. Skin:     General: Skin is warm and dry. Capillary Refill: Capillary refill takes less than 2 seconds. Findings: No rash. Comments: Skin turgor brisk   Neurological:      Mental Status: She is alert and oriented to person, place, and time. Mental status is at baseline. Psychiatric:         Behavior: Behavior is cooperative. Thought Content:  Thought content normal.         Judgment: Judgment normal.        Medications:    lactulose  20 g Oral BID    polyethylene glycol  17 g Oral Daily    sodium zirconium cyclosilicate  10 g Oral Daily    sodium chloride flush  5-40 mL IntraVENous 2 times per day    ipratropium-albuterol  1 ampule Inhalation Q4H WA    aspirin  81 mg Oral Daily    [Held by provider] metoprolol succinate  100 mg Oral Daily    rivaroxaban  20 mg Oral Dinner    furosemide  40 mg IntraVENous BID    insulin glargine  10 Units SubCUTAneous Nightly    insulin lispro  0-4 Units SubCUTAneous TID WC    insulin lispro  0-4 Units SubCUTAneous Nightly    pantoprazole  40 mg IntraVENous Daily      dextrose      sodium chloride       glucose, dextrose bolus **OR** dextrose bolus, glucagon (rDNA), dextrose, oxyCODONE-acetaminophen, sodium chloride flush, sodium chloride, ondansetron **OR** ondansetron, polyethylene glycol, acetaminophen **OR** acetaminophen, potassium chloride **OR** potassium alternative oral replacement **OR** potassium chloride, magnesium sulfate, aluminum & magnesium hydroxide-simethicone, guaiFENesin    Lab Data:  CBC:   Recent Labs     02/23/23 2032 02/24/23  0352   WBC 9.2 9.0   HGB 13.7 15.5   HCT 42.0 46.1   MCV 95.7 92.9    176     BMP:   Recent Labs     02/23/23 2032 02/24/23  0352 02/24/23  1554 02/24/23  2106 02/25/23  0826 02/25/23  1530   *   < > 134* 133* 132*  --    K 5.2*   < > 5.6* 4.2  4.3 4.0 3.3*      < > 102 97* 98*  --    CO2 18*   < > 19* 24 22  --    PHOS 4.4  --   --   --  4.0  --    BUN 19   < > 27* 28* 25*  --    CREATININE 1.5*   < > 2.3* 2.1* 1.8*  --     < > = values in this interval not displayed. LIVER PROFILE:   Recent Labs     02/23/23 2032 02/24/23  1554 02/25/23  0826   AST 21  --   --    ALT 16  --   --    LIPASE  --  32 31   BILITOT 0.5  --   --    ALKPHOS 159*  --   --      PT/INR: No results for input(s): PROTIME, INR in the last 72 hours. APTT: No results for input(s): APTT in the last 72 hours. BNP:  No results for input(s): BNP in the last 72 hours. TROPONIN: No results for input(s): TROPONINT in the last 72 hours.   Labs, consult, tests reviewed          Skylar Nunes PA-C, 2/25/2023 4:01 PM

## 2023-02-25 NOTE — PROGRESS NOTES
Cardiology Progress Note     Admit Date:  2/23/2023    Consult reason/ Seen today for :       Subjective and  Overnight Events : He was bradycardic on admission has been on dopamine drip put on a very small dose heart rate has been mostly been in 60s and 70s  Came in with hyperkalemia evaluated by nephrology she says that she is feeling better now    Chief complain on admission : 61 y. o.year old who is admitted forNo chief complaint on file. Assessment / Plan:  Nonischemic cardiomyopathy EF 30 to 35% came in with hyperkalemia. Also complicating issues schizophrenia hepatitis C continue Lasix 40 twice daily, continue Toprol- mg daily ACE and ARB on hold due to renal failure by nephrology consider restarting at  We will debate ICD as outpatient  Bradycardia? Stop dopamine she is on Toprol-XL we will follow  DVT Prophylaxis if no contraindication    Past medical history:    has a past medical history of CAD (coronary artery disease), Diabetes mellitus ([facility] [address] Hepatitis C, HFrEF (heart failure with reduced ejection fraction) ([facility] [address] and Schizophrenia ([facility] [address] Past surgical history:   has a past surgical history that includes Hysterectomy and Cholecystectomy. Social History:   reports that she has been smoking cigarettes. She has a 7.50 pack-year smoking history. She has never used smokeless tobacco. She reports current alcohol use. She reports that she does not currently use drugs after having used the following drugs: Marijuana [place]). Family history:  family history includes Cancer in her father; No Known Problems in her mother.     Allergies   Allergen Reactions    Haldol [Haloperidol Lactate] Other (See Comments)     Unknown, severe reaction per mother    Penicillins        Review of Systems:    All 14 systems were reviewed and are negative  Except for the positive findings  which as documented     BP (!) 155/83   Pulse 76 Temp 97.6 °F (36.4 °C) (Oral)   Resp 19   Ht 5' 2\" (1.575 m)   Wt 135 lb 12.8 oz (61.6 kg)   SpO2 97%   BMI 24.84 kg/m²     Intake/Output Summary (Last 24 hours) at 2/25/2023 1643  Last data filed at 2/25/2023 1600  Gross per 24 hour   Intake 720 ml   Output 2950 ml   Net -2230 ml     Physical Exam:  Constitutional:  Well developed, Well nourished, No acute distress, Non-toxic appearance. HENT:  Normocephalic, Atraumatic, Bilateral external ears normal, Oropharynx moist, No oral exudates, Nose normal. Neck- Normal range of motion, No tenderness, Supple, No stridor. Eyes:  PERRL, EOMI, Conjunctiva normal, No discharge. Respiratory:  Normal breath sounds, No respiratory distress, No wheezing, No chest tenderness. Cardiovascular:  Normal heart rate, Normal rhythm, No murmurs, No rubs, No gallops, JVP not elevated  Abdomen/GI:  Bowel sounds normal, Soft, No tenderness, No masses, No pulsatile masses. Musculoskeletal:  Intact distal pulses, No edema, No tenderness, No cyanosis, No clubbing. Good range of motion in all major joints. No tenderness to palpation or major deformities noted. Back- No tenderness. Integument:  Warm, Dry, No erythema, No rash. Lymphatic:  No lymphadenopathy noted. Neurologic:  Alert & oriented x 3, Normal motor function, Normal sensory function, No focal deficits noted.    Psychiatric:  Affect  and  Mood :no change    Medications:    lactulose  20 g Oral BID    polyethylene glycol  17 g Oral Daily    sodium zirconium cyclosilicate  10 g Oral Daily    sodium chloride flush  5-40 mL IntraVENous 2 times per day    ipratropium-albuterol  1 ampule Inhalation Q4H WA    aspirin  81 mg Oral Daily    [Held by provider] metoprolol succinate  100 mg Oral Daily    rivaroxaban  20 mg Oral Dinner    furosemide  40 mg IntraVENous BID    insulin glargine  10 Units SubCUTAneous Nightly    insulin lispro  0-4 Units SubCUTAneous TID     insulin lispro  0-4 Units SubCUTAneous Nightly pantoprazole  40 mg IntraVENous Daily      dextrose      sodium chloride       glucose, dextrose bolus **OR** dextrose bolus, glucagon (rDNA), dextrose, oxyCODONE-acetaminophen, sodium chloride flush, sodium chloride, ondansetron **OR** ondansetron, polyethylene glycol, acetaminophen **OR** acetaminophen, potassium chloride **OR** potassium alternative oral replacement **OR** potassium chloride, magnesium sulfate, aluminum & magnesium hydroxide-simethicone, guaiFENesin    Lab Data:  CBC:   Recent Labs     02/23/23 2032 02/24/23  0352 02/25/23  1530   WBC 9.2 9.0 9.1   HGB 13.7 15.5 14.0   HCT 42.0 46.1 41.1   MCV 95.7 92.9 92.6    176 160     BMP:   Recent Labs     02/23/23 2032 02/24/23 0352 02/24/23  1554 02/24/23  2106 02/25/23  0826 02/25/23  1530   *   < > 134* 133* 132*  --    K 5.2*   < > 5.6* 4.2  4.3 4.0 3.3*      < > 102 97* 98*  --    CO2 18*   < > 19* 24 22  --    PHOS 4.4  --   --   --  4.0  --    BUN 19   < > 27* 28* 25*  --    CREATININE 1.5*   < > 2.3* 2.1* 1.8*  --     < > = values in this interval not displayed. PT/INR: No results for input(s): PROTIME, INR in the last 72 hours. BNP:  No results for input(s): PROBNP in the last 72 hours. TROPONIN: No results for input(s): TROPONINT in the last 72 hours. ECHO :   echocardiogram    Assessment:  61 y. o.year old who is admitted forNo chief complaint on file. , active issues as noted below:  Impression:  Principal Problem:    Acute diastolic CHF (congestive heart failure) (HCC)  Active Problems:    Congestive heart failure, unspecified HF chronicity, unspecified heart failure type (HCC)    Precordial pain    Type 2 diabetes mellitus without complication, with long-term current use of insulin (HCC)    Nonrheumatic mitral valve regurgitation  Resolved Problems:    * No resolved hospital problems.  *            All labs, medications and tests reviewed by myself , continue all other medications of all above medical condition listed as is except for changes mentioned above. Thank you very much for consult , please call with questions.     Kia Childress MD, MD 2/25/2023 4:43 PM

## 2023-02-25 NOTE — PROGRESS NOTES
V2.0  [facility] [place]      Name:  Candace Norton /Age/Sex: 1965  (61 y.o. female)   MRN & CSN:  [id number] & [id number] Encounter Date/Time: 2023 9:38 PM EST    [facility]  [facility] PCP: Tamera Nassar Day: 2    Assessment and Plan:   Candace Norton is a 61 y.o. female     -she was admitted on  and is being diuresed for CHF. Today she has an acute kidney injury with hyperkalemia. She had multiple episodes of bradycardia. After multiple treatments her potassium level finally came down from 6.0 in the morning at 4 AM to 4.2 at 9 p.m. Appreciate nephrology consult. Cardiology saw her, and she is on dopamine drip at 5 mics due to multiple episodes of bradycardia during the day      1. Acute on chronic systolic heart failure/NICMP:  -Patient presenting with shortness of breath/orthopnea/weight gain in setting of noncompliance  - Elevated BNP and trop likely form CHF. No chest pain now. EKG ordered. Trop was trended down from 184 to 162 as per careevery where. - Chest xray showed pulm edema  -continue Lasix 40 mg IV BID  -Strict intake/output record  -Daily weights  -Continue Lasix/metoprolol. Hold lisinopril for now  -Cardiology consulted. Continue tele monitoring. EKG ordered. Recent echo EF 30-35%. RVSP 58 mmg Hg. Currently has 2 L with 99 %. I believe that can be weaned off.      2. MELANY on CKD - Base line around 0.5 - Was 1.57 in ED. Likely from renal congestion. Will continue to monitor with diuresis. 3. Suspicion for COPD on last admission - needs PFT as OP     4. Essential hypertension:  -will hold lisinopril. Continue lasix and metoproloo     5. GERD - PPI      6. History of DVT and PE:  -Continue Xarelto     7. Tobacco abuse:  -We will provide counseling/offer nicotine patch     8. Miscellaneous:  -Continue home medication except as contraindicated     9. DM II - placed on SSI. Will repeat a1c.  Last one was 6 9 months back. She reports not taking any meds now. 10. Hep  C - She reported being diagnosed with Hep C and says she follow with Dr as OP. Has not been treated. 11. She also takes abilify IM. As per pharmacy we don't keep Abilify here. Abdominal pain-CT scan ordered on February 23. Percocet as needed ordered in February 23. Hepatitis C virus-has been referred to GI as an outpatient. Has seen Dr. Berto Gay. Diet ADULT DIET; Regular; No Added Salt (3-4 gm); Low Potassium (Less than 3000 mg/day); 1800 ml   DVT Prophylaxis [] Lovenox, []  Heparin, [] SCDs, [] Ambulation,  [] Eliquis, [] Xarelto  [] Coumadin   Code Status Full Code   Disposition From: home  Expected Disposition: home  Estimated Date of Discharge: in about 2 days  Patient requires continued admission due to being on dopamine drip for bradycardia   Surrogate Decision Maker/ Adair Dyer is listed as an alternate contact in the chart     Subjective:       She was having pain above her navel today         Review of Systems:    Review of Systems    No fever or bleeding reported today    Objective: Intake/Output Summary (Last 24 hours) at 2/24/2023 2138  Last data filed at 2/24/2023 2035  Gross per 24 hour   Intake 120 ml   Output 1500 ml   Net -1380 ml        Vitals:   Vitals:    02/24/23 1959   BP: (!) 136/92   Pulse: 79   Resp: 18   Temp: 97 °F (36.1 °C)   SpO2: 98%       Physical Exam:       Eyes: EOMI  Neuro: Alert. Psych: Mood appropriate.      Medications:   Medications:    sodium zirconium cyclosilicate  10 g Oral Daily    sodium chloride flush  5-40 mL IntraVENous 2 times per day    ipratropium-albuterol  1 ampule Inhalation Q4H WA    aspirin  81 mg Oral Daily    [Held by provider] metoprolol succinate  100 mg Oral Daily    rivaroxaban  20 mg Oral Dinner    furosemide  40 mg IntraVENous BID    insulin glargine  10 Units SubCUTAneous Nightly    insulin lispro  0-4 Units SubCUTAneous TID WC    insulin lispro  0-4 Units SubCUTAneous Nightly    pantoprazole  40 mg IntraVENous Daily      Infusions:    dextrose      DOPamine 5 mcg/kg/min (02/24/23 5547)    sodium chloride       PRN Meds: glucose, 4 tablet, PRN  dextrose bolus, 125 mL, PRN   Or  dextrose bolus, 250 mL, PRN  glucagon (rDNA), 1 mg, PRN  dextrose, , Continuous PRN  oxyCODONE-acetaminophen, 1 tablet, Q8H PRN  sodium chloride flush, 5-40 mL, PRN  sodium chloride, , PRN  ondansetron, 4 mg, Q8H PRN   Or  ondansetron, 4 mg, Q6H PRN  polyethylene glycol, 17 g, Daily PRN  acetaminophen, 650 mg, Q6H PRN   Or  acetaminophen, 650 mg, Q6H PRN  potassium chloride, 40 mEq, PRN   Or  potassium alternative oral replacement, 40 mEq, PRN   Or  potassium chloride, 10 mEq, PRN  magnesium sulfate, 2,000 mg, PRN  aluminum & magnesium hydroxide-simethicone, 30 mL, Q6H PRN  guaiFENesin, 200 mg, Q4H PRN        Labs      Recent Results (from the past 24 hour(s))   Basic Metabolic Panel    Collection Time: 02/24/23  3:52 AM   Result Value Ref Range    Sodium 135 135 - 145 MMOL/L    Potassium 6.0 (HH) 3.5 - 5.1 MMOL/L    Chloride 102 99 - 110 mMol/L    CO2 23 21 - 32 MMOL/L    Anion Gap 10 4 - 16    BUN 20 6 - 23 MG/DL    Creatinine 1.9 (H) 0.6 - 1.1 MG/DL    Est, Glom Filt Rate 30 (L) >60 mL/min/1.73m2    Glucose 241 (H) 70 - 99 MG/DL    Calcium 9.3 8.3 - 10.6 MG/DL   Magnesium    Collection Time: 02/24/23  3:52 AM   Result Value Ref Range    Magnesium 2.0 1.8 - 2.4 mg/dl   Lipid Panel    Collection Time: 02/24/23  3:52 AM   Result Value Ref Range    Triglycerides 145 <150 MG/DL    Cholesterol 235 (H) <200 MG/DL    HDL 90 >40 MG/DL    LDL Calculated 116 (H) <100 MG/DL   CBC with Auto Differential    Collection Time: 02/24/23  3:52 AM   Result Value Ref Range    WBC 9.0 4.0 - 10.5 K/CU MM    RBC 4.96 4.2 - 5.4 M/CU MM    Hemoglobin 15.5 12.5 - 16.0 GM/DL    Hematocrit 46.1 37 - 47 %    MCV 92.9 78 - 100 FL    MCH 31.3 (H) 27 - 31 PG    MCHC 33.6 32.0 - 36.0 %    RDW 14.2 11.7 - 14.9 %    Platelets 176 140 - 440 K/CU MM    MPV 10.6 7.5 - 11.1 FL    Differential Type AUTOMATED DIFFERENTIAL     Segs Relative 72.8 (H) 36 - 66 %    Lymphocytes % 20.8 (L) 24 - 44 %    Monocytes % 5.1 (H) 0 - 4 %    Eosinophils % 0.3 0 - 3 %    Basophils % 0.4 0 - 1 %    Segs Absolute 6.6 K/CU MM    Lymphocytes Absolute 1.9 K/CU MM    Monocytes Absolute 0.5 K/CU MM    Eosinophils Absolute 0.0 K/CU MM    Basophils Absolute 0.0 K/CU MM    Nucleated RBC % 0.0 %    Total Nucleated RBC 0.0 K/CU MM    Total Immature Neutrophil 0.05 K/CU MM    Immature Neutrophil % 0.6 (H) 0 - 0.43 %   POCT Glucose    Collection Time: 02/24/23  8:17 AM   Result Value Ref Range    POC Glucose 319 (H) 70 - 99 MG/DL   POCT Glucose    Collection Time: 02/24/23 11:58 AM   Result Value Ref Range    POC Glucose 213 (H) 70 - 99 MG/DL   SPECIMEN REJECTION    Collection Time: 02/24/23 12:22 PM   Result Value Ref Range    Rejected Test EBCG TSHS     Reason for Rejection UNABLE TO PERFORM TESTING:    EKG 12 Lead    Collection Time: 02/24/23 12:44 PM   Result Value Ref Range    Ventricular Rate 100 BPM    Atrial Rate 100 BPM    P-R Interval 134 ms    QRS Duration 84 ms    Q-T Interval 374 ms    QTc Calculation (Bazett) 482 ms    P Axis 74 degrees    R Axis -9 degrees    T Axis 65 degrees    Diagnosis       Normal sinus rhythm  Prolonged QT  Abnormal ECG  When compared with ECG of 23-FEB-2023 20:07,  No significant change was found  Confirmed by Vasquez Boss ([id number]) on 2/24/2023 8:25:45 PM     Electrolytes urine random    Collection Time: 02/24/23  3:05 PM   Result Value Ref Range    Sodium, Ur 95 35 - 167 MMOL/L    Potassium, Ur 35.0 22 - 119 MMOL/L    Chloride 96 43 - 210 MMOL/L   Protein / creatinine ratio, urine    Collection Time: 02/24/23  3:05 PM   Result Value Ref Range    Urine Total Protein 287.6 (H) <12 MG/DL    Creatinine, Ur 43.0 28 - 217 MG/DL    Prot/Creat Ratio, Ur 6.7 (H) <0.2   Urinalysis    Collection Time: 02/24/23  3:05 PM   Result Value Ref Range Color, UA YELLOW YELLOW    Clarity, UA CLEAR CLEAR    Glucose, Urine 100 (A) NEGATIVE MG/DL    Bilirubin Urine NEGATIVE NEGATIVE MG/DL    Ketones, Urine NEGATIVE NEGATIVE MG/DL    Specific Gravity, UA 1.020 1.001 - 1.035    Blood, Urine SMALL NUMBER OR AMOUNT OBSERVED (A) NEGATIVE    pH, Urine 6.0 5.0 - 8.0    Protein, UA >300 (HH) NEGATIVE MG/DL    Urobilinogen, Urine 1.0 0.2 - 1.0 MG/DL    Nitrite Urine, Quantitative NEGATIVE NEGATIVE    Leukocyte Esterase, Urine NEGATIVE NEGATIVE   Microscopic Urinalysis    Collection Time: 02/24/23  3:05 PM   Result Value Ref Range    RBC, UA 2 0 - 6 /HPF    WBC, UA NONE SEEN 0 - 5 /HPF    Bacteria, UA NEGATIVE NEGATIVE /HPF    Squam Epithel, UA 2 /HPF    Mucus, UA RARE (A) NEGATIVE HPF    Trichomonas, UA NONE SEEN NONE SEEN /HPF    Hyaline Casts, UA 10 /LPF   SPECIMEN REJECTION    Collection Time: 02/24/23  3:13 PM   Result Value Ref Range    Rejected Test CHEM     Reason for Rejection UNABLE TO PERFORM TESTING:    POCT Glucose    Collection Time: 02/24/23  3:16 PM   Result Value Ref Range    POC Glucose 132 (H) 70 - 99 MG/DL   Gamma GT    Collection Time: 02/24/23  3:54 PM   Result Value Ref Range    GGT 48 (H) 5 - 36 IU/L   Basic Metabolic Panel    Collection Time: 02/24/23  3:54 PM   Result Value Ref Range    Sodium 134 (L) 135 - 145 MMOL/L    Potassium 5.6 (HH) 3.5 - 5.1 MMOL/L    Chloride 102 99 - 110 mMol/L    CO2 19 (L) 21 - 32 MMOL/L    Anion Gap 13 4 - 16    BUN 27 (H) 6 - 23 MG/DL    Creatinine 2.3 (H) 0.6 - 1.1 MG/DL    Est, Glom Filt Rate 24 (L) >60 mL/min/1.73m2    Glucose 150 (H) 70 - 99 MG/DL    Calcium 8.6 8.3 - 10.6 MG/DL   Lipase    Collection Time: 02/24/23  3:54 PM   Result Value Ref Range    Lipase 32 13 - 60 IU/L   TSH    Collection Time: 02/24/23  3:54 PM   Result Value Ref Range    TSH, High Sensitivity 3.880 0.270 - 4.20 uIu/ml   Ammonia    Collection Time: 02/24/23  3:57 PM   Result Value Ref Range    Ammonia 63 (H) 11 - 51 UMOL/L   POCT Glucose Collection Time: 02/24/23  7:27 PM   Result Value Ref Range    POC Glucose 194 (H) 70 - 99 MG/DL   Basic Metabolic Panel w/ Reflex to MG    Collection Time: 02/24/23  9:06 PM   Result Value Ref Range    Sodium 133 (L) 135 - 145 MMOL/L    Potassium 4.3 3.5 - 5.1 MMOL/L    Chloride 97 (L) 99 - 110 mMol/L    CO2 24 21 - 32 MMOL/L    Anion Gap 12 4 - 16    BUN 28 (H) 6 - 23 MG/DL    Creatinine 2.1 (H) 0.6 - 1.1 MG/DL    Est, Glom Filt Rate 27 (L) >60 mL/min/1.73m2    Glucose 265 (H) 70 - 99 MG/DL    Calcium 8.7 8.3 - 10.6 MG/DL   Potassium w/ Reflex to Magnesium    Collection Time: 02/24/23  9:06 PM   Result Value Ref Range    Potassium 4.2 3.5 - 5.1 MMOL/L        Imaging/Diagnostics Last 24 Hours   XR CHEST PORTABLE    Result Date: 2/23/2023  EXAMINATION: ONE XRAY VIEW OF THE CHEST 2/23/2023 8:22 pm COMPARISON: 12/01/2022 HISTORY: ORDERING SYSTEM PROVIDED HISTORY: sob TECHNOLOGIST PROVIDED HISTORY: Reason for exam:->sob Reason for Exam: sob Additional signs and symptoms: na Relevant Medical/Surgical History: diabetes FINDINGS: Cardiomegaly with pulmonary vascular congestion. No overt edema or consolidation. Is sinal contours normal.  No pneumothorax or pleural effusion. No acute osseous abnormality. New enlargement of the cardiac silhouette with pulmonary vascular congestion. Differential diagnosis includes cardiomegaly or pericardial effusion.        Electronically signed by Angela Jain MD on 2/24/2023 at 9:38 PM

## 2023-02-26 LAB
ALBUMIN SERPL-MCNC: 2.7 GM/DL (ref 3.4–5)
ANION GAP SERPL CALCULATED.3IONS-SCNC: 12 MMOL/L (ref 4–16)
BUN SERPL-MCNC: 28 MG/DL (ref 6–23)
CALCIUM SERPL-MCNC: 7.7 MG/DL (ref 8.3–10.6)
CHLORIDE BLD-SCNC: 98 MMOL/L (ref 99–110)
CO2: 26 MMOL/L (ref 21–32)
CREAT SERPL-MCNC: 1.9 MG/DL (ref 0.6–1.1)
GFR SERPL CREATININE-BSD FRML MDRD: 30 ML/MIN/1.73M2
GLUCOSE BLD-MCNC: 130 MG/DL (ref 70–99)
GLUCOSE BLD-MCNC: 218 MG/DL (ref 70–99)
GLUCOSE BLD-MCNC: 236 MG/DL (ref 70–99)
GLUCOSE BLD-MCNC: 286 MG/DL (ref 70–99)
GLUCOSE SERPL-MCNC: 365 MG/DL (ref 70–99)
HCT VFR BLD CALC: 38.8 % (ref 37–47)
HEMOGLOBIN: 12.8 GM/DL (ref 12.5–16)
MAGNESIUM: 1.9 MG/DL (ref 1.8–2.4)
MCH RBC QN AUTO: 31.1 PG (ref 27–31)
MCHC RBC AUTO-ENTMCNC: 33 % (ref 32–36)
MCV RBC AUTO: 94.2 FL (ref 78–100)
PDW BLD-RTO: 14.2 % (ref 11.7–14.9)
PHOSPHORUS: 3.8 MG/DL (ref 2.5–4.9)
PLATELET # BLD: 155 K/CU MM (ref 140–440)
PMV BLD AUTO: 10.8 FL (ref 7.5–11.1)
POTASSIUM SERPL-SCNC: 3.9 MMOL/L (ref 3.5–5.1)
POTASSIUM SERPL-SCNC: 4.1 MMOL/L (ref 3.5–5.1)
POTASSIUM SERPL-SCNC: 4.2 MMOL/L (ref 3.5–5.1)
POTASSIUM SERPL-SCNC: 4.3 MMOL/L (ref 3.5–5.1)
PRO-BNP: 9471 PG/ML
RBC # BLD: 4.12 M/CU MM (ref 4.2–5.4)
SODIUM BLD-SCNC: 136 MMOL/L (ref 135–145)
WBC # BLD: 7.9 K/CU MM (ref 4–10.5)

## 2023-02-26 PROCEDURE — 36415 COLL VENOUS BLD VENIPUNCTURE: CPT

## 2023-02-26 PROCEDURE — 6370000000 HC RX 637 (ALT 250 FOR IP): Performed by: STUDENT IN AN ORGANIZED HEALTH CARE EDUCATION/TRAINING PROGRAM

## 2023-02-26 PROCEDURE — 2580000003 HC RX 258

## 2023-02-26 PROCEDURE — 6370000000 HC RX 637 (ALT 250 FOR IP): Performed by: FAMILY MEDICINE

## 2023-02-26 PROCEDURE — 84132 ASSAY OF SERUM POTASSIUM: CPT

## 2023-02-26 PROCEDURE — 2140000000 HC CCU INTERMEDIATE R&B

## 2023-02-26 PROCEDURE — 85027 COMPLETE CBC AUTOMATED: CPT

## 2023-02-26 PROCEDURE — 94664 DEMO&/EVAL PT USE INHALER: CPT

## 2023-02-26 PROCEDURE — 83880 ASSAY OF NATRIURETIC PEPTIDE: CPT

## 2023-02-26 PROCEDURE — 99233 SBSQ HOSP IP/OBS HIGH 50: CPT | Performed by: INTERNAL MEDICINE

## 2023-02-26 PROCEDURE — 6370000000 HC RX 637 (ALT 250 FOR IP): Performed by: INTERNAL MEDICINE

## 2023-02-26 PROCEDURE — 94640 AIRWAY INHALATION TREATMENT: CPT

## 2023-02-26 PROCEDURE — 2500000003 HC RX 250 WO HCPCS: Performed by: FAMILY MEDICINE

## 2023-02-26 PROCEDURE — 82962 GLUCOSE BLOOD TEST: CPT

## 2023-02-26 PROCEDURE — 80069 RENAL FUNCTION PANEL: CPT

## 2023-02-26 PROCEDURE — 2580000003 HC RX 258: Performed by: STUDENT IN AN ORGANIZED HEALTH CARE EDUCATION/TRAINING PROGRAM

## 2023-02-26 PROCEDURE — 6360000002 HC RX W HCPCS: Performed by: STUDENT IN AN ORGANIZED HEALTH CARE EDUCATION/TRAINING PROGRAM

## 2023-02-26 PROCEDURE — C9113 INJ PANTOPRAZOLE SODIUM, VIA: HCPCS | Performed by: STUDENT IN AN ORGANIZED HEALTH CARE EDUCATION/TRAINING PROGRAM

## 2023-02-26 PROCEDURE — 94761 N-INVAS EAR/PLS OXIMETRY MLT: CPT

## 2023-02-26 PROCEDURE — 83735 ASSAY OF MAGNESIUM: CPT

## 2023-02-26 RX ORDER — TRAZODONE HYDROCHLORIDE 50 MG/1
50 TABLET ORAL ONCE
Status: COMPLETED | OUTPATIENT
Start: 2023-02-26 | End: 2023-02-26

## 2023-02-26 RX ORDER — IPRATROPIUM BROMIDE AND ALBUTEROL SULFATE 2.5; .5 MG/3ML; MG/3ML
1 SOLUTION RESPIRATORY (INHALATION) EVERY 4 HOURS PRN
Status: DISCONTINUED | OUTPATIENT
Start: 2023-02-26 | End: 2023-02-27 | Stop reason: HOSPADM

## 2023-02-26 RX ORDER — WATER 1000 ML/1000ML
INJECTION, SOLUTION INTRAVENOUS
Status: COMPLETED
Start: 2023-02-26 | End: 2023-02-26

## 2023-02-26 RX ADMIN — IPRATROPIUM BROMIDE AND ALBUTEROL SULFATE 1 AMPULE: 2.5; .5 SOLUTION RESPIRATORY (INHALATION) at 15:25

## 2023-02-26 RX ADMIN — LACTULOSE 20 G: 10 SOLUTION ORAL at 08:59

## 2023-02-26 RX ADMIN — FUROSEMIDE 40 MG: 10 INJECTION, SOLUTION INTRAMUSCULAR; INTRAVENOUS at 17:14

## 2023-02-26 RX ADMIN — SODIUM CHLORIDE, PRESERVATIVE FREE 10 ML: 5 INJECTION INTRAVENOUS at 08:59

## 2023-02-26 RX ADMIN — RIVAROXABAN 20 MG: 20 TABLET, FILM COATED ORAL at 17:14

## 2023-02-26 RX ADMIN — INSULIN LISPRO 2 UNITS: 100 INJECTION, SOLUTION INTRAVENOUS; SUBCUTANEOUS at 17:14

## 2023-02-26 RX ADMIN — IPRATROPIUM BROMIDE AND ALBUTEROL SULFATE 1 AMPULE: 2.5; .5 SOLUTION RESPIRATORY (INHALATION) at 12:26

## 2023-02-26 RX ADMIN — OXYCODONE AND ACETAMINOPHEN 1 TABLET: 5; 325 TABLET ORAL at 15:01

## 2023-02-26 RX ADMIN — FUROSEMIDE 40 MG: 10 INJECTION, SOLUTION INTRAMUSCULAR; INTRAVENOUS at 08:59

## 2023-02-26 RX ADMIN — LACTULOSE 20 G: 10 SOLUTION ORAL at 20:33

## 2023-02-26 RX ADMIN — WATER 10 ML: 1 INJECTION INTRAMUSCULAR; INTRAVENOUS; SUBCUTANEOUS at 08:59

## 2023-02-26 RX ADMIN — SODIUM CHLORIDE, PRESERVATIVE FREE 10 ML: 5 INJECTION INTRAVENOUS at 20:34

## 2023-02-26 RX ADMIN — ONDANSETRON 4 MG: 4 TABLET, ORALLY DISINTEGRATING ORAL at 04:51

## 2023-02-26 RX ADMIN — TRAZODONE HYDROCHLORIDE 50 MG: 50 TABLET ORAL at 21:24

## 2023-02-26 RX ADMIN — GUAIFENESIN 200 MG: 100 SOLUTION ORAL at 04:37

## 2023-02-26 RX ADMIN — POLYETHYLENE GLYCOL 3350 17 G: 17 POWDER, FOR SOLUTION ORAL at 04:41

## 2023-02-26 RX ADMIN — IPRATROPIUM BROMIDE AND ALBUTEROL SULFATE 1 AMPULE: 2.5; .5 SOLUTION RESPIRATORY (INHALATION) at 08:05

## 2023-02-26 RX ADMIN — GUAIFENESIN 200 MG: 100 SOLUTION ORAL at 15:01

## 2023-02-26 RX ADMIN — PANTOPRAZOLE SODIUM 40 MG: 40 INJECTION, POWDER, FOR SOLUTION INTRAVENOUS at 08:59

## 2023-02-26 RX ADMIN — ASPIRIN 81 MG 81 MG: 81 TABLET ORAL at 08:59

## 2023-02-26 RX ADMIN — OXYCODONE AND ACETAMINOPHEN 1 TABLET: 5; 325 TABLET ORAL at 04:49

## 2023-02-26 RX ADMIN — INSULIN GLARGINE 10 UNITS: 100 INJECTION, SOLUTION SUBCUTANEOUS at 20:30

## 2023-02-26 RX ADMIN — BISACODYL 10 MG: 5 TABLET, COATED ORAL at 15:01

## 2023-02-26 ASSESSMENT — PAIN - FUNCTIONAL ASSESSMENT
PAIN_FUNCTIONAL_ASSESSMENT: PREVENTS OR INTERFERES SOME ACTIVE ACTIVITIES AND ADLS
PAIN_FUNCTIONAL_ASSESSMENT: PREVENTS OR INTERFERES WITH ALL ACTIVE AND SOME PASSIVE ACTIVITIES
PAIN_FUNCTIONAL_ASSESSMENT: PREVENTS OR INTERFERES SOME ACTIVE ACTIVITIES AND ADLS

## 2023-02-26 ASSESSMENT — PAIN DESCRIPTION - LOCATION
LOCATION: ABDOMEN

## 2023-02-26 ASSESSMENT — PAIN DESCRIPTION - ONSET
ONSET: ON-GOING
ONSET: ON-GOING

## 2023-02-26 ASSESSMENT — PAIN SCALES - WONG BAKER
WONGBAKER_NUMERICALRESPONSE: 0

## 2023-02-26 ASSESSMENT — PAIN DESCRIPTION - PAIN TYPE
TYPE: ACUTE PAIN
TYPE: ACUTE PAIN

## 2023-02-26 ASSESSMENT — PAIN DESCRIPTION - DIRECTION: RADIATING_TOWARDS: DENIES

## 2023-02-26 ASSESSMENT — PAIN SCALES - GENERAL
PAINLEVEL_OUTOF10: 1
PAINLEVEL_OUTOF10: 0
PAINLEVEL_OUTOF10: 9
PAINLEVEL_OUTOF10: 10

## 2023-02-26 ASSESSMENT — PAIN DESCRIPTION - ORIENTATION
ORIENTATION: MID
ORIENTATION: MID

## 2023-02-26 ASSESSMENT — PAIN DESCRIPTION - DESCRIPTORS
DESCRIPTORS: ACHING;CRAMPING
DESCRIPTORS: ACHING
DESCRIPTORS: ACHING;CRAMPING

## 2023-02-26 ASSESSMENT — PAIN DESCRIPTION - FREQUENCY
FREQUENCY: INTERMITTENT
FREQUENCY: INTERMITTENT

## 2023-02-26 NOTE — PLAN OF CARE
Problem: Chronic Conditions and Co-morbidities  Goal: Patient's chronic conditions and co-morbidity symptoms are monitored and maintained or improved  2/25/2023 2149 by Isreal Arroyo RN  Outcome: Progressing  2/25/2023 0823 by Mykel Boateng RN  Outcome: Progressing     Problem: Discharge Planning  Goal: Discharge to home or other facility with appropriate resources  2/25/2023 2149 by Isreal Arroyo RN  Outcome: Progressing  2/25/2023 0823 by Mykel Boateng RN  Outcome: Progressing     Problem: Pain  Goal: Verbalizes/displays adequate comfort level or baseline comfort level  2/25/2023 2149 by Isreal Arroyo RN  Outcome: Progressing  2/25/2023 0823 by Mykel Boateng RN  Outcome: Progressing

## 2023-02-26 NOTE — PROGRESS NOTES
V2.0  [facility] [place] Hospitalist Progress Note      Name:  Tracy Baca /Age/Sex: 1965  (61 y.o. female)   MRN & CSN:  [id number] & [id number] Encounter Date/Time: 2023 9:38 PM EST    Location:  [facility]-A PCP: Mei Gutierrez Day: 3    Assessment and Plan:   Tracy Baca is a 61 y.o. female      -  no further episodes of bradycardia. W0adcypk drip was  stopped today (apparently at about 1 pm). No further issues without it. Discussed with cardiology. Bradycardia may have been just due to hyperkalemia (rather than due to intrinsic conduction system dz). -she was admitted on  and is being diuresed for CHF. Today she has an acute kidney injury with hyperkalemia. She had multiple episodes of bradycardia. After multiple treatments her potassium level finally came down from 6.0 in the morning at 4 AM to 4.2 at 9 p.m. Appreciate nephrology consult. Cardiology saw her, and she is on dopamine drip at 5 mics due to multiple episodes of bradycardia during the day      1. Acute on chronic systolic heart failure/NICMP:  -Patient presenting with shortness of breath/orthopnea/weight gain in setting of noncompliance  - Elevated BNP and trop likely form CHF. No chest pain now. EKG ordered. Trop was trended down from 184 to 162 as per careevery where. - Chest xray showed pulm edema  -continue Lasix 40 mg IV BID  -Strict intake/output record  -Daily weights  -Continue Lasix/metoprolol. Hold lisinopril for now  -Cardiology consulted. Continue tele monitoring. EKG ordered. Recent echo EF 30-35%. RVSP 58 mmg Hg. Currently has 2 L with 99 %. I believe that can be weaned off.      2. MELANY on CKD - Base line around 0.5 - Was 1.57 in ED. Likely from renal congestion. Will continue to monitor with diuresis. 3. Suspicion for COPD on last admission - needs PFT as OP     4. Essential hypertension:  -will hold lisinopril.  Continue lasix and metoproloo 5. GERD - PPI      6. History of DVT and PE:  -Continue Xarelto     7. Tobacco abuse:  -We will provide counseling/offer nicotine patch     8. Miscellaneous:  -Continue home medication except as contraindicated     9. DM II - placed on SSI. Will repeat a1c. Last one was 6 9 months back. She reports not taking any meds now. 10. Hep  C - She reported being diagnosed with Hep C and says she follow with Dr as OP. Has not been treated. 11. She also takes abilify IM. As per pharmacy we don't keep Abilify here. Abdominal pain-CT scan ordered on February 23. Percocet as needed ordered in February 23.  -2/25 - reports she thinks the abd pain is from hepatitis. Describes it as pressure in her stomach. Hepatitis C virus-has been referred to GI as an outpatient. Has seen Dr. Harpreet Booth. -reports a 3 month treatment will be done by Dr. Harpreet Booth, has not started it yet      Diet ADULT DIET; Regular; No Added Salt (3-4 gm); Low Potassium (Less than 3000 mg/day); 1800 ml   DVT Prophylaxis [] Lovenox, []  Heparin, [] SCDs, [] Ambulation,  [] Eliquis, [] Xarelto  [] Coumadin   Code Status Full Code   Disposition From: home  Expected Disposition: home  Estimated Date of Discharge: in about 2 days  Patient requires continued admission due to being on dopamine drip for bradycardia   Surrogate Decision Maker/ Britney Willard is listed as an alternate contact in the chart     Subjective:     Feb 25:    Less short of breath today. Feb 24: She was having pain above her navel today         Review of Systems:    Review of Systems    No fever or dizziness reported today. Objective:      Intake/Output Summary (Last 24 hours) at 2/25/2023 2011  Last data filed at 2/25/2023 1600  Gross per 24 hour   Intake 720 ml   Output 2950 ml   Net -2230 ml          Vitals:   Vitals:    02/25/23 1943   BP:    Pulse: 87   Resp: 20   Temp:    SpO2: 97%       Physical Exam:       Eyes: EOMI  Neuro: Alert  Psych: Mood appropriate    Medications:   Medications:    lactulose  20 g Oral BID    polyethylene glycol  17 g Oral Daily    sodium zirconium cyclosilicate  10 g Oral Daily    sodium chloride flush  5-40 mL IntraVENous 2 times per day    ipratropium-albuterol  1 ampule Inhalation Q4H WA    aspirin  81 mg Oral Daily    [Held by provider] metoprolol succinate  100 mg Oral Daily    rivaroxaban  20 mg Oral Dinner    furosemide  40 mg IntraVENous BID    insulin glargine  10 Units SubCUTAneous Nightly    insulin lispro  0-4 Units SubCUTAneous TID WC    insulin lispro  0-4 Units SubCUTAneous Nightly    pantoprazole  40 mg IntraVENous Daily      Infusions:    dextrose      sodium chloride       PRN Meds: glucose, 4 tablet, PRN  dextrose bolus, 125 mL, PRN   Or  dextrose bolus, 250 mL, PRN  glucagon (rDNA), 1 mg, PRN  dextrose, , Continuous PRN  oxyCODONE-acetaminophen, 1 tablet, Q8H PRN  sodium chloride flush, 5-40 mL, PRN  sodium chloride, , PRN  ondansetron, 4 mg, Q8H PRN   Or  ondansetron, 4 mg, Q6H PRN  polyethylene glycol, 17 g, Daily PRN  acetaminophen, 650 mg, Q6H PRN   Or  acetaminophen, 650 mg, Q6H PRN  potassium chloride, 40 mEq, PRN   Or  potassium alternative oral replacement, 40 mEq, PRN   Or  potassium chloride, 10 mEq, PRN  magnesium sulfate, 2,000 mg, PRN  aluminum & magnesium hydroxide-simethicone, 30 mL, Q6H PRN  guaiFENesin, 200 mg, Q4H PRN      Labs      Recent Results (from the past 24 hour(s))   Basic Metabolic Panel w/ Reflex to MG    Collection Time: 02/24/23  9:06 PM   Result Value Ref Range    Sodium 133 (L) 135 - 145 MMOL/L    Potassium 4.3 3.5 - 5.1 MMOL/L    Chloride 97 (L) 99 - 110 mMol/L    CO2 24 21 - 32 MMOL/L    Anion Gap 12 4 - 16    BUN 28 (H) 6 - 23 MG/DL    Creatinine 2.1 (H) 0.6 - 1.1 MG/DL    Est, Glom Filt Rate 27 (L) >60 mL/min/1.73m2    Glucose 265 (H) 70 - 99 MG/DL    Calcium 8.7 8.3 - 10.6 MG/DL   Potassium w/ Reflex to Magnesium    Collection Time: 02/24/23  9:06 PM   Result Value Ref Range    Potassium 4.2 3.5 - 5.1 MMOL/L   POCT Glucose    Collection Time: 02/25/23  6:43 AM   Result Value Ref Range    POC Glucose 137 (H) 70 - 99 MG/DL   Magnesium    Collection Time: 02/25/23  8:26 AM   Result Value Ref Range    Magnesium 1.9 1.8 - 2.4 mg/dl   Lipase    Collection Time: 02/25/23  8:26 AM   Result Value Ref Range    Lipase 31 13 - 60 IU/L   Renal Function Panel    Collection Time: 02/25/23  8:26 AM   Result Value Ref Range    Sodium 132 (L) 135 - 145 MMOL/L    Potassium 4.0 3.5 - 5.1 MMOL/L    Chloride 98 (L) 99 - 110 mMol/L    CO2 22 21 - 32 MMOL/L    Anion Gap 12 4 - 16    BUN 25 (H) 6 - 23 MG/DL    Creatinine 1.8 (H) 0.6 - 1.1 MG/DL    Est, Glom Filt Rate 32 (L) >60 mL/min/1.73m2    Glucose 189 (H) 70 - 99 MG/DL    Calcium 8.2 (L) 8.3 - 10.6 MG/DL    Albumin 2.7 (L) 3.4 - 5.0 GM/DL    Phosphorus 4.0 2.5 - 4.9 MG/DL   POCT Glucose    Collection Time: 02/25/23 11:19 AM   Result Value Ref Range    POC Glucose 162 (H) 70 - 99 MG/DL   Potassium w/ Reflex to Magnesium    Collection Time: 02/25/23  3:30 PM   Result Value Ref Range    Potassium 3.3 (L) 3.5 - 5.1 MMOL/L   CBC    Collection Time: 02/25/23  3:30 PM   Result Value Ref Range    WBC 9.1 4.0 - 10.5 K/CU MM    RBC 4.44 4.2 - 5.4 M/CU MM    Hemoglobin 14.0 12.5 - 16.0 GM/DL    Hematocrit 41.1 37 - 47 %    MCV 92.6 78 - 100 FL    MCH 31.5 (H) 27 - 31 PG    MCHC 34.1 32.0 - 36.0 %    RDW 14.1 11.7 - 14.9 %    Platelets 015 394 - 959 K/CU MM    MPV 11.0 7.5 - 11.1 FL   Magnesium    Collection Time: 02/25/23  3:30 PM   Result Value Ref Range    Magnesium 1.9 1.8 - 2.4 mg/dl        Imaging/Diagnostics Last 24 Hours   XR CHEST PORTABLE    Result Date: 2/23/2023  EXAMINATION: ONE XRAY VIEW OF THE CHEST 2/23/2023 8:22 pm COMPARISON: 12/01/2022 HISTORY: ORDERING SYSTEM PROVIDED HISTORY: sob TECHNOLOGIST PROVIDED HISTORY: Reason for exam:->sob Reason for Exam: sob Additional signs and symptoms: na Relevant Medical/Surgical History: diabetes FINDINGS: Cardiomegaly with pulmonary vascular congestion. No overt edema or consolidation. Is sinal contours normal.  No pneumothorax or pleural effusion. No acute osseous abnormality. New enlargement of the cardiac silhouette with pulmonary vascular congestion. Differential diagnosis includes cardiomegaly or pericardial effusion.        Electronically signed by Francine Lew MD on 2/25/2023 at 8:11 PM

## 2023-02-26 NOTE — PROGRESS NOTES
Cardiology Progress Note     Admit Date:  2/23/2023    Consult reason/ Seen today for :       Subjective and  Overnight Events : He was bradycardic on admission has been on dopamine drip put on a very small dose heart rate has been mostly been in 60s and 70s  Came in with hyperkalemia evaluated by nephrology she says that she is feeling better now    Chief complain on admission : 61 y. o.year old who is admitted forNo chief complaint on file. Assessment / Plan:  Nonischemic cardiomyopathy EF 30 to 35% came in with hyperkalemia. Also complicating issues schizophrenia hepatitis C continue Lasix 40 twice daily, continue Toprol- mg daily ACE and ARB on hold due to renal failure by nephrology consider restarting at  We will debate ICD as outpatient  Bradycardia? Stop dopamine she is on Toprol-XL we will follow  DVT Prophylaxis if no contraindication    Past medical history:    has a past medical history of CAD (coronary artery disease), Diabetes mellitus ([facility] [address] Hepatitis C, HFrEF (heart failure with reduced ejection fraction) ([facility] [address] and Schizophrenia ([facility] [address] Past surgical history:   has a past surgical history that includes Hysterectomy and Cholecystectomy. Social History:   reports that she has been smoking cigarettes. She has a 7.50 pack-year smoking history. She has never used smokeless tobacco. She reports current alcohol use. She reports that she does not currently use drugs after having used the following drugs: Marijuana Frank Forward). Family history:  family history includes Cancer in her father; No Known Problems in her mother.     Allergies   Allergen Reactions    Haldol [Haloperidol Lactate] Other (See Comments)     Unknown, severe reaction per mother    Penicillins        Review of Systems:    All 14 systems were reviewed and are negative  Except for the positive findings  which as documented     BP (!) 141/86   Pulse 85 Temp 97 °F (36.1 °C) (Oral)   Resp 18   Ht 5' 2\" (1.575 m)   Wt 135 lb 3 oz (61.3 kg)   SpO2 95%   BMI 24.73 kg/m²     Intake/Output Summary (Last 24 hours) at 2/26/2023 1638  Last data filed at 2/26/2023 0850  Gross per 24 hour   Intake 680 ml   Output 600 ml   Net 80 ml     Physical Exam:  Constitutional:  Well developed, Well nourished, No acute distress, Non-toxic appearance. HENT:  Normocephalic, Atraumatic, Bilateral external ears normal, Oropharynx moist, No oral exudates, Nose normal. Neck- Normal range of motion, No tenderness, Supple, No stridor. Eyes:  PERRL, EOMI, Conjunctiva normal, No discharge. Respiratory:  Normal breath sounds, No respiratory distress, No wheezing, No chest tenderness. Cardiovascular:  Normal heart rate, Normal rhythm, No murmurs, No rubs, No gallops, JVP not elevated  Abdomen/GI:  Bowel sounds normal, Soft, No tenderness, No masses, No pulsatile masses. Musculoskeletal:  Intact distal pulses, No edema, No tenderness, No cyanosis, No clubbing. Good range of motion in all major joints. No tenderness to palpation or major deformities noted. Back- No tenderness. Integument:  Warm, Dry, No erythema, No rash. Lymphatic:  No lymphadenopathy noted. Neurologic:  Alert & oriented x 3, Normal motor function, Normal sensory function, No focal deficits noted.    Psychiatric:  Affect  and  Mood :no change    Medications:    lactulose  20 g Oral BID    polyethylene glycol  17 g Oral Daily    sodium chloride flush  5-40 mL IntraVENous 2 times per day    ipratropium-albuterol  1 ampule Inhalation Q4H WA    aspirin  81 mg Oral Daily    [Held by provider] metoprolol succinate  100 mg Oral Daily    rivaroxaban  20 mg Oral Dinner    furosemide  40 mg IntraVENous BID    insulin glargine  10 Units SubCUTAneous Nightly    insulin lispro  0-4 Units SubCUTAneous TID     insulin lispro  0-4 Units SubCUTAneous Nightly    pantoprazole  40 mg IntraVENous Daily      dextrose      sodium chloride       glucose, dextrose bolus **OR** dextrose bolus, glucagon (rDNA), dextrose, oxyCODONE-acetaminophen, sodium chloride flush, sodium chloride, ondansetron **OR** ondansetron, polyethylene glycol, acetaminophen **OR** acetaminophen, potassium chloride **OR** potassium alternative oral replacement **OR** potassium chloride, magnesium sulfate, aluminum & magnesium hydroxide-simethicone, guaiFENesin    Lab Data:  CBC:   Recent Labs     02/24/23  0352 02/25/23  1530 02/26/23  0104   WBC 9.0 9.1 7.9   HGB 15.5 14.0 12.8   HCT 46.1 41.1 38.8   MCV 92.9 92.6 94.2    160 155     BMP:   Recent Labs     02/23/23 2032 02/24/23 0352 02/24/23  2106 02/25/23  0826 02/25/23  1530 02/26/23  0104 02/26/23  1420   *   < > 133* 132*  --  136  --    K 5.2*   < > 4.2  4.3 4.0 3.3* 4.3  4.1 4.2      < > 97* 98*  --  98*  --    CO2 18*   < > 24 22  --  26  --    PHOS 4.4  --   --  4.0  --  3.8  --    BUN 19   < > 28* 25*  --  28*  --    CREATININE 1.5*   < > 2.1* 1.8*  --  1.9*  --     < > = values in this interval not displayed.     PT/INR: No results for input(s): PROTIME, INR in the last 72 hours.  BNP:    Recent Labs     02/26/23  0104   PROBNP 9,471*     TROPONIN: No results for input(s): TROPONINT in the last 72 hours.     ECHO :   echocardiogram    Assessment:  57 y.o.year old who is admitted forNo chief complaint on file.   , active issues as noted below:  Impression:  Principal Problem:    Acute diastolic CHF (congestive heart failure) ([facility])  Active Problems:    Congestive heart failure, unspecified HF chronicity, unspecified heart failure type (HCC)    Precordial pain    Type 2 diabetes mellitus without complication, with long-term current use of insulin (HCC)    Nonrheumatic mitral valve regurgitation  Resolved Problems:    * No resolved hospital problems. *            All labs, medications and tests reviewed by myself , continue all other medications of all above medical condition listed as is  except for changes mentioned above. Thank you very much for consult , please call with questions.     Roxy Lamar MD, MD 2/26/2023 4:38 PM

## 2023-02-26 NOTE — PROGRESS NOTES
V2.0  [facility] Hospitalist Progress Note      Name:  Akilah Jj /Age/Sex: 1965  (61 y.o. female)   MRN & CSN:  [id number] & [id number] Encounter Date/Time: 2023 9:38 PM EST    Location:  [facility]5/3105-A PCP: Syl Knight Day: 4    Assessment and Plan:   Akilah Jj is a 61 y.o. female      -  no further episodes of bradycardia. [id number] drip was  stopped today (apparently at about 1 pm). No further issues without it. Discussed with cardiology. Bradycardia may have been just due to hyperkalemia (rather than due to intrinsic conduction system dz). -she was admitted on  and is being diuresed for CHF. Today she has an acute kidney injury with hyperkalemia. She had multiple episodes of bradycardia. After multiple treatments her potassium level finally came down from 6.0 in the morning at 4 AM to 4.2 at 9 p.m. Appreciate nephrology consult. Cardiology saw her, and she is on dopamine drip at 5 mics due to multiple episodes of bradycardia during the day      1. Acute on chronic systolic heart failure/NICMP:  -Patient presenting with shortness of breath/orthopnea/weight gain in setting of noncompliance  - Elevated BNP and trop likely form CHF. No chest pain now. EKG ordered. Trop was trended down from 184 to 162 as per careevery where. - Chest xray showed pulm edema  -continue Lasix 40 mg IV BID  -Strict intake/output record  -Daily weights  -Continue Lasix/metoprolol. Hold lisinopril for now  -Cardiology consulted. Continue tele monitoring. EKG ordered. Recent echo EF 30-35%. RVSP 58 mmg Hg. Currently has 2 L with 99 %. I believe that can be weaned off.      2. MELANY on CKD - Base line around 0.5 - Was 1.57 in ED. Likely from renal congestion. Will continue to monitor with diuresis. 3. Suspicion for COPD on last admission - needs PFT as OP     4. Essential hypertension:  -will hold lisinopril.  Continue lasix and metoproloo 5. GERD - PPI      6. History of DVT and PE:  -Continue Xarelto     7. Tobacco abuse:  -We will provide counseling/offer nicotine patch     8. Miscellaneous:  -Continue home medication except as contraindicated     9. DM II - placed on SSI. Will repeat a1c. Last one was 6 9 months back. She reports not taking any meds now. 10. Hep  C - She reported being diagnosed with Hep C and says she follow with Dr as OP. Has not been treated. 11. She also takes abilify IM. As per pharmacy we don't keep Abilify here. Abdominal pain-CT scan ordered on February 23. Percocet as needed ordered in February 23.  -2/25 - reports she thinks the abd pain is from hepatitis. Describes it as pressure in her stomach. Hepatitis C virus-has been referred to GI as an outpatient. Has seen Dr. Sherri Sanon. -reports a 3 month treatment will be done by Dr. Sherri Sanon, has not started it yet      Diet ADULT DIET; Regular; No Added Salt (3-4 gm); Low Potassium (Less than 3000 mg/day); 1800 ml   DVT Prophylaxis [] Lovenox, []  Heparin, [] SCDs, [] Ambulation,  [] Eliquis, [] Xarelto  [] Coumadin   Code Status Full Code   Disposition From: home  Expected Disposition: home  Estimated Date of Discharge: in about 2 days  Patient requires continued admission due to being on dopamine drip for bradycardia   Surrogate Decision Maker/ Presley Tinajero is listed as an alternate contact in the chart     Subjective:     Feb 25:    Less short of breath today. Feb 24: She was having pain above her navel today         Review of Systems:    Review of Systems    No fever or dizziness reported today. Objective:      Intake/Output Summary (Last 24 hours) at 2/26/2023 1824  Last data filed at 2/26/2023 1820  Gross per 24 hour   Intake 920 ml   Output 1830 ml   Net -910 ml          Vitals:   Vitals:    02/26/23 1539   BP: (!) 141/86   Pulse:    Resp:    Temp: 97 °F (36.1 °C)   SpO2: 95%       Physical Exam:       Eyes: EOMI  Neuro: Alert  Psych: Mood appropriate    Medications:   Medications:    lactulose  20 g Oral BID    polyethylene glycol  17 g Oral Daily    sodium chloride flush  5-40 mL IntraVENous 2 times per day    aspirin  81 mg Oral Daily    [Held by provider] metoprolol succinate  100 mg Oral Daily    rivaroxaban  20 mg Oral Dinner    furosemide  40 mg IntraVENous BID    insulin glargine  10 Units SubCUTAneous Nightly    insulin lispro  0-4 Units SubCUTAneous TID WC    insulin lispro  0-4 Units SubCUTAneous Nightly    pantoprazole  40 mg IntraVENous Daily      Infusions:    dextrose      sodium chloride       PRN Meds: ipratropium-albuterol, 1 ampule, Q4H PRN  glucose, 4 tablet, PRN  dextrose bolus, 125 mL, PRN   Or  dextrose bolus, 250 mL, PRN  glucagon (rDNA), 1 mg, PRN  dextrose, , Continuous PRN  oxyCODONE-acetaminophen, 1 tablet, Q8H PRN  sodium chloride flush, 5-40 mL, PRN  sodium chloride, , PRN  ondansetron, 4 mg, Q8H PRN   Or  ondansetron, 4 mg, Q6H PRN  polyethylene glycol, 17 g, Daily PRN  acetaminophen, 650 mg, Q6H PRN   Or  acetaminophen, 650 mg, Q6H PRN  potassium chloride, 40 mEq, PRN   Or  potassium alternative oral replacement, 40 mEq, PRN   Or  potassium chloride, 10 mEq, PRN  magnesium sulfate, 2,000 mg, PRN  aluminum & magnesium hydroxide-simethicone, 30 mL, Q6H PRN  guaiFENesin, 200 mg, Q4H PRN      Labs      Recent Results (from the past 24 hour(s))   POCT Glucose    Collection Time: 02/25/23  8:30 PM   Result Value Ref Range    POC Glucose 144 (H) 70 - 99 MG/DL   Potassium w/ Reflex to Magnesium    Collection Time: 02/26/23  1:04 AM   Result Value Ref Range    Potassium 4.1 3.5 - 5.1 MMOL/L   Magnesium    Collection Time: 02/26/23  1:04 AM   Result Value Ref Range    Magnesium 1.9 1.8 - 2.4 mg/dl   Brain Natriuretic Peptide    Collection Time: 02/26/23  1:04 AM   Result Value Ref Range    Pro-BNP 9,471 (H) <300 PG/ML   Renal Function Panel    Collection Time: 02/26/23  1:04 AM   Result Value Ref Range Sodium 136 135 - 145 MMOL/L    Potassium 4.3 3.5 - 5.1 MMOL/L    Chloride 98 (L) 99 - 110 mMol/L    CO2 26 21 - 32 MMOL/L    Anion Gap 12 4 - 16    BUN 28 (H) 6 - 23 MG/DL    Creatinine 1.9 (H) 0.6 - 1.1 MG/DL    Est, Glom Filt Rate 30 (L) >60 mL/min/1.73m2    Glucose 365 (H) 70 - 99 MG/DL    Calcium 7.7 (L) 8.3 - 10.6 MG/DL    Albumin 2.7 (L) 3.4 - 5.0 GM/DL    Phosphorus 3.8 2.5 - 4.9 MG/DL   CBC    Collection Time: 02/26/23  1:04 AM   Result Value Ref Range    WBC 7.9 4.0 - 10.5 K/CU MM    RBC 4.12 (L) 4.2 - 5.4 M/CU MM    Hemoglobin 12.8 12.5 - 16.0 GM/DL    Hematocrit 38.8 37 - 47 %    MCV 94.2 78 - 100 FL    MCH 31.1 (H) 27 - 31 PG    MCHC 33.0 32.0 - 36.0 %    RDW 14.2 11.7 - 14.9 %    Platelets 840 840 - 189 K/CU MM    MPV 10.8 7.5 - 11.1 FL   POCT Glucose    Collection Time: 02/26/23  6:38 AM   Result Value Ref Range    POC Glucose 130 (H) 70 - 99 MG/DL   Potassium w/ Reflex to Magnesium    Collection Time: 02/26/23  2:20 PM   Result Value Ref Range    Potassium 4.2 3.5 - 5.1 MMOL/L   POCT Glucose    Collection Time: 02/26/23  4:56 PM   Result Value Ref Range    POC Glucose 286 (H) 70 - 99 MG/DL        Imaging/Diagnostics Last 24 Hours   XR CHEST PORTABLE    Result Date: 2/23/2023  EXAMINATION: ONE XRAY VIEW OF THE CHEST 2/23/2023 8:22 pm COMPARISON: 12/01/2022 HISTORY: ORDERING SYSTEM PROVIDED HISTORY: sob TECHNOLOGIST PROVIDED HISTORY: Reason for exam:->sob Reason for Exam: sob Additional signs and symptoms: na Relevant Medical/Surgical History: diabetes FINDINGS: Cardiomegaly with pulmonary vascular congestion. No overt edema or consolidation. Is sinal contours normal.  No pneumothorax or pleural effusion. No acute osseous abnormality. New enlargement of the cardiac silhouette with pulmonary vascular congestion. Differential diagnosis includes cardiomegaly or pericardial effusion.        Electronically signed by Roc Allen MD on 2/26/2023 at 6:24 PM

## 2023-02-26 NOTE — PROGRESS NOTES
Nephrology Progress Note  2/26/2023 9:28 AM  Subjective: Interval History: Gela Stafford is a 61 y.o. female overall doing okay no distress more awake today was anxious last time but better now      Data:   Scheduled Meds:   lactulose  20 g Oral BID    polyethylene glycol  17 g Oral Daily    sodium chloride flush  5-40 mL IntraVENous 2 times per day    ipratropium-albuterol  1 ampule Inhalation Q4H WA    aspirin  81 mg Oral Daily    [Held by provider] metoprolol succinate  100 mg Oral Daily    rivaroxaban  20 mg Oral Dinner    furosemide  40 mg IntraVENous BID    insulin glargine  10 Units SubCUTAneous Nightly    insulin lispro  0-4 Units SubCUTAneous TID     insulin lispro  0-4 Units SubCUTAneous Nightly    pantoprazole  40 mg IntraVENous Daily     Continuous Infusions:   dextrose      sodium chloride           CBC   Recent Labs     02/24/23 0352 02/25/23  1530 02/26/23  0104   WBC 9.0 9.1 7.9   HGB 15.5 14.0 12.8   HCT 46.1 41.1 38.8    160 155      BMP   Recent Labs     02/23/23 2032 02/24/23 0352 02/24/23  2106 02/25/23  0826 02/25/23  1530 02/26/23  0104   *   < > 133* 132*  --  136   K 5.2*   < > 4.2  4.3 4.0 3.3* 4.3  4.1      < > 97* 98*  --  98*   CO2 18*   < > 24 22  --  26   PHOS 4.4  --   --  4.0  --  3.8   BUN 19   < > 28* 25*  --  28*   CREATININE 1.5*   < > 2.1* 1.8*  --  1.9*    < > = values in this interval not displayed. Hepatic:   Recent Labs     02/23/23 2032   AST 21   ALT 16   BILITOT 0.5   ALKPHOS 159*     Troponin: No results for input(s): TROPONINI in the last 72 hours. BNP: No results for input(s): BNP in the last 72 hours. Lipids:   Recent Labs     02/24/23 0352   CHOL 235*   HDL 90     ABGs:   Lab Results   Component Value Date/Time    PO2ART 28.7 08/02/2020 05:41 AM    OAR8QVM 48.5 08/02/2020 05:41 AM     INR: No results for input(s): INR in the last 72 hours.   Renal Labs  Albumin:    Lab Results   Component Value Date/Time    LABALBU 2.7 2023 01:04 AM    LABALBU 49 2022 06:25 PM     Calcium:    Lab Results   Component Value Date/Time    CALCIUM 7.7 2023 01:04 AM     Phosphorus:    Lab Results   Component Value Date/Time    PHOS 3.8 2023 01:04 AM     U/A:    Lab Results   Component Value Date/Time    NITRU NEGATIVE 2023 03:05 PM    COLORU YELLOW 2023 03:05 PM    WBCUA NONE SEEN 2023 03:05 PM    RBCUA 2 2023 03:05 PM    MUCUS RARE 2023 03:05 PM    TRICHOMONAS NONE SEEN 2023 03:05 PM    YEAST RARE 03/15/2022 12:08 PM    BACTERIA NEGATIVE 2023 03:05 PM    CLARITYU CLEAR 2023 03:05 PM    SPECGRAV 1.020 2023 03:05 PM    UROBILINOGEN 1.0 2023 03:05 PM    BILIRUBINUR NEGATIVE 2023 03:05 PM    BLOODU SMALL NUMBER OR AMOUNT OBSERVED 2023 03:05 PM    KETUA NEGATIVE 2023 03:05 PM     ABG:    Lab Results   Component Value Date/Time    GTX1NWQ 48.5 2020 05:41 AM    PO2ART 28.7 2020 05:41 AM    DJB3DTX 31.0 2020 05:41 AM     HgBA1c:    Lab Results   Component Value Date/Time    LABA1C 6.6 2023 08:32 PM     Microalbumen/Creatinine ratio:  No components found for: RUCREAT  TSH:  No results found for: TSH  IRON:    Lab Results   Component Value Date/Time    IRON 89 2020 03:37 PM     Iron Saturation:  No components found for: PERCENTFE  TIBC:    Lab Results   Component Value Date/Time    TIBC 230 2020 03:37 PM     FERRITIN:  No results found for: FERRITIN  RPR:  No results found for: RPR  WALT:    Lab Results   Component Value Date/Time    WALT None Detected 11/10/2022 06:31 AM     24 Hour Urine for Creatinine Clearance:  No components found for: CREAT4, UHRS10, UTV10      Objective:   I/O:  701 -  07  In: 960 [P.O.:960]  Out: 1900 [Urine:1900]  I/O last 3 completed shifts:   In: 1200 [P.O.:1200]  Out: 3550 [IUPR]  I/O this shift:  In: 200 [P.O.:200]  Out: -   Vitals: /72   Pulse 85   Temp 97.6 °F (36.4 °C) (Oral)   Resp 19   Ht 5' 2\" (1.575 m)   Wt 135 lb 3 oz (61.3 kg)   SpO2 96%   BMI 24.73 kg/m²  {  General appearance: awake weak  HEENT: Head: Normal, normocephalic, atraumatic.   Neck: supple, symmetrical, trachea midline  Lungs: diminished breath sounds bilaterally  Heart: S1, S2 normal  Abdomen: abnormal findings:  soft nt distended  Extremities: edema trace  Neurologic: Mental status: alertness: alert anxious        Assessment and Plan:      IMP:  #1 CHF exacerbation  #2 history of PE/DVT  #3 type 2 diabetes  #4 acute renal failure from cardiorenal syndrome  #5 hyperkalemia  #6 anxiety with schizophrenia  #7 hyponatremia  #8 history of hep C    Plan     #1 monitor cardiac status and maintain diuresis keep negative as able  #2 maintain anticoagulation  #3 glucose overall variable and adjust with insulin as needed  #4 monitor renal function creatinine holding 1.9 stable  #5 potassium stable  #6 affect overall improved  #7 sodium stable  #8 follow-up hep C PCR  Supportive care medical management  Appears overall improved           Avel Gupta MD, MD

## 2023-02-27 ENCOUNTER — APPOINTMENT (OUTPATIENT)
Dept: GENERAL RADIOLOGY | Age: 58
DRG: 291 | End: 2023-02-27
Attending: STUDENT IN AN ORGANIZED HEALTH CARE EDUCATION/TRAINING PROGRAM
Payer: COMMERCIAL

## 2023-02-27 VITALS
DIASTOLIC BLOOD PRESSURE: 94 MMHG | HEIGHT: 62 IN | SYSTOLIC BLOOD PRESSURE: 135 MMHG | BODY MASS INDEX: 24.88 KG/M2 | HEART RATE: 75 BPM | WEIGHT: 135.19 LBS | OXYGEN SATURATION: 96 % | RESPIRATION RATE: 21 BRPM | TEMPERATURE: 98.6 F

## 2023-02-27 LAB
ALBUMIN SERPL-MCNC: 2.5 GM/DL (ref 3.4–5)
ANION GAP SERPL CALCULATED.3IONS-SCNC: 9 MMOL/L (ref 4–16)
BUN SERPL-MCNC: 25 MG/DL (ref 6–23)
CALCIUM SERPL-MCNC: 7.8 MG/DL (ref 8.3–10.6)
CHLORIDE BLD-SCNC: 101 MMOL/L (ref 99–110)
CO2: 26 MMOL/L (ref 21–32)
CREAT SERPL-MCNC: 1.6 MG/DL (ref 0.6–1.1)
GFR SERPL CREATININE-BSD FRML MDRD: 37 ML/MIN/1.73M2
GLUCOSE BLD-MCNC: 122 MG/DL (ref 70–99)
GLUCOSE BLD-MCNC: 203 MG/DL (ref 70–99)
GLUCOSE SERPL-MCNC: 152 MG/DL (ref 70–99)
HCT VFR BLD CALC: 36.9 % (ref 37–47)
HCV RNA SERPL NAA+PROBE-ACNC: ABNORMAL IU/ML
HCV RNA SERPL NAA+PROBE-LOG IU: 5.1 LOG IU/ML
HCV RNA SERPL QL NAA+PROBE: DETECTED
HEMOGLOBIN: 12.4 GM/DL (ref 12.5–16)
MAGNESIUM: 1.8 MG/DL (ref 1.8–2.4)
MCH RBC QN AUTO: 31.5 PG (ref 27–31)
MCHC RBC AUTO-ENTMCNC: 33.6 % (ref 32–36)
MCV RBC AUTO: 93.7 FL (ref 78–100)
PDW BLD-RTO: 14.5 % (ref 11.7–14.9)
PHOSPHORUS: 3.5 MG/DL (ref 2.5–4.9)
PLATELET # BLD: 125 K/CU MM (ref 140–440)
PMV BLD AUTO: 10.4 FL (ref 7.5–11.1)
POTASSIUM SERPL-SCNC: 4 MMOL/L (ref 3.5–5.1)
RBC # BLD: 3.94 M/CU MM (ref 4.2–5.4)
SODIUM BLD-SCNC: 136 MMOL/L (ref 135–145)
WBC # BLD: 6.9 K/CU MM (ref 4–10.5)

## 2023-02-27 PROCEDURE — 71045 X-RAY EXAM CHEST 1 VIEW: CPT

## 2023-02-27 PROCEDURE — 83735 ASSAY OF MAGNESIUM: CPT

## 2023-02-27 PROCEDURE — 2580000003 HC RX 258: Performed by: STUDENT IN AN ORGANIZED HEALTH CARE EDUCATION/TRAINING PROGRAM

## 2023-02-27 PROCEDURE — 84132 ASSAY OF SERUM POTASSIUM: CPT

## 2023-02-27 PROCEDURE — C9113 INJ PANTOPRAZOLE SODIUM, VIA: HCPCS | Performed by: STUDENT IN AN ORGANIZED HEALTH CARE EDUCATION/TRAINING PROGRAM

## 2023-02-27 PROCEDURE — 36415 COLL VENOUS BLD VENIPUNCTURE: CPT

## 2023-02-27 PROCEDURE — 94761 N-INVAS EAR/PLS OXIMETRY MLT: CPT

## 2023-02-27 PROCEDURE — 2500000003 HC RX 250 WO HCPCS: Performed by: FAMILY MEDICINE

## 2023-02-27 PROCEDURE — 6370000000 HC RX 637 (ALT 250 FOR IP): Performed by: STUDENT IN AN ORGANIZED HEALTH CARE EDUCATION/TRAINING PROGRAM

## 2023-02-27 PROCEDURE — 6360000002 HC RX W HCPCS: Performed by: STUDENT IN AN ORGANIZED HEALTH CARE EDUCATION/TRAINING PROGRAM

## 2023-02-27 PROCEDURE — 99232 SBSQ HOSP IP/OBS MODERATE 35: CPT | Performed by: INTERNAL MEDICINE

## 2023-02-27 PROCEDURE — 6370000000 HC RX 637 (ALT 250 FOR IP): Performed by: INTERNAL MEDICINE

## 2023-02-27 PROCEDURE — 80069 RENAL FUNCTION PANEL: CPT

## 2023-02-27 PROCEDURE — APPSS30 APP SPLIT SHARED TIME 16-30 MINUTES

## 2023-02-27 PROCEDURE — 85027 COMPLETE CBC AUTOMATED: CPT

## 2023-02-27 PROCEDURE — 82962 GLUCOSE BLOOD TEST: CPT

## 2023-02-27 RX ORDER — LOSARTAN POTASSIUM 25 MG/1
25 TABLET ORAL DAILY
Status: DISCONTINUED | OUTPATIENT
Start: 2023-02-27 | End: 2023-02-27 | Stop reason: HOSPADM

## 2023-02-27 RX ORDER — LOSARTAN POTASSIUM 25 MG/1
25 TABLET ORAL DAILY
Qty: 30 TABLET | Refills: 3 | Status: SHIPPED | OUTPATIENT
Start: 2023-02-28

## 2023-02-27 RX ORDER — GUAIFENESIN 600 MG/1
600 TABLET, EXTENDED RELEASE ORAL 2 TIMES DAILY
Qty: 60 TABLET | Refills: 0 | Status: SHIPPED | OUTPATIENT
Start: 2023-02-27 | End: 2023-03-29

## 2023-02-27 RX ORDER — TORSEMIDE 20 MG/1
20 TABLET ORAL 2 TIMES DAILY
Status: DISCONTINUED | OUTPATIENT
Start: 2023-02-27 | End: 2023-02-27 | Stop reason: HOSPADM

## 2023-02-27 RX ORDER — TORSEMIDE 20 MG/1
20 TABLET ORAL 2 TIMES DAILY
Qty: 30 TABLET | Refills: 3 | Status: SHIPPED | OUTPATIENT
Start: 2023-02-27

## 2023-02-27 RX ORDER — FLUTICASONE PROPIONATE 50 MCG
1 SPRAY, SUSPENSION (ML) NASAL DAILY
Status: DISCONTINUED | OUTPATIENT
Start: 2023-02-27 | End: 2023-02-27 | Stop reason: HOSPADM

## 2023-02-27 RX ORDER — FLUTICASONE PROPIONATE 50 MCG
1 SPRAY, SUSPENSION (ML) NASAL DAILY
Qty: 16 G | Refills: 3 | Status: SHIPPED | OUTPATIENT
Start: 2023-02-27

## 2023-02-27 RX ORDER — GUAIFENESIN 200 MG/10ML
200 LIQUID ORAL EVERY 4 HOURS PRN
Qty: 1 EACH | Refills: 0 | Status: SHIPPED | OUTPATIENT
Start: 2023-02-27 | End: 2023-03-29

## 2023-02-27 RX ORDER — INSULIN GLARGINE 100 [IU]/ML
10 INJECTION, SOLUTION SUBCUTANEOUS NIGHTLY
Qty: 10 ML | Refills: 3 | Status: SHIPPED | OUTPATIENT
Start: 2023-02-27

## 2023-02-27 RX ORDER — SENNA PLUS 8.6 MG/1
1 TABLET ORAL NIGHTLY
Status: DISCONTINUED | OUTPATIENT
Start: 2023-02-27 | End: 2023-02-27 | Stop reason: HOSPADM

## 2023-02-27 RX ORDER — GUAIFENESIN 600 MG/1
600 TABLET, EXTENDED RELEASE ORAL 2 TIMES DAILY
Status: DISCONTINUED | OUTPATIENT
Start: 2023-02-27 | End: 2023-02-27 | Stop reason: HOSPADM

## 2023-02-27 RX ADMIN — POLYETHYLENE GLYCOL (3350) 17 G: 17 POWDER, FOR SOLUTION ORAL at 07:55

## 2023-02-27 RX ADMIN — INSULIN LISPRO 1 UNITS: 100 INJECTION, SOLUTION INTRAVENOUS; SUBCUTANEOUS at 11:13

## 2023-02-27 RX ADMIN — GUAIFENESIN 200 MG: 100 SOLUTION ORAL at 01:57

## 2023-02-27 RX ADMIN — LACTULOSE 20 G: 10 SOLUTION ORAL at 07:55

## 2023-02-27 RX ADMIN — GUAIFENESIN 200 MG: 100 SOLUTION ORAL at 07:58

## 2023-02-27 RX ADMIN — EMPAGLIFLOZIN 10 MG: 10 TABLET, FILM COATED ORAL at 07:55

## 2023-02-27 RX ADMIN — PANTOPRAZOLE SODIUM 40 MG: 40 INJECTION, POWDER, FOR SOLUTION INTRAVENOUS at 07:56

## 2023-02-27 RX ADMIN — METOPROLOL SUCCINATE 100 MG: 50 TABLET, EXTENDED RELEASE ORAL at 09:23

## 2023-02-27 RX ADMIN — SODIUM CHLORIDE, PRESERVATIVE FREE 10 ML: 5 INJECTION INTRAVENOUS at 08:02

## 2023-02-27 RX ADMIN — LOSARTAN POTASSIUM 25 MG: 25 TABLET, FILM COATED ORAL at 07:55

## 2023-02-27 RX ADMIN — TORSEMIDE 20 MG: 20 TABLET ORAL at 07:55

## 2023-02-27 RX ADMIN — FLUTICASONE PROPIONATE 1 SPRAY: 50 SPRAY, METERED NASAL at 11:08

## 2023-02-27 RX ADMIN — ASPIRIN 81 MG 81 MG: 81 TABLET ORAL at 07:55

## 2023-02-27 RX ADMIN — GUAIFENESIN 600 MG: 600 TABLET, EXTENDED RELEASE ORAL at 09:58

## 2023-02-27 RX ADMIN — OXYCODONE AND ACETAMINOPHEN 1 TABLET: 5; 325 TABLET ORAL at 07:58

## 2023-02-27 ASSESSMENT — PAIN DESCRIPTION - LOCATION: LOCATION: ABDOMEN

## 2023-02-27 ASSESSMENT — PAIN SCALES - GENERAL: PAINLEVEL_OUTOF10: 7

## 2023-02-27 NOTE — PROGRESS NOTES
Cardiology Progress Note     Admit Date:  2/23/2023    Consult reason/ Seen today for :       Subjective and  Overnight Events : Heart rate is much better now   Bradycardia resolved    Chief complain on admission : 61 y. o.year old who is admitted forNo chief complaint on file. Assessment / Plan:  Nonischemic cardiomyopathy EF 30 to 35% came in with hyperkalemia. Also complicating issues schizophrenia hepatitis C continue Lasix 40 twice daily, continue Toprol- mg daily ACE and ARB on hold due to renal failure by nephrology consider restarting at  We will debate ICD as outpatient  Severe mitral regurgitation she did not want RAYA gets very anxious she may benefit from valve repair surgery will discuss with outpatient  History of DVT on Xarelto  Bradycardia? Improved now  DVT Prophylaxis if no contraindication    Past medical history:    has a past medical history of CAD (coronary artery disease), Diabetes mellitus ([facility] [address] Hepatitis C, HFrEF (heart failure with reduced ejection fraction) ([facility] [address] and Schizophrenia ([facility] [address] Past surgical history:   has a past surgical history that includes Hysterectomy and Cholecystectomy. Social History:   reports that she has been smoking cigarettes. She has a 7.50 pack-year smoking history. She has never used smokeless tobacco. She reports current alcohol use. She reports that she does not currently use drugs after having used the following drugs: Marijuana Princella Fergusson). Family history:  family history includes Cancer in her father; No Known Problems in her mother.     Allergies   Allergen Reactions    Haldol [Haloperidol Lactate] Other (See Comments)     Unknown, severe reaction per mother    Penicillins        Review of Systems:    All 14 systems were reviewed and are negative  Except for the positive findings  which as documented     BP (!) 135/94   Pulse 75   Temp 98.6 °F (37 °C) (Oral)   Resp 21 Ht 5' 2\" (1.575 m)   Wt 135 lb 3 oz (61.3 kg)   SpO2 96%   BMI 24.73 kg/m²     Intake/Output Summary (Last 24 hours) at 2/27/2023 1506  Last data filed at 2/27/2023 1110  Gross per 24 hour   Intake 240 ml   Output 2130 ml   Net -1890 ml     Physical Exam:  Constitutional:  Well developed, Well nourished, No acute distress, Non-toxic appearance. HENT:  Normocephalic, Atraumatic, Bilateral external ears normal, Oropharynx moist, No oral exudates, Nose normal. Neck- Normal range of motion, No tenderness, Supple, No stridor. Eyes:  PERRL, EOMI, Conjunctiva normal, No discharge. Respiratory:  Normal breath sounds, No respiratory distress, No wheezing, No chest tenderness. Cardiovascular:  Normal heart rate, Normal rhythm, No murmurs, No rubs, No gallops, JVP not elevated  Abdomen/GI:  Bowel sounds normal, Soft, No tenderness, No masses, No pulsatile masses. Musculoskeletal:  Intact distal pulses, No edema, No tenderness, No cyanosis, No clubbing. Good range of motion in all major joints. No tenderness to palpation or major deformities noted. Back- No tenderness. Integument:  Warm, Dry, No erythema, No rash. Lymphatic:  No lymphadenopathy noted. Neurologic:  Alert & oriented x 3, Normal motor function, Normal sensory function, No focal deficits noted.    Psychiatric:  Affect  and  Mood :no change    Medications:    torsemide  20 mg Oral BID    empagliflozin  10 mg Oral Daily    losartan  25 mg Oral Daily    guaiFENesin  600 mg Oral BID    fluticasone  1 spray Each Nostril Daily    senna  1 tablet Oral Nightly    glycerin ([employer])  1 suppository Rectal Once    lactulose  20 g Oral BID    polyethylene glycol  17 g Oral Daily    sodium chloride flush  5-40 mL IntraVENous 2 times per day    aspirin  81 mg Oral Daily    metoprolol succinate  100 mg Oral Daily    rivaroxaban  20 mg Oral Dinner    insulin glargine  10 Units SubCUTAneous Nightly    insulin lispro  0-4 Units SubCUTAneous TID  insulin lispro  0-4 Units SubCUTAneous Nightly    pantoprazole  40 mg IntraVENous Daily      dextrose      sodium chloride       ipratropium-albuterol, glucose, dextrose bolus **OR** dextrose bolus, glucagon (rDNA), dextrose, oxyCODONE-acetaminophen, sodium chloride flush, sodium chloride, ondansetron **OR** ondansetron, polyethylene glycol, acetaminophen **OR** acetaminophen, aluminum & magnesium hydroxide-simethicone, guaiFENesin    Lab Data:  CBC:   Recent Labs     02/25/23  1530 02/26/23  0104 02/27/23  0526   WBC 9.1 7.9 6.9   HGB 14.0 12.8 12.4*   HCT 41.1 38.8 36.9*   MCV 92.6 94.2 93.7    155 125*     BMP:   Recent Labs     02/25/23  0826 02/25/23  1530 02/26/23  0104 02/26/23  1420 02/26/23  1959 02/27/23  0526   *  --  136  --   --  136   K 4.0   < > 4.3  4.1 4.2 3.9 4.0   CL 98*  --  98*  --   --  101   CO2 22  --  26  --   --  26   PHOS 4.0  --  3.8  --   --  3.5   BUN 25*  --  28*  --   --  25*   CREATININE 1.8*  --  1.9*  --   --  1.6*    < > = values in this interval not displayed. PT/INR: No results for input(s): PROTIME, INR in the last 72 hours. BNP:    Recent Labs     02/26/23 0104   PROBNP 9,471*     TROPONIN: No results for input(s): TROPONINT in the last 72 hours. ECHO :   echocardiogram    Assessment:  61 y. o.year old who is admitted forNo chief complaint on file. , active issues as noted below:  Impression:  Principal Problem:    Acute diastolic CHF (congestive heart failure) (HCC)  Active Problems:    Congestive heart failure, unspecified HF chronicity, unspecified heart failure type (HCC)    Precordial pain    Type 2 diabetes mellitus without complication, with long-term current use of insulin (HCC)    Nonrheumatic mitral valve regurgitation  Resolved Problems:    * No resolved hospital problems.  *            All labs, medications and tests reviewed by myself , continue all other medications of all above medical condition listed as is except for changes mentioned above.    Thank you very much for consult , please call with questions.     Marv Coelho MD, MD 2/27/2023 3:06 PM

## 2023-02-27 NOTE — PROGRESS NOTES
02/27/23 0848   Encounter Summary   Encounter Overview/Reason  Spiritual/Emotional Needs   Service Provided For: Patient   Referral/Consult From: [address] [facility] Parent; Family members   Last Encounter  02/27/23  (Offered prayer and spiritual support. Patient feeling better thanks for prayers.)   Complexity of Encounter Low   Begin Time 0840   End Time  0848   Total Time Calculated 8 min   Encounter    Type Initial Screen/Assessment   Spiritual/Emotional needs   Type Spiritual Support   Assessment/Intervention/Outcome   Assessment Peaceful; Hopeful   Intervention Active listening;Discussed belief system/Latter day practices/alexis;Discussed illness injury and its impact; Discussed relationship with God;Empowerment;Nurtured Hope;Prayer (assurance of)/[place];Sustaining Presence/Ministry of presence   Outcome Engaged in conversation;Encouraged;Expressed Gratitude   Plan and Referrals   Plan/Referrals No future visits requested;Continue Support (comment)  (as needed)

## 2023-02-27 NOTE — DISCHARGE INSTRUCTIONS
- please schedule an appointment to see your PCP  - please schedule an appointment to see cardiologists and nephrology  - please schedule an appointment to see your GI doctor; you have been provided with contact information of Dr. Jose Carlos Munson if you don't have GI docotor  - please go to lab in one week for blood work  - please take medications as prescribed         HEART FAILURE INSTRUCTIONS:         MEDICATIONS:  Please notify the the heart failure clinic R.N. or your doctor if you are not able to take your medications for any reason. WEIGHT MONITORING:   Weigh yourself  everyday in the morning after urination and record the weight on your weight log. Notify the doctor/clinic nurse of a weight gain of 3 pounds or more in 1 day   OR  a total of 5 pounds or more in 1 week             DIET  Cardiac heart healthy diet- Low saturated / low trans fat, no added salt, caffeine restricted,   Low sodium diet- no  more than 2,000mg (2 grams) of salt / sodium per day (which equals to a little less than  a teaspoon of salt)  The doctor may also recommend a fluid limit -  Fluid restriction- 2,000 ml (milliliters) = 64 ounces = you can have 8 glasses of fluid per day (each glass 8 ounces)    Avoid using salt at the table, avoid / limit use of canned soups, processed / packaged foods, salted snacks, olives and pickles. Do not use a salt substitute without checking with the doctor. (Mrs. Silvia Randall is safe to use). NOTIFY THE  DOCTOR THE FIRST DAY OF ONSET OF ANY OF THESE   SYMPTOMS:   Weight gain of 3 pounds or more in 1 day         OR 5 pounds or more in one week  More shortness of breath  More swelling in stomach, legs, ankles or feet  Feeling more tired, No energy  Dry hacky cough  Dizziness  More chest pain / discomfort           Please visit  American Heart Association Healthy Living with Heart Failure  at [email or web address]      [facility]  Heart Failure Clinic  [place]ARR, KAREL  Phone: [phone] or [employer]

## 2023-02-27 NOTE — CARE COORDINATION
CM met with pt to f/u for d/c visit planning. Pt states that she is planning to return home. Pt states that she will not go to a SNF and does not want HHC. Pt denies any d/c needs at this time. D/c plan is home alone, no needs. Notify CM if any d/c needs arise.   TE

## 2023-02-27 NOTE — PROGRESS NOTES
Met with patient and introduced myself as the Heart failure education R.N. I have seen this patient for heart failure education on 11/7/22. Patient able to remember and recite some of the heart failure treatment plan. States she called her doctor and had an office visit when symptoms began. Reports eating poorly. She does not like to cook and chooses fast freezer dinners and snack foods. Discussed choosing healthier freezer meals like 'Healthy Choice' -was choosing [employer]. She reports her , Kai Sullivan, 'has all the heart patients' and watches out for her. Patient also has support from her uncle. Admitting diagnosis-Acute diastolic CHF   Cardiologist- Charlene  Heart Failure Education Nurse consulted- yes   Ejection fraction 30-35 % as of 1/20/23  Pro BNP-9,471 on 2/26/23  Hospital follow up appt-  Dr. Yoli Leblanc on 3/14 (previously scheduled)  Patient informed of appointment and appointment added to AVS  Cardiac rehabilitation referral- N/A   ICD information- Cardiology PA discussed with patient  Smoking Cessation information and referral- current smoker - denies desire for referral  Pneumonia vaccine- did not discuss - dose was requested of hospitalist on previous admission  PCP- Acebal  Patient has a digital scale? Yes   Transportation- her , Kai Sullivan, provides transport to appointments    Able to obtain medications without difficulty? - Yes- Patient denies difficulty in obtaining or taking medications. Advised not to stop taking a medication without notifying provider. Reviewed Heart failure patient education book with patient. Questions answered. Reviewed the Stop Light Handout with patient and instructed when to call her provider. Patient was engaged and attentive during education session.

## 2023-02-27 NOTE — PROGRESS NOTES
Outpatient Pharmacy Progress Note for Meds-to-Beds    Total number of Prescriptions Filled: 8  The following medications were dispensed to the patient during the discharge process:  Mucinex  Fluticasone  Mya-Tussin liquid  Losartan  Lantus insulin  Torsemide  Insulin pen needles  Alcohol prep wipes    Additional Documentation:  Medication(s) were delivered to the patient's room prior to discharge      Thank you for letting us serve your patients.   [address] [facility]    [address], [address] [facility]    Phone: [phone]    Fax: [phone]

## 2023-02-27 NOTE — PROGRESS NOTES
Cardiology progress note    Subjective:  Patient is still complaining of shortness of breath but says that it is significantly better. Patient is experiencing rhonchi at this time. Informed her that we will check chest x-ray since its been a few days. Talk to patient about possible need for ICD that we will consider as an outpatient. Patient was hesitant at first but I informed her that it would be beneficial to receive a little more information. Assessment/plan:    Short episodes of sinus bradycardia  -No episodes of bradycardia overnight. Significantly improved following improvement of hyperkalemia  Hyperkalemia  -Improved. Potassium of 4.0 today. Acute on chronic heart failure with reduced ejection fraction  Nonischemic cardiomyopathy  -Most recent EF of 30 to 35%  -GDMT: Resume Toprol  mg daily, continue losartan 25 mg daily, and Jardiance 10 mg daily (on Farxiga at home). Avoiding addition of Aldactone at this time due to hyperkalemia  -Continue torsemide 20 mg twice daily  Check CXR today  -Consider outpatient ICD workup  Valvular heart disease  -Severe mitral regurgitation, moderate pulmonic and tricuspid regurgitation  Elevated troponin with history of nonobstructive coronary artery disease  -No chest pain at this time.   Non-ACS trend  -Continue aspirin  History of venous thromboembolism  -On Xarelto 20 mg daily  Acute kidney injury: Creatinine 1.6  Diabetes mellitus: A1c of 6.6  Hepatitis C      Sukhdev Boyce PA-C

## 2023-02-27 NOTE — PROGRESS NOTES
Nephrology Progress Note  2/27/2023 10:12 AM  Subjective: Interval History: Tavia Joseph is a 61 y.o. female appears to be doing okay overall resting in bed no distress      Data:   Scheduled Meds:   torsemide  20 mg Oral BID    empagliflozin  10 mg Oral Daily    losartan  25 mg Oral Daily    guaiFENesin  600 mg Oral BID    fluticasone  1 spray Each Nostril Daily    senna  1 tablet Oral Nightly    glycerin ([employer])  1 suppository Rectal Once    lactulose  20 g Oral BID    polyethylene glycol  17 g Oral Daily    sodium chloride flush  5-40 mL IntraVENous 2 times per day    aspirin  81 mg Oral Daily    metoprolol succinate  100 mg Oral Daily    rivaroxaban  20 mg Oral Dinner    insulin glargine  10 Units SubCUTAneous Nightly    insulin lispro  0-4 Units SubCUTAneous TID WC    insulin lispro  0-4 Units SubCUTAneous Nightly    pantoprazole  40 mg IntraVENous Daily     Continuous Infusions:   dextrose      sodium chloride           CBC   Recent Labs     02/25/23  1530 02/26/23  0104 02/27/23  0526   WBC 9.1 7.9 6.9   HGB 14.0 12.8 12.4*   HCT 41.1 38.8 36.9*    155 125*      BMP   Recent Labs     02/25/23  0826 02/25/23  1530 02/26/23  0104 02/26/23  1420 02/26/23  1959 02/27/23  0526   *  --  136  --   --  136   K 4.0   < > 4.3  4.1 4.2 3.9 4.0   CL 98*  --  98*  --   --  101   CO2 22  --  26  --   --  26   PHOS 4.0  --  3.8  --   --  3.5   BUN 25*  --  28*  --   --  25*   CREATININE 1.8*  --  1.9*  --   --  1.6*    < > = values in this interval not displayed. Hepatic:   No results for input(s): AST, ALT, ALB, BILITOT, ALKPHOS in the last 72 hours. Troponin: No results for input(s): TROPONINI in the last 72 hours. BNP: No results for input(s): BNP in the last 72 hours. Lipids:   No results for input(s): CHOL, HDL in the last 72 hours.     Invalid input(s): LDLCALCU    ABGs:   Lab Results   Component Value Date/Time    PO2ART 28.7 08/02/2020 05:41 AM    YWV6UPQ 48.5 08/02/2020 05:41 AM     INR: No results for input(s): INR in the last 72 hours.   Renal Labs  Albumin:    Lab Results   Component Value Date/Time    LABALBU 2.5 02/27/2023 05:26 AM    LABALBU 49 11/08/2022 06:25 PM     Calcium:    Lab Results   Component Value Date/Time    CALCIUM 7.8 02/27/2023 05:26 AM     Phosphorus:    Lab Results   Component Value Date/Time    PHOS 3.5 02/27/2023 05:26 AM     U/A:    Lab Results   Component Value Date/Time    NITRU NEGATIVE 02/24/2023 03:05 PM    COLORU YELLOW 02/24/2023 03:05 PM    WBCUA NONE SEEN 02/24/2023 03:05 PM    RBCUA 2 02/24/2023 03:05 PM    MUCUS RARE 02/24/2023 03:05 PM    TRICHOMONAS NONE SEEN 02/24/2023 03:05 PM    YEAST RARE 03/15/2022 12:08 PM    BACTERIA NEGATIVE 02/24/2023 03:05 PM    CLARITYU CLEAR 02/24/2023 03:05 PM    SPECGRAV 1.020 02/24/2023 03:05 PM    UROBILINOGEN 1.0 02/24/2023 03:05 PM    BILIRUBINUR NEGATIVE 02/24/2023 03:05 PM    BLOODU SMALL NUMBER OR AMOUNT OBSERVED 02/24/2023 03:05 PM    [address] 02/24/2023 03:05 PM     ABG:    Lab Results   Component Value Date/Time    OGU6BIX 48.5 08/02/2020 05:41 AM    PO2ART 28.7 08/02/2020 05:41 AM    EWF0XNP 31.0 08/02/2020 05:41 AM     HgBA1c:    Lab Results   Component Value Date/Time    LABA1C 6.6 02/23/2023 08:32 PM     Microalbumen/Creatinine ratio:  No components found for: RUCREAT  TSH:  No results found for: TSH  IRON:    Lab Results   Component Value Date/Time    IRON 89 08/01/2020 03:37 PM     Iron Saturation:  No components found for: PERCENTFE  TIBC:    Lab Results   Component Value Date/Time    TIBC 230 08/01/2020 03:37 PM     FERRITIN:  No results found for: FERRITIN  RPR:  No results found for: RPR  WALT:    Lab Results   Component Value Date/Time    WALT None Detected 11/10/2022 06:31 AM     24 Hour Urine for Creatinine Clearance:  No components found for: CREAT4, UHRS10, UTV10      Objective:   I/O: 02/26 0701 - 02/27 0700  In: 440 [P.O.:440]  Out: 1230 [Urine:1230]  I/O last 3 completed shifts: In: 640 [P.O.:920]  Out: 3222 [Urine:1830]  No intake/output data recorded. Vitals: BP (!) 144/84   Pulse 83   Temp 98.5 °F (36.9 °C) (Oral)   Resp 20   Ht 5' 2\" (1.575 m)   Wt 135 lb 3 oz (61.3 kg)   SpO2 96%   BMI 24.73 kg/m²  {  General appearance: awake weak  HEENT: Head: Normal, normocephalic, atraumatic. Neck: supple, symmetrical, trachea midline  Lungs: diminished breath sounds bilaterally  Heart: S1, S2 normal  Abdomen: abnormal findings:  soft nt distended  Extremities: edema trace  Neurologic: Mental status: alertness: Alert        Assessment and Plan:      IMP:  #1 CHF exacerbation  #2 history of PE/DVT  #3 type 2 diabetes  #4 acute renal failure from cardiorenal syndrome  #5 hyperkalemia  #6 anxiety with schizophrenia  #7 hyponatremia  #8 history of hep C    Plan     Stable urine output cardiac status monitor  #2 monitor hold anticoagulation in the above setting supportive care  #3 monitor glucose control stable  #4 creatinine holding 1.6 felt most likely cardiorenal but patient does have significant proteinuria about 6.7 g as well as some hematuria. This could also be due to the diabetes and/or prior hep C that was not treated.   Patient currently be high risk for biopsy best thing is medical management we will try to restart back on losartan low-dose as well as Jardiance and see how patient tolerates monitor potassium closely  #5 potassium holding stable for now  #6 affect holding stable monitor  #7 sodium stable  #8 follow-up with GI to treat hep C and she states was supposed to see as an outpatient today  Overall supportive care and medical management   follow-up for now  Work on discharge to rehab if she agrees         James Machado MD, MD

## 2023-02-28 ENCOUNTER — TELEPHONE (OUTPATIENT)
Dept: CARDIOLOGY CLINIC | Age: 58
End: 2023-02-28

## 2023-02-28 NOTE — DISCHARGE SUMMARY
Discharge Summary    Name:  Maryam Forman /Age/Sex: 1965  (61 y.o. female)   MRN & CSN:  [id number] & [id number] Admission Date/Time: 2023  7:27 PM   Attending:  No att. providers found Discharging Physician: Stephan Valdez MD     Hospital Course:   Maryam Forman is a 61 y.o.  female  who presents with Acute diastolic CHF (congestive heart failure) ([address]    HPI  \"Deb Chamberlain is a 61 y.o. female with pmh of as mentioned above who presents with SOB worsened with exertion associated with cough and worsening b/l leg swelling x 1 week. She said she had some chest pain in the ED but has resolved now. Trop was initially high which trended down. EKG was not suggestive for ischemic changes as per the report. New order for EKG has been placed. \"       The following problems have been addressed during this hospitalization. Acute on chronic systolic heart failure/NICMP  Patient presenting with shortness of breath/orthopnea/weight gain in setting of noncompliance  Chest xray showed pulm edema  ECHO 1/10/2023 EF 30-35%, moderate dilated LA, severe MR, moderate pulm and TR, moderate PHTN RVSP 58 mmg Hg.  -received Lasix 40 mg IV BID transitioned to torsemide 20 mg PO BID on discharge  - pt started on losartan 25 mg daily  - nephrology was consulted  -Cardiology consulted and was following pt. Pt to discuss with cardiology outpatient regarding ICD placement     Bradycardia. Pt had bradycardia requiring dopamine drip for 20 hrs. Thought to be 2/2 hyperkalemia. No adjustments needed on pt's home metoprolol dose. Cardiology was managing. MELANY on CKD - Baseline ~0.5. Came w Cr 1.9. Likely from renal congestion. Improved to 1.6 on discharge. Hyperkalemia 6.0 on admission. Required IV insulin+D50, 1 dose IV bicarb, Lokelma. Nephro was following pt. Essential hypertension  On home lisinopril and metoprolol. Lisinopril held on admission given hyperkalemia.    Nephrology changed it to losartan 25 mg PO on discharge  Home metoprolol succinate 100 mg PO was resumed     History of DVT and PE  -On Xarelto          DM II - HA1C 6.6 2/23/2023. placed on SSI. Will repeat a1c. Last one was 6 9 months back. She reports not taking any meds now. On Lantus 10U qhs      Hep  C - She reported being diagnosed with Hep C and says she follow with Dr as OP. Has not been treated. Referral given to pt in case she doesn't have GI doctor       Abdominal pain-CT scan ordered on February 23 which only showed small periumbilical fat containing hernia no complication. Abd pain resolved on discharge       Patient was seen and examined at bedside on day of discharge. This note can be used as the progress note for today's visit. Pt was cleared for discharge by nephrology and cardiology. Pt feels better. Has no new complaints or concerns. Pt was on room air. Hemodynamically stable, HR 70, 's. Physical Exam  Vitals:   Vitals:    02/27/23 1100   BP: (!) 135/94   Pulse: 75   Resp: 21   Temp: 98.6 °F (37 °C)   SpO2:        General: NAD  Eyes: EOMI  ENT: neck supple  Cardiovascular: Regular rate. Respiratory: diminished breathing ssounds bilateral  Gastrointestinal: Soft, non tender  Genitourinary: no suprapubic tenderness  Musculoskeletal: No edema  Skin: warm, dry  Neuro: Alert. Psych: Mood appropriate. The patient expressed appropriate understanding of and agreement with the discharge recommendations, medications, and plan.      Consults this admission:  IP CONSULT TO HEART FAILURE NURSE/COORDINATOR  IP CONSULT TO DIETITIAN  IP CONSULT TO CARDIOLOGY  IP CONSULT TO HEART FAILURE NURSE/COORDINATOR      Discharge Instruction:   Handoff to PCP:   - discuss with pt the need to see GI and pulm  - pt may need PFT and PHTN workup    Follow up appointments: GI, cardiology, nephrology  Primary care physician: Edward Valentine      Diet:  cardiac diet   Activity: activity as tolerated  Disposition: Discharged to:   [x]Home, [][facility], []SNF, []Acute Rehab, []Hospice   Condition on discharge: Stable    Discharge Medications:        Medication List        START taking these medications      aspirin 81 MG chewable tablet     fluticasone 50 MCG/ACT nasal spray  Commonly known as: FLONASE  1 spray by Each Nostril route daily     * guaiFENesin 600 MG extended release tablet  Commonly known as: MUCINEX  Take 1 tablet by mouth 2 times daily     * guaiFENesin 100 MG/5ML liquid  Commonly known as: ROBITUSSIN  Take 10 mLs by mouth every 4 hours as needed for Cough     insulin glargine 100 UNIT/ML injection vial  Commonly known as: LANTUS  Inject 10 Units into the skin nightly     losartan 25 MG tablet  Commonly known as: COZAAR  Take 1 tablet by mouth daily     torsemide 20 MG tablet  Commonly known as: DEMADEX  Take 1 tablet by mouth 2 times daily           * This list has 2 medication(s) that are the same as other medications prescribed for you. Read the directions carefully, and ask your doctor or other care provider to review them with you.                 CONTINUE taking these medications      AIMSCO INS SYR .3CC/29GX0.5\" 29G X 1/2\" 0.3 ML Misc  Generic drug: Insulin Syringe-Needle U-100  1 each by Does not apply route daily     albuterol sulfate  (90 Base) MCG/ACT inhaler  Commonly known as: PROVENTIL;VENTOLIN;PROAIR  Inhale 2 puffs into the lungs every 4 hours as needed for Wheezing     Blood Pressure Kit  1 Device by Does not apply route daily     empagliflozin 10 MG tablet  Commonly known as: Jardiance  Take 1 tablet by mouth daily     metoprolol succinate 100 MG extended release tablet  Commonly known as: TOPROL XL  Take 1 tablet by mouth daily     rivaroxaban 20 MG Tabs tablet  Commonly known as: Xarelto  Take 1 tablet by mouth Daily with supper            STOP taking these medications      Abilify Maintena 400 MG Prsy  Generic drug: ARIPiprazole ER     dicyclomine 10 MG capsule  Commonly known as: BENTYL     furosemide 20 MG tablet  Commonly known as: LASIX     insulin lispro 100 UNIT/ML injection vial  Commonly known as: HUMALOG     lisinopril 40 MG tablet  Commonly known as: PRINIVIL;ZESTRIL     loratadine 10 MG tablet  Commonly known as: CLARITIN               Where to Get Your Medications        These medications were sent to [facility], [address] [place] [address], Radha Summers      Phone: [phone]   fluticasone 50 MCG/ACT nasal spray  guaiFENesin 100 MG/5ML liquid  guaiFENesin 600 MG extended release tablet  insulin glargine 100 UNIT/ML injection vial  losartan 25 MG tablet  torsemide 20 MG tablet         Objective Findings at Discharge:       BMP/CBC  Recent Labs     02/25/23  1530 02/26/23  0104 02/26/23  1420 02/26/23  1959 02/27/23  0526   NA  --  136  --   --  136   K 3.3* 4.3  4.1 4.2 3.9 4.0   CL  --  98*  --   --  101   CO2  --  26  --   --  26   BUN  --  28*  --   --  25*   CREATININE  --  1.9*  --   --  1.6*   WBC 9.1 7.9  --   --  6.9   HCT 41.1 38.8  --   --  36.9*    155  --   --  125*       IMAGING:      Additional Information: Patient seen and examined day of discharge.  For more information regarding patient's care please contact [facility] records [phone]    Discharge Time of 35 minutes    Electronically signed by Acosta Bernstein MD on 2/28/2023 at 3:28 PM

## 2023-02-28 NOTE — TELEPHONE ENCOUNTER
Dr Mariajose Whittington with [facility] called with a question about patients Abilify medication , discharge paper work indicates to discontinue, she is concerned that this may be a mistake

## 2023-03-14 ENCOUNTER — OFFICE VISIT (OUTPATIENT)
Dept: CARDIOLOGY CLINIC | Age: 58
End: 2023-03-14
Payer: COMMERCIAL

## 2023-03-14 VITALS
BODY MASS INDEX: 24.11 KG/M2 | DIASTOLIC BLOOD PRESSURE: 90 MMHG | HEART RATE: 88 BPM | SYSTOLIC BLOOD PRESSURE: 148 MMHG | HEIGHT: 62 IN | WEIGHT: 131 LBS

## 2023-03-14 DIAGNOSIS — I10 PRIMARY HYPERTENSION: ICD-10-CM

## 2023-03-14 DIAGNOSIS — I82.413 ACUTE BILATERAL DEEP VEIN THROMBOSIS (DVT) OF FEMORAL VEINS (HCC): ICD-10-CM

## 2023-03-14 DIAGNOSIS — I26.99 OTHER PULMONARY EMBOLISM WITHOUT ACUTE COR PULMONALE, UNSPECIFIED CHRONICITY (HCC): ICD-10-CM

## 2023-03-14 DIAGNOSIS — I34.0 NONRHEUMATIC MITRAL VALVE REGURGITATION: Primary | ICD-10-CM

## 2023-03-14 DIAGNOSIS — I50.31 ACUTE DIASTOLIC CHF (CONGESTIVE HEART FAILURE) (HCC): ICD-10-CM

## 2023-03-14 DIAGNOSIS — F17.200 SMOKING: ICD-10-CM

## 2023-03-14 DIAGNOSIS — I50.23 ACUTE ON CHRONIC SYSTOLIC CONGESTIVE HEART FAILURE (HCC): ICD-10-CM

## 2023-03-14 DIAGNOSIS — R07.2 PRECORDIAL PAIN: ICD-10-CM

## 2023-03-14 DIAGNOSIS — I50.9 CONGESTIVE HEART FAILURE, UNSPECIFIED HF CHRONICITY, UNSPECIFIED HEART FAILURE TYPE (HCC): ICD-10-CM

## 2023-03-14 PROCEDURE — 3080F DIAST BP >= 90 MM HG: CPT | Performed by: INTERNAL MEDICINE

## 2023-03-14 PROCEDURE — G8427 DOCREV CUR MEDS BY ELIG CLIN: HCPCS | Performed by: INTERNAL MEDICINE

## 2023-03-14 PROCEDURE — 1111F DSCHRG MED/CURRENT MED MERGE: CPT | Performed by: INTERNAL MEDICINE

## 2023-03-14 PROCEDURE — 4004F PT TOBACCO SCREEN RCVD TLK: CPT | Performed by: INTERNAL MEDICINE

## 2023-03-14 PROCEDURE — 3077F SYST BP >= 140 MM HG: CPT | Performed by: INTERNAL MEDICINE

## 2023-03-14 PROCEDURE — G8420 CALC BMI NORM PARAMETERS: HCPCS | Performed by: INTERNAL MEDICINE

## 2023-03-14 PROCEDURE — 99214 OFFICE O/P EST MOD 30 MIN: CPT | Performed by: INTERNAL MEDICINE

## 2023-03-14 PROCEDURE — 3017F COLORECTAL CA SCREEN DOC REV: CPT | Performed by: INTERNAL MEDICINE

## 2023-03-14 PROCEDURE — G8484 FLU IMMUNIZE NO ADMIN: HCPCS | Performed by: INTERNAL MEDICINE

## 2023-03-14 RX ORDER — METOPROLOL SUCCINATE 50 MG/1
50 TABLET, EXTENDED RELEASE ORAL DAILY
Qty: 30 TABLET | Refills: 3 | Status: SHIPPED | OUTPATIENT
Start: 2023-03-14

## 2023-03-14 RX ORDER — LOSARTAN POTASSIUM 50 MG/1
50 TABLET ORAL DAILY
Qty: 30 TABLET | Refills: 3 | Status: SHIPPED | OUTPATIENT
Start: 2023-03-14

## 2023-03-14 NOTE — PROGRESS NOTES
CARDIOLOGY  NOTE    Chief Complaint: DVT /cardiomyopathy    HPI:   Mohsen Jackson is a 61y.o. year old who has Past medical history as noted below. Mohsen Jackson was admitted with heart failure in march 2023 and bradycardia with low bp requiring dopamine   At this poitn all her cardiac meds were stopped on discharge she is not on her meds   She just restarted lasix and feels her ankles are still swollen    Mohsen Jackson says her breathing is not good . She has CHF due to severe non ischemic cardiomyopathy  with EF of 30 -35 %  she is taking her meds but aldactone was stopped due to hyperkalemia  Sees nephrology for CKD ( Dr Xavi Ferraro)  She is here with her  she has history of schizophrenia and hepatitis C and was found to have bilateral lower extremity swelling work-up revealed bilateral extensive DVT of both lower extremity she was evaluated by hematology oncology and currently hypercoagulable work-up , she is on xarelto 20 mg daily . Her echo shows severe cardiomyopathy  With EF of 30- 35% and hypokinesis of the anteroseptal wall with eccentric severe  mitral regurgitation. Cardiac cath revealed no significant obstructive coronary artery disease she was started on guideline recommended medical therapy but because of low blood pressures she had difficulty tolerating them. She is feeling much better now.   Currently taking 20 mg of Lasix and Xarelto 20 mg daily   She says she smokes several cigars a day but is trying to cut down    Current Outpatient Medications   Medication Sig Dispense Refill    metoprolol succinate (TOPROL XL) 50 MG extended release tablet Take 1 tablet by mouth daily 30 tablet 3    losartan (COZAAR) 50 MG tablet Take 1 tablet by mouth daily 30 tablet 3    ABILIFY MAINTENA 400 MG SRER every 30 days      furosemide (LASIX) 20 MG tablet Take 1 tablet by mouth 2 times daily 180 tablet 3    fluticasone (FLONASE) 50 MCG/ACT nasal spray 1 spray by Each Nostril route daily 16 g 3    guaiFENesin (MUCINEX) 600 MG extended release tablet Take 1 tablet by mouth 2 times daily 60 tablet 0    guaiFENesin (ROBITUSSIN) 100 MG/5ML liquid Take 10 mLs by mouth every 4 hours as needed for Cough 1 each 0    insulin glargine (LANTUS) 100 UNIT/ML injection vial Inject 10 Units into the skin nightly 10 mL 3    rivaroxaban (XARELTO) 20 MG TABS tablet Take 1 tablet by mouth Daily with supper 30 tablet 5    albuterol sulfate HFA (PROVENTIL;VENTOLIN;PROAIR) 108 (90 Base) MCG/ACT inhaler Inhale 2 puffs into the lungs every 4 hours as needed for Wheezing 18 g 3    empagliflozin (JARDIANCE) 10 MG tablet Take 1 tablet by mouth daily 90 tablet 1     No current facility-administered medications for this visit. Allergies:   Haldol [haloperidol lactate]    Patient History:  Past Medical History:   Diagnosis Date    CAD (coronary artery disease)     Diabetes mellitus ([facility]     Hepatitis C     HFrEF (heart failure with reduced ejection fraction) ([facility]     Schizophrenia ([facility]      Past Surgical History:   Procedure Laterality Date    CHOLECYSTECTOMY      HYSTERECTOMY (CERVIX STATUS UNKNOWN)       Family History   Problem Relation Age of Onset    No Known Problems Mother     Cancer Father      Social History     Tobacco Use    Smoking status: Every Day     Packs/day: 0.25     Years: 30.00     Pack years: 7.50     Types: Cigarettes     Last attempt to quit: 10/17/2022     Years since quittin.4    Smokeless tobacco: Never    Tobacco comments:     Couple cigars and 3 cigarettes a day 22   Substance Use Topics    Alcohol use: Yes     Comment: Alcohol: 1 drink every 2 weeks. Caffeine: 1 cup of coffee occasionally, 2 cans of Coke or Pepsi daily        Review of Systems:   Constitutional: No Fever or Weight Loss   Eyes: No Decreased Vision  ENT: No Headaches, Hearing Loss or Vertigo  Cardiovascular: as per note above   Respiratory: No cough or wheezing and as per note above.   Gastrointestinal: No abdominal pain, appetite loss, blood in stools, constipation, diarrhea or heartburn  Genitourinary: No dysuria, trouble voiding, or hematuria  Musculoskeletal:  denies any new  joint aches , swelling  or pain   Integumentary: No rash or pruritis  Neurological: No TIA or stroke symptoms  Psychiatric: No anxiety or depression  Endocrine: No malaise, fatigue or temperature intolerance  Hematologic/Lymphatic: No bleeding problems, blood clots or swollen lymph nodes  Allergic/Immunologic: No nasal congestion or hives    Objective:      Physical Exam:  BP (!) 148/90   Pulse 88   Ht 5' 2\" (1.575 m)   Wt 131 lb (59.4 kg)   BMI 23.96 kg/m²   Wt Readings from Last 3 Encounters:   03/14/23 131 lb (59.4 kg)   03/10/23 135 lb 3.2 oz (61.3 kg)   02/26/23 135 lb 3 oz (61.3 kg)     Body mass index is 23.96 kg/m².  Vitals:    03/14/23 1131   BP: (!) 148/90   Pulse:         General Appearance:  No distress, conversant  Constitutional:  Well developed, Well nourished, No acute distress, Non-toxic appearance.   HENT:  Normocephalic, Atraumatic, Bilateral external ears normal, Oropharynx moist, No oral exudates, Nose normal. Neck- Normal range of motion, No tenderness, Supple, No stridor,no apical-carotid delay  Eyes:  PERRL, EOMI, Conjunctiva normal, No discharge.   Respiratory:  Normal breath sounds, No respiratory distress, No wheezing, No chest tenderness.,no use of accessory muscles, NO crackles  Cardiovascular: (PMI) apex non displaced,no lifts no thrills,S1 and S2 audible, No added heart sounds, No signs of ankle edema, or volume overload, No evidence of JVD, No crackles  GI:  Bowel sounds normal, Soft, No tenderness, No masses, No gross visceromegaly   :  No costovertebral angle tenderness   Musculoskeletal:  No edema, no tenderness, no deformities. Back- no tenderness  Integument:  Well hydrated, no rash   Lymphatic:  No lymphadenopathy noted   Neurologic:  Alert & oriented x 3, CN 2-12 normal,  normal motor function, normal sensory function, no focal deficits noted   Psychiatric:  Speech and behavior appropriate       Medical decision making and Data review:  DATA:  Lab Results   Component Value Date    TROPONINT 0.017 (H) 11/08/2022     BNP:    Lab Results   Component Value Date    PROBNP 9,471 (H) 02/26/2023     PT/INR:  No results found for: [employer]  Lab Results   Component Value Date    LABA1C 6.6 (H) 02/23/2023    LABA1C 6.0 05/03/2022     Lab Results   Component Value Date    CHOL 235 (H) 02/24/2023    TRIG 145 02/24/2023    HDL 90 02/24/2023    LDLCALC 116 (H) 02/24/2023    LDLDIRECT 70 03/12/2021     Lab Results   Component Value Date    ALT 16 02/23/2023    AST 21 02/23/2023     No results for input(s): WBC, HGB, HCT, MCV, PLT in the last 72 hours. TSH: No results found for: TSH  Lab Results   Component Value Date    AST 21 02/23/2023    ALT 16 02/23/2023    BILIDIR 0.2 03/16/2022    BILITOT 0.5 02/23/2023    ALKPHOS 159 (H) 02/23/2023     Lab Results   Component Value Date    PROBNP 9,471 (H) 02/26/2023    PROBNP 30,533 (H) 11/07/2022    PROBNP 16,222 (H) 05/02/2022     Lab Results   Component Value Date    LABA1C 6.6 (H) 02/23/2023    LABA1C 6.0 05/03/2022     Lab Results   Component Value Date    WBC 6.9 02/27/2023    HGB 12.4 (L) 02/27/2023    HCT 36.9 (L) 02/27/2023     (L) 02/27/2023       Echo 3/022     Summary   Left ventricular systolic function is abnormal.   Ejection fraction is visually estimated at 30-35 %   Slight hypokinesis of the anteroseptal wall segment. Mild left ventricular hypertrophy. Severe eccentric mitral regurgitation (ERO: 0.40 cm sq, regurgitant volume:   62 ml). Essentially normal right ventricle; no evidence of right heart strain. Moderate tricuspid regurgitation; RVSP: 44 mmHg. Mild pulmonic regurgitation present. No evidence of any pericardial effusion. Bilateral pleural effusion.    Inferior vena cava is dilated, measuring at 2.3 cm, but does collapse with  Cath 3/18/22  Cardiac Arteries and Lesion Findings     LMCA: Normal.     LAD: Normal.     LCx: Normal.     RCA: Mild Lumen Irregularity. 10% prox RCA    Echo  Summary   This is a limited echocardiogram.   Left ventricular systolic function is abnormal.   Ejection fraction is visually estimated at 25-30%. Global severe hypokinesis noted. Bilateral atrial enlargement. Severe mitral regurgitation consistent with previous echo. Severe tricuspid regurgitation; RVSP: 63 mmHg. No evidence of any pericardial effusion. All labs, medications and tests reviewed by myself including data and history from outside source , patient and available family . Assessment & Plan:      1. Nonrheumatic mitral valve regurgitation    2. Precordial pain    3. Primary hypertension    4. Other pulmonary embolism without acute cor pulmonale, unspecified chronicity ([address]    5. Congestive heart failure, unspecified HF chronicity, unspecified heart failure type ([address]    6. Acute on chronic systolic congestive heart failure ([address]    7. Acute diastolic CHF (congestive heart failure) ([address]    8. Acute bilateral deep vein thrombosis (DVT) of femoral veins (HCC)    9. Smoking             Acute on chronic systolic congestive heart failure ([address]  Cath in March 2022 did not reveal any obstructive coronary artery disease.     Non Ischemic cardiomyopathy with ejection fraction of 20-25 % ,   Restart toprol xl 50 mg and losartan 50 mg daily   Change lasix 40 mg daily   At this stage refusing icd but will think about it , she may benefit Mitral valve intervention as well plan will uptitrate meds monthly will evaluate every month  Could nto tolerate aldactone due to hyperkalemia     Acute bilateral deep vein thrombosis (DVT) of femoral veins (HCC)   Bilateral extensive DVT started on Xarelto continue as per protocol she is seeing hematology for hypercoagulable work-up    Nonrheumatic mitral valve regurgitation  Severe  mitral regurgitation will repeat echo , will need MAT and may benefit from intervention she is refusing any cardiac surgery, mitral clip may be an inferior /palliative  option? She wants to wait for MAT she is scared, will get echo and then plan mat based on echo  Will need anesthesia as she is very anxious and scared     Primary hypertension   Gradually titrate up medication but blood pressures well controlled today continue Lasix 20 mg BId     Smoking  Encouraged to quit smoking    CKD  Seeing Dr Michelle Villanueva       Type 2 diabetes mellitus without complication, with long-term current use of insulin ([facility]   Needs to establish care with PCP currently on insulin     Dyslipidemia :  All available lab work was reviewed. Patient was advised to repeat lab work before next visit. Necessary orders were placed , instructions given by myself       Counseled extensively and medication compliance urged. We discussed that for the  prevention of ASCVD our  goal is aggressive risk modification. Patient is encouraged to exercise if they can , educated about  brisk walk for 30 minutes  at least 3 to 4 times a week if there are no physical limitations  Various goals were discussed and questions answered. Continue current medications. Appropriate prescriptions are addressed and refills ordered. Questions answered and patient verbalizes understanding. Call for any problems, questions, or concerns. Greater than 60 % of time spent counseling besides reviewing data and images     Continue all other medications of all above medical condition listed as is. Return in about 1 month (around 4/14/2023). Please note this report has been partially produced using speech recognition software and may contain errors related to that system including errors in grammar, punctuation, and spelling, as well as words and phrases that may be inappropriate.  If there are any questions or concerns please feel free to contact the dictating provider for clarification.

## 2023-03-15 ENCOUNTER — TELEPHONE (OUTPATIENT)
Dept: CARDIOLOGY CLINIC | Age: 58
End: 2023-03-15

## 2023-03-15 RX ORDER — NICOTINE 21 MG/24HR
1 PATCH, TRANSDERMAL 24 HOURS TRANSDERMAL DAILY
Qty: 42 PATCH | Refills: 3 | Status: SHIPPED | OUTPATIENT
Start: 2023-03-15 | End: 2023-04-26

## 2023-03-15 NOTE — TELEPHONE ENCOUNTER
Patient called she had asked Dr Reyna Interiano at her appt to put on Nicotine patches to try to stop smoking [facility]

## 2023-05-01 ENCOUNTER — HOSPITAL ENCOUNTER (OUTPATIENT)
Age: 58
Discharge: HOME | End: 2023-05-01
Payer: COMMERCIAL

## 2023-05-01 DIAGNOSIS — E11.9 TYPE 2 DIABETES MELLITUS WITHOUT COMPLICATION, WITH LONG-TERM CURRENT USE OF INSULIN (HCC): ICD-10-CM

## 2023-05-01 DIAGNOSIS — I26.99 OTHER PULMONARY EMBOLISM WITHOUT ACUTE COR PULMONALE, UNSPECIFIED CHRONICITY (HCC): ICD-10-CM

## 2023-05-01 DIAGNOSIS — R80.1 PERSISTENT PROTEINURIA: ICD-10-CM

## 2023-05-01 DIAGNOSIS — I50.41 ACUTE COMBINED SYSTOLIC AND DIASTOLIC CONGESTIVE HEART FAILURE (HCC): ICD-10-CM

## 2023-05-01 DIAGNOSIS — F17.200 SMOKING: ICD-10-CM

## 2023-05-01 DIAGNOSIS — Z79.4 TYPE 2 DIABETES MELLITUS WITHOUT COMPLICATION, WITH LONG-TERM CURRENT USE OF INSULIN (HCC): ICD-10-CM

## 2023-05-01 DIAGNOSIS — B18.2 CHRONIC HEPATITIS C WITHOUT HEPATIC COMA (HCC): ICD-10-CM

## 2023-05-01 DIAGNOSIS — N18.32 STAGE 3B CHRONIC KIDNEY DISEASE (HCC): ICD-10-CM

## 2023-05-01 DIAGNOSIS — I10 PRIMARY HYPERTENSION: ICD-10-CM

## 2023-05-01 LAB
ALBUMIN SERPL-MCNC: 3.4 GM/DL (ref 3.4–5)
ALBUMIN SERPL-MCNC: 3.4 GM/DL (ref 3.4–5)
ALP BLD-CCNC: 165 IU/L (ref 40–129)
ALT SERPL-CCNC: 16 U/L (ref 10–40)
ANION GAP SERPL CALCULATED.3IONS-SCNC: 12 MMOL/L (ref 4–16)
AST SERPL-CCNC: 24 IU/L (ref 15–37)
BILIRUB SERPL-MCNC: 0.5 MG/DL (ref 0–1)
BILIRUBIN DIRECT: 0.2 MG/DL (ref 0–0.3)
BILIRUBIN, INDIRECT: 0.3 MG/DL (ref 0–0.7)
BUN SERPL-MCNC: 31 MG/DL (ref 6–23)
CALCIUM SERPL-MCNC: 9.1 MG/DL (ref 8.3–10.6)
CHLORIDE BLD-SCNC: 105 MMOL/L (ref 99–110)
CO2: 18 MMOL/L (ref 21–32)
CREAT SERPL-MCNC: 1.9 MG/DL (ref 0.6–1.1)
GFR SERPL CREATININE-BSD FRML MDRD: 30 ML/MIN/1.73M2
GLUCOSE SERPL-MCNC: 182 MG/DL (ref 70–99)
MAGNESIUM: 2.3 MG/DL (ref 1.8–2.4)
PHOSPHORUS: 3.7 MG/DL (ref 2.5–4.9)
POTASSIUM SERPL-SCNC: 4.8 MMOL/L (ref 3.5–5.1)
SODIUM BLD-SCNC: 135 MMOL/L (ref 135–145)
TOTAL PROTEIN: 7.6 GM/DL (ref 6.4–8.2)
TSH SERPL DL<=0.005 MIU/L-ACNC: 4.37 UIU/ML (ref 0.27–4.2)

## 2023-05-01 PROCEDURE — 84100 ASSAY OF PHOSPHORUS: CPT

## 2023-05-01 PROCEDURE — 80053 COMPREHEN METABOLIC PANEL: CPT

## 2023-05-01 PROCEDURE — 85025 COMPLETE CBC W/AUTO DIFF WBC: CPT

## 2023-05-01 PROCEDURE — 82248 BILIRUBIN DIRECT: CPT

## 2023-05-01 PROCEDURE — 36415 COLL VENOUS BLD VENIPUNCTURE: CPT

## 2023-05-01 PROCEDURE — 83970 ASSAY OF PARATHORMONE: CPT

## 2023-05-01 PROCEDURE — 83735 ASSAY OF MAGNESIUM: CPT

## 2023-05-01 PROCEDURE — 84443 ASSAY THYROID STIM HORMONE: CPT

## 2023-05-01 PROCEDURE — 86160 COMPLEMENT ANTIGEN: CPT

## 2023-05-01 PROCEDURE — 82040 ASSAY OF SERUM ALBUMIN: CPT

## 2023-05-01 PROCEDURE — 82570 ASSAY OF URINE CREATININE: CPT

## 2023-05-08 LAB
REASON FOR REJECTION: NORMAL
REJECTED TEST: NORMAL

## 2023-05-10 ENCOUNTER — OFFICE VISIT (OUTPATIENT)
Dept: CARDIOLOGY CLINIC | Age: 58
End: 2023-05-10
Payer: COMMERCIAL

## 2023-05-10 VITALS
SYSTOLIC BLOOD PRESSURE: 148 MMHG | WEIGHT: 145.2 LBS | OXYGEN SATURATION: 94 % | HEART RATE: 100 BPM | DIASTOLIC BLOOD PRESSURE: 92 MMHG | BODY MASS INDEX: 26.72 KG/M2 | HEIGHT: 62 IN

## 2023-05-10 DIAGNOSIS — F17.200 SMOKING: Primary | ICD-10-CM

## 2023-05-10 DIAGNOSIS — Z86.718 HISTORY OF DVT (DEEP VEIN THROMBOSIS): ICD-10-CM

## 2023-05-10 DIAGNOSIS — I50.42 CHRONIC COMBINED SYSTOLIC AND DIASTOLIC CONGESTIVE HEART FAILURE (HCC): ICD-10-CM

## 2023-05-10 PROBLEM — N17.9 AKI (ACUTE KIDNEY INJURY) (HCC): Status: RESOLVED | Noted: 2022-03-22 | Resolved: 2023-05-10

## 2023-05-10 PROBLEM — I50.23 ACUTE ON CHRONIC SYSTOLIC CONGESTIVE HEART FAILURE (HCC): Status: RESOLVED | Noted: 2022-03-16 | Resolved: 2023-05-10

## 2023-05-10 PROBLEM — I82.413 ACUTE BILATERAL DEEP VEIN THROMBOSIS (DVT) OF FEMORAL VEINS (HCC): Status: RESOLVED | Noted: 2022-03-15 | Resolved: 2023-05-10

## 2023-05-10 PROCEDURE — G8419 CALC BMI OUT NRM PARAM NOF/U: HCPCS | Performed by: NURSE PRACTITIONER

## 2023-05-10 PROCEDURE — 4004F PT TOBACCO SCREEN RCVD TLK: CPT | Performed by: NURSE PRACTITIONER

## 2023-05-10 PROCEDURE — 99213 OFFICE O/P EST LOW 20 MIN: CPT | Performed by: NURSE PRACTITIONER

## 2023-05-10 PROCEDURE — G8427 DOCREV CUR MEDS BY ELIG CLIN: HCPCS | Performed by: NURSE PRACTITIONER

## 2023-05-10 PROCEDURE — 3080F DIAST BP >= 90 MM HG: CPT | Performed by: NURSE PRACTITIONER

## 2023-05-10 PROCEDURE — 3077F SYST BP >= 140 MM HG: CPT | Performed by: NURSE PRACTITIONER

## 2023-05-10 PROCEDURE — 3017F COLORECTAL CA SCREEN DOC REV: CPT | Performed by: NURSE PRACTITIONER

## 2023-05-10 RX ORDER — LOSARTAN POTASSIUM 100 MG/1
100 TABLET ORAL DAILY
Qty: 30 TABLET | Refills: 3 | Status: SHIPPED | OUTPATIENT
Start: 2023-05-10

## 2023-05-10 RX ORDER — VELPATASVIR AND SOFOSBUVIR 100; 400 MG/1; MG/1
TABLET, FILM COATED ORAL
COMMUNITY
Start: 2023-04-25

## 2023-05-10 ASSESSMENT — ENCOUNTER SYMPTOMS
COUGH: 0
SHORTNESS OF BREATH: 1

## 2023-05-10 NOTE — PATIENT INSTRUCTIONS
**It is YOUR responsibilty to bring medication bottles and/or updated medication list to [address] [place] [address]. This will allow us to better serve you and all your healthcare needs**    Please be informed that if you contact our office outside of normal business hours the physician on call cannot help with any scheduling or rescheduling issues, procedure instruction questions or any type of medication issue. We advise you for any urgent/emergency that you go to the nearest emergency room! PLEASE CALL OUR OFFICE DURING NORMAL BUSINESS HOURS    Monday - Friday   8 am to 5 pm    [facility] [address]: [phone]    [place]:  [phone]    Thank you for allowing us to care for you today! We want to ensure we can follow your treatment plan and we strive to give you the best outcomes and experience possible. If you ever have a life threatening emergency and call 911 - for an ambulance (EMS)   Our providers can only care for you at:   [facility] or [facility]. Even if you have someone take you or you drive yourself we can only care for you in a [facility] facility. Our providers are not setup at the other healthcare locations!

## 2023-05-10 NOTE — PROGRESS NOTES
mitral valve regurgitation  Severe  mitral regurgitation will repeat echo , will need RAYA and may benefit from intervention she is refusing any cardiac surgery, mitral clip may be an inferior /palliative  option? She wants to wait for RAYA she is scared, we again reviewed the echo and I encouraged her do RAYA. She wants to see Dr. Aristides Heath prior to scheduling. Will need anesthesia as she is very anxious and scared        Primary hypertension  Improved  Increase losartan to 100 mg Daily. Will need seen frequently for up titration of medications    Smoking  Encouraged to quit smoking. She had her last cigar and cigarette a few days a go she states that she occasionally has a partial, but she is trying to stop. CKD  Seeing Dr Octavia Aase         Type 2 diabetes mellitus without complication, with long-term current use of insulin ([facility]   Needs to establish care with PCP currently on insulin       Dyslipidemia   All available lab work was reviewed. Patient was advised to repeat lab work before next visit. Necessary orders were placed, instructions given by myself         Counseled extensively and medication compliance urged. We discussed that for the  prevention of ASCVD our  goal is aggressive risk modification. Patient is encouraged to exercise if they can , educated about  brisk walk for 30 minutes  at least 3 to 4 times a week if there are no physical limitations  Various goals were discussed and questions answered. Continue current medications. Appropriate prescriptions are addressed and refills ordered. Questions answered and patient verbalizes understanding. Call for any problems, questions, or concerns. Greater than 60 % of time spent counseling besides reviewing data and images       Return in about 4 weeks (around 6/7/2023). An electronic signature was used to authenticate this note.     Electronically signed by YNES Woodall CNP on 5/10/2023 at 1:24 PM

## 2023-05-31 PROBLEM — R80.1 PERSISTENT PROTEINURIA: Status: ACTIVE | Noted: 2023-05-31

## 2023-05-31 PROBLEM — N18.32 STAGE 3B CHRONIC KIDNEY DISEASE (HCC): Status: ACTIVE | Noted: 2023-05-31

## 2023-06-13 NOTE — PROGRESS NOTES
ECHO COMPLETED Preventive Care Visit  [facility]  Arabella Bassett MD, Pediatrics  Jun 13, 2023    Assessment & Plan   3 year old 11 month old, here for preventive care.    (Z00.129) Encounter for routine child health examination w/o abnormal findings  (primary encounter diagnosis)  Plan: BEHAVIORAL/EMOTIONAL ASSESSMENT (15026),         SCREENING, VISUAL ACUITY, QUANTITATIVE, BILAT        Patient has been advised of split billing requirements and indicates understanding: Yes  Growth      Normal height and weight  Pediatric Healthy Lifestyle Action Plan         Exercise and nutrition counseling performed    Immunizations   Appropriate vaccinations were ordered.    Anticipatory Guidance    Reviewed age appropriate anticipatory guidance.   The following topics were discussed:  SOCIAL/ FAMILY:    Reading     Given a book from Reach Out & Read     readiness  NUTRITION:    Healthy food choices    Limit juice to 4 ounces   HEALTH/ SAFETY:    Dental care    Good/bad touch    Referrals/Ongoing Specialty Care  Ongoing care with ENT  Verbal Dental Referral: Patient has established dental home  Dental Fluoride Varnish: No, parent/guardian declines fluoride varnish.  Reason for decline: Recent/Upcoming dental appointment    Subjective         6/13/2023     3:31 PM   Additional Questions   Accompanied by Mom and dad   Questions for today's visit No   Surgery, major illness, or injury since last physical No         6/13/2023     3:27 PM   Social   Lives with Parent(s)    Sibling(s)   Recent potential stressors None   History of trauma No   Family Hx of mental health challenges No   Lack of transportation has limited access to appts/meds No   Difficulty paying mortgage/rent on time No   Lack of steady place to sleep/has slept in a shelter No         6/13/2023     3:27 PM   Health Risks/Safety   What type of car seat does your child use? Car seat with harness   Is your child's car seat forward or rear  facing? Forward facing   Where does your child sit in the car?  Back seat   Do you have a swimming pool? No   Helmet use? Yes   Is your child ever home alone?  No   Are the guns/firearms secured in a safe or with a trigger lock? Yes   Is ammunition stored separately from guns? Yes            6/13/2023     3:27 PM   TB Screening: Consider immunosuppression as a risk factor for TB   Recent TB infection or positive TB test in family/close contacts No   Recent travel outside [place] (child/family/close contacts) No   Recent residence in high-risk group setting (correctional facility/health care facility/homeless shelter/refugee camp) No          No results for input(s): CHOL, HDL, LDL, TRIG, CHOLHDLRATIO in the last 62119 hours.      6/13/2023     3:27 PM   Dental Screening   Has your child seen a dentist? Yes   When was the last visit? 3 months to 6 months ago   Has your child had cavities in the last 2 years? No   Have parents/caregivers/siblings had cavities in the last 2 years? No         6/13/2023     3:27 PM   Diet   Do you have questions about feeding your child? No   What does your child regularly drink? Water    Cow's milk   What type of milk? 1%   What type of water? Tap   How often does your family eat meals together? Every day   How many snacks does your child eat per day 2   Are there types of foods your child won't eat? No   At least 3 servings of food or beverages that have calcium each day Yes   In past 12 months, concerned food might run out Never true   In past 12 months, food has run out/couldn't afford more Never true         6/13/2023     3:27 PM   Elimination   Bowel or bladder concerns? No concerns   Toilet training status: Toilet trained, day and night         6/13/2023     3:27 PM   Activity   Days per week of moderate/strenuous exercise (!) 5 DAYS   On average, how many minutes does your child engage in exercise at this level? 60 minutes   What does your child do for exercise?  swimming dance bike  "scooter soccer gymnstics   What activities is your child involved with?  soccer swimming dance gymnastics         6/13/2023     3:27 PM   Media Use   Hours per day of screen time (for entertainment) 1   Screen in bedroom No         6/13/2023     3:27 PM   Sleep   Do you have any concerns about your child's sleep?  No concerns, sleeps well through the night         6/13/2023     3:27 PM   School   School concerns No concerns   Grade in school    Current school [employer]         6/13/2023     3:27 PM   Vision/Hearing   Vision or hearing concerns No concerns          View : No data to display.              Development/Social-Emotional Screen - PSC-17 required for C&TC    Screening tool used, reviewed with parent/guardian:   Electronic PSC       6/13/2023     3:28 PM   PSC SCORES   Inattentive / Hyperactive Symptoms Subtotal 2   Externalizing Symptoms Subtotal 1   Internalizing Symptoms Subtotal 1   PSC - 17 Total Score 4        no follow up necessary  PSC-17 PASS (total score <15; attention symptoms <7, externalizing symptoms <7, internalizing symptoms <5)    Milestones (by observation/ exam/ report) 75-90% ile   SOCIAL/EMOTIONAL:  Follows rules or takes turns when playing games with other children  Sings, dances, or acts for you   Does simple chores at home, like matching socks or clearing the table after eating  LANGUAGE:/COMMUNICATION:  Tells a story they heard or made up with at least two events.  For example, a cat was stuck in a tree and a  saved it  Answers simple questions about a book or story after you read or tell it to them  Keeps a conversation going with more than three back and forth exchanges  Uses or recognizes simple rhymes (bat-cat, ball-tall)  COGNITIVE (LEARNING, THINKING, PROBLEM-SOLVING):   Counts to 10   Names some numbers between 1 and 5 when you point to them   Uses words about time, like \"yesterday,\" \"tomorrow,\" \"morning,\" or \"night\"   Pays attention for 5 to 10 minutes " "during activities. For example, during story time or making arts and crafts (screen time does not count)   Writes some letters in their name   Names some letters when you point to them  MOVEMENT/PHYSICAL DEVELOPMENT:   Buttons some buttons   Hops on one foot         Objective     Exam  BP (!) 113/93   Pulse 92   Temp 98.2  F (36.8  C) (Tympanic)   Resp 22   Ht 3' 7.75\" (1.111 m)   Wt 48 lb 3.2 oz (21.9 kg)   SpO2 98%   BMI 17.70 kg/m    77 %ile (Z= 0.74) based on CDC (Girls, 2-20 Years) Stature-for-age data based on Stature recorded on 6/13/2023.  90 %ile (Z= 1.26) based on CDC (Girls, 2-20 Years) weight-for-age data using vitals from 6/13/2023.  93 %ile (Z= 1.44) based on [facility] (Girls, 2-20 Years) BMI-for-age based on BMI available as of 6/13/2023.  Blood pressure %jannet are 97 % systolic and >99 % diastolic based on the 2017 AAP Clinical Practice Guideline. This reading is in the Stage 2 hypertension range (BP >= 95th %ile + 12 mmHg).    Vision Screen  Vision Screen Details  Does the patient have corrective lenses (glasses/contacts)?: No  Vision Acuity Screen  Vision Acuity Tool: CLEO  RIGHT EYE: 10/20 (20/40)  LEFT EYE: 10/20 (20/40)  Is there a two line difference?: No  Vision Screen Results: Pass    Hearing Screen  Hearing Screen Not Completed  Reason Hearing Screen was not completed: Seen by audiologist in the past 12 months      Physical Exam  GENERAL: Alert, well appearing, no distress  SKIN: Clear. No significant rash, abnormal pigmentation or lesions  HEAD: Normocephalic.  EYES:  Symmetric light reflex and no eye movement on cover/uncover test. Normal conjunctivae.  EARS: Normal canals. Tympanic membranes are normal; gray and translucent.  NOSE: Normal without discharge.  MOUTH/THROAT: Clear. No oral lesions. Teeth without obvious abnormalities.  NECK: Supple, no masses.  No thyromegaly.  LYMPH NODES: No adenopathy  LUNGS: Clear. No rales, rhonchi, wheezing or retractions  HEART: Regular rhythm. Normal " S1/S2. No murmurs. Normal pulses.  ABDOMEN: Soft, non-tender, not distended, no masses or hepatosplenomegaly. Bowel sounds normal.   GENITALIA: Normal female external genitalia. Peter stage I,  No inguinal herniae are present.  EXTREMITIES: Full range of motion, no deformities  NEUROLOGIC: No focal findings. Cranial nerves grossly intact: DTR's normal. Normal gait, strength and tone        Arabella Bassett MD  [facility]

## 2023-08-07 ENCOUNTER — OFFICE VISIT (OUTPATIENT)
Dept: CARDIOLOGY CLINIC | Age: 58
End: 2023-08-07
Payer: COMMERCIAL

## 2023-08-07 VITALS
BODY MASS INDEX: 25.32 KG/M2 | HEART RATE: 80 BPM | WEIGHT: 137.6 LBS | SYSTOLIC BLOOD PRESSURE: 140 MMHG | DIASTOLIC BLOOD PRESSURE: 82 MMHG | HEIGHT: 62 IN

## 2023-08-07 DIAGNOSIS — I34.0 NONRHEUMATIC MITRAL VALVE REGURGITATION: Primary | ICD-10-CM

## 2023-08-07 DIAGNOSIS — I42.8 NICM (NONISCHEMIC CARDIOMYOPATHY) (HCC): ICD-10-CM

## 2023-08-07 DIAGNOSIS — N18.32 STAGE 3B CHRONIC KIDNEY DISEASE (HCC): ICD-10-CM

## 2023-08-07 DIAGNOSIS — I26.99 OTHER PULMONARY EMBOLISM WITHOUT ACUTE COR PULMONALE, UNSPECIFIED CHRONICITY (HCC): ICD-10-CM

## 2023-08-07 DIAGNOSIS — I10 PRIMARY HYPERTENSION: ICD-10-CM

## 2023-08-07 DIAGNOSIS — E11.65 HYPERGLYCEMIC CRISIS IN DIABETES MELLITUS (HCC): ICD-10-CM

## 2023-08-07 DIAGNOSIS — E11.9 TYPE 2 DIABETES MELLITUS WITHOUT COMPLICATION, WITH LONG-TERM CURRENT USE OF INSULIN (HCC): ICD-10-CM

## 2023-08-07 DIAGNOSIS — R07.2 PRECORDIAL PAIN: ICD-10-CM

## 2023-08-07 DIAGNOSIS — I95.9 HYPOTENSIVE EPISODE: ICD-10-CM

## 2023-08-07 DIAGNOSIS — I50.42 CHRONIC COMBINED SYSTOLIC AND DIASTOLIC CONGESTIVE HEART FAILURE (HCC): ICD-10-CM

## 2023-08-07 DIAGNOSIS — B18.2 CHRONIC HEPATITIS C WITHOUT HEPATIC COMA (HCC): ICD-10-CM

## 2023-08-07 DIAGNOSIS — Z79.4 TYPE 2 DIABETES MELLITUS WITHOUT COMPLICATION, WITH LONG-TERM CURRENT USE OF INSULIN (HCC): ICD-10-CM

## 2023-08-07 DIAGNOSIS — F17.200 SMOKING: ICD-10-CM

## 2023-08-07 PROCEDURE — 3017F COLORECTAL CA SCREEN DOC REV: CPT | Performed by: INTERNAL MEDICINE

## 2023-08-07 PROCEDURE — 4004F PT TOBACCO SCREEN RCVD TLK: CPT | Performed by: INTERNAL MEDICINE

## 2023-08-07 PROCEDURE — G8419 CALC BMI OUT NRM PARAM NOF/U: HCPCS | Performed by: INTERNAL MEDICINE

## 2023-08-07 PROCEDURE — G8427 DOCREV CUR MEDS BY ELIG CLIN: HCPCS | Performed by: INTERNAL MEDICINE

## 2023-08-07 PROCEDURE — 2022F DILAT RTA XM EVC RTNOPTHY: CPT | Performed by: INTERNAL MEDICINE

## 2023-08-07 PROCEDURE — 3079F DIAST BP 80-89 MM HG: CPT | Performed by: INTERNAL MEDICINE

## 2023-08-07 PROCEDURE — 3044F HG A1C LEVEL LT 7.0%: CPT | Performed by: INTERNAL MEDICINE

## 2023-08-07 PROCEDURE — 3077F SYST BP >= 140 MM HG: CPT | Performed by: INTERNAL MEDICINE

## 2023-08-07 PROCEDURE — 99214 OFFICE O/P EST MOD 30 MIN: CPT | Performed by: INTERNAL MEDICINE

## 2023-08-07 RX ORDER — LISINOPRIL 40 MG/1
40 TABLET ORAL DAILY
Qty: 30 TABLET | Refills: 4 | Status: SHIPPED | OUTPATIENT
Start: 2023-08-07

## 2023-08-07 RX ORDER — METOPROLOL SUCCINATE 50 MG/1
50 TABLET, EXTENDED RELEASE ORAL DAILY
Qty: 30 TABLET | Refills: 3 | Status: SHIPPED | OUTPATIENT
Start: 2023-08-07

## 2023-08-07 RX ORDER — FUROSEMIDE 20 MG/1
40 TABLET ORAL 2 TIMES DAILY
Qty: 60 TABLET | Refills: 4 | Status: SHIPPED | OUTPATIENT
Start: 2023-08-07

## 2023-08-07 RX ORDER — LISINOPRIL 40 MG/1
40 TABLET ORAL DAILY
COMMUNITY
Start: 2023-01-10 | End: 2023-08-07 | Stop reason: SDUPTHER

## 2023-08-07 NOTE — PROGRESS NOTES
CARDIOLOGY  NOTE    Chief Complaint: DVT /cardiomyopathy    HPI:   Neto Morel is a 61y.o. year old who has Past medical history as noted below. Neto Morel was admitted with heart failure in march 2023 and bradycardia with low bp , she has severe mitral regurgitation but she did not want to get mat or any   She just restarted lasix and feels her ankles are still swollen    Neto Morel says her breathing is better   She has CHF due to severe non ischemic cardiomyopathy  with EF of 30 -35 %  she is taking her meds but aldactone was stopped due to hyperkalemia  Sees nephrology for CKD ( Dr Jayne Mayer)  She is here with her  she has history of schizophrenia and hepatitis C and was found to have bilateral lower extremity swelling work-up revealed bilateral extensive DVT of both lower extremity she was evaluated by hematology oncology and currently hypercoagulable work-up , she is on xarelto 20 mg daily . Her echo shows severe cardiomyopathy  With EF of 30- 35% and hypokinesis of the anteroseptal wall with eccentric severe  mitral regurgitation. Cardiac cath revealed no significant obstructive coronary artery disease she was started on guideline recommended medical therapy but because of low blood pressures she had difficulty tolerating them. She is feeling much better now.   Currently taking 20 mg of Lasix and Xarelto 20 mg daily   She says she smokes several cigars a day but is trying to cut down    Current Outpatient Medications   Medication Sig Dispense Refill    rivaroxaban (XARELTO) 20 MG TABS tablet Take 1 tablet by mouth Daily with supper 30 tablet 5    metoprolol succinate (TOPROL XL) 50 MG extended release tablet Take 1 tablet by mouth daily 30 tablet 3    empagliflozin (JARDIANCE) 10 MG tablet Take 1 tablet by mouth daily 30 tablet 5    lisinopril (PRINIVIL;ZESTRIL) 40 MG tablet Take 1 tablet by mouth daily 30 tablet 4    furosemide (LASIX) 20 MG tablet Take 2

## 2023-08-28 ENCOUNTER — PROCEDURE VISIT (OUTPATIENT)
Dept: CARDIOLOGY CLINIC | Age: 58
End: 2023-08-28
Payer: COMMERCIAL

## 2023-08-28 ENCOUNTER — HOSPITAL ENCOUNTER (OUTPATIENT)
Age: 58
Discharge: HOME | End: 2023-08-28
Payer: COMMERCIAL

## 2023-08-28 ENCOUNTER — TELEPHONE (OUTPATIENT)
Dept: CARDIOLOGY CLINIC | Age: 58
End: 2023-08-28

## 2023-08-28 DIAGNOSIS — R31.21 ASYMPTOMATIC MICROSCOPIC HEMATURIA: ICD-10-CM

## 2023-08-28 DIAGNOSIS — N18.32 STAGE 3B CHRONIC KIDNEY DISEASE (HCC): ICD-10-CM

## 2023-08-28 DIAGNOSIS — I26.99 OTHER PULMONARY EMBOLISM WITHOUT ACUTE COR PULMONALE, UNSPECIFIED CHRONICITY (HCC): ICD-10-CM

## 2023-08-28 DIAGNOSIS — F17.200 SMOKING: ICD-10-CM

## 2023-08-28 DIAGNOSIS — Z79.4 TYPE 2 DIABETES MELLITUS WITHOUT COMPLICATION, WITH LONG-TERM CURRENT USE OF INSULIN (HCC): ICD-10-CM

## 2023-08-28 DIAGNOSIS — I10 PRIMARY HYPERTENSION: ICD-10-CM

## 2023-08-28 DIAGNOSIS — I42.8 NICM (NONISCHEMIC CARDIOMYOPATHY) (HCC): ICD-10-CM

## 2023-08-28 DIAGNOSIS — E11.9 TYPE 2 DIABETES MELLITUS WITHOUT COMPLICATION, WITH LONG-TERM CURRENT USE OF INSULIN (HCC): ICD-10-CM

## 2023-08-28 DIAGNOSIS — B18.2 CHRONIC HEPATITIS C WITHOUT HEPATIC COMA (HCC): ICD-10-CM

## 2023-08-28 DIAGNOSIS — I34.0 NONRHEUMATIC MITRAL VALVE REGURGITATION: ICD-10-CM

## 2023-08-28 DIAGNOSIS — R80.1 PERSISTENT PROTEINURIA: ICD-10-CM

## 2023-08-28 LAB
ALBUMIN SERPL-MCNC: 3.7 GM/DL (ref 3.4–5)
ALBUMIN SERPL-MCNC: 3.7 GM/DL (ref 3.4–5)
ALBUMIN SERPL-MCNC: 4 GM/DL (ref 3.4–5)
ALP BLD-CCNC: 207 IU/L (ref 40–129)
ALP BLD-CCNC: 219 IU/L (ref 40–128)
ALT SERPL-CCNC: 14 U/L (ref 10–40)
ALT SERPL-CCNC: 14 U/L (ref 10–40)
ANION GAP SERPL CALCULATED.3IONS-SCNC: 13 MMOL/L (ref 4–16)
ANION GAP SERPL CALCULATED.3IONS-SCNC: 13 MMOL/L (ref 4–16)
AST SERPL-CCNC: 18 IU/L (ref 15–37)
AST SERPL-CCNC: 20 IU/L (ref 15–37)
BACTERIA: NEGATIVE /HPF
BASOPHILS ABSOLUTE: 0 K/CU MM
BASOPHILS RELATIVE PERCENT: 0.7 % (ref 0–1)
BILIRUB SERPL-MCNC: 0.3 MG/DL (ref 0–1)
BILIRUB SERPL-MCNC: 0.3 MG/DL (ref 0–1)
BILIRUBIN DIRECT: 0.2 MG/DL (ref 0–0.3)
BILIRUBIN URINE: NEGATIVE MG/DL
BILIRUBIN, INDIRECT: 0.1 MG/DL (ref 0–0.7)
BLOOD, URINE: ABNORMAL
BUN SERPL-MCNC: 35 MG/DL (ref 6–23)
BUN SERPL-MCNC: 35 MG/DL (ref 6–23)
CALCIUM SERPL-MCNC: 9.2 MG/DL (ref 8.3–10.6)
CALCIUM SERPL-MCNC: 9.4 MG/DL (ref 8.3–10.6)
CHLORIDE BLD-SCNC: 104 MMOL/L (ref 99–110)
CHLORIDE BLD-SCNC: 105 MMOL/L (ref 99–110)
CHOLEST SERPL-MCNC: 230 MG/DL
CLARITY: CLEAR
CO2: 19 MMOL/L (ref 21–32)
CO2: 20 MMOL/L (ref 21–32)
COLOR: YELLOW
CREAT SERPL-MCNC: 2 MG/DL (ref 0.6–1.1)
CREAT SERPL-MCNC: 2 MG/DL (ref 0.6–1.1)
CREATININE URINE: 133.2 MG/DL (ref 28–217)
DIFFERENTIAL TYPE: ABNORMAL
EOSINOPHILS ABSOLUTE: 0.1 K/CU MM
EOSINOPHILS RELATIVE PERCENT: 1.8 % (ref 0–3)
ESTIMATED AVERAGE GLUCOSE: 114 MG/DL
ESTIMATED AVERAGE GLUCOSE: 114 MG/DL
GFR SERPL CREATININE-BSD FRML MDRD: 28 ML/MIN/1.73M2
GFR SERPL CREATININE-BSD FRML MDRD: 28 ML/MIN/1.73M2
GLUCOSE SERPL-MCNC: 102 MG/DL (ref 70–99)
GLUCOSE SERPL-MCNC: 99 MG/DL (ref 70–99)
GLUCOSE, URINE: NEGATIVE MG/DL
HBA1C MFR BLD: 5.6 % (ref 4.2–6.3)
HBA1C MFR BLD: 5.6 % (ref 4.2–6.3)
HCT VFR BLD CALC: 35.5 % (ref 37–47)
HDLC SERPL-MCNC: 92 MG/DL
HEMOGLOBIN: 11.8 GM/DL (ref 12.5–16)
IMMATURE NEUTROPHIL %: 0.5 % (ref 0–0.43)
KETONES, URINE: NEGATIVE MG/DL
LDLC SERPL CALC-MCNC: 107 MG/DL
LEUKOCYTE ESTERASE, URINE: NEGATIVE
LV EF: 33 %
LVEF MODALITY: NORMAL
LYMPHOCYTES ABSOLUTE: 1.2 K/CU MM
LYMPHOCYTES RELATIVE PERCENT: 19.4 % (ref 24–44)
MAGNESIUM: 1.9 MG/DL (ref 1.8–2.4)
MCH RBC QN AUTO: 31.6 PG (ref 27–31)
MCHC RBC AUTO-ENTMCNC: 33.2 % (ref 32–36)
MCV RBC AUTO: 95.2 FL (ref 78–100)
MONOCYTES ABSOLUTE: 0.4 K/CU MM
MONOCYTES RELATIVE PERCENT: 6.7 % (ref 0–4)
NITRITE URINE, QUANTITATIVE: NEGATIVE
NUCLEATED RBC %: 0 %
PDW BLD-RTO: 13.1 % (ref 11.7–14.9)
PH, URINE: 6 (ref 5–8)
PHOSPHORUS: 4 MG/DL (ref 2.5–4.9)
PLATELET # BLD: 151 K/CU MM (ref 140–440)
PMV BLD AUTO: 10.5 FL (ref 7.5–11.1)
POTASSIUM SERPL-SCNC: 4.7 MMOL/L (ref 3.5–5.1)
POTASSIUM SERPL-SCNC: 4.7 MMOL/L (ref 3.5–5.1)
PROT/CREAT RATIO, UR: 3.6
PROTEIN UA: >300 MG/DL
RBC # BLD: 3.73 M/CU MM (ref 4.2–5.4)
RBC URINE: 2 /HPF (ref 0–6)
SEGMENTED NEUTROPHILS ABSOLUTE COUNT: 4.2 K/CU MM
SEGMENTED NEUTROPHILS RELATIVE PERCENT: 70.9 % (ref 36–66)
SODIUM BLD-SCNC: 137 MMOL/L (ref 135–145)
SODIUM BLD-SCNC: 137 MMOL/L (ref 135–145)
SPECIFIC GRAVITY UA: 1.02 (ref 1–1.03)
SQUAMOUS EPITHELIAL: <1 /HPF
TOTAL IMMATURE NEUTOROPHIL: 0.03 K/CU MM
TOTAL NUCLEATED RBC: 0 K/CU MM
TOTAL PROTEIN: 7.9 GM/DL (ref 6.4–8.2)
TOTAL PROTEIN: 8.3 GM/DL (ref 6.4–8.2)
TRICHOMONAS: NORMAL /HPF
TRIGL SERPL-MCNC: 156 MG/DL
URINE TOTAL PROTEIN: 480.3 MG/DL
UROBILINOGEN, URINE: 1 MG/DL (ref 0.2–1)
WBC # BLD: 6 K/CU MM (ref 4–10.5)
WBC UA: <1 /HPF (ref 0–5)

## 2023-08-28 PROCEDURE — 86160 COMPLEMENT ANTIGEN: CPT

## 2023-08-28 PROCEDURE — 36415 COLL VENOUS BLD VENIPUNCTURE: CPT

## 2023-08-28 PROCEDURE — 93306 TTE W/DOPPLER COMPLETE: CPT | Performed by: INTERNAL MEDICINE

## 2023-08-28 PROCEDURE — 83036 HEMOGLOBIN GLYCOSYLATED A1C: CPT

## 2023-08-28 PROCEDURE — 84156 ASSAY OF PROTEIN URINE: CPT

## 2023-08-28 PROCEDURE — 82040 ASSAY OF SERUM ALBUMIN: CPT

## 2023-08-28 PROCEDURE — 81001 URINALYSIS AUTO W/SCOPE: CPT

## 2023-08-28 PROCEDURE — 80053 COMPREHEN METABOLIC PANEL: CPT

## 2023-08-28 PROCEDURE — 80061 LIPID PANEL: CPT

## 2023-08-28 PROCEDURE — 83735 ASSAY OF MAGNESIUM: CPT

## 2023-08-28 PROCEDURE — 82570 ASSAY OF URINE CREATININE: CPT

## 2023-08-28 PROCEDURE — 84100 ASSAY OF PHOSPHORUS: CPT

## 2023-08-28 PROCEDURE — 85025 COMPLETE CBC W/AUTO DIFF WBC: CPT

## 2023-08-28 PROCEDURE — 82248 BILIRUBIN DIRECT: CPT

## 2023-08-28 NOTE — TELEPHONE ENCOUNTER
Summary   Left ventricular function is severely abnormal , EF is estimated at 30-35%. Left ventricle size is normal.   Slight hypokineses of the anteroseptal wall. Mild left ventricular hypertrophy. Grade II diastolic dysfunction. Mildly dilated left atrium. Thickening of mitral valve leaflets is noted. Severe eccentric mitral regurgitation with pulmonary venous flow reversal   noted. Moderate tricuspid regurgitation. Severe Pulmonary Hypertension with RVSP of 60mmHg. Inferior vena cava is dilated, measuring at 2.4 cm, and does not collapse   with respiration. No evidence of pericardial effusion. When this echo is compared with the echo from 1/23, no significant interval   changes have occurred. Spoke to pt regarding results. Patient advised and voices understanding.

## 2023-08-31 LAB
C3 SERPL-MCNC: 112 MG/DL (ref 90–180)
C4 SERPL-MCNC: 19 MG/DL (ref 10–40)

## 2023-09-08 PROBLEM — E11.9 TYPE 2 DIABETES MELLITUS WITHOUT COMPLICATION, WITHOUT LONG-TERM CURRENT USE OF INSULIN (HCC): Status: ACTIVE | Noted: 2023-09-08

## 2023-09-12 ENCOUNTER — HOSPITAL ENCOUNTER (EMERGENCY)
Age: 58
Discharge: HOME | End: 2023-09-12
Payer: COMMERCIAL

## 2023-09-12 VITALS
RESPIRATION RATE: 18 BRPM | TEMPERATURE: 98 F | HEART RATE: 99 BPM | DIASTOLIC BLOOD PRESSURE: 102 MMHG | SYSTOLIC BLOOD PRESSURE: 154 MMHG | OXYGEN SATURATION: 99 %

## 2023-09-12 DIAGNOSIS — L50.9 URTICARIA: ICD-10-CM

## 2023-09-12 DIAGNOSIS — T78.40XA ALLERGIC REACTION, INITIAL ENCOUNTER: Primary | ICD-10-CM

## 2023-09-12 PROCEDURE — 99283 EMERGENCY DEPT VISIT LOW MDM: CPT

## 2023-09-12 RX ORDER — PREDNISONE 20 MG/1
20 TABLET ORAL 2 TIMES DAILY
Qty: 10 TABLET | Refills: 0 | Status: SHIPPED | OUTPATIENT
Start: 2023-09-12 | End: 2023-09-17

## 2023-09-12 RX ORDER — LORATADINE 10 MG/1
10 TABLET ORAL DAILY
Qty: 10 TABLET | Refills: 0 | Status: SHIPPED | OUTPATIENT
Start: 2023-09-12 | End: 2023-09-22

## 2023-09-12 NOTE — ED PROVIDER NOTES
[facility] [facility] ENCOUNTER        Pt Name: Naseem Capps  MRN: [id number]  [address] [facility] 1965  Date of evaluation: 9/12/2023  Provider: YNES Gore - ALLYSSA  PCP: Ellie GAN. I have evaluated this patient. Triage CHIEF COMPLAINT       Chief Complaint   Patient presents with    Facial Swelling     Complains of swelling to face for the past 2 days. HISTORY OF PRESENT ILLNESS      Chief Complaint: facial swelling, rash    Naseem Capps is a 61 y.o. female who presents for evaluation of generalized facial swelling and rash after her face was sprayed with some chemical insecticides several days ago accidentally. She reports itching and some mild discomfort around her eyes where there is swelling. She is not having any difficulty with vision, no oropharyngeal swelling, difficulty breathing or swallowing. She has had a similar reaction in the past with exposure as well. She did take some Benadryl at home with some relief of the itching. Nursing Notes were all reviewed and agreed with or any disagreements were addressed in the HPI. REVIEW OF SYSTEMS     Pertinent ROS as noted in HPI.       PAST MEDICAL HISTORY     Past Medical History:   Diagnosis Date    Acute bilateral deep vein thrombosis (DVT) of femoral veins ([address]) 3/15/2022    Acute on chronic systolic congestive heart failure ([address]) 3/16/2022    MELANY (acute kidney injury) ([address]) 3/22/2022    CAD (coronary artery disease)     Diabetes mellitus ([address])     Hepatitis C     HFrEF (heart failure with reduced ejection fraction) ([address])     Schizophrenia ([address])        SURGICAL HISTORY     Past Surgical History:   Procedure Laterality Date    CHOLECYSTECTOMY      HYSTERECTOMY (CERVIX STATUS UNKNOWN)         CURRENTMEDICATIONS       Previous Medications    ABILIFY MAINTENA 400 MG SRER    every 30 days    EMPAGLIFLOZIN (JARDIANCE) 10 MG TABLET

## 2023-09-13 ENCOUNTER — TELEPHONE (OUTPATIENT)
Dept: CARDIOLOGY CLINIC | Age: 58
End: 2023-09-13

## 2023-10-16 RX ORDER — FUROSEMIDE 20 MG/1
40 TABLET ORAL 2 TIMES DAILY
Qty: 180 TABLET | Refills: 1 | Status: SHIPPED | OUTPATIENT
Start: 2023-10-16

## 2023-10-16 RX ORDER — LISINOPRIL 40 MG/1
40 TABLET ORAL DAILY
Qty: 90 TABLET | Refills: 1 | Status: SHIPPED | OUTPATIENT
Start: 2023-10-16

## 2023-11-14 ENCOUNTER — TELEPHONE (OUTPATIENT)
Dept: CARDIOLOGY CLINIC | Age: 58
End: 2023-11-14

## 2023-11-14 RX ORDER — FUROSEMIDE 20 MG/1
40 TABLET ORAL 2 TIMES DAILY
Qty: 180 TABLET | Refills: 3 | OUTPATIENT
Start: 2023-11-14

## 2023-11-14 NOTE — TELEPHONE ENCOUNTER
Patient is  swelling. Wanted to see if there was something else she can take. Patient feels Lasix's are not working. Please advise.

## 2023-11-14 NOTE — TELEPHONE ENCOUNTER
Pt states her ankles are swelling and feels she needs something stronger than Lasix. Has been elevating feet at night, not wearing compression stockings.

## 2023-11-15 DIAGNOSIS — I34.0 NONRHEUMATIC MITRAL VALVE REGURGITATION: Primary | ICD-10-CM

## 2023-11-15 RX ORDER — TORSEMIDE 20 MG/1
40 TABLET ORAL DAILY
Qty: 30 TABLET | Refills: 3 | Status: SHIPPED | OUTPATIENT
Start: 2023-11-15

## 2023-11-15 RX ORDER — POTASSIUM CHLORIDE 20 MEQ/1
40 TABLET, EXTENDED RELEASE ORAL DAILY
Qty: 180 TABLET | Refills: 1 | Status: SHIPPED | OUTPATIENT
Start: 2023-11-15

## 2023-11-15 NOTE — TELEPHONE ENCOUNTER
Spoke to pt, gave instructions as given by Dr Christopher Self, pt could not schedule before her appt on 11/30 she has to call a ride service and this would not be enough time.

## 2023-11-21 ENCOUNTER — HOSPITAL ENCOUNTER (OUTPATIENT)
Age: 58
Discharge: HOME | End: 2023-11-21
Payer: COMMERCIAL

## 2023-11-21 DIAGNOSIS — R80.1 PERSISTENT PROTEINURIA: ICD-10-CM

## 2023-11-21 DIAGNOSIS — I10 PRIMARY HYPERTENSION: ICD-10-CM

## 2023-11-21 DIAGNOSIS — I34.0 NONRHEUMATIC MITRAL VALVE REGURGITATION: ICD-10-CM

## 2023-11-21 DIAGNOSIS — I42.8 NICM (NONISCHEMIC CARDIOMYOPATHY) (HCC): ICD-10-CM

## 2023-11-21 DIAGNOSIS — R31.21 ASYMPTOMATIC MICROSCOPIC HEMATURIA: ICD-10-CM

## 2023-11-21 DIAGNOSIS — E11.9 TYPE 2 DIABETES MELLITUS WITHOUT COMPLICATION, WITHOUT LONG-TERM CURRENT USE OF INSULIN (HCC): ICD-10-CM

## 2023-11-21 DIAGNOSIS — I26.99 OTHER PULMONARY EMBOLISM WITHOUT ACUTE COR PULMONALE, UNSPECIFIED CHRONICITY (HCC): ICD-10-CM

## 2023-11-21 DIAGNOSIS — B18.2 CHRONIC HEPATITIS C WITHOUT HEPATIC COMA (HCC): ICD-10-CM

## 2023-11-21 LAB
ALBUMIN SERPL-MCNC: 3.7 GM/DL (ref 3.4–5)
ANION GAP SERPL CALCULATED.3IONS-SCNC: 11 MMOL/L (ref 4–16)
BACTERIA: ABNORMAL /HPF
BASOPHILS ABSOLUTE: 0 K/CU MM
BASOPHILS RELATIVE PERCENT: 0.7 % (ref 0–1)
BILIRUBIN URINE: NEGATIVE MG/DL
BLOOD, URINE: ABNORMAL
BUN SERPL-MCNC: 38 MG/DL (ref 6–23)
CALCIUM SERPL-MCNC: 9.8 MG/DL (ref 8.3–10.6)
CHLORIDE BLD-SCNC: 102 MMOL/L (ref 99–110)
CLARITY: CLEAR
CO2: 25 MMOL/L (ref 21–32)
COLOR: YELLOW
CREAT SERPL-MCNC: 2.5 MG/DL (ref 0.6–1.1)
CREATININE URINE: 248.9 MG/DL (ref 28–217)
DIFFERENTIAL TYPE: ABNORMAL
EOSINOPHILS ABSOLUTE: 0.2 K/CU MM
EOSINOPHILS RELATIVE PERCENT: 3 % (ref 0–3)
GFR SERPL CREATININE-BSD FRML MDRD: 22 ML/MIN/1.73M2
GLUCOSE SERPL-MCNC: 161 MG/DL (ref 70–99)
GLUCOSE, URINE: NEGATIVE MG/DL
HCT VFR BLD CALC: 39.6 % (ref 37–47)
HEMOGLOBIN: 13 GM/DL (ref 12.5–16)
HYALINE CASTS: 5 /LPF
IMMATURE NEUTROPHIL %: 0.3 % (ref 0–0.43)
KETONES, URINE: NEGATIVE MG/DL
LEUKOCYTE ESTERASE, URINE: NEGATIVE
LYMPHOCYTES ABSOLUTE: 1.3 K/CU MM
LYMPHOCYTES RELATIVE PERCENT: 21.1 % (ref 24–44)
MAGNESIUM: 1.8 MG/DL (ref 1.8–2.4)
MCH RBC QN AUTO: 31.4 PG (ref 27–31)
MCHC RBC AUTO-ENTMCNC: 32.8 % (ref 32–36)
MCV RBC AUTO: 95.7 FL (ref 78–100)
MONOCYTES ABSOLUTE: 0.5 K/CU MM
MONOCYTES RELATIVE PERCENT: 7.6 % (ref 0–4)
NITRITE URINE, QUANTITATIVE: NEGATIVE
NUCLEATED RBC %: 0 %
PDW BLD-RTO: 12.3 % (ref 11.7–14.9)
PH, URINE: 6 (ref 5–8)
PHOSPHORUS: 4.1 MG/DL (ref 2.5–4.9)
PLATELET # BLD: 154 K/CU MM (ref 140–440)
PMV BLD AUTO: 10.7 FL (ref 7.5–11.1)
POTASSIUM SERPL-SCNC: 4.8 MMOL/L (ref 3.5–5.1)
PROT/CREAT RATIO, UR: ABNORMAL
PROTEIN UA: >300 MG/DL
RBC # BLD: 4.14 M/CU MM (ref 4.2–5.4)
RBC URINE: 3 /HPF (ref 0–6)
SEGMENTED NEUTROPHILS ABSOLUTE COUNT: 4.1 K/CU MM
SEGMENTED NEUTROPHILS RELATIVE PERCENT: 67.3 % (ref 36–66)
SODIUM BLD-SCNC: 138 MMOL/L (ref 135–145)
SPECIFIC GRAVITY UA: 1.02 (ref 1–1.03)
SQUAMOUS EPITHELIAL: 12 /HPF
TOTAL IMMATURE NEUTOROPHIL: 0.02 K/CU MM
TOTAL NUCLEATED RBC: 0 K/CU MM
TRICHOMONAS: ABNORMAL /HPF
URINE TOTAL PROTEIN: >600 MG/DL
UROBILINOGEN, URINE: 0.2 MG/DL (ref 0.2–1)
WBC # BLD: 6 K/CU MM (ref 4–10.5)
WBC UA: 4 /HPF (ref 0–5)

## 2023-11-21 PROCEDURE — 84156 ASSAY OF PROTEIN URINE: CPT

## 2023-11-21 PROCEDURE — 81001 URINALYSIS AUTO W/SCOPE: CPT

## 2023-11-21 PROCEDURE — 80048 BASIC METABOLIC PNL TOTAL CA: CPT

## 2023-11-21 PROCEDURE — 84100 ASSAY OF PHOSPHORUS: CPT

## 2023-11-21 PROCEDURE — 85025 COMPLETE CBC W/AUTO DIFF WBC: CPT

## 2023-11-21 PROCEDURE — 83735 ASSAY OF MAGNESIUM: CPT

## 2023-11-21 PROCEDURE — 36415 COLL VENOUS BLD VENIPUNCTURE: CPT

## 2023-11-21 PROCEDURE — 82040 ASSAY OF SERUM ALBUMIN: CPT

## 2023-11-21 PROCEDURE — 82570 ASSAY OF URINE CREATININE: CPT

## 2023-11-30 ENCOUNTER — OFFICE VISIT (OUTPATIENT)
Dept: CARDIOLOGY CLINIC | Age: 58
End: 2023-11-30
Payer: COMMERCIAL

## 2023-11-30 VITALS
SYSTOLIC BLOOD PRESSURE: 156 MMHG | DIASTOLIC BLOOD PRESSURE: 86 MMHG | OXYGEN SATURATION: 99 % | HEIGHT: 62 IN | BODY MASS INDEX: 26.39 KG/M2 | WEIGHT: 143.4 LBS | HEART RATE: 86 BPM

## 2023-11-30 DIAGNOSIS — I26.99 OTHER PULMONARY EMBOLISM WITHOUT ACUTE COR PULMONALE, UNSPECIFIED CHRONICITY (HCC): ICD-10-CM

## 2023-11-30 DIAGNOSIS — F17.200 SMOKING: ICD-10-CM

## 2023-11-30 DIAGNOSIS — E11.9 TYPE 2 DIABETES MELLITUS WITHOUT COMPLICATION, WITH LONG-TERM CURRENT USE OF INSULIN (HCC): ICD-10-CM

## 2023-11-30 DIAGNOSIS — I50.42 CHRONIC COMBINED SYSTOLIC AND DIASTOLIC CONGESTIVE HEART FAILURE (HCC): ICD-10-CM

## 2023-11-30 DIAGNOSIS — I42.8 NICM (NONISCHEMIC CARDIOMYOPATHY) (HCC): ICD-10-CM

## 2023-11-30 DIAGNOSIS — N18.32 STAGE 3B CHRONIC KIDNEY DISEASE (HCC): ICD-10-CM

## 2023-11-30 DIAGNOSIS — I34.0 NONRHEUMATIC MITRAL VALVE REGURGITATION: Primary | ICD-10-CM

## 2023-11-30 DIAGNOSIS — I10 PRIMARY HYPERTENSION: ICD-10-CM

## 2023-11-30 DIAGNOSIS — Z79.4 TYPE 2 DIABETES MELLITUS WITHOUT COMPLICATION, WITH LONG-TERM CURRENT USE OF INSULIN (HCC): ICD-10-CM

## 2023-11-30 PROCEDURE — G8419 CALC BMI OUT NRM PARAM NOF/U: HCPCS | Performed by: NURSE PRACTITIONER

## 2023-11-30 PROCEDURE — 3017F COLORECTAL CA SCREEN DOC REV: CPT | Performed by: NURSE PRACTITIONER

## 2023-11-30 PROCEDURE — 3079F DIAST BP 80-89 MM HG: CPT | Performed by: NURSE PRACTITIONER

## 2023-11-30 PROCEDURE — 2022F DILAT RTA XM EVC RTNOPTHY: CPT | Performed by: NURSE PRACTITIONER

## 2023-11-30 PROCEDURE — 93000 ELECTROCARDIOGRAM COMPLETE: CPT | Performed by: NURSE PRACTITIONER

## 2023-11-30 PROCEDURE — G8484 FLU IMMUNIZE NO ADMIN: HCPCS | Performed by: NURSE PRACTITIONER

## 2023-11-30 PROCEDURE — 1036F TOBACCO NON-USER: CPT | Performed by: NURSE PRACTITIONER

## 2023-11-30 PROCEDURE — 3077F SYST BP >= 140 MM HG: CPT | Performed by: NURSE PRACTITIONER

## 2023-11-30 PROCEDURE — 99214 OFFICE O/P EST MOD 30 MIN: CPT | Performed by: NURSE PRACTITIONER

## 2023-11-30 PROCEDURE — 3044F HG A1C LEVEL LT 7.0%: CPT | Performed by: NURSE PRACTITIONER

## 2023-11-30 PROCEDURE — G8427 DOCREV CUR MEDS BY ELIG CLIN: HCPCS | Performed by: NURSE PRACTITIONER

## 2023-11-30 RX ORDER — TORSEMIDE 20 MG/1
40 TABLET ORAL DAILY
Qty: 180 TABLET | Refills: 3 | Status: SHIPPED | OUTPATIENT
Start: 2023-11-30

## 2023-11-30 ASSESSMENT — ENCOUNTER SYMPTOMS
COUGH: 0
SHORTNESS OF BREATH: 0

## 2023-11-30 NOTE — PROGRESS NOTES
CARDIOLOGY  NOTE    2023    Christiana Zaragoza (:  1965) is a 61 y.o. female,an established patient with Dr. Callie Lynch, here for evaluation of the following chief complaint(s):  3 Month Follow-Up (Pt denies cardiac symptoms/)        SUBJECTIVE/OBJECTIVE:    HPI  Dominic Pierce has Past medical history as noted below. Dominic Pierce has had more swelling and we increased her torsemide. She is terrified about possibly having procedure and possibility of needing surgery. She was admitted with heart failure in 2023 and bradycardia with low bp, she has severe mitral regurgitation but she did not want to get mat or any   She just restarted lasix and feels her ankles are still swollen    Dominic Pierce says her breathing is better   She has CHF due to severe non ischemic cardiomyopathy  with EF of 30 -35 %  she is taking her meds but aldactone was stopped due to hyperkalemia  Sees nephrology for CKD ( Dr Preethi Gomez)  She is here with her  she has history of schizophrenia and hepatitis C and was found to have bilateral lower extremity swelling work-up revealed bilateral extensive DVT of both lower extremity she was evaluated by hematology oncology and currently hypercoagulable work-up , she is on xarelto 20 mg daily . Her echo shows severe cardiomyopathy  With EF of 30- 35% and hypokinesis of the anteroseptal wall with eccentric severe  mitral regurgitation. Cardiac cath revealed no significant obstructive coronary artery disease she was started on guideline recommended medical therapy but because of low blood pressures she had difficulty tolerating them. She is feeling much better now. Currently taking 20 mg of Lasix and Xarelto 20 mg daily   She says she smokes several cigars a day but is trying to cut down    Review of Systems   Constitutional:  Negative for fatigue and fever. Respiratory:  Negative for cough and shortness of breath.     Cardiovascular:  Negative for chest pain, palpitations and

## 2023-11-30 NOTE — PATIENT INSTRUCTIONS
Please be informed that if you contact our office outside of normal business hours the physician on call cannot help with any scheduling or rescheduling issues, procedure instruction questions or any type of medication issue. We advise you for any urgent/emergency that you go to the nearest emergency room! PLEASE CALL OUR OFFICE DURING NORMAL BUSINESS HOURS    Monday - Friday   8 am to 5 pm    [place]: [address] [place]: [phone]    [place]:  [phone]    **It is YOUR responsibilty to bring medication bottles and/or updated medication list to [facility]. This will allow us to better serve you and all your healthcare needs**  Thank you for allowing us to care for you today! We want to ensure we can follow your treatment plan and we strive to give you the best outcomes and experience possible. If you ever have a life threatening emergency and call 911 - for an ambulance (EMS)   Our providers can only care for you at:   [facility] or [facility]. Even if you have someone take you or you drive yourself we can only care for you in a [facility] facility. Our providers are not setup at the other healthcare locations! [facility] Laboratory Locations - No appointment necessary. Sites open Monday to Friday. Call your preferred location for test preparation, business   hours and other information you need. [employer] accepts [employer]. [address] [place] [address]. [address] [place], [facility]  Phone: [phone]       We are committed to providing you the best care possible. If you receive a survey after visiting one of our offices, please take time to share your experience concerning your physician office visit. These surveys are confidential and no health information about you is shared. We are eager to improve for you and we are counting on your feedback to help make that happen.

## 2023-12-13 PROBLEM — N18.4 CHRONIC KIDNEY DISEASE, STAGE IV (SEVERE) (HCC): Status: ACTIVE | Noted: 2023-12-13

## 2024-01-08 ENCOUNTER — OFFICE VISIT (OUTPATIENT)
Dept: CARDIOLOGY CLINIC | Age: 59
End: 2024-01-08
Payer: COMMERCIAL

## 2024-01-08 VITALS
HEART RATE: 97 BPM | BODY MASS INDEX: 26.87 KG/M2 | DIASTOLIC BLOOD PRESSURE: 86 MMHG | SYSTOLIC BLOOD PRESSURE: 152 MMHG | OXYGEN SATURATION: 98 % | HEIGHT: 62 IN | WEIGHT: 146 LBS

## 2024-01-08 DIAGNOSIS — I10 PRIMARY HYPERTENSION: ICD-10-CM

## 2024-01-08 DIAGNOSIS — F17.200 SMOKING: ICD-10-CM

## 2024-01-08 DIAGNOSIS — I50.42 CHRONIC COMBINED SYSTOLIC AND DIASTOLIC CONGESTIVE HEART FAILURE (HCC): ICD-10-CM

## 2024-01-08 DIAGNOSIS — I42.8 NICM (NONISCHEMIC CARDIOMYOPATHY) (HCC): ICD-10-CM

## 2024-01-08 DIAGNOSIS — I34.0 NONRHEUMATIC MITRAL VALVE REGURGITATION: Primary | ICD-10-CM

## 2024-01-08 DIAGNOSIS — I26.99 OTHER PULMONARY EMBOLISM WITHOUT ACUTE COR PULMONALE, UNSPECIFIED CHRONICITY (HCC): ICD-10-CM

## 2024-01-08 DIAGNOSIS — E11.9 TYPE 2 DIABETES MELLITUS WITHOUT COMPLICATION, WITHOUT LONG-TERM CURRENT USE OF INSULIN (HCC): ICD-10-CM

## 2024-01-08 DIAGNOSIS — N18.32 STAGE 3B CHRONIC KIDNEY DISEASE (HCC): ICD-10-CM

## 2024-01-08 DIAGNOSIS — B18.2 CHRONIC HEPATITIS C WITHOUT HEPATIC COMA (HCC): ICD-10-CM

## 2024-01-08 PROCEDURE — 3079F DIAST BP 80-89 MM HG: CPT | Performed by: NURSE PRACTITIONER

## 2024-01-08 PROCEDURE — 99214 OFFICE O/P EST MOD 30 MIN: CPT | Performed by: NURSE PRACTITIONER

## 2024-01-08 PROCEDURE — 3046F HEMOGLOBIN A1C LEVEL >9.0%: CPT | Performed by: NURSE PRACTITIONER

## 2024-01-08 PROCEDURE — G8427 DOCREV CUR MEDS BY ELIG CLIN: HCPCS | Performed by: NURSE PRACTITIONER

## 2024-01-08 PROCEDURE — 3077F SYST BP >= 140 MM HG: CPT | Performed by: NURSE PRACTITIONER

## 2024-01-08 PROCEDURE — 3017F COLORECTAL CA SCREEN DOC REV: CPT | Performed by: NURSE PRACTITIONER

## 2024-01-08 PROCEDURE — G8484 FLU IMMUNIZE NO ADMIN: HCPCS | Performed by: NURSE PRACTITIONER

## 2024-01-08 PROCEDURE — 1036F TOBACCO NON-USER: CPT | Performed by: NURSE PRACTITIONER

## 2024-01-08 PROCEDURE — G8419 CALC BMI OUT NRM PARAM NOF/U: HCPCS | Performed by: NURSE PRACTITIONER

## 2024-01-08 PROCEDURE — 2022F DILAT RTA XM EVC RTNOPTHY: CPT | Performed by: NURSE PRACTITIONER

## 2024-01-08 RX ORDER — TORSEMIDE 20 MG/1
40 TABLET ORAL DAILY
Qty: 180 TABLET | Refills: 3 | Status: SHIPPED | OUTPATIENT
Start: 2024-01-08

## 2024-01-08 RX ORDER — LISINOPRIL 40 MG/1
40 TABLET ORAL DAILY
Qty: 90 TABLET | Refills: 3 | Status: SHIPPED | OUTPATIENT
Start: 2024-01-08

## 2024-01-08 RX ORDER — METOPROLOL SUCCINATE 50 MG/1
50 TABLET, EXTENDED RELEASE ORAL DAILY
Qty: 90 TABLET | Refills: 3 | Status: SHIPPED | OUTPATIENT
Start: 2024-01-08

## 2024-01-08 NOTE — PROGRESS NOTES
CARDIOLOGY  NOTE    2024    Deb Urbano (:  1965) is a 58 y.o. female,an established patient with Dr. Chang, here for evaluation of the following chief complaint(s):  1 Month Follow-Up (Pt denies any cardiac symptoms)        SUBJECTIVE/OBJECTIVE:    MANUEL Boyd has Past medical history as noted below.    She states that she is feeling well. She is very anxious about her VHD and she does not want to do RAYA right now. She states that she is too busy to do any testing currently.     She was admitted with heart failure in 2023 and bradycardia with low bp,   She has CHF due to severe non ischemic cardiomyopathy  with EF of 30 -35 %  she is taking her meds but aldactone was stopped due to hyperkalemia  Sees nephrology for CKD ( Dr Mcdonnell)  She has history of schizophrenia and hepatitis C and was found to have bilateral lower extremity swelling work-up revealed bilateral extensive DVT of both lower extremity she was evaluated by hematology oncology and currently hypercoagulable work-up.  With EF of 30- 35% and hypokinesis of the anteroseptal wall with eccentric severe  mitral regurgitation.  Cardiac cath revealed no significant obstructive coronary artery disease     She says she smokes several cigars a day but is trying to cut down    Review of Systems   Constitutional:  Negative for fatigue and fever.   Respiratory:  Negative for cough and shortness of breath.    Cardiovascular:  Negative for chest pain, palpitations and leg swelling.   Musculoskeletal:  Negative for arthralgias and gait problem.   Neurological:  Negative for dizziness, syncope, weakness, light-headedness and headaches.       Vitals:    24 1413 24 1418   BP: (!) 156/92 (!) 152/86   Site: Left Upper Arm Right Upper Arm   Position: Sitting Sitting   Cuff Size: Medium Adult Medium Adult   Pulse: 97    SpO2: 98%    Weight: 66.2 kg (146 lb)    Height: 1.575 m (5' 2\")        Wt Readings from Last 3 Encounters:

## 2024-01-08 NOTE — PATIENT INSTRUCTIONS
Please be informed that if you contact our office outside of normal business hours the physician on call cannot help with any scheduling or rescheduling issues, procedure instruction questions or any type of medication issue.    We advise you for any urgent/emergency that you go to the nearest emergency room!    PLEASE CALL OUR OFFICE DURING NORMAL BUSINESS HOURS    Monday - Friday   8 am to 5 pm    [place]: [phone]    [place]: [phone]    [place]:  [phone]    **It is YOUR responsibilty to bring medication bottles and/or updated medication list to EACH APPOINTMENT. This will allow us to better serve you and all your healthcare needs**    Thank you for allowing us to care for you today!   We want to ensure we can follow your treatment plan and we strive to give you the best outcomes and experience possible.   If you ever have a life threatening emergency and call 911 - for an ambulance (EMS)   Our providers can only care for you at:   [facility] or [facility].   Even if you have someone take you or you drive yourself we can only care for you in a [facility] facility. Our providers are not setup at the other healthcare locations!     We are committed to providing you the best care possible.    If you receive a survey after visiting one of our offices, please take time to share your experience concerning your physician office visit.  These surveys are confidential and no health information about you is shared.    We are eager to improve for you and we are counting on your feedback to help make that happen.